# Patient Record
Sex: FEMALE | Race: WHITE | Employment: OTHER | URBAN - METROPOLITAN AREA
[De-identification: names, ages, dates, MRNs, and addresses within clinical notes are randomized per-mention and may not be internally consistent; named-entity substitution may affect disease eponyms.]

---

## 2019-01-25 ENCOUNTER — HOSPITAL ENCOUNTER (OUTPATIENT)
Age: 64
Discharge: HOME | End: 2019-01-27
Payer: COMMERCIAL

## 2019-01-25 ENCOUNTER — HOSPITAL ENCOUNTER (OUTPATIENT)
Dept: GENERAL RADIOLOGY | Age: 64
Discharge: HOME | End: 2019-01-27
Payer: COMMERCIAL

## 2019-01-25 ENCOUNTER — HOSPITAL ENCOUNTER (OUTPATIENT)
Dept: MAMMOGRAPHY | Age: 64
Discharge: HOME | End: 2019-01-27
Payer: COMMERCIAL

## 2019-01-25 ENCOUNTER — HOSPITAL ENCOUNTER (OUTPATIENT)
Dept: ULTRASOUND IMAGING | Age: 64
End: 2019-01-25
Payer: COMMERCIAL

## 2019-01-25 DIAGNOSIS — C50.911 MALIGNANT NEOPLASM OF RIGHT FEMALE BREAST, UNSPECIFIED ESTROGEN RECEPTOR STATUS, UNSPECIFIED SITE OF BREAST (HCC): ICD-10-CM

## 2019-01-25 PROCEDURE — 77065 DX MAMMO INCL CAD UNI: CPT

## 2019-01-25 PROCEDURE — 71046 X-RAY EXAM CHEST 2 VIEWS: CPT

## 2020-02-14 ENCOUNTER — HOSPITAL ENCOUNTER (OUTPATIENT)
Dept: MAMMOGRAPHY | Age: 65
Discharge: HOME | End: 2020-02-16
Payer: COMMERCIAL

## 2020-02-14 PROCEDURE — 77067 SCR MAMMO BI INCL CAD: CPT

## 2021-03-04 ENCOUNTER — HOSPITAL ENCOUNTER (OUTPATIENT)
Dept: MAMMOGRAPHY | Age: 66
Discharge: HOME | End: 2021-03-06
Payer: MEDICARE

## 2021-03-04 DIAGNOSIS — M65.9 SAPHO SYNDROME (HCC): ICD-10-CM

## 2021-03-04 DIAGNOSIS — L40.3 SAPHO SYNDROME (HCC): ICD-10-CM

## 2021-03-04 DIAGNOSIS — M85.80 SAPHO SYNDROME (HCC): ICD-10-CM

## 2021-03-04 DIAGNOSIS — L70.9 SAPHO SYNDROME (HCC): ICD-10-CM

## 2021-03-04 DIAGNOSIS — N95.9 MENOPAUSAL PROBLEM: ICD-10-CM

## 2021-03-04 DIAGNOSIS — M86.9 SAPHO SYNDROME (HCC): ICD-10-CM

## 2021-03-04 DIAGNOSIS — M85.80 OTHER SPECIFIED DISORDERS OF BONE DENSITY AND STRUCTURE, UNSPECIFIED SITE: ICD-10-CM

## 2021-03-04 DIAGNOSIS — N95.9 UNSPECIFIED MENOPAUSAL AND PERIMENOPAUSAL DISORDER: ICD-10-CM

## 2021-03-04 PROCEDURE — 77080 DXA BONE DENSITY AXIAL: CPT

## 2021-04-05 ENCOUNTER — HOSPITAL ENCOUNTER (OUTPATIENT)
Age: 66
Discharge: HOME | End: 2021-04-05
Payer: MEDICARE

## 2021-04-05 ENCOUNTER — HOSPITAL ENCOUNTER (OUTPATIENT)
Dept: MAMMOGRAPHY | Age: 66
Discharge: HOME | End: 2021-04-07
Payer: MEDICARE

## 2021-04-05 DIAGNOSIS — Z12.31 ENCOUNTER FOR SCREENING MAMMOGRAM FOR MALIGNANT NEOPLASM OF BREAST: ICD-10-CM

## 2021-04-05 DIAGNOSIS — Z12.31 OTHER SCREENING MAMMOGRAM: ICD-10-CM

## 2021-04-05 LAB
ALBUMIN SERPL-MCNC: 4.4 G/DL (ref 3.5–5.2)
ALP BLD-CCNC: 91 U/L (ref 35–104)
ALT SERPL-CCNC: 13 U/L (ref 0–32)
ANION GAP SERPL CALCULATED.3IONS-SCNC: 10 MMOL/L (ref 7–16)
AST SERPL-CCNC: 13 U/L (ref 0–31)
BASOPHILS ABSOLUTE: 0.05 E9/L (ref 0–0.2)
BASOPHILS RELATIVE PERCENT: 1 % (ref 0–2)
BILIRUB SERPL-MCNC: 0.3 MG/DL (ref 0–1.2)
BUN BLDV-MCNC: 24 MG/DL (ref 8–23)
CALCIUM SERPL-MCNC: 9.6 MG/DL (ref 8.6–10.2)
CHLORIDE BLD-SCNC: 106 MMOL/L (ref 98–107)
CO2: 25 MMOL/L (ref 22–29)
CREAT SERPL-MCNC: 1.1 MG/DL (ref 0.5–1)
EOSINOPHILS ABSOLUTE: 0.06 E9/L (ref 0.05–0.5)
EOSINOPHILS RELATIVE PERCENT: 1.2 % (ref 0–6)
GFR AFRICAN AMERICAN: 60
GFR NON-AFRICAN AMERICAN: 49 ML/MIN/1.73
GLUCOSE BLD-MCNC: 133 MG/DL (ref 74–99)
HCT VFR BLD CALC: 43.3 % (ref 34–48)
HEMOGLOBIN: 14.1 G/DL (ref 11.5–15.5)
IMMATURE GRANULOCYTES #: 0.01 E9/L
IMMATURE GRANULOCYTES %: 0.2 % (ref 0–5)
LYMPHOCYTES ABSOLUTE: 1.52 E9/L (ref 1.5–4)
LYMPHOCYTES RELATIVE PERCENT: 29.5 % (ref 20–42)
MCH RBC QN AUTO: 28.4 PG (ref 26–35)
MCHC RBC AUTO-ENTMCNC: 32.6 % (ref 32–34.5)
MCV RBC AUTO: 87.1 FL (ref 80–99.9)
MONOCYTES ABSOLUTE: 0.42 E9/L (ref 0.1–0.95)
MONOCYTES RELATIVE PERCENT: 8.1 % (ref 2–12)
NEUTROPHILS ABSOLUTE: 3.1 E9/L (ref 1.8–7.3)
NEUTROPHILS RELATIVE PERCENT: 60 % (ref 43–80)
PDW BLD-RTO: 12.9 FL (ref 11.5–15)
PLATELET # BLD: 228 E9/L (ref 130–450)
PMV BLD AUTO: 10 FL (ref 7–12)
POTASSIUM SERPL-SCNC: 4.5 MMOL/L (ref 3.5–5)
RBC # BLD: 4.97 E12/L (ref 3.5–5.5)
SODIUM BLD-SCNC: 141 MMOL/L (ref 132–146)
TOTAL PROTEIN: 7.3 G/DL (ref 6.4–8.3)
VITAMIN D 25-HYDROXY: 45 NG/ML (ref 30–100)
WBC # BLD: 5.2 E9/L (ref 4.5–11.5)

## 2021-04-05 PROCEDURE — 82306 VITAMIN D 25 HYDROXY: CPT

## 2021-04-05 PROCEDURE — 80183 DRUG SCRN QUANT OXCARBAZEPIN: CPT

## 2021-04-05 PROCEDURE — 36415 COLL VENOUS BLD VENIPUNCTURE: CPT

## 2021-04-05 PROCEDURE — 77067 SCR MAMMO BI INCL CAD: CPT

## 2021-04-05 PROCEDURE — 85025 COMPLETE CBC W/AUTO DIFF WBC: CPT

## 2021-04-05 PROCEDURE — 80053 COMPREHEN METABOLIC PANEL: CPT

## 2021-04-09 LAB — OXCARBAZEPINE: 25 UG/ML (ref 3–35)

## 2021-09-03 ENCOUNTER — HOSPITAL ENCOUNTER (OUTPATIENT)
Dept: CT IMAGING | Age: 66
Discharge: HOME | End: 2021-09-03
Payer: MEDICARE

## 2021-09-03 DIAGNOSIS — G40.209 PARTIAL SYMPTOMATIC EPILEPSY WITH COMPLEX PARTIAL SEIZURES, NOT INTRACTABLE, WITHOUT STATUS EPILEPTICUS (HCC): ICD-10-CM

## 2021-09-03 DIAGNOSIS — D32.9 MENINGIOMA (HCC): ICD-10-CM

## 2021-09-03 PROCEDURE — 70450 CT HEAD/BRAIN W/O DYE: CPT

## 2021-09-17 LAB
ALBUMIN SERPL-MCNC: 3.9 G/DL (ref 3.5–5.2)
ALP BLD-CCNC: 87 U/L (ref 35–104)
ALT SERPL-CCNC: 15 U/L (ref 0–32)
ANION GAP SERPL CALCULATED.3IONS-SCNC: 11 MMOL/L (ref 7–16)
AST SERPL-CCNC: 17 U/L (ref 0–31)
BASOPHILS ABSOLUTE: 0.06 E9/L (ref 0–0.2)
BASOPHILS RELATIVE PERCENT: 0.9 % (ref 0–2)
BILIRUB SERPL-MCNC: 0.4 MG/DL (ref 0–1.2)
BUN BLDV-MCNC: 24 MG/DL (ref 6–23)
CALCIUM SERPL-MCNC: 9.3 MG/DL (ref 8.6–10.2)
CHLORIDE BLD-SCNC: 101 MMOL/L (ref 98–107)
CO2: 26 MMOL/L (ref 22–29)
CREAT SERPL-MCNC: 1.3 MG/DL (ref 0.5–1)
EOSINOPHILS ABSOLUTE: 0.06 E9/L (ref 0.05–0.5)
EOSINOPHILS RELATIVE PERCENT: 0.9 % (ref 0–6)
GFR AFRICAN AMERICAN: 49
GFR NON-AFRICAN AMERICAN: 41 ML/MIN/1.73
GLUCOSE BLD-MCNC: 113 MG/DL (ref 74–99)
HCT VFR BLD CALC: 42.2 % (ref 34–48)
HEMOGLOBIN: 14 G/DL (ref 11.5–15.5)
LACTIC ACID: 1.5 MMOL/L (ref 0.5–2.2)
LYMPHOCYTES ABSOLUTE: 1.74 E9/L (ref 1.5–4)
LYMPHOCYTES RELATIVE PERCENT: 28.1 % (ref 20–42)
MCH RBC QN AUTO: 28.3 PG (ref 26–35)
MCHC RBC AUTO-ENTMCNC: 33.2 % (ref 32–34.5)
MCV RBC AUTO: 85.4 FL (ref 80–99.9)
MONOCYTES ABSOLUTE: 0.81 E9/L (ref 0.1–0.95)
MONOCYTES RELATIVE PERCENT: 13.2 % (ref 2–12)
NEUTROPHILS ABSOLUTE: 3.53 E9/L (ref 1.8–7.3)
NEUTROPHILS RELATIVE PERCENT: 57 % (ref 43–80)
PDW BLD-RTO: 13.8 FL (ref 11.5–15)
PLATELET # BLD: 223 E9/L (ref 130–450)
PMV BLD AUTO: 9.7 FL (ref 7–12)
POLYCHROMASIA: ABNORMAL
POTASSIUM REFLEX MAGNESIUM: 4.5 MMOL/L (ref 3.5–5)
PRO-BNP: 1992 PG/ML (ref 0–125)
RBC # BLD: 4.94 E12/L (ref 3.5–5.5)
SARS-COV-2, NAAT: NOT DETECTED
SODIUM BLD-SCNC: 138 MMOL/L (ref 132–146)
TOTAL PROTEIN: 7.6 G/DL (ref 6.4–8.3)
TROPONIN, HIGH SENSITIVITY: 18 NG/L (ref 0–9)
WBC # BLD: 6.2 E9/L (ref 4.5–11.5)

## 2021-09-17 PROCEDURE — 99285 EMERGENCY DEPT VISIT HI MDM: CPT

## 2021-09-17 PROCEDURE — 87635 SARS-COV-2 COVID-19 AMP PRB: CPT

## 2021-09-17 PROCEDURE — 93005 ELECTROCARDIOGRAM TRACING: CPT | Performed by: NURSE PRACTITIONER

## 2021-09-17 PROCEDURE — 83880 ASSAY OF NATRIURETIC PEPTIDE: CPT

## 2021-09-17 PROCEDURE — 85025 COMPLETE CBC W/AUTO DIFF WBC: CPT

## 2021-09-17 PROCEDURE — 80053 COMPREHEN METABOLIC PANEL: CPT

## 2021-09-17 PROCEDURE — 84484 ASSAY OF TROPONIN QUANT: CPT

## 2021-09-17 PROCEDURE — 83605 ASSAY OF LACTIC ACID: CPT

## 2021-09-17 NOTE — ED TRIAGE NOTES
FIRST PROVIDER CONTACT ASSESSMENT NOTE                                                                                                Department of Emergency Medicine                                                      First Provider Note  21  5:17 PM EDT  NAME: Telma Butcher  : 1955  MRN: [id number]    Chief Complaint: Cough      History of Present Illness:   Telma Butcher is a 74 y.o. female who presents to the ED for productive cough, dyspnea, and congestion since yesterday. Focused Physical Exam:  VS:    ED Triage Vitals   BP Temp Temp src Pulse Resp SpO2 Height Weight   -- -- -- -- -- -- -- --        General: Alert and in no apparent distress. Medical History:  has no past medical history on file. Surgical History:  has no past surgical history on file. Social History:      Family History: family history is not on file.     Allergies: Betadine [povidone iodine]     Initial Plan of Care:  Initiate Treatment-Testing, Proceed toTreatment Area When Bed Available for ED Attending/MLP to Continue Care    -------------------------------------------------END OF FIRST PROVIDER CONTACT ASSESSMENT NOTE--------------------------------------------------------  Electronically signed by MARLYN Modi CNP   DD: 21

## 2021-09-18 ENCOUNTER — APPOINTMENT (OUTPATIENT)
Dept: GENERAL RADIOLOGY | Age: 66
End: 2021-09-18
Payer: MEDICARE

## 2021-09-18 ENCOUNTER — HOSPITAL ENCOUNTER (EMERGENCY)
Age: 66
Discharge: HOME | End: 2021-09-18
Attending: EMERGENCY MEDICINE
Payer: MEDICARE

## 2021-09-18 VITALS
HEIGHT: 66 IN | DIASTOLIC BLOOD PRESSURE: 59 MMHG | RESPIRATION RATE: 20 BRPM | BODY MASS INDEX: 33.75 KG/M2 | OXYGEN SATURATION: 96 % | WEIGHT: 210 LBS | SYSTOLIC BLOOD PRESSURE: 144 MMHG | TEMPERATURE: 97.9 F | HEART RATE: 87 BPM

## 2021-09-18 DIAGNOSIS — R05.9 COUGH: Primary | ICD-10-CM

## 2021-09-18 LAB
EKG ATRIAL RATE: 100 BPM
EKG ATRIAL RATE: 375 BPM
EKG Q-T INTERVAL: 368 MS
EKG Q-T INTERVAL: 376 MS
EKG QRS DURATION: 88 MS
EKG QRS DURATION: 92 MS
EKG QTC CALCULATION (BAZETT): 452 MS
EKG QTC CALCULATION (BAZETT): 482 MS
EKG R AXIS: 21 DEGREES
EKG R AXIS: 40 DEGREES
EKG T AXIS: 57 DEGREES
EKG T AXIS: 99 DEGREES
EKG VENTRICULAR RATE: 103 BPM
EKG VENTRICULAR RATE: 87 BPM
TROPONIN, HIGH SENSITIVITY: 18 NG/L (ref 0–9)

## 2021-09-18 PROCEDURE — 93005 ELECTROCARDIOGRAM TRACING: CPT | Performed by: EMERGENCY MEDICINE

## 2021-09-18 PROCEDURE — 84484 ASSAY OF TROPONIN QUANT: CPT

## 2021-09-18 PROCEDURE — 71046 X-RAY EXAM CHEST 2 VIEWS: CPT

## 2021-09-18 RX ORDER — BENZONATATE 100 MG/1
100 CAPSULE ORAL 2 TIMES DAILY PRN
Qty: 14 CAPSULE | Refills: 0 | Status: SHIPPED | OUTPATIENT
Start: 2021-09-18 | End: 2021-09-25

## 2021-09-18 NOTE — ED NOTES
Bed: 04  Expected date:   Expected time:   Means of arrival:   Comments:  bev Ruano, DEANGELO  09/18/21 6308

## 2021-09-18 NOTE — ED PROVIDER NOTES
HPI:  9/18/21, Time: 3:37 AM EDT         Odilia Lui is a 74 y.o. female with a history of seizures, hyperlipidemia, atrial fibrillation, breast cancer, mandible cancer presenting to the ED for cough, beginning a week ago. The complaint has been persistent, mild in severity, and worsened by nothing. Patient states that for the last week she has had a nonproductive cough. She denies any fever, chills, myalgias, rhinorrhea, sore throat, chest pain, shortness of breath, nausea, vomiting, diarrhea, constipation. She denies any recent travel, recent surgery, calf pain, calf swelling, history of DVT or PE. Does have a history of cancer. Is not undergoing any chemo or radiation per the patient. ROS:   Pertinent positives and negatives are stated within HPI, all other systems reviewed and are negative.  --------------------------------------------- PAST HISTORY ---------------------------------------------  Past Medical History:  has no past medical history on file. Past Surgical History:  has no past surgical history on file. Social History:      Family History: family history is not on file. The patients home medications have been reviewed. Allergies: Betadine [povidone iodine]    ---------------------------------------------------PHYSICAL EXAM--------------------------------------    Constitutional/General: Alert and oriented x3, well appearing, non toxic in NAD  Head: Normocephalic and atraumatic  Eyes: PERRL, EOMI  Mouth: Oropharynx clear, handling secretions, no trismus  Neck: Supple, full ROM, non tender to palpation in the midline, no stridor, no crepitus, no meningeal signs  Pulmonary: Lungs clear to auscultation bilaterally, no wheezes, rales, or rhonchi. Not in respiratory distress  Cardiovascular:  Regular rate. Regular rhythm. No murmurs, gallops, or rubs. 2+ distal pulses  Chest: no chest wall tenderness  Abdomen: Soft. Non tender. Non distended. +BS.   No rebound, guarding, or rigidity. No pulsatile masses appreciated. Musculoskeletal: Moves all extremities x 4. Warm and well perfused, no clubbing, cyanosis, or edema. Capillary refill <3 seconds  Skin: warm and dry. No rashes. Neurologic: GCS 15, CN 2-12 grossly intact, no focal deficits, symmetric strength 5/5 in the upper and lower extremities bilaterally  Psych: Normal Affect    -------------------------------------------------- RESULTS -------------------------------------------------  I have personally reviewed all laboratory and imaging results for this patient. Results are listed below.      LABS:  Results for orders placed or performed during the hospital encounter of 09/18/21   COVID-19, Rapid    Specimen: Nasopharyngeal Swab   Result Value Ref Range    SARS-CoV-2, NAAT Not Detected Not Detected   CBC Auto Differential   Result Value Ref Range    WBC 6.2 4.5 - 11.5 E9/L    RBC 4.94 3.50 - 5.50 E12/L    Hemoglobin 14.0 11.5 - 15.5 g/dL    Hematocrit 42.2 34.0 - 48.0 %    MCV 85.4 80.0 - 99.9 fL    MCH 28.3 26.0 - 35.0 pg    MCHC 33.2 32.0 - 34.5 %    RDW 13.8 11.5 - 15.0 fL    Platelets 551 090 - 860 E9/L    MPV 9.7 7.0 - 12.0 fL    Neutrophils % 57.0 43.0 - 80.0 %    Lymphocytes % 28.1 20.0 - 42.0 %    Monocytes % 13.2 (H) 2.0 - 12.0 %    Eosinophils % 0.9 0.0 - 6.0 %    Basophils % 0.9 0.0 - 2.0 %    Neutrophils Absolute 3.53 1.80 - 7.30 E9/L    Lymphocytes Absolute 1.74 1.50 - 4.00 E9/L    Monocytes Absolute 0.81 0.10 - 0.95 E9/L    Eosinophils Absolute 0.06 0.05 - 0.50 E9/L    Basophils Absolute 0.06 0.00 - 0.20 E9/L    Polychromasia 1+    Comprehensive Metabolic Panel w/ Reflex to MG   Result Value Ref Range    Sodium 138 132 - 146 mmol/L    Potassium reflex Magnesium 4.5 3.5 - 5.0 mmol/L    Chloride 101 98 - 107 mmol/L    CO2 26 22 - 29 mmol/L    Anion Gap 11 7 - 16 mmol/L    Glucose 113 (H) 74 - 99 mg/dL    BUN 24 (H) 6 - 23 mg/dL    CREATININE 1.3 (H) 0.5 - 1.0 mg/dL    GFR Non-African American 41 >=60 mL/min/1.73    GFR MAKING----------------------  Medications - No data to display          Medical Decision Making:    Vital signs are stable. Physical exam is unremarkable. Lungs are clear to auscultation bilaterally. No calf pain or swelling. Labs and imaging were obtained. Patient's creatinine is 1.3. Troponin is 18. White blood cell count is normal.  Lactic acid is normal.  Covid is not detected. Chest x-ray does not show any acute process. BNP is elevated at 1992. Will repeat the patient's troponin. It was 18. Patient denies chest pain. EKG shows atrial fibrillation. Patient says she has a history of atrial fibrillation. I will discharge the patient home. I will prescribe her Tessalon Perles. I instructed her to take the medication prescribed as directed, to follow-up with her primary care physician for reevaluation this week, and to return for worsening symptoms. She is agreeable with plan of care. Re-Evaluations:             Re-evaluation. Patients symptoms are improving        This patient's ED course included: a personal history and physicial examination, re-evaluation prior to disposition, multiple bedside re-evaluations, cardiac monitoring, continuous pulse oximetry and a personal history and physicial eaxmination    This patient has remained hemodynamically stable during their ED course. Counseling: The emergency provider has spoken with the patient and discussed todays results, in addition to providing specific details for the plan of care and counseling regarding the diagnosis and prognosis. Questions are answered at this time and they are agreeable with the plan.       --------------------------------- IMPRESSION AND DISPOSITION ---------------------------------    IMPRESSION  1. Cough        DISPOSITION  Disposition: Discharge to home  Patient condition is stable        NOTE: This report was transcribed using voice recognition software.  Every effort was made to ensure accuracy; however, inadvertent computerized transcription errors may be present          Maximilian Cheng MD  09/18/21 3303

## 2021-11-06 ENCOUNTER — HOSPITAL ENCOUNTER (EMERGENCY)
Age: 66
Discharge: HOME | End: 2021-11-06
Attending: STUDENT IN AN ORGANIZED HEALTH CARE EDUCATION/TRAINING PROGRAM
Payer: MEDICARE

## 2021-11-06 ENCOUNTER — APPOINTMENT (OUTPATIENT)
Dept: CT IMAGING | Age: 66
End: 2021-11-06
Payer: MEDICARE

## 2021-11-06 ENCOUNTER — APPOINTMENT (OUTPATIENT)
Dept: GENERAL RADIOLOGY | Age: 66
End: 2021-11-06
Payer: MEDICARE

## 2021-11-06 VITALS
HEART RATE: 78 BPM | HEIGHT: 66 IN | TEMPERATURE: 97.9 F | RESPIRATION RATE: 16 BRPM | DIASTOLIC BLOOD PRESSURE: 70 MMHG | OXYGEN SATURATION: 97 % | SYSTOLIC BLOOD PRESSURE: 141 MMHG | WEIGHT: 210 LBS | BODY MASS INDEX: 33.75 KG/M2

## 2021-11-06 DIAGNOSIS — S09.90XA CLOSED HEAD INJURY, INITIAL ENCOUNTER: Primary | ICD-10-CM
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
## 2021-11-06 LAB
ANION GAP SERPL CALCULATED.3IONS-SCNC: 11 MMOL/L (ref 7–16)
BASOPHILS ABSOLUTE: 0.04 E9/L (ref 0–0.2)
BASOPHILS RELATIVE PERCENT: 0.4 % (ref 0–2)
BUN BLDV-MCNC: 22 MG/DL (ref 6–23)
CALCIUM SERPL-MCNC: 10.4 MG/DL (ref 8.6–10.2)
CHLORIDE BLD-SCNC: 107 MMOL/L (ref 98–107)
CO2: 26 MMOL/L (ref 22–29)
CREAT SERPL-MCNC: 1.4 MG/DL (ref 0.5–1)
EOSINOPHILS ABSOLUTE: 0.02 E9/L (ref 0.05–0.5)
EOSINOPHILS RELATIVE PERCENT: 0.2 % (ref 0–6)
GFR AFRICAN AMERICAN: 45
GFR NON-AFRICAN AMERICAN: 37 ML/MIN/1.73
GLUCOSE BLD-MCNC: 108 MG/DL (ref 74–99)
HCT VFR BLD CALC: 44.3 % (ref 34–48)
HEMOGLOBIN: 14.2 G/DL (ref 11.5–15.5)
IMMATURE GRANULOCYTES #: 0.04 E9/L
IMMATURE GRANULOCYTES %: 0.4 % (ref 0–5)
LYMPHOCYTES ABSOLUTE: 1.38 E9/L (ref 1.5–4)
LYMPHOCYTES RELATIVE PERCENT: 15.1 % (ref 20–42)
MAGNESIUM: 2.5 MG/DL (ref 1.6–2.6)
MCH RBC QN AUTO: 28.3 PG (ref 26–35)
MCHC RBC AUTO-ENTMCNC: 32.1 % (ref 32–34.5)
MCV RBC AUTO: 88.2 FL (ref 80–99.9)
MONOCYTES ABSOLUTE: 0.52 E9/L (ref 0.1–0.95)
MONOCYTES RELATIVE PERCENT: 5.7 % (ref 2–12)
NEUTROPHILS ABSOLUTE: 7.13 E9/L (ref 1.8–7.3)
NEUTROPHILS RELATIVE PERCENT: 78.2 % (ref 43–80)
PDW BLD-RTO: 13.6 FL (ref 11.5–15)
PLATELET # BLD: 216 E9/L (ref 130–450)
PMV BLD AUTO: 11.3 FL (ref 7–12)
POTASSIUM SERPL-SCNC: 4.1 MMOL/L (ref 3.5–5)
RBC # BLD: 5.02 E12/L (ref 3.5–5.5)
SODIUM BLD-SCNC: 144 MMOL/L (ref 132–146)
WBC # BLD: 9.1 E9/L (ref 4.5–11.5)

## 2021-11-06 PROCEDURE — 72170 X-RAY EXAM OF PELVIS: CPT

## 2021-11-06 PROCEDURE — 83735 ASSAY OF MAGNESIUM: CPT

## 2021-11-06 PROCEDURE — 85025 COMPLETE CBC W/AUTO DIFF WBC: CPT

## 2021-11-06 PROCEDURE — 70450 CT HEAD/BRAIN W/O DYE: CPT

## 2021-11-06 PROCEDURE — 93005 ELECTROCARDIOGRAM TRACING: CPT | Performed by: STUDENT IN AN ORGANIZED HEALTH CARE EDUCATION/TRAINING PROGRAM

## 2021-11-06 PROCEDURE — 80048 BASIC METABOLIC PNL TOTAL CA: CPT

## 2021-11-06 PROCEDURE — 71045 X-RAY EXAM CHEST 1 VIEW: CPT

## 2021-11-06 PROCEDURE — 99284 EMERGENCY DEPT VISIT MOD MDM: CPT

## 2021-11-06 PROCEDURE — 72125 CT NECK SPINE W/O DYE: CPT

## 2021-11-06 NOTE — ED NOTES
[id number] patient brought in via squad from home with c/o dizziness per the family she has had 2 falls in 2 days patient baseline mentation is confusion but per report the family states she just isnt her usual self patient cant remember falling family states there was no LOC she follows commands but is only alert to self and forgetful she tolerated her ekg well safety maintained     Yajaira Garcia RN  11/06/21 8644

## 2021-11-06 NOTE — ED PROVIDER NOTES
Department of Emergency Medicine   ED  Provider Note  Admit Date/RoomTime: 11/6/2021  2:18 PM  ED Room: Kendrick Stoddard          History of Present Illness:  11/6/21, Time: 3:27 PM EDT  Chief Complaint   Patient presents with   Dom Chen     pt has fallen 2 times today and hit back of head, pts family states that pt is more altered since fall, +thinners, -LOC         Jose Antonio Moon is a 74 y.o. female presenting to the ED for mechanical fall. Apparently, the patient is very unsteady on her feet at baseline. She has sustained 2 mechanical falls over the past 3 days. She fell 3 days ago and struck her head on the ground. Subsequently she has been complaining of intermittent she denies any nausea or vomiting, headaches or diplopia since then. Apparently, the patient was ambulating today at home and she fell while using her walker. She usually gets caught up on her feet. This was witnessed by her . However, the patient has baseline dementia and is unable to further describe the events aside from she tripped. At present, she denies any complaints. Nothing makes her symptoms better or worse. I did inquire with the patient's granddaughter Chase Ceja about admission for placement and she declines. Apparently, the patient's  is largely unable to care for her at home. However, they have been resistant to placement for long-term care as an inpatient. Review of Systems:  Review of systems obtained and negative unless stated otherwise above in the HPI.    --------------------------------------------- PAST HISTORY ---------------------------------------------  Past Medical History: Denies history of hypertension or diabetes, does have a history of A. fib on Eliquis with recent cardioversion  Past Surgical History:  has no past surgical history on file. Social History:  reports that she has never smoked. She does not have any smokeless tobacco history on file.  She reports that she does not drink alcohol and does not use drugs. Family History: family history is not on file. . Unless otherwise noted, family history is non contributory    The patients home medications have been reviewed. Allergies: Betadine [povidone iodine]    I have reviewed the past medical history, past surgical history, social history, and family history    ---------------------------------------------------PHYSICAL EXAM--------------------------------------    Constitutional: Appears in no distress  Head: Normocephalic, atraumatic  Eyes: Non-icteric slcera, no conjunctival injection  ENT: Moist mucous membranes,  Neck: Trachea midline, no JVD  Respiratory: Nonlabored respirations. Lungs clear to auscultation bilaterally, no wheezes, rales, or rhonchi. Cardiovascular: Regular rate. Regular rhythm. No murmurs, no gallops, no rubs. Gastrointestinal: Abdomen Soft, Non tender, Non distended. No rebound tenderness, guarding, or rigidity. Extremities: No lower extremity edema  Genitourinary: No CVA tenderness, no suprapubic tenderness  Musculoskeletal: Moves all extremities, no deformity, there is no midline cervical thoracic or lumbar spinal tenderness  Skin: Pink, warm, dry without rash. Neurologic: Alert, symmetric facies, no aphasia    -------------------------------------------------- RESULTS -------------------------------------------------  I have personally reviewed all laboratory and imaging results for this patient. Results are listed below.      LABS: (Lab results interpreted by me)  Results for orders placed or performed during the hospital encounter of 86/66/53   Basic Metabolic Panel   Result Value Ref Range    Sodium 144 132 - 146 mmol/L    Potassium 4.1 3.5 - 5.0 mmol/L    Chloride 107 98 - 107 mmol/L    CO2 26 22 - 29 mmol/L    Anion Gap 11 7 - 16 mmol/L    Glucose 108 (H) 74 - 99 mg/dL    BUN 22 6 - 23 mg/dL    CREATININE 1.4 (H) 0.5 - 1.0 mg/dL    GFR Non-African American 37 >=60 mL/min/1.73    GFR African American 45 Calcium 10.4 (H) 8.6 - 10.2 mg/dL   CBC Auto Differential   Result Value Ref Range    WBC 9.1 4.5 - 11.5 E9/L    RBC 5.02 3.50 - 5.50 E12/L    Hemoglobin 14.2 11.5 - 15.5 g/dL    Hematocrit 44.3 34.0 - 48.0 %    MCV 88.2 80.0 - 99.9 fL    MCH 28.3 26.0 - 35.0 pg    MCHC 32.1 32.0 - 34.5 %    RDW 13.6 11.5 - 15.0 fL    Platelets 489 303 - 065 E9/L    MPV 11.3 7.0 - 12.0 fL    Neutrophils % 78.2 43.0 - 80.0 %    Immature Granulocytes % 0.4 0.0 - 5.0 %    Lymphocytes % 15.1 (L) 20.0 - 42.0 %    Monocytes % 5.7 2.0 - 12.0 %    Eosinophils % 0.2 0.0 - 6.0 %    Basophils % 0.4 0.0 - 2.0 %    Neutrophils Absolute 7.13 1.80 - 7.30 E9/L    Immature Granulocytes # 0.04 E9/L    Lymphocytes Absolute 1.38 (L) 1.50 - 4.00 E9/L    Monocytes Absolute 0.52 0.10 - 0.95 E9/L    Eosinophils Absolute 0.02 (L) 0.05 - 0.50 E9/L    Basophils Absolute 0.04 0.00 - 0.20 E9/L   Magnesium   Result Value Ref Range    Magnesium 2.5 1.6 - 2.6 mg/dL   ,       RADIOLOGY:  Interpreted by Radiologist unless otherwise specified  CT HEAD WO CONTRAST   Final Result   No acute intracranial abnormality. Postsurgical changes and left-sided ventricular shunt catheter. CT CERVICAL SPINE WO CONTRAST   Final Result   No acute cervical spine fracture. Mild degenerative changes of the cervical spine with mild reversal of the   cervical lordosis. XR CHEST PORTABLE   Final Result   No acute process. XR PELVIS (1-2 VIEWS)   Final Result   No acute abnormality of the pelvis.               ------------------------- NURSING NOTES AND VITALS REVIEWED ---------------------------   The nursing notes within the ED encounter and vital signs as below have been reviewed by myself  BP (!) 141/70   Pulse 78   Temp 97.9 °F (36.6 °C)   Resp 16   Ht 5' 6\" (1.676 m)   Wt 210 lb (95.3 kg)   SpO2 97%   BMI 33.89 kg/m²     Oxygen Saturation Interpretation: Normal    The patients available past medical records and past encounters were reviewed. ------------------------------ ED COURSE/MEDICAL DECISION MAKING----------------------  Medications - No data to display       This patient's ED course included:a personal history and physicial examination    This patient has remained hemodynamically stable during their ED course. Consultations:  None    Medical Decision Making:   Patient presents after mechanical fall. She denies complaints at this time. Will obtain CT of the head as well as CT C-spine plain film of the chest and pelvis. Labs: Elevated creatinine 1.4 this is around the patient's normal baseline. CBC is normal.    Imaging is reviewed by myself unremarkable for any acute intracranial process, there is a shunt noted, C-spine is unremarkable aside from degenerative changes. I spoke with the patient's son who states he is comfortable with the patient going home. Patient was ambulated throughout the emergency department. She does so without difficulty and walks by herself to the bathroom. We discussed inpatient versus outpatient management and possible PT OT. Patient be discharged with outpatient follow-up. Counseling: The emergency provider has spoken with the patient and discussed todays results, in addition to providing specific details for the plan of care and counseling regarding the diagnosis and prognosis. Questions are answered at this time and they are agreeable with the plan.       --------------------------------- IMPRESSION AND DISPOSITION ---------------------------------    IMPRESSION  1. Closed head injury, initial encounter    2. Fall, initial encounter        DISPOSITION  Disposition: Discharge to home  Patient condition is stable    IDr. Naa, am the primary provider of record    Naa Mackenzie DO  Emergency Medicine    NOTE: This report was transcribed using voice recognition software.  Every effort was made to ensure accuracy; however, inadvertent computerized transcription errors may be present         Dandy Sanches,   11/06/21 1817

## 2021-11-06 NOTE — ED NOTES
[phone] patient fully dressed with all personal belongings at her side she tolerated the removal of her IV well I reviewed her discharge instructions, fall safety and follow up appointments she and her grandson verbalized understanding patient was wheeled to a private vehicle grandson driving safety maintained     [facility]  11/06/21 [address]

## 2021-11-06 NOTE — PROGRESS NOTES
Pt arrived to Ct department with two earrings and one cross necklace. Jewelry was removed in CT room and placed in sealed envelope.  Pt was given sealed envelope containing jewelry leaving CT room

## 2021-11-06 NOTE — ED NOTES
[phone] patient back from testing states she tolerated it well no signs of distress safety maintained     Geraldo Espinoza RN  11/06/21 3295

## 2021-11-08 ENCOUNTER — APPOINTMENT (OUTPATIENT)
Dept: CT IMAGING | Age: 66
End: 2021-11-08
Payer: MEDICARE

## 2021-11-08 ENCOUNTER — HOSPITAL ENCOUNTER (EMERGENCY)
Age: 66
Discharge: HOME | End: 2021-11-08
Attending: EMERGENCY MEDICINE
Payer: MEDICARE

## 2021-11-08 VITALS
DIASTOLIC BLOOD PRESSURE: 52 MMHG | RESPIRATION RATE: 19 BRPM | HEART RATE: 43 BPM | TEMPERATURE: 97.1 F | OXYGEN SATURATION: 98 % | SYSTOLIC BLOOD PRESSURE: 107 MMHG

## 2021-11-08 DIAGNOSIS — I95.1 ORTHOSTASIS: ICD-10-CM

## 2021-11-08 DIAGNOSIS — R42 DIZZINESS: Primary | ICD-10-CM

## 2021-11-08 LAB
ALBUMIN SERPL-MCNC: 4.3 G/DL (ref 3.5–5.2)
ALP BLD-CCNC: 87 U/L (ref 35–104)
ALT SERPL-CCNC: 13 U/L (ref 0–32)
ANION GAP SERPL CALCULATED.3IONS-SCNC: 11 MMOL/L (ref 7–16)
AST SERPL-CCNC: 16 U/L (ref 0–31)
BASOPHILS ABSOLUTE: 0.05 E9/L (ref 0–0.2)
BASOPHILS RELATIVE PERCENT: 0.7 % (ref 0–2)
BILIRUB SERPL-MCNC: 0.4 MG/DL (ref 0–1.2)
BILIRUBIN URINE: NEGATIVE
BLOOD, URINE: NEGATIVE
BUN BLDV-MCNC: 23 MG/DL (ref 6–23)
CALCIUM SERPL-MCNC: 9.3 MG/DL (ref 8.6–10.2)
CHLORIDE BLD-SCNC: 102 MMOL/L (ref 98–107)
CLARITY: CLEAR
CO2: 27 MMOL/L (ref 22–29)
COLOR: YELLOW
CREAT SERPL-MCNC: 1.3 MG/DL (ref 0.5–1)
EOSINOPHILS ABSOLUTE: 0.03 E9/L (ref 0.05–0.5)
EOSINOPHILS RELATIVE PERCENT: 0.4 % (ref 0–6)
GFR AFRICAN AMERICAN: 49
GFR NON-AFRICAN AMERICAN: 41 ML/MIN/1.73
GLUCOSE BLD-MCNC: 94 MG/DL (ref 74–99)
GLUCOSE URINE: NEGATIVE MG/DL
HCT VFR BLD CALC: 44.3 % (ref 34–48)
HEMOGLOBIN: 14.3 G/DL (ref 11.5–15.5)
IMMATURE GRANULOCYTES #: 0.02 E9/L
IMMATURE GRANULOCYTES %: 0.3 % (ref 0–5)
KETONES, URINE: NEGATIVE MG/DL
LACTIC ACID, SEPSIS: 0.9 MMOL/L (ref 0.5–1.9)
LACTIC ACID, SEPSIS: 1.2 MMOL/L (ref 0.5–1.9)
LEUKOCYTE ESTERASE, URINE: NEGATIVE
LYMPHOCYTES ABSOLUTE: 1.9 E9/L (ref 1.5–4)
LYMPHOCYTES RELATIVE PERCENT: 26.1 % (ref 20–42)
MCH RBC QN AUTO: 28.9 PG (ref 26–35)
MCHC RBC AUTO-ENTMCNC: 32.3 % (ref 32–34.5)
MCV RBC AUTO: 89.5 FL (ref 80–99.9)
MONOCYTES ABSOLUTE: 0.58 E9/L (ref 0.1–0.95)
MONOCYTES RELATIVE PERCENT: 8 % (ref 2–12)
NEUTROPHILS ABSOLUTE: 4.71 E9/L (ref 1.8–7.3)
NEUTROPHILS RELATIVE PERCENT: 64.5 % (ref 43–80)
NITRITE, URINE: NEGATIVE
PDW BLD-RTO: 13.1 FL (ref 11.5–15)
PH UA: 6.5 (ref 5–9)
PLATELET # BLD: 232 E9/L (ref 130–450)
PMV BLD AUTO: 10.3 FL (ref 7–12)
POTASSIUM REFLEX MAGNESIUM: 3.9 MMOL/L (ref 3.5–5)
PRO-BNP: 1599 PG/ML (ref 0–125)
PROTEIN UA: NEGATIVE MG/DL
RBC # BLD: 4.95 E12/L (ref 3.5–5.5)
SODIUM BLD-SCNC: 140 MMOL/L (ref 132–146)
SPECIFIC GRAVITY UA: 1.02 (ref 1–1.03)
TOTAL PROTEIN: 7.4 G/DL (ref 6.4–8.3)
TROPONIN, HIGH SENSITIVITY: 17 NG/L (ref 0–9)
UROBILINOGEN, URINE: 0.2 E.U./DL
WBC # BLD: 7.3 E9/L (ref 4.5–11.5)

## 2021-11-08 PROCEDURE — 85025 COMPLETE CBC W/AUTO DIFF WBC: CPT

## 2021-11-08 PROCEDURE — 83605 ASSAY OF LACTIC ACID: CPT

## 2021-11-08 PROCEDURE — 2580000003 HC RX 258: Performed by: GENERAL PRACTICE

## 2021-11-08 PROCEDURE — 84484 ASSAY OF TROPONIN QUANT: CPT

## 2021-11-08 PROCEDURE — 81003 URINALYSIS AUTO W/O SCOPE: CPT

## 2021-11-08 PROCEDURE — 87088 URINE BACTERIA CULTURE: CPT

## 2021-11-08 PROCEDURE — 70450 CT HEAD/BRAIN W/O DYE: CPT

## 2021-11-08 PROCEDURE — 80053 COMPREHEN METABOLIC PANEL: CPT

## 2021-11-08 PROCEDURE — 83880 ASSAY OF NATRIURETIC PEPTIDE: CPT

## 2021-11-08 PROCEDURE — 93005 ELECTROCARDIOGRAM TRACING: CPT | Performed by: GENERAL PRACTICE

## 2021-11-08 PROCEDURE — 36415 COLL VENOUS BLD VENIPUNCTURE: CPT

## 2021-11-08 PROCEDURE — 99283 EMERGENCY DEPT VISIT LOW MDM: CPT

## 2021-11-08 RX ORDER — 0.9 % SODIUM CHLORIDE 0.9 %
1000 INTRAVENOUS SOLUTION INTRAVENOUS ONCE
Status: COMPLETED | OUTPATIENT
Start: 2021-11-08 | End: 2021-11-08

## 2021-11-08 RX ADMIN — SODIUM CHLORIDE 1000 ML: 9 INJECTION, SOLUTION INTRAVENOUS at 18:28

## 2021-11-08 RX ADMIN — SODIUM CHLORIDE 1000 ML: 9 INJECTION, SOLUTION INTRAVENOUS at 15:09

## 2021-11-08 ASSESSMENT — ENCOUNTER SYMPTOMS
SORE THROAT: 0
DIARRHEA: 0
EYE PAIN: 0
WHEEZING: 0
VOMITING: 0
COUGH: 0
ABDOMINAL PAIN: 0
CHEST TIGHTNESS: 0
CHOKING: 0
SINUS PAIN: 0
SHORTNESS OF BREATH: 0
NAUSEA: 0

## 2021-11-08 NOTE — ED NOTES
Bed: 10  Expected date: 11/8/21  Expected time:   Means of arrival:   Comments:  triage     Fred Jose RN  11/08/21 1400

## 2021-11-08 NOTE — ED TRIAGE NOTES
FIRST PROVIDER CONTACT ASSESSMENT NOTE           Department of Emergency Medicine                 First Provider Note            21  1:49 PM EST    Date of Encounter: No admission date for patient encounter. Patient Name: Geralynn Harada  : 1955  MRN: [id number]    Chief Complaint: Dizziness (hx dementia, freq falls this past week)      History of Present Illness:   Geralynn Harada is a 74 y.o. female who presents to the ED for dizziness. History of afib, cardioverted two week ago at [facility]. Frequent falls at home. Focused Physical Exam:  VS:    ED Triage Vitals [21 1335]   BP Temp Temp Source Pulse Resp SpO2 Height Weight   -- 97.1 °F (36.2 °C) Temporal -- -- -- -- --        Physical Ex: Constitutional: Alert and non-toxic. Medical History:  has no past medical history on file. Surgical History:  has no past surgical history on file. Social History:  reports that she has never smoked. She does not have any smokeless tobacco history on file. She reports that she does not drink alcohol and does not use drugs. Family History: family history is not on file.     Allergies: Betadine [povidone iodine]     Initial Plan of Care: Initiate Treatment-Testing, Proceed toTreatment Area When Bed Available for ED Attending/MLP to Continue Care      ---END OF FIRST PROVIDER CONTACT ASSESSMENT NOTE---  Electronically signed by MARLYN Jennings CNP   DD: 21

## 2021-11-08 NOTE — ED PROVIDER NOTES
ED  Provider Note  Admit Date/RoomTime: 11/8/2021  2:00 PM  ED Room: 10/10     HPI:   Marixa Adams is a 74 y.o. female presenting to the ED for dizzyness, beginning days ago. History comes primarily from Family. Past medical history includes prior aneurysmal rupture with intracranial hemorrhage, dementia, newly diagnosed atrial fibrillation. The complaint has been persistent, moderate in severity, improved by nothing and worsened by nothing. Associated symptoms include falls. Rell Lopez has been having multiple falls over the course the last several days. She was recently assessed for this at [facility] at which point a CT scan was performed which revealed no acute intracranial abnormality. She again fell over the course of this past weekend. The patient lives at home with her  and son, who are her caretakers. They are concerned that her newly diagnosed atrial fibrillation is the cause for her falls. Patient last fell 48 hours ago and has not been seen since that fall. For this reason she was transported to [facility] emergency department for further evaluation and treatment. On arrival, the patient was assessed with history, physical exam, imaging studies, laboratory studies and ekg, vital signs. Vital signs were notable for but the patient was afebrile. Aradycardia of 43 and a borderline blood pressure of 107/52           Review of Systems   Constitutional: Negative for appetite change, chills and fever. HENT: Negative for sinus pain and sore throat. Eyes: Negative for pain and visual disturbance. Respiratory: Negative for cough, choking, chest tightness, shortness of breath and wheezing. Cardiovascular: Negative for chest pain and palpitations. Gastrointestinal: Negative for abdominal pain, diarrhea, nausea and vomiting. Genitourinary: Negative for hematuria. Musculoskeletal: Negative for neck pain and neck stiffness. Skin: Negative for rash. Neurological: Negative for tremors, syncope and weakness. Psychiatric/Behavioral: Negative for confusion. Physical Exam  Vitals and nursing note reviewed. Constitutional:       Appearance: She is well-developed. She is obese. She is not ill-appearing. HENT:      Head: Normocephalic and atraumatic. Eyes:      Pupils: Pupils are equal, round, and reactive to light. Cardiovascular:      Rate and Rhythm: Normal rate and regular rhythm. Pulmonary:      Effort: Pulmonary effort is normal. No respiratory distress. Breath sounds: Normal breath sounds. No wheezing or rales. Abdominal:      General: Bowel sounds are normal.      Palpations: Abdomen is soft. Tenderness: There is no abdominal tenderness. There is no guarding or rebound. Musculoskeletal:      Cervical back: Normal range of motion and neck supple. Skin:     General: Skin is warm and dry. Neurological:      General: No focal deficit present. Mental Status: She is alert. Mental status is at baseline. Cranial Nerves: No cranial nerve deficit. Sensory: No sensory deficit. Coordination: Coordination normal.          Procedures     MDM  Number of Diagnoses or Management Options  Dizziness  Orthostasis  Diagnosis management comments: Emergency Department evaluation was notable for reassuring work-up in the setting of lightheadedness and fall. Laboratory studies revealed no significant leukocytosis, anemia or thrombocytopenia, patient's troponin and proBNP were stable as compared to previous studies, lactic acid level was within normal limits, renal function was good as well as balanced electrolytes. Patient was mildly bradycardic throughout the entirety of her emergency department stay, however she improved from the 40s to the high 50s over the course of her time in the emergency department, and after fluids and food repeat orthostatic assessment revealed no significant drop.   Discussed patient's baseline iodine]    -------------------------------------------------- RESULTS -------------------------------------------------  Labs:  Results for orders placed or performed during the hospital encounter of 11/08/21   CBC Auto Differential   Result Value Ref Range    WBC 7.3 4.5 - 11.5 E9/L    RBC 4.95 3.50 - 5.50 E12/L    Hemoglobin 14.3 11.5 - 15.5 g/dL    Hematocrit 44.3 34.0 - 48.0 %    MCV 89.5 80.0 - 99.9 fL    MCH 28.9 26.0 - 35.0 pg    MCHC 32.3 32.0 - 34.5 %    RDW 13.1 11.5 - 15.0 fL    Platelets 812 905 - 693 E9/L    MPV 10.3 7.0 - 12.0 fL    Neutrophils % 64.5 43.0 - 80.0 %    Immature Granulocytes % 0.3 0.0 - 5.0 %    Lymphocytes % 26.1 20.0 - 42.0 %    Monocytes % 8.0 2.0 - 12.0 %    Eosinophils % 0.4 0.0 - 6.0 %    Basophils % 0.7 0.0 - 2.0 %    Neutrophils Absolute 4.71 1.80 - 7.30 E9/L    Immature Granulocytes # 0.02 E9/L    Lymphocytes Absolute 1.90 1.50 - 4.00 E9/L    Monocytes Absolute 0.58 0.10 - 0.95 E9/L    Eosinophils Absolute 0.03 (L) 0.05 - 0.50 E9/L    Basophils Absolute 0.05 0.00 - 0.20 E9/L   Troponin   Result Value Ref Range    Troponin, High Sensitivity 17 (H) 0 - 9 ng/L   Urinalysis, reflex to microscopic   Result Value Ref Range    Color, UA Yellow Straw/Yellow    Clarity, UA Clear Clear    Glucose, Ur Negative Negative mg/dL    Bilirubin Urine Negative Negative    Ketones, Urine Negative Negative mg/dL    Specific Gravity, UA 1.020 1.005 - 1.030    Blood, Urine Negative Negative    pH, UA 6.5 5.0 - 9.0    Protein, UA Negative Negative mg/dL    Urobilinogen, Urine 0.2 <2.0 E.U./dL    Nitrite, Urine Negative Negative    Leukocyte Esterase, Urine Negative Negative   Comprehensive Metabolic Panel w/ Reflex to MG   Result Value Ref Range    Sodium 140 132 - 146 mmol/L    Potassium reflex Magnesium 3.9 3.5 - 5.0 mmol/L    Chloride 102 98 - 107 mmol/L    CO2 27 22 - 29 mmol/L    Anion Gap 11 7 - 16 mmol/L    Glucose 94 74 - 99 mg/dL    BUN 23 6 - 23 mg/dL    CREATININE 1.3 (H) 0.5 - 1.0 mg/dL GFR Non- 41 >=60 mL/min/1.73    GFR African American 49     Calcium 9.3 8.6 - 10.2 mg/dL    Total Protein 7.4 6.4 - 8.3 g/dL    Albumin 4.3 3.5 - 5.2 g/dL    Total Bilirubin 0.4 0.0 - 1.2 mg/dL    Alkaline Phosphatase 87 35 - 104 U/L    ALT 13 0 - 32 U/L    AST 16 0 - 31 U/L   Brain Natriuretic Peptide   Result Value Ref Range    Pro-BNP 1,599 (H) 0 - 125 pg/mL   Lactate, Sepsis   Result Value Ref Range    Lactic Acid, Sepsis 1.2 0.5 - 1.9 mmol/L   Lactate, Sepsis   Result Value Ref Range    Lactic Acid, Sepsis 0.9 0.5 - 1.9 mmol/L   EKG 12 Lead   Result Value Ref Range    Ventricular Rate 44 BPM    Atrial Rate 44 BPM    P-R Interval 216 ms    QRS Duration 92 ms    Q-T Interval 532 ms    QTc Calculation (Bazett) 454 ms    P Axis 41 degrees    R Axis 14 degrees    T Axis 49 degrees       Radiology:  CT Head WO Contrast   Final Result   1. There is no acute intracranial hemorrhage or acute intracranial abnormality   2. Stable position of the  shunt catheter tip near the foramen Monro there   is no evidence of hydrocephalus. 3. Postsurgical changes seen within the suprasellar region. ------------------------- NURSING NOTES AND VITALS REVIEWED ---------------------------  Date / Time Roomed:  11/8/2021  2:00 PM  ED Bed Assignment:  10/10    The nursing notes within the ED encounter and vital signs as below have been reviewed. BP (!) 107/52   Pulse (!) 43   Temp 97.1 °F (36.2 °C) (Temporal)   Resp 19   SpO2 98%   Oxygen Saturation Interpretation: Normal      ------------------------------------------ PROGRESS NOTES ------------------------------------------  8:34 PM EST  I have spoken with the patient and discussed todays results, in addition to providing specific details for the plan of care and counseling regarding the diagnosis and prognosis. Their questions are answered at this time and they are agreeable with the plan.  I discussed at length with them reasons for immediate return here for re evaluation. They will followup with their primary care physician by calling their office tomorrow. --------------------------------- ADDITIONAL PROVIDER NOTES ---------------------------------  At this time the patient is without objective evidence of an acute process requiring hospitalization or inpatient management. They have remained hemodynamically stable throughout their entire ED visit and are stable for discharge with outpatient follow-up. The plan has been discussed in detail and they are aware of the specific conditions for emergent return, as well as the importance of follow-up. New Prescriptions    No medications on file       Diagnosis:  1. Dizziness    2. Orthostasis        Disposition:  Patient's disposition: Discharge to home  Patient's condition is stable.        Zeina [address] DO  Resident  11/08/21 2034

## 2021-11-10 LAB
EKG ATRIAL RATE: 45 BPM
EKG P AXIS: 41 DEGREES
EKG P-R INTERVAL: 202 MS
EKG Q-T INTERVAL: 490 MS
EKG QRS DURATION: 90 MS
EKG QTC CALCULATION (BAZETT): 423 MS
EKG R AXIS: 14 DEGREES
EKG T AXIS: 61 DEGREES
EKG VENTRICULAR RATE: 45 BPM
URINE CULTURE, ROUTINE: NORMAL

## 2021-11-10 PROCEDURE — 93010 ELECTROCARDIOGRAM REPORT: CPT | Performed by: INTERNAL MEDICINE

## 2021-11-11 LAB
EKG ATRIAL RATE: 44 BPM
EKG P AXIS: 41 DEGREES
EKG P-R INTERVAL: 216 MS
EKG Q-T INTERVAL: 532 MS
EKG QRS DURATION: 92 MS
EKG QTC CALCULATION (BAZETT): 454 MS
EKG R AXIS: 14 DEGREES
EKG T AXIS: 49 DEGREES
EKG VENTRICULAR RATE: 44 BPM

## 2024-01-01 ENCOUNTER — APPOINTMENT (OUTPATIENT)
Dept: GENERAL RADIOLOGY | Age: 69
End: 2024-01-01
Payer: MEDICARE

## 2024-01-01 ENCOUNTER — HOSPITAL ENCOUNTER (EMERGENCY)
Age: 69
Discharge: HOME | End: 2024-01-02
Attending: STUDENT IN AN ORGANIZED HEALTH CARE EDUCATION/TRAINING PROGRAM
Payer: MEDICARE

## 2024-01-01 ENCOUNTER — APPOINTMENT (OUTPATIENT)
Dept: CT IMAGING | Age: 69
End: 2024-01-01
Payer: MEDICARE

## 2024-01-01 VITALS
RESPIRATION RATE: 20 BRPM | OXYGEN SATURATION: 99 % | BODY MASS INDEX: 33.09 KG/M2 | TEMPERATURE: 98.8 F | HEART RATE: 63 BPM | DIASTOLIC BLOOD PRESSURE: 73 MMHG | WEIGHT: 205 LBS | SYSTOLIC BLOOD PRESSURE: 134 MMHG

## 2024-01-01 DIAGNOSIS — S82.839A AVULSION FRACTURE OF DISTAL FIBULA: ICD-10-CM
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
## 2024-01-01 PROCEDURE — 72125 CT NECK SPINE W/O DYE: CPT

## 2024-01-01 PROCEDURE — 73610 X-RAY EXAM OF ANKLE: CPT

## 2024-01-01 PROCEDURE — 73521 X-RAY EXAM HIPS BI 2 VIEWS: CPT

## 2024-01-01 PROCEDURE — 70450 CT HEAD/BRAIN W/O DYE: CPT

## 2024-01-01 PROCEDURE — 73552 X-RAY EXAM OF FEMUR 2/>: CPT

## 2024-01-01 PROCEDURE — 73560 X-RAY EXAM OF KNEE 1 OR 2: CPT

## 2024-01-01 PROCEDURE — 99284 EMERGENCY DEPT VISIT MOD MDM: CPT

## 2024-01-01 PROCEDURE — 71045 X-RAY EXAM CHEST 1 VIEW: CPT

## 2024-01-01 RX ORDER — METOPROLOL TARTRATE 50 MG/1
50 TABLET, FILM COATED ORAL 2 TIMES DAILY
COMMUNITY

## 2024-01-01 RX ORDER — SERTRALINE HYDROCHLORIDE 100 MG/1
100 TABLET, FILM COATED ORAL 2 TIMES DAILY
COMMUNITY

## 2024-01-01 RX ORDER — ANASTROZOLE 1 MG/1
1 TABLET ORAL DAILY
COMMUNITY

## 2024-01-01 RX ORDER — OXCARBAZEPINE 600 MG/1
600 TABLET, FILM COATED ORAL 2 TIMES DAILY
COMMUNITY

## 2024-01-01 RX ORDER — ASPIRIN 81 MG/1
81 TABLET ORAL DAILY
COMMUNITY

## 2024-01-01 RX ORDER — ZONISAMIDE 100 MG/1
100 CAPSULE ORAL 4 TIMES DAILY
COMMUNITY

## 2024-01-01 RX ORDER — DONEPEZIL HYDROCHLORIDE 5 MG/1
5 TABLET, FILM COATED ORAL NIGHTLY
COMMUNITY

## 2024-01-01 RX ORDER — SIMVASTATIN 20 MG
20 TABLET ORAL NIGHTLY
COMMUNITY

## 2024-01-01 ASSESSMENT — LIFESTYLE VARIABLES: HOW OFTEN DO YOU HAVE A DRINK CONTAINING ALCOHOL: NEVER

## 2024-01-02 NOTE — DISCHARGE INSTRUCTIONS
Please follow-up with an orthopedic surgeon for further evaluation of your symptoms.  Please follow-up with your primary care doctor for further evaluation of your symptoms and for ER follow-up.  Please return to the ER for any new or worsening symptoms.

## 2024-01-22 ENCOUNTER — OFFICE VISIT (OUTPATIENT)
Dept: ORTHOPEDIC SURGERY | Age: 69
End: 2024-01-22
Payer: MEDICARE

## 2024-01-22 VITALS — TEMPERATURE: 98 F | WEIGHT: 205 LBS | HEIGHT: 66 IN | BODY MASS INDEX: 32.95 KG/M2

## 2024-01-22 DIAGNOSIS — S93.402A SPRAIN OF LEFT ANKLE, UNSPECIFIED LIGAMENT, INITIAL ENCOUNTER: Primary | ICD-10-CM

## 2024-01-22 PROCEDURE — 99203 OFFICE O/P NEW LOW 30 MIN: CPT | Performed by: ORTHOPAEDIC SURGERY

## 2024-01-22 PROCEDURE — 1123F ACP DISCUSS/DSCN MKR DOCD: CPT | Performed by: ORTHOPAEDIC SURGERY

## 2024-01-22 NOTE — PROGRESS NOTES
Susanna Arriola is a 68 y.o. female, who presents   Chief Complaint   Patient presents with    Ankle Pain     Left ankle fx DOI 1/1/24. Patient slipped on a ramp and injured ankle. Has been in boot since injury. Ankle has improved since the injury.        HPI:: Injury occurred 1/1/2024.  Mrs. garza was on the wheelchair ramp outside her house and slipped.  She hurt her left ankle and leg.  She was taken to [facility] ER by EMS where she was evaluated including x-rays from hips to left ankle.  She was placed in a boot brace.  She is still wearing that.    Allergies; medications; past medical, surgical, family, and social history; and problem list have been reviewed today and updated as indicated in this encounter - see below following Ortho specifics.    Musculoskeletal: The boot brace was removed.  She has good skin condition and good neurovascular function in the left lower extremity.  There is no pain in the hip with good range of motion.  Knee range of motion is good with no instability or pain.  There is mild tenderness to palpation anterior over the dorsum of the foot with no irregularities and no discoloration or swelling.  She has fair range of motion in the ankle with little limited dorsiflexion which may be her normal range.  There is no medial or lateral pain to suggest malleolar problems acutely.    Radiologic Studies: Imaging hips, left femur knee and ankle showed no evidence of acute fracture.    ASSESSMENT:  Susanna was seen today for ankle pain.    Diagnoses and all orders for this visit:    Sprain of left ankle, unspecified ligament, initial encounter     Treatment alternatives were reviewed including medical and physical therapies, injections, and surgical options, expected risks benefits and likely outcome of each were discussed in detail, questions asked and answered and understood.  We discussed the incident as well as physical findings and imaging results.  I see no evidence of acute

## 2024-07-04 ENCOUNTER — APPOINTMENT (OUTPATIENT)
Dept: CT IMAGING | Age: 69
DRG: 308 | End: 2024-07-04
Payer: MEDICARE

## 2024-07-04 ENCOUNTER — HOSPITAL ENCOUNTER (INPATIENT)
Age: 69
LOS: 2 days | Discharge: HOME | DRG: 308 | End: 2024-07-06
Attending: STUDENT IN AN ORGANIZED HEALTH CARE EDUCATION/TRAINING PROGRAM | Admitting: INTERNAL MEDICINE
Payer: MEDICARE

## 2024-07-04 ENCOUNTER — APPOINTMENT (OUTPATIENT)
Dept: GENERAL RADIOLOGY | Age: 69
DRG: 308 | End: 2024-07-04
Payer: MEDICARE

## 2024-07-04 DIAGNOSIS — J96.01 ACUTE HYPOXIC RESPIRATORY FAILURE (HCC): ICD-10-CM

## 2024-07-04 DIAGNOSIS — I48.91 ATRIAL FIBRILLATION WITH RVR (HCC): Primary | ICD-10-CM

## 2024-07-04 DIAGNOSIS — I48.91 ATRIAL FIBRILLATION WITH RAPID VENTRICULAR RESPONSE (HCC): ICD-10-CM

## 2024-07-04 DIAGNOSIS — I48.0 PAROXYSMAL ATRIAL FIBRILLATION (HCC): ICD-10-CM

## 2024-07-04 LAB
ALBUMIN SERPL-MCNC: 4.2 G/DL (ref 3.5–5.2)
ALLEN TEST: POSITIVE
ALP SERPL-CCNC: 95 U/L (ref 35–104)
ALT SERPL-CCNC: 18 U/L (ref 0–32)
ANION GAP SERPL CALCULATED.3IONS-SCNC: 14 MMOL/L (ref 7–16)
AST SERPL-CCNC: 22 U/L (ref 0–31)
B PARAP IS1001 DNA NPH QL NAA+NON-PROBE: NOT DETECTED
B PERT DNA SPEC QL NAA+PROBE: NOT DETECTED
BACTERIA URNS QL MICRO: ABNORMAL
BASOPHILS # BLD: 0.06 K/UL (ref 0–0.2)
BASOPHILS NFR BLD: 1 % (ref 0–2)
BILIRUB SERPL-MCNC: 0.4 MG/DL (ref 0–1.2)
BILIRUB UR QL STRIP: NEGATIVE
BNP SERPL-MCNC: 2674 PG/ML (ref 0–450)
BUN SERPL-MCNC: 22 MG/DL (ref 6–23)
C PNEUM DNA NPH QL NAA+NON-PROBE: NOT DETECTED
CALCIUM SERPL-MCNC: 9.3 MG/DL (ref 8.6–10.2)
CHLORIDE SERPL-SCNC: 111 MMOL/L (ref 98–107)
CLARITY UR: CLEAR
CO2 SERPL-SCNC: 17 MMOL/L (ref 22–29)
COLOR UR: YELLOW
CREAT SERPL-MCNC: 1 MG/DL (ref 0.5–1)
EKG ATRIAL RATE: 278 BPM
EKG ATRIAL RATE: 315 BPM
EKG Q-T INTERVAL: 304 MS
EKG Q-T INTERVAL: 314 MS
EKG QRS DURATION: 72 MS
EKG QRS DURATION: 78 MS
EKG QTC CALCULATION (BAZETT): 445 MS
EKG QTC CALCULATION (BAZETT): 467 MS
EKG R AXIS: 43 DEGREES
EKG R AXIS: 59 DEGREES
EKG T AXIS: 120 DEGREES
EKG T AXIS: 80 DEGREES
EKG VENTRICULAR RATE: 129 BPM
EKG VENTRICULAR RATE: 133 BPM
EOSINOPHIL # BLD: 0.01 K/UL (ref 0.05–0.5)
EOSINOPHILS RELATIVE PERCENT: 0 % (ref 0–6)
ERYTHROCYTE [DISTWIDTH] IN BLOOD BY AUTOMATED COUNT: 13.4 % (ref 11.5–15)
FIO2: 50
FLUAV RNA NPH QL NAA+NON-PROBE: NOT DETECTED
FLUBV RNA NPH QL NAA+NON-PROBE: NOT DETECTED
GFR, ESTIMATED: 60 ML/MIN/1.73M2
GLUCOSE SERPL-MCNC: 153 MG/DL (ref 74–99)
GLUCOSE UR STRIP-MCNC: NEGATIVE MG/DL
HADV DNA NPH QL NAA+NON-PROBE: NOT DETECTED
HCOV 229E RNA NPH QL NAA+NON-PROBE: NOT DETECTED
HCOV HKU1 RNA NPH QL NAA+NON-PROBE: NOT DETECTED
HCOV NL63 RNA NPH QL NAA+NON-PROBE: NOT DETECTED
HCOV OC43 RNA NPH QL NAA+NON-PROBE: NOT DETECTED
HCT VFR BLD AUTO: 41.4 % (ref 34–48)
HGB BLD-MCNC: 13.3 G/DL (ref 11.5–15.5)
HGB UR QL STRIP.AUTO: ABNORMAL
HMPV RNA NPH QL NAA+NON-PROBE: NOT DETECTED
HPIV1 RNA NPH QL NAA+NON-PROBE: NOT DETECTED
HPIV2 RNA NPH QL NAA+NON-PROBE: NOT DETECTED
HPIV3 RNA NPH QL NAA+NON-PROBE: NOT DETECTED
HPIV4 RNA NPH QL NAA+NON-PROBE: NOT DETECTED
IMM GRANULOCYTES # BLD AUTO: 0.05 K/UL (ref 0–0.58)
IMM GRANULOCYTES NFR BLD: 0 % (ref 0–5)
INR PPP: 1.8
KETONES UR STRIP-MCNC: NEGATIVE MG/DL
LEUKOCYTE ESTERASE UR QL STRIP: NEGATIVE
LYMPHOCYTES NFR BLD: 0.86 K/UL (ref 1.5–4)
LYMPHOCYTES RELATIVE PERCENT: 7 % (ref 20–42)
M PNEUMO DNA NPH QL NAA+NON-PROBE: NOT DETECTED
MAGNESIUM SERPL-MCNC: 2 MG/DL (ref 1.6–2.6)
MCH RBC QN AUTO: 29 PG (ref 26–35)
MCHC RBC AUTO-ENTMCNC: 32.1 G/DL (ref 32–34.5)
MCV RBC AUTO: 90.4 FL (ref 80–99.9)
MONOCYTES NFR BLD: 0.63 K/UL (ref 0.1–0.95)
MONOCYTES NFR BLD: 5 % (ref 2–12)
MUCOUS THREADS URNS QL MICRO: PRESENT
NEGATIVE BASE EXCESS, ART: 8.8 MMOL/L
NEUTROPHILS NFR BLD: 87 % (ref 43–80)
NEUTS SEG NFR BLD: 10.79 K/UL (ref 1.8–7.3)
NITRITE UR QL STRIP: NEGATIVE
O2 DELIVERY DEVICE: ABNORMAL
PATIENT TEMP: 37
PEEP: 8
PH UR STRIP: 6 [PH] (ref 5–9)
PLATELET # BLD AUTO: 237 K/UL (ref 130–450)
PMV BLD AUTO: 10.4 FL (ref 7–12)
POC HCO3: 18.7 MMOL/L (ref 22–26)
POC O2 SATURATION: 97.2 % (ref 92–98.5)
POC PCO2 TEMP: 45.1 MM HG
POC PCO2: 45.1 MM HG (ref 35–45)
POC PH TEMP: 7.23
POC PH: 7.23 (ref 7.35–7.45)
POC PO2 TEMP: 109.9 MM HG
POC PO2: 109.9 MM HG (ref 60–80)
POTASSIUM SERPL-SCNC: 4.4 MMOL/L (ref 3.5–5)
PRESSURE SUPPORT: 14
PROT SERPL-MCNC: 7.4 G/DL (ref 6.4–8.3)
PROT UR STRIP-MCNC: ABNORMAL MG/DL
PROTHROMBIN TIME: 19.3 SEC (ref 9.3–12.4)
RBC # BLD AUTO: 4.58 M/UL (ref 3.5–5.5)
RBC #/AREA URNS HPF: ABNORMAL /HPF
RSV RNA NPH QL NAA+NON-PROBE: NOT DETECTED
RV+EV RNA NPH QL NAA+NON-PROBE: NOT DETECTED
SAMPLE SITE: ABNORMAL
SARS-COV-2 RNA NPH QL NAA+NON-PROBE: NOT DETECTED
SODIUM SERPL-SCNC: 142 MMOL/L (ref 132–146)
SP GR UR STRIP: 1.02 (ref 1–1.03)
SPECIMEN DESCRIPTION: NORMAL
TROPONIN I SERPL HS-MCNC: 14 NG/L (ref 0–9)
TROPONIN I SERPL HS-MCNC: 16 NG/L (ref 0–9)
TROPONIN I SERPL HS-MCNC: 20 NG/L (ref 0–9)
TROPONIN I SERPL HS-MCNC: 26 NG/L (ref 0–9)
TROPONIN I SERPL HS-MCNC: 26 NG/L (ref 0–9)
UROBILINOGEN UR STRIP-ACNC: 0.2 EU/DL (ref 0–1)
WBC #/AREA URNS HPF: ABNORMAL /HPF
WBC OTHER # BLD: 12.4 K/UL (ref 4.5–11.5)

## 2024-07-04 PROCEDURE — 71045 X-RAY EXAM CHEST 1 VIEW: CPT

## 2024-07-04 PROCEDURE — 85025 COMPLETE CBC W/AUTO DIFF WBC: CPT

## 2024-07-04 PROCEDURE — 6370000000 HC RX 637 (ALT 250 FOR IP): Performed by: NURSE PRACTITIONER

## 2024-07-04 PROCEDURE — 2500000003 HC RX 250 WO HCPCS: Performed by: STUDENT IN AN ORGANIZED HEALTH CARE EDUCATION/TRAINING PROGRAM

## 2024-07-04 PROCEDURE — 0202U NFCT DS 22 TRGT SARS-COV-2: CPT

## 2024-07-04 PROCEDURE — 93005 ELECTROCARDIOGRAM TRACING: CPT | Performed by: STUDENT IN AN ORGANIZED HEALTH CARE EDUCATION/TRAINING PROGRAM

## 2024-07-04 PROCEDURE — 6360000002 HC RX W HCPCS: Performed by: INTERNAL MEDICINE

## 2024-07-04 PROCEDURE — 94660 CPAP INITIATION&MGMT: CPT

## 2024-07-04 PROCEDURE — 2580000003 HC RX 258: Performed by: STUDENT IN AN ORGANIZED HEALTH CARE EDUCATION/TRAINING PROGRAM

## 2024-07-04 PROCEDURE — 83880 ASSAY OF NATRIURETIC PEPTIDE: CPT

## 2024-07-04 PROCEDURE — 74177 CT ABD & PELVIS W/CONTRAST: CPT

## 2024-07-04 PROCEDURE — 5A09357 ASSISTANCE WITH RESPIRATORY VENTILATION, LESS THAN 24 CONSECUTIVE HOURS, CONTINUOUS POSITIVE AIRWAY PRESSURE: ICD-10-PCS | Performed by: INTERNAL MEDICINE

## 2024-07-04 PROCEDURE — 85610 PROTHROMBIN TIME: CPT

## 2024-07-04 PROCEDURE — 84484 ASSAY OF TROPONIN QUANT: CPT

## 2024-07-04 PROCEDURE — 96365 THER/PROPH/DIAG IV INF INIT: CPT

## 2024-07-04 PROCEDURE — 81001 URINALYSIS AUTO W/SCOPE: CPT

## 2024-07-04 PROCEDURE — 6370000000 HC RX 637 (ALT 250 FOR IP): Performed by: INTERNAL MEDICINE

## 2024-07-04 PROCEDURE — 6360000002 HC RX W HCPCS: Performed by: NURSE PRACTITIONER

## 2024-07-04 PROCEDURE — 96374 THER/PROPH/DIAG INJ IV PUSH: CPT

## 2024-07-04 PROCEDURE — 96361 HYDRATE IV INFUSION ADD-ON: CPT

## 2024-07-04 PROCEDURE — 93010 ELECTROCARDIOGRAM REPORT: CPT | Performed by: INTERNAL MEDICINE

## 2024-07-04 PROCEDURE — 83735 ASSAY OF MAGNESIUM: CPT

## 2024-07-04 PROCEDURE — 99285 EMERGENCY DEPT VISIT HI MDM: CPT

## 2024-07-04 PROCEDURE — 80053 COMPREHEN METABOLIC PANEL: CPT

## 2024-07-04 PROCEDURE — 36415 COLL VENOUS BLD VENIPUNCTURE: CPT

## 2024-07-04 PROCEDURE — 93005 ELECTROCARDIOGRAM TRACING: CPT | Performed by: NURSE PRACTITIONER

## 2024-07-04 PROCEDURE — 99223 1ST HOSP IP/OBS HIGH 75: CPT | Performed by: INTERNAL MEDICINE

## 2024-07-04 PROCEDURE — 2060000000 HC ICU INTERMEDIATE R&B

## 2024-07-04 PROCEDURE — 6360000004 HC RX CONTRAST MEDICATION: Performed by: RADIOLOGY

## 2024-07-04 PROCEDURE — 82803 BLOOD GASES ANY COMBINATION: CPT

## 2024-07-04 PROCEDURE — 94640 AIRWAY INHALATION TREATMENT: CPT

## 2024-07-04 PROCEDURE — 2580000003 HC RX 258: Performed by: NURSE PRACTITIONER

## 2024-07-04 RX ORDER — ONDANSETRON 4 MG/1
4 TABLET, ORALLY DISINTEGRATING ORAL EVERY 8 HOURS PRN
Status: DISCONTINUED | OUTPATIENT
Start: 2024-07-04 | End: 2024-07-06 | Stop reason: HOSPADM

## 2024-07-04 RX ORDER — SODIUM CHLORIDE 9 MG/ML
INJECTION, SOLUTION INTRAVENOUS PRN
Status: DISCONTINUED | OUTPATIENT
Start: 2024-07-04 | End: 2024-07-06 | Stop reason: HOSPADM

## 2024-07-04 RX ORDER — ASPIRIN 81 MG/1
81 TABLET ORAL DAILY
Status: DISCONTINUED | OUTPATIENT
Start: 2024-07-04 | End: 2024-07-06 | Stop reason: HOSPADM

## 2024-07-04 RX ORDER — OXCARBAZEPINE 300 MG/1
600 TABLET, FILM COATED ORAL 2 TIMES DAILY
Status: DISCONTINUED | OUTPATIENT
Start: 2024-07-04 | End: 2024-07-06 | Stop reason: HOSPADM

## 2024-07-04 RX ORDER — ANASTROZOLE 1 MG/1
1 TABLET ORAL DAILY
Status: DISCONTINUED | OUTPATIENT
Start: 2024-07-04 | End: 2024-07-06 | Stop reason: HOSPADM

## 2024-07-04 RX ORDER — MAGNESIUM SULFATE IN WATER 40 MG/ML
2000 INJECTION, SOLUTION INTRAVENOUS PRN
Status: DISCONTINUED | OUTPATIENT
Start: 2024-07-04 | End: 2024-07-06 | Stop reason: HOSPADM

## 2024-07-04 RX ORDER — DILTIAZEM HYDROCHLORIDE 5 MG/ML
10 INJECTION INTRAVENOUS ONCE
Status: COMPLETED | OUTPATIENT
Start: 2024-07-04 | End: 2024-07-04

## 2024-07-04 RX ORDER — POLYETHYLENE GLYCOL 3350 17 G/17G
17 POWDER, FOR SOLUTION ORAL DAILY PRN
Status: DISCONTINUED | OUTPATIENT
Start: 2024-07-04 | End: 2024-07-06 | Stop reason: HOSPADM

## 2024-07-04 RX ORDER — POTASSIUM CHLORIDE 20 MEQ/1
40 TABLET, EXTENDED RELEASE ORAL PRN
Status: DISCONTINUED | OUTPATIENT
Start: 2024-07-04 | End: 2024-07-06 | Stop reason: HOSPADM

## 2024-07-04 RX ORDER — DONEPEZIL HYDROCHLORIDE 5 MG/1
5 TABLET, FILM COATED ORAL NIGHTLY
Status: DISCONTINUED | OUTPATIENT
Start: 2024-07-04 | End: 2024-07-06 | Stop reason: HOSPADM

## 2024-07-04 RX ORDER — SODIUM CHLORIDE 0.9 % (FLUSH) 0.9 %
5-40 SYRINGE (ML) INJECTION EVERY 12 HOURS SCHEDULED
Status: DISCONTINUED | OUTPATIENT
Start: 2024-07-04 | End: 2024-07-06 | Stop reason: HOSPADM

## 2024-07-04 RX ORDER — METOPROLOL TARTRATE 50 MG/1
50 TABLET, FILM COATED ORAL 2 TIMES DAILY
Status: DISCONTINUED | OUTPATIENT
Start: 2024-07-04 | End: 2024-07-04

## 2024-07-04 RX ORDER — LEVALBUTEROL 1.25 MG/.5ML
1.25 SOLUTION, CONCENTRATE RESPIRATORY (INHALATION) EVERY 4 HOURS PRN
Status: DISCONTINUED | OUTPATIENT
Start: 2024-07-04 | End: 2024-07-06 | Stop reason: HOSPADM

## 2024-07-04 RX ORDER — LORAZEPAM 2 MG/ML
0.5 INJECTION INTRAMUSCULAR ONCE
Status: COMPLETED | OUTPATIENT
Start: 2024-07-04 | End: 2024-07-04

## 2024-07-04 RX ORDER — 0.9 % SODIUM CHLORIDE 0.9 %
1000 INTRAVENOUS SOLUTION INTRAVENOUS ONCE
Status: DISCONTINUED | OUTPATIENT
Start: 2024-07-04 | End: 2024-07-04

## 2024-07-04 RX ORDER — ATORVASTATIN CALCIUM 10 MG/1
10 TABLET, FILM COATED ORAL DAILY
Status: DISCONTINUED | OUTPATIENT
Start: 2024-07-04 | End: 2024-07-06 | Stop reason: HOSPADM

## 2024-07-04 RX ORDER — SODIUM CHLORIDE FOR INHALATION 0.9 %
3 VIAL, NEBULIZER (ML) INHALATION EVERY 4 HOURS PRN
Status: DISCONTINUED | OUTPATIENT
Start: 2024-07-04 | End: 2024-07-06 | Stop reason: HOSPADM

## 2024-07-04 RX ORDER — FUROSEMIDE 10 MG/ML
40 INJECTION INTRAMUSCULAR; INTRAVENOUS 2 TIMES DAILY
Status: COMPLETED | OUTPATIENT
Start: 2024-07-04 | End: 2024-07-05

## 2024-07-04 RX ORDER — POTASSIUM CHLORIDE 7.45 MG/ML
10 INJECTION INTRAVENOUS PRN
Status: DISCONTINUED | OUTPATIENT
Start: 2024-07-04 | End: 2024-07-06 | Stop reason: HOSPADM

## 2024-07-04 RX ORDER — SODIUM CHLORIDE 0.9 % (FLUSH) 0.9 %
5-40 SYRINGE (ML) INJECTION PRN
Status: DISCONTINUED | OUTPATIENT
Start: 2024-07-04 | End: 2024-07-06 | Stop reason: HOSPADM

## 2024-07-04 RX ORDER — FUROSEMIDE 10 MG/ML
20 INJECTION INTRAMUSCULAR; INTRAVENOUS 2 TIMES DAILY
Status: DISCONTINUED | OUTPATIENT
Start: 2024-07-04 | End: 2024-07-04

## 2024-07-04 RX ORDER — ACETAMINOPHEN 650 MG/1
650 SUPPOSITORY RECTAL EVERY 6 HOURS PRN
Status: DISCONTINUED | OUTPATIENT
Start: 2024-07-04 | End: 2024-07-06 | Stop reason: HOSPADM

## 2024-07-04 RX ORDER — ONDANSETRON 2 MG/ML
4 INJECTION INTRAMUSCULAR; INTRAVENOUS EVERY 6 HOURS PRN
Status: DISCONTINUED | OUTPATIENT
Start: 2024-07-04 | End: 2024-07-06 | Stop reason: HOSPADM

## 2024-07-04 RX ORDER — ACETAMINOPHEN 325 MG/1
650 TABLET ORAL EVERY 6 HOURS PRN
Status: DISCONTINUED | OUTPATIENT
Start: 2024-07-04 | End: 2024-07-06 | Stop reason: HOSPADM

## 2024-07-04 RX ORDER — ALBUTEROL SULFATE 2.5 MG/3ML
2.5 SOLUTION RESPIRATORY (INHALATION) EVERY 4 HOURS PRN
Status: DISCONTINUED | OUTPATIENT
Start: 2024-07-04 | End: 2024-07-04

## 2024-07-04 RX ADMIN — IOPAMIDOL 80 ML: 755 INJECTION, SOLUTION INTRAVENOUS at 05:15

## 2024-07-04 RX ADMIN — LORAZEPAM 0.5 MG: 2 INJECTION INTRAMUSCULAR; INTRAVENOUS at 06:06

## 2024-07-04 RX ADMIN — APIXABAN 5 MG: 5 TABLET, FILM COATED ORAL at 09:51

## 2024-07-04 RX ADMIN — APIXABAN 5 MG: 5 TABLET, FILM COATED ORAL at 20:47

## 2024-07-04 RX ADMIN — ASPIRIN 81 MG: 81 TABLET, COATED ORAL at 09:51

## 2024-07-04 RX ADMIN — DILTIAZEM HYDROCHLORIDE 10 MG: 5 INJECTION, SOLUTION INTRAVENOUS at 05:45

## 2024-07-04 RX ADMIN — FUROSEMIDE 40 MG: 10 INJECTION, SOLUTION INTRAMUSCULAR; INTRAVENOUS at 18:53

## 2024-07-04 RX ADMIN — ANASTROZOLE 1 MG: 1 TABLET ORAL at 16:41

## 2024-07-04 RX ADMIN — Medication 10 ML: at 20:48

## 2024-07-04 RX ADMIN — DONEPEZIL HYDROCHLORIDE 5 MG: 5 TABLET, FILM COATED ORAL at 20:46

## 2024-07-04 RX ADMIN — Medication 10 ML: at 09:58

## 2024-07-04 RX ADMIN — METOPROLOL TARTRATE 75 MG: 50 TABLET, FILM COATED ORAL at 20:47

## 2024-07-04 RX ADMIN — SODIUM CHLORIDE 1000 ML: 9 INJECTION, SOLUTION INTRAVENOUS at 04:21

## 2024-07-04 RX ADMIN — METOPROLOL TARTRATE 50 MG: 50 TABLET, FILM COATED ORAL at 09:51

## 2024-07-04 RX ADMIN — ATORVASTATIN CALCIUM 10 MG: 10 TABLET, FILM COATED ORAL at 09:51

## 2024-07-04 RX ADMIN — FUROSEMIDE 20 MG: 10 INJECTION, SOLUTION INTRAMUSCULAR; INTRAVENOUS at 09:52

## 2024-07-04 RX ADMIN — DILTIAZEM HYDROCHLORIDE 12.5 MG/HR: 5 INJECTION INTRAVENOUS at 20:21

## 2024-07-04 RX ADMIN — DILTIAZEM HYDROCHLORIDE 5 MG/HR: 5 INJECTION INTRAVENOUS at 05:49

## 2024-07-04 RX ADMIN — LEVALBUTEROL HYDROCHLORIDE 1.25 MG: 1.25 SOLUTION, CONCENTRATE RESPIRATORY (INHALATION) at 06:00

## 2024-07-04 ASSESSMENT — LIFESTYLE VARIABLES
HOW OFTEN DO YOU HAVE A DRINK CONTAINING ALCOHOL: NEVER
HOW MANY STANDARD DRINKS CONTAINING ALCOHOL DO YOU HAVE ON A TYPICAL DAY: PATIENT DOES NOT DRINK

## 2024-07-04 ASSESSMENT — PAIN - FUNCTIONAL ASSESSMENT: PAIN_FUNCTIONAL_ASSESSMENT: NONE - DENIES PAIN

## 2024-07-04 NOTE — H&P
Department of Internal Medicine  History and Physical Examination     Primary Care Physician: Lay Hunter MD   Admitting Physician:  Shen Solo DO  Admission date and time: 7/4/2024  4:07 AM    Room:  08/08  Admitting diagnosis: Atrial fibrillation with rapid ventricular response (HCC) [I48.91]    Patient Name: Susanna Arriola  MRN: [id number]    Date of Service: 7/4/2024     Chief Complaint: Shortness of breath    HISTORY OF PRESENT ILLNESS:    Susanna Arriola is a 69-year-old female patient presents to [facility] for evaluation of shortness of breath.  This was of an abrupt onset.  States she was feeling at her baseline yesterday.  History collection is limited from the patient due to dementia but her family present at bedside augment this somewhat.  She was evaluated in the ER where she was found to be in atrial fibrillation with rapid ventricular response with heart rates in the 140s and 150s.  She was also hypertensive, tachypneic and borderline hypoxic but without fever.  Cardizem bolus and infusion were instituted.  Troponin x 2 were assessed and not elevated at 14 and 16 with flat trajectory.  CBC showed mild leukocytosis but was otherwise unremarkable.  Magnesium and potassium levels were adequate.  proBNP was elevated at 2674 and bilateral pleural effusions were identified on CT abdomen pelvis imaging.  Also apparent CT imaging was multiple hepatic cysts and an abnormal calcified region only falciform ligament that will eventually require further workup.  Following ambulation back from the restroom and our initial examination this morning the patient developed some significant respiratory difficulties that required the initiation of NIPPV.  Case was discussed with ER physician with plans for admission, further care and management under the service of Dr. Solo.      PAST MEDICAL Hx:  Past Medical History:   Diagnosis Date    Atrial fibrillation (HCC)     Dementia (HCC)     History of cardioversion  07/04/2024 04:21 AM     Recent Labs     07/04/24  0530   TROPHS 16*       ASSESSMENT:  Atrial fibrillation with rapid ventricular response, on chronic Eliquis  Acutely decompensated CHF, type indeterminate  Essential hypertension   Dementia with intermittent behavioral disturbance  Seizure disorder  Depression and anxiety    PLAN:  Susanna Arriola is a 69-year-old female who presented with symptomatic atrial fibrillation with rapid ventricular response and also component of decompensated CHF.  Unfortunately, records at this facility are limited as she typically goes to [facility].  Echo will be updated to determine cardiomyopathy status and LVEF/valvular situation.  Cardizem bolus and infusion have been instituted.  Resume oral beta-blocker therapy and anticoagulation.  Cardiovascular team will provide consultation as well.  Gentle Lasix diuresis and the bolus ordered in the emergency department of fluid will be discontinued for now.  Due to significant respiratory difficulty following ambulation he will be placed on BiPAP to assist in management of her respiratory status.  Low-dose Ativan has been provided otherwise she became rather agitated with this degree of dyspnea.  PT, OT and social work will follow for discharge planning purposes. Underlying co-morbidites will be addressed during hospitalization as well. Labs and vital signs will be monitored closely and addressed accordingly. See additional orders for details.     Due to high hospital inpatient census and bed limitations, along with staff shortages, we have had a natalia discussion with the patient/family regarding the likelihood of prolonged ER boarding status and potential delays/disruptions in care related to this.  They understand and agree to maintain hospitalization at this facility while accepting these risks.  We have made every attempt to coordinate care with the ER nursing staff as well to avoid any disruptions in care.      Jack

## 2024-07-04 NOTE — ED PROVIDER NOTES
[facility] EMERGENCY DEPARTMENT  EMERGENCY DEPARTMENT ENCOUNTER    Pt Name: Susanna Arriola  MRN: [id number]  Birthdate 1955  Date of evaluation: 7/4/2024  Provider: Arnav Garrison MD  PCP: Lay Hunter MD  Note Started: 4:05 AM EDT 7/4/24    HPI     Patient is a 69 y.o. female presents with a chief complaint of   Chief Complaint   Patient presents with    Shortness of Breath     Pt comes to the ED with c/o shortness of breath.   .    Patient presents for shortness of breath.  Patient developed shortness of breath just prior to arrival was worried about elevated blood pressure.  Patient has a history of atrial fibrillation and is on metoprolol as well as warfarin for this.  Patient has not missed a dose.  Patient denies any changes in urinary or bowel habits.    Nursing Notes were all reviewed and agreed with or any disagreements were addressed in the HPI.    History From: Patient    Review of Systems   Pertinent positives and negatives as per HPI.     Physical Exam  Vitals and nursing note reviewed.   Constitutional:       Appearance: She is well-developed.   HENT:      Head: Normocephalic and atraumatic.   Eyes:      Conjunctiva/sclera: Conjunctivae normal.   Cardiovascular:      Rate and Rhythm: Tachycardia present. Rhythm irregular.      Heart sounds: Normal heart sounds. No murmur heard.  Pulmonary:      Effort: Pulmonary effort is normal. No respiratory distress.      Breath sounds: Normal breath sounds. No wheezing or rales.   Abdominal:      General: Bowel sounds are normal.      Palpations: Abdomen is soft.      Tenderness: There is no abdominal tenderness. There is no guarding or rebound.   Musculoskeletal:      Cervical back: Normal range of motion and neck supple.   Skin:     General: Skin is warm and dry.   Neurological:      General: No focal deficit present.      Mental Status: She is alert.      Cranial Nerves: No cranial nerve deficit.       Virus PCR Not Detected Not Detected    Bordetella parapertussis by PCR Not Detected Not Detected    B Pertussis by PCR Not Detected Not Detected    Chlamydia pneumoniae By PCR Not Detected Not Detected    Mycoplasma pneumo by PCR Not Detected Not Detected   CBC with Auto Differential   Result Value Ref Range    WBC 12.4 (H) 4.5 - 11.5 k/uL    RBC 4.58 3.50 - 5.50 m/uL    Hemoglobin 13.3 11.5 - 15.5 g/dL    Hematocrit 41.4 34.0 - 48.0 %    MCV 90.4 80.0 - 99.9 fL    MCH 29.0 26.0 - 35.0 pg    MCHC 32.1 32.0 - 34.5 g/dL    RDW 13.4 11.5 - 15.0 %    Platelets 237 130 - 450 k/uL    MPV 10.4 7.0 - 12.0 fL    Neutrophils % 87 (H) 43.0 - 80.0 %    Lymphocytes % 7 (L) 20.0 - 42.0 %    Monocytes % 5 2.0 - 12.0 %    Eosinophils % 0 0 - 6 %    Basophils % 1 0.0 - 2.0 %    Immature Granulocytes % 0 0.0 - 5.0 %    Neutrophils Absolute 10.79 (H) 1.80 - 7.30 k/uL    Lymphocytes Absolute 0.86 (L) 1.50 - 4.00 k/uL    Monocytes Absolute 0.63 0.10 - 0.95 k/uL    Eosinophils Absolute 0.01 (L) 0.05 - 0.50 k/uL    Basophils Absolute 0.06 0.00 - 0.20 k/uL    Immature Granulocytes Absolute 0.05 0.00 - 0.58 k/uL   CMP   Result Value Ref Range    Sodium 142 132 - 146 mmol/L    Potassium 4.4 3.5 - 5.0 mmol/L    Chloride 111 (H) 98 - 107 mmol/L    CO2 17 (L) 22 - 29 mmol/L    Anion Gap 14 7 - 16 mmol/L    Glucose 153 (H) 74 - 99 mg/dL    BUN 22 6 - 23 mg/dL    Creatinine 1.0 0.50 - 1.00 mg/dL    Est, Glom Filt Rate 60 (L) >60 mL/min/1.73m2    Calcium 9.3 8.6 - 10.2 mg/dL    Total Protein 7.4 6.4 - 8.3 g/dL    Albumin 4.2 3.5 - 5.2 g/dL    Total Bilirubin 0.4 0.0 - 1.2 mg/dL    Alkaline Phosphatase 95 35 - 104 U/L    ALT 18 0 - 32 U/L    AST 22 0 - 31 U/L   Troponin   Result Value Ref Range    Troponin, High Sensitivity 14 (H) 0 - 9 ng/L   Magnesium   Result Value Ref Range    Magnesium 2.0 1.6 - 2.6 mg/dL   Brain Natriuretic Peptide   Result Value Ref Range    Pro-BNP 2,674 (H) 0 - 450 pg/mL   Protime-INR   Result Value Ref Range    Protime

## 2024-07-04 NOTE — CONSULTS
INPATIENT CARDIOLOGY CONSULT    Name: Susanna Arriola    Age: 69 y.o.    Date of Admission: 7/4/2024  4:07 AM    Date of Service: 7/4/2024    Reason for Consultation:   Chief Complaint   Patient presents with    Shortness of Breath     Pt comes to the ED with c/o shortness of breath.          Referring Physician: Shen Solo DO    History of Present Illness: 69-year-old female who presented for shortness of breath and found to be in A-fib with RVR and cardiology consulted.          Past Medical History:  Past Medical History:   Diagnosis Date    Atrial fibrillation (HCC)     Dementia (HCC)     History of cardioversion 10/2021       Past Surgical History:  History reviewed. No pertinent surgical history.    Family History:  History reviewed. No pertinent family history.    Social History:  Social History     Socioeconomic History    Marital status:      Spouse name: Not on file    Number of children: Not on file    Years of education: Not on file    Highest education level: Not on file   Occupational History    Not on file   Tobacco Use    Smoking status: Never    Smokeless tobacco: Not on file   Substance and Sexual Activity    Alcohol use: Never    Drug use: Never    Sexual activity: Not on file   Other Topics Concern    Not on file   Social History Narrative    Not on file     Social Determinants of Health     Financial Resource Strain: Not on file   Food Insecurity: Not on file   Transportation Needs: Not on file   Physical Activity: Not on file   Stress: Not on file   Social Connections: Not on file   Intimate Partner Violence: Not on file   Housing Stability: Not on file       Allergies:  Allergies   Allergen Reactions    Betadine [Povidone Iodine] Rash       Home Medications:  Prior to Admission medications    Medication Sig Start Date End Date Taking? Authorizing Provider   apixaban (ELIQUIS) 5 MG TABS tablet Take by mouth 2 times daily    Provider, MD Kelvin   sertraline (ZOLOFT) 100 MG

## 2024-07-04 NOTE — PROGRESS NOTES
4 Eyes Skin Assessment     NAME:  Susanna Arriola  YOB: 1955  MEDICAL RECORD NUMBER:  [id number]    The patient is being assessed for  Admission    I agree that at least one RN has performed a thorough Head to Toe Skin Assessment on the patient. ALL assessment sites listed below have been assessed.      Areas assessed by both nurses:    Head, Face, Ears, Shoulders, Back, Chest, Arms, Elbows, Hands, Sacrum. Buttock, Coccyx, Ischium, Legs. Feet and Heels, and Under Medical Devices         Does the Patient have a Wound? No noted wound(s)       Deshaun Prevention initiated by RN: No  Wound Care Orders initiated by RN: No    Pressure Injury (Stage 3,4, Unstageable, DTI, NWPT, and Complex wounds) if present, place Wound referral order by RN under : No    New Ostomies, if present place, Ostomy referral order under : No     Nurse 1 eSignature: Electronically signed by Pancho Mujica RN on 7/4/24 at 2:59 PM EDT    **SHARE this note so that the co-signing nurse can place an eSignature**    Nurse 2 eSignature: Electronically signed by Vania Verma RN on 7/4/24 at 3:00 PM EDT

## 2024-07-05 ENCOUNTER — ANESTHESIA EVENT (OUTPATIENT)
Age: 69
End: 2024-07-05
Payer: MEDICARE

## 2024-07-05 ENCOUNTER — HOSPITAL ENCOUNTER (INPATIENT)
Age: 69
DRG: 308 | End: 2024-07-05
Payer: MEDICARE

## 2024-07-05 ENCOUNTER — ANESTHESIA (OUTPATIENT)
Age: 69
End: 2024-07-05
Payer: MEDICARE

## 2024-07-05 VITALS
HEART RATE: 62 BPM | OXYGEN SATURATION: 92 % | BODY MASS INDEX: 34.16 KG/M2 | WEIGHT: 205 LBS | DIASTOLIC BLOOD PRESSURE: 55 MMHG | HEIGHT: 65 IN | SYSTOLIC BLOOD PRESSURE: 94 MMHG | TEMPERATURE: 98.8 F

## 2024-07-05 LAB
ANION GAP SERPL CALCULATED.3IONS-SCNC: 10 MMOL/L (ref 7–16)
BASOPHILS # BLD: 0.05 K/UL (ref 0–0.2)
BASOPHILS NFR BLD: 1 % (ref 0–2)
BUN SERPL-MCNC: 20 MG/DL (ref 6–23)
CALCIUM SERPL-MCNC: 8.6 MG/DL (ref 8.6–10.2)
CHLORIDE SERPL-SCNC: 108 MMOL/L (ref 98–107)
CHOLEST SERPL-MCNC: 156 MG/DL
CO2 SERPL-SCNC: 23 MMOL/L (ref 22–29)
CREAT SERPL-MCNC: 1.1 MG/DL (ref 0.5–1)
ECHO BSA: 2.06 M2
EKG ATRIAL RATE: 277 BPM
EKG ATRIAL RATE: 46 BPM
EKG P AXIS: 71 DEGREES
EKG P-R INTERVAL: 218 MS
EKG Q-T INTERVAL: 310 MS
EKG Q-T INTERVAL: 554 MS
EKG QRS DURATION: 74 MS
EKG QRS DURATION: 96 MS
EKG QTC CALCULATION (BAZETT): 481 MS
EKG QTC CALCULATION (BAZETT): 484 MS
EKG R AXIS: 43 DEGREES
EKG R AXIS: 50 DEGREES
EKG T AXIS: 130 DEGREES
EKG T AXIS: 59 DEGREES
EKG VENTRICULAR RATE: 145 BPM
EKG VENTRICULAR RATE: 46 BPM
EOSINOPHIL # BLD: 0.02 K/UL (ref 0.05–0.5)
EOSINOPHILS RELATIVE PERCENT: 0 % (ref 0–6)
ERYTHROCYTE [DISTWIDTH] IN BLOOD BY AUTOMATED COUNT: 13.6 % (ref 11.5–15)
GFR, ESTIMATED: 56 ML/MIN/1.73M2
GLUCOSE SERPL-MCNC: 129 MG/DL (ref 74–99)
HBA1C MFR BLD: 4.9 % (ref 4–5.6)
HCT VFR BLD AUTO: 37.2 % (ref 34–48)
HDLC SERPL-MCNC: 58 MG/DL
HGB BLD-MCNC: 11.9 G/DL (ref 11.5–15.5)
IMM GRANULOCYTES # BLD AUTO: 0.03 K/UL (ref 0–0.58)
IMM GRANULOCYTES NFR BLD: 0 % (ref 0–5)
LDLC SERPL CALC-MCNC: 80 MG/DL
LYMPHOCYTES NFR BLD: 1.08 K/UL (ref 1.5–4)
LYMPHOCYTES RELATIVE PERCENT: 13 % (ref 20–42)
MAGNESIUM SERPL-MCNC: 2.1 MG/DL (ref 1.6–2.6)
MCH RBC QN AUTO: 28.2 PG (ref 26–35)
MCHC RBC AUTO-ENTMCNC: 32 G/DL (ref 32–34.5)
MCV RBC AUTO: 88.2 FL (ref 80–99.9)
MONOCYTES NFR BLD: 0.69 K/UL (ref 0.1–0.95)
MONOCYTES NFR BLD: 8 % (ref 2–12)
NEUTROPHILS NFR BLD: 77 % (ref 43–80)
NEUTS SEG NFR BLD: 6.37 K/UL (ref 1.8–7.3)
PHOSPHATE SERPL-MCNC: 2.5 MG/DL (ref 2.5–4.5)
PLATELET # BLD AUTO: 200 K/UL (ref 130–450)
PMV BLD AUTO: 10.4 FL (ref 7–12)
POTASSIUM SERPL-SCNC: 3.4 MMOL/L (ref 3.5–5)
RBC # BLD AUTO: 4.22 M/UL (ref 3.5–5.5)
SODIUM SERPL-SCNC: 141 MMOL/L (ref 132–146)
T4 FREE SERPL-MCNC: 0.7 NG/DL (ref 0.9–1.7)
TRIGL SERPL-MCNC: 92 MG/DL
TROPONIN I SERPL HS-MCNC: 19 NG/L (ref 0–9)
TROPONIN I SERPL HS-MCNC: 19 NG/L (ref 0–9)
TROPONIN I SERPL HS-MCNC: 21 NG/L (ref 0–9)
TROPONIN I SERPL HS-MCNC: 21 NG/L (ref 0–9)
TROPONIN I SERPL HS-MCNC: 22 NG/L (ref 0–9)
TROPONIN I SERPL HS-MCNC: 22 NG/L (ref 0–9)
TSH SERPL DL<=0.05 MIU/L-ACNC: 1.11 UIU/ML (ref 0.27–4.2)
VLDLC SERPL CALC-MCNC: 18 MG/DL
WBC OTHER # BLD: 8.2 K/UL (ref 4.5–11.5)

## 2024-07-05 PROCEDURE — 97530 THERAPEUTIC ACTIVITIES: CPT | Performed by: PHYSICAL THERAPIST

## 2024-07-05 PROCEDURE — 93312 ECHO TRANSESOPHAGEAL: CPT | Performed by: INTERNAL MEDICINE

## 2024-07-05 PROCEDURE — 92960 CARDIOVERSION ELECTRIC EXT: CPT | Performed by: INTERNAL MEDICINE

## 2024-07-05 PROCEDURE — 6370000000 HC RX 637 (ALT 250 FOR IP): Performed by: INTERNAL MEDICINE

## 2024-07-05 PROCEDURE — 93005 ELECTROCARDIOGRAM TRACING: CPT | Performed by: INTERNAL MEDICINE

## 2024-07-05 PROCEDURE — 83036 HEMOGLOBIN GLYCOSYLATED A1C: CPT

## 2024-07-05 PROCEDURE — 6360000002 HC RX W HCPCS: Performed by: NURSE ANESTHETIST, CERTIFIED REGISTERED

## 2024-07-05 PROCEDURE — 7100000001 HC PACU RECOVERY - ADDTL 15 MIN

## 2024-07-05 PROCEDURE — 84100 ASSAY OF PHOSPHORUS: CPT

## 2024-07-05 PROCEDURE — 93010 ELECTROCARDIOGRAM REPORT: CPT | Performed by: INTERNAL MEDICINE

## 2024-07-05 PROCEDURE — 97161 PT EVAL LOW COMPLEX 20 MIN: CPT | Performed by: PHYSICAL THERAPIST

## 2024-07-05 PROCEDURE — 80061 LIPID PANEL: CPT

## 2024-07-05 PROCEDURE — 6360000002 HC RX W HCPCS: Performed by: INTERNAL MEDICINE

## 2024-07-05 PROCEDURE — 7100000000 HC PACU RECOVERY - FIRST 15 MIN

## 2024-07-05 PROCEDURE — 36415 COLL VENOUS BLD VENIPUNCTURE: CPT

## 2024-07-05 PROCEDURE — 6370000000 HC RX 637 (ALT 250 FOR IP): Performed by: NURSE PRACTITIONER

## 2024-07-05 PROCEDURE — 93320 DOPPLER ECHO COMPLETE: CPT | Performed by: INTERNAL MEDICINE

## 2024-07-05 PROCEDURE — 94660 CPAP INITIATION&MGMT: CPT

## 2024-07-05 PROCEDURE — 93325 DOPPLER ECHO COLOR FLOW MAPG: CPT | Performed by: INTERNAL MEDICINE

## 2024-07-05 PROCEDURE — B24BZZ4 ULTRASONOGRAPHY OF HEART WITH AORTA, TRANSESOPHAGEAL: ICD-10-PCS | Performed by: INTERNAL MEDICINE

## 2024-07-05 PROCEDURE — 84443 ASSAY THYROID STIM HORMONE: CPT

## 2024-07-05 PROCEDURE — 5A2204Z RESTORATION OF CARDIAC RHYTHM, SINGLE: ICD-10-PCS | Performed by: INTERNAL MEDICINE

## 2024-07-05 PROCEDURE — 97110 THERAPEUTIC EXERCISES: CPT | Performed by: PHYSICAL THERAPIST

## 2024-07-05 PROCEDURE — 2580000003 HC RX 258: Performed by: NURSE ANESTHETIST, CERTIFIED REGISTERED

## 2024-07-05 PROCEDURE — 85025 COMPLETE CBC W/AUTO DIFF WBC: CPT

## 2024-07-05 PROCEDURE — 3700000000 HC ANESTHESIA ATTENDED CARE

## 2024-07-05 PROCEDURE — 2580000003 HC RX 258: Performed by: NURSE PRACTITIONER

## 2024-07-05 PROCEDURE — 3700000001 HC ADD 15 MINUTES (ANESTHESIA)

## 2024-07-05 PROCEDURE — 3E033XZ INTRODUCTION OF VASOPRESSOR INTO PERIPHERAL VEIN, PERCUTANEOUS APPROACH: ICD-10-PCS | Performed by: INTERNAL MEDICINE

## 2024-07-05 PROCEDURE — 2060000000 HC ICU INTERMEDIATE R&B

## 2024-07-05 PROCEDURE — 83735 ASSAY OF MAGNESIUM: CPT

## 2024-07-05 PROCEDURE — 80048 BASIC METABOLIC PNL TOTAL CA: CPT

## 2024-07-05 PROCEDURE — 84439 ASSAY OF FREE THYROXINE: CPT

## 2024-07-05 PROCEDURE — 99233 SBSQ HOSP IP/OBS HIGH 50: CPT | Performed by: INTERNAL MEDICINE

## 2024-07-05 PROCEDURE — 93312 ECHO TRANSESOPHAGEAL: CPT

## 2024-07-05 PROCEDURE — 84484 ASSAY OF TROPONIN QUANT: CPT

## 2024-07-05 PROCEDURE — 2700000000 HC OXYGEN THERAPY PER DAY

## 2024-07-05 RX ORDER — SODIUM CHLORIDE, SODIUM LACTATE, POTASSIUM CHLORIDE, CALCIUM CHLORIDE 600; 310; 30; 20 MG/100ML; MG/100ML; MG/100ML; MG/100ML
INJECTION, SOLUTION INTRAVENOUS CONTINUOUS PRN
Status: DISCONTINUED | OUTPATIENT
Start: 2024-07-05 | End: 2024-07-05 | Stop reason: SDUPTHER

## 2024-07-05 RX ORDER — EPHEDRINE SULFATE/0.9% NACL/PF 25 MG/5 ML
SYRINGE (ML) INTRAVENOUS
Status: DISPENSED
Start: 2024-07-05 | End: 2024-07-06

## 2024-07-05 RX ORDER — EPHEDRINE SULFATE 50 MG/ML
INJECTION INTRAVENOUS PRN
Status: DISCONTINUED | OUTPATIENT
Start: 2024-07-05 | End: 2024-07-05 | Stop reason: SDUPTHER

## 2024-07-05 RX ORDER — PROPOFOL 10 MG/ML
INJECTION, EMULSION INTRAVENOUS PRN
Status: DISCONTINUED | OUTPATIENT
Start: 2024-07-05 | End: 2024-07-05 | Stop reason: SDUPTHER

## 2024-07-05 RX ORDER — PHENYLEPHRINE HYDROCHLORIDE 10 MG/ML
INJECTION INTRAVENOUS
Status: COMPLETED
Start: 2024-07-05 | End: 2024-07-05

## 2024-07-05 RX ADMIN — PROPOFOL 150 MG: 10 INJECTION, EMULSION INTRAVENOUS at 13:08

## 2024-07-05 RX ADMIN — EPHEDRINE SULFATE 10 MG: 50 INJECTION INTRAVENOUS at 13:42

## 2024-07-05 RX ADMIN — ATORVASTATIN CALCIUM 10 MG: 10 TABLET, FILM COATED ORAL at 07:58

## 2024-07-05 RX ADMIN — METOPROLOL TARTRATE 75 MG: 50 TABLET, FILM COATED ORAL at 07:57

## 2024-07-05 RX ADMIN — PHENYLEPHRINE HYDROCHLORIDE 100 MCG: 10 INJECTION INTRAVENOUS at 13:38

## 2024-07-05 RX ADMIN — ACETAMINOPHEN 650 MG: 325 TABLET ORAL at 07:58

## 2024-07-05 RX ADMIN — PHENYLEPHRINE HYDROCHLORIDE 100 MCG: 10 INJECTION INTRAVENOUS at 13:14

## 2024-07-05 RX ADMIN — METOPROLOL TARTRATE 75 MG: 50 TABLET, FILM COATED ORAL at 20:41

## 2024-07-05 RX ADMIN — Medication 10 ML: at 07:59

## 2024-07-05 RX ADMIN — OXCARBAZEPINE 600 MG: 300 TABLET, FILM COATED ORAL at 20:41

## 2024-07-05 RX ADMIN — PHENYLEPHRINE HYDROCHLORIDE 100 MCG: 10 INJECTION INTRAVENOUS at 13:29

## 2024-07-05 RX ADMIN — OXCARBAZEPINE 600 MG: 300 TABLET, FILM COATED ORAL at 09:31

## 2024-07-05 RX ADMIN — DONEPEZIL HYDROCHLORIDE 5 MG: 5 TABLET, FILM COATED ORAL at 20:42

## 2024-07-05 RX ADMIN — PHENYLEPHRINE HYDROCHLORIDE 100 MCG: 10 INJECTION INTRAVENOUS at 13:18

## 2024-07-05 RX ADMIN — APIXABAN 5 MG: 5 TABLET, FILM COATED ORAL at 07:57

## 2024-07-05 RX ADMIN — ASPIRIN 81 MG: 81 TABLET, COATED ORAL at 07:57

## 2024-07-05 RX ADMIN — FUROSEMIDE 40 MG: 10 INJECTION, SOLUTION INTRAMUSCULAR; INTRAVENOUS at 18:26

## 2024-07-05 RX ADMIN — ANASTROZOLE 1 MG: 1 TABLET ORAL at 07:57

## 2024-07-05 RX ADMIN — Medication 10 ML: at 20:45

## 2024-07-05 RX ADMIN — APIXABAN 5 MG: 5 TABLET, FILM COATED ORAL at 20:41

## 2024-07-05 RX ADMIN — PHENYLEPHRINE HYDROCHLORIDE 100 MCG: 10 INJECTION INTRAVENOUS at 13:27

## 2024-07-05 RX ADMIN — FUROSEMIDE 40 MG: 10 INJECTION, SOLUTION INTRAMUSCULAR; INTRAVENOUS at 07:58

## 2024-07-05 RX ADMIN — SODIUM CHLORIDE, POTASSIUM CHLORIDE, SODIUM LACTATE AND CALCIUM CHLORIDE: 600; 310; 30; 20 INJECTION, SOLUTION INTRAVENOUS at 12:58

## 2024-07-05 RX ADMIN — EPHEDRINE SULFATE 5 MG: 50 INJECTION INTRAVENOUS at 13:52

## 2024-07-05 RX ADMIN — EPHEDRINE SULFATE 10 MG: 50 INJECTION INTRAVENOUS at 13:46

## 2024-07-05 RX ADMIN — PROPOFOL 50 MG: 10 INJECTION, EMULSION INTRAVENOUS at 13:27

## 2024-07-05 NOTE — PROGRESS NOTES
Patient brought to PACU for scheduled ARIANNA/Cardioversion with Dr. Villalobos. Patient placed on oxygen and monitors; consents verified.   1230: Pre procedure vitals- /76, 100% on 4L Oxy Mask, RR 20, HR 84  1308: Time out performed  1328: Patient shocked at 200Js  EKG obtained, pt in afib, pt given more sedation by anesthesia  1341: Patient shocked at 300Js  Patient converted to Sinus Jose A with 1st degree AV block.  Patients son updated.

## 2024-07-05 NOTE — PLAN OF CARE
1947  Problem: Safety - Adult  Goal: Free from fall injury  7/4/2024 2130 by Nellie Hood, RN  Outcome: Progressing  7/4/2024 1637 by Pancho Mujica, RN  Outcome: Progressing     Problem: Discharge Planning  Goal: Discharge to home or other facility with appropriate resources  7/4/2024 2130 by Nellie Hood, RN  Outcome: Progressing  Flowsheets (Taken 7/4/2024 1949)  Discharge to home or other facility with appropriate resources: Identify barriers to discharge with patient and caregiver  7/4/2024 1637 by Pancho Mujica, RN  Outcome: Progressing

## 2024-07-05 NOTE — PROGRESS NOTES
Physical Therapy  Physical Therapy Initial Evaluation/Plan of Care    Room #:  0536/0536-01  Patient Name: Susanna Arriola  YOB: 1955  MRN: [id number]    Date of Service: 7/5/2024     Tentative placement recommendation: Home with Home Health Physical Therapy with 24/7 assist/supervision  Equipment recommendation: Patient has needed equipment       Evaluating Physical Therapist: Puneet Shirley, PT, DPT #[id number]      Specific Provider Orders/Date/Referring Provider :     07/04/24 0545    PT eval and treat  Start:  07/04/24 0545,   End:  07/04/24 0545,   ONE TIME,   Standing Count:  1 Occurrences,   R       Jack Moscoso, APRN - CNP Acknowledge New    Admitting Diagnosis:   Atrial fibrillation with rapid ventricular response (HCC) [I48.91]  Atrial fibrillation with RVR (HCC) [I48.91]  Acute hypoxic respiratory failure (HCC) [J96.01]      Surgery: none  Visit Diagnoses         Codes    Atrial fibrillation with RVR (HCC)    -  Primary I48.91    Acute hypoxic respiratory failure (HCC)     J96.01    Paroxysmal atrial fibrillation (HCC)     I48.0            Patient Active Problem List   Diagnosis    Atrial fibrillation with rapid ventricular response (HCC)        ASSESSMENT of Current Deficits Patient exhibits decreased strength, balance, and endurance impairing functional mobility, transfers, gait , gait distance, and tolerance to activity. Pt pleasantly confused during session but agreeable to PT. Pt ambulated in hallway with no LOB, dizziness, or SOB performing ambulation and stairs with Josias.  Pt agreeable to seated exercises following function with VC for technique.      PHYSICAL THERAPY  PLAN OF CARE       Physical therapy plan of care is established based on physician order,  patient diagnosis and clinical assessment    Current Treatment Recommendations:    -Bed Mobility: Lower and upper extremity exercises, and trunk control activities  -Sitting Balance: Incorporate reaching activities to activate trunk  tolerance. Pt performed bed mobility, transfers, ambulation in hallway, stair training, seated exercises.      Therapeutic Exercises:  ankle pumps, heel raises, long arc quad, and seated marching  x 15 reps.       At end of session, patient in chair with alarm call light and phone within reach,  all lines and tubes intact, nursing notified.      Patient would benefit from continued skilled Physical Therapy to improve functional independence and quality of life.         Patient's/ family goals   home    Time in    1625  Time out  1708    Total Treatment Time  23 minutes    Evaluation time includes thorough review of current medical information, gathering information on past medical history/social history and prior level of function, completion of standardized testing/informal observation of tasks, assessment of data, and development of Plan of care and goals.     CPT codes:  Low Complexity PT evaluation (99443)  Therapeutic activities (53689)   15 minutes  1 unit(s)  Therapeutic exercises (18144)   8 minutes  1 unit(s)    Puneet Shirley, PT

## 2024-07-05 NOTE — PROGRESS NOTES
assessed.  Integumentary:  No rashes  Skin normal color and texture.  Genitalia/Breast:  Deferred    Medication:  Scheduled Meds:   anastrozole  1 mg Oral Daily    apixaban  5 mg Oral BID    aspirin  81 mg Oral Daily    donepezil  5 mg Oral Nightly    OXcarbazepine  600 mg Oral BID    sertraline  100 mg Oral BID    atorvastatin  10 mg Oral Daily    sodium chloride flush  5-40 mL IntraVENous 2 times per day    furosemide  40 mg IntraVENous BID    metoprolol tartrate  75 mg Oral BID     Continuous Infusions:   dilTIAZem 10 mg/hr (07/05/24 0926)    sodium chloride         Objective Data:  CBC with Differential:    Lab Results   Component Value Date/Time    WBC 8.2 07/05/2024 07:08 AM    RBC 4.22 07/05/2024 07:08 AM    HGB 11.9 07/05/2024 07:08 AM    HCT 37.2 07/05/2024 07:08 AM     07/05/2024 07:08 AM    MCV 88.2 07/05/2024 07:08 AM    MCH 28.2 07/05/2024 07:08 AM    MCHC 32.0 07/05/2024 07:08 AM    RDW 13.6 07/05/2024 07:08 AM    LYMPHOPCT 13 07/05/2024 07:08 AM    MONOPCT 8 07/05/2024 07:08 AM    EOSPCT 0 07/05/2024 07:08 AM    BASOPCT 1 07/05/2024 07:08 AM    MONOSABS 0.69 07/05/2024 07:08 AM    LYMPHSABS 1.08 07/05/2024 07:08 AM    EOSABS 0.02 07/05/2024 07:08 AM    BASOSABS 0.05 07/05/2024 07:08 AM     BMP:    Lab Results   Component Value Date/Time     07/05/2024 07:08 AM    K 3.4 07/05/2024 07:08 AM    K 3.9 11/08/2021 03:03 PM     07/05/2024 07:08 AM    CO2 23 07/05/2024 07:08 AM    BUN 20 07/05/2024 07:08 AM    CREATININE 1.1 07/05/2024 07:08 AM    CALCIUM 8.6 07/05/2024 07:08 AM    GFRAA 49 11/08/2021 03:03 PM    LABGLOM 56 07/05/2024 07:08 AM    GLUCOSE 129 07/05/2024 07:08 AM       Wound Documentation:   See documentation    Assessment:  Atrial fibrillation with rapid ventricular response, on chronic Eliquis  Acutely decompensated CHF, type indeterminate  Essential hypertension   Dementia with intermittent behavioral disturbance  Seizure disorder  Depression and anxiety    Plan:   Susanna

## 2024-07-05 NOTE — ANESTHESIA POSTPROCEDURE EVALUATION
Department of Anesthesiology  Postprocedure Note    Patient: Susanna Arriola  MRN: [id number]  YOB: 1955  Date of evaluation: 7/5/2024    Procedure Summary       Date: 07/05/24 Room / Location: [facility] PACU; [facility] Cardiology    Anesthesia Start: 1258 Anesthesia Stop: 1406    Procedure: ARIANNA W/ POSSIBLE CARDIOVERSION (PRN CONTRAST/BUBBLE/3D) Diagnosis: Paroxysmal atrial fibrillation (HCC)    Scheduled Providers: Roverto Cabrera MD; Deirdre Nolasco APRN - CRNA Responsible Provider: Roverto Cabrera MD    Anesthesia Type: MAC ASA Status: 3            Anesthesia Type: No value filed.    Aliyah Phase I: Aliyah Score: 8    Aliyah Phase II:      Anesthesia Post Evaluation    Patient location during evaluation: PACU  Patient participation: complete - patient participated  Level of consciousness: awake  Airway patency: patent  Nausea & Vomiting: no nausea and no vomiting  Cardiovascular status: hemodynamically stable  Respiratory status: acceptable  Hydration status: euvolemic  Pain management: adequate    No notable events documented.

## 2024-07-05 NOTE — PROCEDURES
PROCEDURE NOTE  Date: 7/5/2024   Name: Susanna Arriola  YOB: 1955    Procedures            : Arian Villalobos MD     Procedure Date: 7/5/24      PROCEDURE(S): Synchronized direct-current cardioversion.     INDICATION(S): Atrial fibrillation.       CLINICAL SUMMARY:  Susanna Arriola is a 69 y.o. female with history of a fib/flutter who presents for ARIANNA/DCC. The risks and benefits of synchronized direct current cardioversion and moderate sedation were discussed with the patient today to include but not limited skin burn, stroke/CVA, allergic reaction,breathing problems that require a breathing tube, infection, embolus, arrhythmias, MI, external pacing, temporary and or permanent pacemaker use of ACLS, death.  It was discussed that there is a <1% risk of significant complication associated with the synchronized direct current cardioversion and moderate sedation.The patient verbalizes the  Understanding of these and other unnamed risks and wishes to proceed.  The patient is on anticoagulation with Eliquis and status of anticoagulant agent was confirmed.        DESCRIPTION OF PROCEDURE(S):  After taking written informed consent, the patient was brought to the procedure room and the pads were placed appropriately.  NPO status was confirmed with the patient. A proper time-out was performed.  The patient was sedated by the anesthesia service. Patient then underwent ARIANNA which revealed no evidence of left atrial appendage or left atrial clot and subsequently synchronized biphasic cardioversion initially with 200 J was performed which was unsuccessful but the repeat 300 J biphasic synchronized cardioversion successfully converted patient into normal rhythm. The patient tolerated the procedure very well without any complication.       PLAN:   Successful synchronized biphasic cardioversion with 300 J  from A fib/Flutter to normal sinus rhythm.    The patient tolerated the procedure very well without any

## 2024-07-05 NOTE — CARE COORDINATION
Case Management Assessment  Initial Evaluation    Date/Time of Evaluation: 7/5/2024 3:56 PM  Assessment Completed by: PANCHO Kelly    If patient is discharged prior to next notation, then this note serves as note for discharge by case management.    Patient Name: Susanna Arriola                   YOB: 1955  Diagnosis: Atrial fibrillation with rapid ventricular response (HCC) [I48.91]  Atrial fibrillation with RVR (HCC) [I48.91]  Acute hypoxic respiratory failure (HCC) [J96.01]                   Date / Time: 7/4/2024  4:07 AM    Patient Admission Status: Inpatient   Readmission Risk (Low < 19, Mod (19-27), High > 27): Readmission Risk Score: 10.8    Current PCP: Lay Hunter MD  PCP verified by CM? Yes    Chart Reviewed: Yes      History Provided by: Patient, Spouse  Patient Orientation: Alert and Oriented    Patient Cognition: Short Term Memory Deficit    Hospitalization in the last 30 days (Readmission):  No    If yes, Readmission Assessment in  Navigator will be completed.    Advance Directives:      Code Status: Full Code   Patient's Primary Decision Maker is: Legal Next of Kin      Discharge Planning:    Patient lives with:   Type of Home:    Primary Care Giver: Family  Patient Support Systems include: Spouse/Significant Other, Children   Current Financial resources: Medicare  Current community resources: None  Current services prior to admission:              Current DME:              Type of Home Care services:       ADLS  Prior functional level: Independent in ADLs/IADLs  Current functional level: Independent in ADLs/IADLs    PT AM-PAC:   /24  OT AM-PAC:   /24    Family can provide assistance at DC: Yes  Would you like Case Management to discuss the discharge plan with any other family members/significant others, and if so, who? Yes ()  Plans to Return to Present Housing: Yes    Potential Assistance needed at discharge:              Potential DME:    Patient

## 2024-07-05 NOTE — ANESTHESIA PRE PROCEDURE
Jack Moscoso APRN - CNP        polyethylene glycol (GLYCOLAX) packet 17 g  17 g Oral Daily PRN Jack Moscoso APRN - CNP        levalbuterol (XOPENEX) nebulizer solution 1.25 mg  1.25 mg Nebulization Q4H PRN Jack Moscoso APRN - CNP   1.25 mg at 07/04/24 0600    sodium chloride nebulizer 0.9 % solution 3 mL  3 mL Nebulization Q4H PRN Jack Moscoso APRN - CNP        furosemide (LASIX) injection 40 mg  40 mg IntraVENous BID Arian Villalobos MD   40 mg at 07/05/24 0758    metoprolol tartrate (LOPRESSOR) tablet 75 mg  75 mg Oral BID Arian Villalobos MD   75 mg at 07/05/24 0757       Allergies:    Allergies   Allergen Reactions    Betadine [Povidone Iodine] Rash       Problem List:    Patient Active Problem List   Diagnosis Code    Atrial fibrillation with rapid ventricular response (HCC) I48.91       Past Medical History:        Diagnosis Date    Atrial fibrillation (HCC)     Dementia (HCC)     History of cardioversion 10/2021       Past Surgical History:  No past surgical history on file.    Social History:    Social History     Tobacco Use    Smoking status: Never    Smokeless tobacco: Not on file   Substance Use Topics    Alcohol use: Never                                Counseling given: Not Answered      Vital Signs (Current): There were no vitals filed for this visit.                                           BP Readings from Last 3 Encounters:   07/05/24 112/68   01/01/24 134/73   11/08/21 (!) 107/52       NPO Status:                                                                                 BMI:   Wt Readings from Last 3 Encounters:   07/04/24 93 kg (205 lb)   01/22/24 93 kg (205 lb)   01/01/24 93 kg (205 lb)     There is no height or weight on file to calculate BMI.    CBC:   Lab Results   Component Value Date/Time    WBC 8.2 07/05/2024 07:08 AM    RBC 4.22 07/05/2024 07:08 AM    HGB 11.9 07/05/2024 07:08 AM    HCT 37.2 07/05/2024 07:08 AM    MCV 88.2 07/05/2024 07:08 AM    RDW 13.6

## 2024-07-05 NOTE — PROGRESS NOTES
07/04/24  2339 07/05/24  0145 07/05/24  0708 07/05/24  0956 07/05/24  1622   TROPHS 21* 21* 22* 19* 19*     Lab Results   Component Value Date    INR 1.8 07/04/2024    PROTIME 19.3 (H) 07/04/2024     Lab Results   Component Value Date    TSH 1.11 07/05/2024     Lab Results   Component Value Date    LABA1C 4.9 07/05/2024     No results found for: \"EAG\"  Lab Results   Component Value Date    CHOL 156 07/05/2024     Lab Results   Component Value Date    TRIG 92 07/05/2024     Lab Results   Component Value Date    HDL 58 07/05/2024     No components found for: \"LDLCALC\", \"LDLCHOLESTEROL\"  Lab Results   Component Value Date    VLDL 18 07/05/2024     No results found for: \"CHOLHDLRATIO\"  Recent Labs     07/04/24  0421   PROBNP 2,674*       Cardiac Tests:  EKG reviewed (EKG date: EKG July 4, 2024 at 6:17 AM shows a flutter with RVR and variable ventricular response rate of 133 bpm):      Echocardiogram reviewed:     Stress test reviewed:      Cardiac catheterization reviewed:     CXR reviewed:     The 10-year ASCVD risk score (Pretty DK, et al., 2019) is: 6.3%    Values used to calculate the score:      Age: 69 years      Sex: Female      Is Non- : No      Diabetic: No      Tobacco smoker: No      Systolic Blood Pressure: 101 mmHg      Is BP treated: No      HDL Cholesterol: 58 mg/dL      Total Cholesterol: 156 mg/dL      ARIANNA July 5, 2024    Left Ventricle: The EF by visual approximation is 50 - 55%.    Right Ventricle: Right ventricle size is normal. Normal systolic function.    Aortic Valve: Mild regurgitation.    Mitral Valve: Mild regurgitation.    Tricuspid Valve: Mild regurgitation.    Pulmonic Valve: Trace regurgitation.    Left Atrium: Left atrium is dilated. No left atrial appendage thrombus noted.    Interatrial Septum: Agitated saline study was negative with and without provocation.    Image quality is adequate.         ASSESSMENT / PLAN:    1.  Acute decompensated heart failure with  unknown EF    2. Atrial fibrillation with rapid ventricular response (HCC)  - Echo (TTE) complete (PRN contrast/bubble/strain/3D); Standing  - Echo (TTE) complete (PRN contrast/bubble/strain/3D)    3. Acute hypoxic respiratory failure (HCC)    4.  Hypertension    5.  Dementia    6.  Seizure disorder    7.  Depression/anxiety     Plan:  Underwent successful ARIANNA guided cardioversion today  Continue Eliquis for anticoagulation  Continue current dose of beta-blocker for rate control  Discontinue on diltiazem drip  Continue IV Lasix and switch to p.o. Lasix tomorrow if patient has achieved euvolemic  Monitor electrolytes and keep potassium at 4 magnesium at 2  Daily weight, strict ins and out monitoring, and low-salt diet  Eventual ischemic evaluation once stable and euvolemic  Above plan discussed with charge nurse      Greater than 50 minutes was spent counseling the patient, reviewing the rationale for the above recommendations and reviewing the patient's current medication list, problem list and results of all previously ordered testing.      Thank you for allowing me to participate in your patient's care. Please feel free to contact me if you have any questions or concerns.    Arian Villalobos MD  [facility] Cardiology

## 2024-07-06 VITALS
SYSTOLIC BLOOD PRESSURE: 104 MMHG | WEIGHT: 205 LBS | HEART RATE: 76 BPM | BODY MASS INDEX: 34.16 KG/M2 | DIASTOLIC BLOOD PRESSURE: 67 MMHG | OXYGEN SATURATION: 100 % | RESPIRATION RATE: 18 BRPM | TEMPERATURE: 97.5 F | HEIGHT: 65 IN

## 2024-07-06 LAB
TROPONIN I SERPL HS-MCNC: 24 NG/L (ref 0–9)
TROPONIN I SERPL HS-MCNC: 24 NG/L (ref 0–9)
TROPONIN I SERPL HS-MCNC: 25 NG/L (ref 0–9)

## 2024-07-06 PROCEDURE — 2580000003 HC RX 258: Performed by: NURSE PRACTITIONER

## 2024-07-06 PROCEDURE — 6370000000 HC RX 637 (ALT 250 FOR IP): Performed by: INTERNAL MEDICINE

## 2024-07-06 PROCEDURE — 84484 ASSAY OF TROPONIN QUANT: CPT

## 2024-07-06 PROCEDURE — 36415 COLL VENOUS BLD VENIPUNCTURE: CPT

## 2024-07-06 PROCEDURE — 99233 SBSQ HOSP IP/OBS HIGH 50: CPT | Performed by: INTERNAL MEDICINE

## 2024-07-06 PROCEDURE — 6370000000 HC RX 637 (ALT 250 FOR IP): Performed by: NURSE PRACTITIONER

## 2024-07-06 PROCEDURE — 94660 CPAP INITIATION&MGMT: CPT

## 2024-07-06 RX ORDER — SERTRALINE HYDROCHLORIDE 100 MG/1
100 TABLET, FILM COATED ORAL DAILY
Qty: 30 TABLET | Refills: 0
Start: 2024-07-06

## 2024-07-06 RX ORDER — METOPROLOL TARTRATE 75 MG/1
75 TABLET, FILM COATED ORAL 2 TIMES DAILY
Qty: 60 TABLET | Refills: 0 | Status: SHIPPED | OUTPATIENT
Start: 2024-07-06

## 2024-07-06 RX ADMIN — METOPROLOL TARTRATE 75 MG: 50 TABLET, FILM COATED ORAL at 07:53

## 2024-07-06 RX ADMIN — APIXABAN 5 MG: 5 TABLET, FILM COATED ORAL at 07:54

## 2024-07-06 RX ADMIN — ANASTROZOLE 1 MG: 1 TABLET ORAL at 07:54

## 2024-07-06 RX ADMIN — SERTRALINE 100 MG: 50 TABLET, FILM COATED ORAL at 07:53

## 2024-07-06 RX ADMIN — Medication 10 ML: at 07:59

## 2024-07-06 RX ADMIN — ASPIRIN 81 MG: 81 TABLET, COATED ORAL at 07:53

## 2024-07-06 RX ADMIN — ATORVASTATIN CALCIUM 10 MG: 10 TABLET, FILM COATED ORAL at 07:53

## 2024-07-06 RX ADMIN — OXCARBAZEPINE 600 MG: 300 TABLET, FILM COATED ORAL at 07:53

## 2024-07-06 NOTE — PLAN OF CARE
Problem: Discharge Planning  Goal: Discharge to home or other facility with appropriate resources  7/5/2024 2010 by Nellie Hood, RN  Outcome: Progressing  Flowsheets (Taken 7/5/2024 1938)  Discharge to home or other facility with appropriate resources: Identify barriers to discharge with patient and caregiver  7/5/2024 1821 by Angela lAlison, RN  Outcome: Progressing     Problem: Safety - Adult  Goal: Free from fall injury  7/5/2024 2010 by Nellie Hood, RN  Outcome: Progressing  7/5/2024 1821 by Angela Allison, RN  Outcome: Progressing

## 2024-07-06 NOTE — PROGRESS NOTES
[facility] Cardiology Inpatient Progress Note    Patient is a 69 y.o. female of Lay Hunter MD seen in hospital follow up.     Chief complaint: Afib    HPI: No CP or SOB.     Patient Active Problem List   Diagnosis    Atrial fibrillation with rapid ventricular response (HCC)       Allergies   Allergen Reactions    Betadine [Povidone Iodine] Rash       Current Facility-Administered Medications   Medication Dose Route Frequency Provider Last Rate Last Admin    sertraline (ZOLOFT) tablet 100 mg  100 mg Oral Daily Jack Moscoso APRN - CNP   100 mg at 07/06/24 0753    anastrozole (ARIMIDEX) tablet 1 mg  1 mg Oral Daily Jack Moscoso APRN - CNP   1 mg at 07/06/24 0754    apixaban (ELIQUIS) tablet 5 mg  5 mg Oral BID Jack Moscoso APRN - CNP   5 mg at 07/06/24 0754    aspirin EC tablet 81 mg  81 mg Oral Daily Jack Moscoso APRN - CNP   81 mg at 07/06/24 0753    donepezil (ARICEPT) tablet 5 mg  5 mg Oral Nightly Jack Moscoso APRN - CNP   5 mg at 07/05/24 2042    OXcarbazepine (TRILEPTAL) tablet 600 mg  600 mg Oral BID Jack Moscoso APRN - CNP   600 mg at 07/06/24 0753    atorvastatin (LIPITOR) tablet 10 mg  10 mg Oral Daily Jack Moscoso APRN - CNP   10 mg at 07/06/24 0753    sodium phosphate 14.88 mmol in sodium chloride 0.9 % 250 mL IVPB  0.16 mmol/kg IntraVENous PRN Jack Moscoso APRN - CNP        Or    sodium phosphate 29.76 mmol in sodium chloride 0.9 % 250 mL IVPB  0.32 mmol/kg IntraVENous PRN Jack Moscoso APRN - CNP        sodium chloride flush 0.9 % injection 5-40 mL  5-40 mL IntraVENous 2 times per day Jack Moscoso APRN - CNP   10 mL at 07/06/24 0759    sodium chloride flush 0.9 % injection 5-40 mL  5-40 mL IntraVENous PRN Jack Moscoso APRN - CNP        0.9 % sodium chloride infusion   IntraVENous PRN Jack Moscoso APRN - CNP        potassium chloride (KLOR-CON M) extended release tablet 40 mEq  40 mEq Oral PRN Jack Msocoso, MARLYN - CNP        Or    potassium bicarb-citric

## 2024-07-06 NOTE — DISCHARGE SUMMARY
adequate.  proBNP was elevated at 2674 and bilateral pleural effusions were identified on CT abdomen pelvis imaging.  Also apparent CT imaging was multiple hepatic cysts and an abnormal calcified region only falciform ligament that will eventually require further workup.  Following ambulation back from the restroom and our initial examination this morning the patient developed some significant respiratory difficulties that required the initiation of NIPPV.  Case was discussed with ER physician with plans for admission, further care and management under the service of Dr. Solo.     LABS::  Lab Results   Component Value Date    WBC 8.2 07/05/2024    HGB 11.9 07/05/2024    HCT 37.2 07/05/2024     07/05/2024     07/05/2024    K 3.4 (L) 07/05/2024     (H) 07/05/2024    CREATININE 1.1 (H) 07/05/2024    BUN 20 07/05/2024    CO2 23 07/05/2024    GLUCOSE 129 (H) 07/05/2024    ALT 18 07/04/2024    AST 22 07/04/2024    INR 1.8 07/04/2024     Lab Results   Component Value Date    INR 1.8 07/04/2024    PROTIME 19.3 (H) 07/04/2024      Lab Results   Component Value Date    TSH 1.11 07/05/2024     Lab Results   Component Value Date    TRIG 92 07/05/2024     Lab Results   Component Value Date    HDL 58 07/05/2024     No components found for: \"LDLCALC\"  Lab Results   Component Value Date    LABA1C 4.9 07/05/2024       IMAGING:  ARIANNA with possible cardioversion (with contrast PRN)    Addendum Date: 7/5/2024      Left Ventricle: The EF by visual approximation is 50 - 55%.   Right Ventricle: Right ventricle size is normal. Normal systolic function.   Aortic Valve: Mild regurgitation.   Mitral Valve: Mild regurgitation.   Tricuspid Valve: Mild regurgitation.   Pulmonic Valve: Trace regurgitation.   Left Atrium: Left atrium is dilated. No left atrial appendage thrombus noted.   Interatrial Septum: Agitated saline study was negative with and without provocation.   Image quality is adequate.     Result Date: 7/5/2024     well as the importance of follow-up.  Their questions are answered at this time and they are agreeable with the plan for discharge to home    DISCHARGE MEDICATIONS:   Current Discharge Medication List             Details   sertraline (ZOLOFT) 100 MG tablet Take 1 tablet by mouth daily  Qty: 30 tablet, Refills: 0      metoprolol tartrate 75 MG TABS Take 75 mg by mouth 2 times daily  Qty: 60 tablet, Refills: 0                Details   apixaban (ELIQUIS) 5 MG TABS tablet Take by mouth 2 times daily      anastrozole (ARIMIDEX) 1 MG tablet Take 1 tablet by mouth daily      simvastatin (ZOCOR) 20 MG tablet Take 1 tablet by mouth nightly      zonisamide (ZONEGRAN) 100 MG capsule Take 1 capsule by mouth 4 times daily      donepezil (ARICEPT) 5 MG tablet Take 1 tablet by mouth nightly      OXcarbazepine (TRILEPTAL) 600 MG tablet Take 1 tablet by mouth 2 times daily      aspirin 81 MG EC tablet Take 1 tablet by mouth daily             FOLLOW UP/INSTRUCTIONS:  This patient is instructed to follow-up with her primary care physician.  Patient is instructed to follow-up with the consults listed above as directed by them.  she is instructed to resume home medications and take new medications as indicated in the list above.  If the patient has a recurrence of symptoms, she is instructed to go to the ED.    Preparing for this patient's discharge, including paperwork, orders, instructions, and meeting with patient did require > 40 minutes.    MARLYN Alfonso CNP     7/6/2024  9:35 AM

## 2024-07-06 NOTE — PROGRESS NOTES
OT SESSION ATTEMPT     Date:2024  Patient Name: Susanna Arriola  MRN: [id number]  : 1955  Room: [address] [place] [address]     Attempted OT session this date:    [] unavailable due to other medical staff currently with pt   [] unavailable per nursing staff recommendation    [] Unavailable per nursing staff secondary to lab / radiology results    [] pt declined due to ____.  Benefits of participation in therapy reviewed with pt.    [] off unit   [x] Other: Pt being DC today    Will reattempt OT evaluation and/or treatment at a later time.    Divya Bravo OTR/L; [id number]

## 2024-10-25 ENCOUNTER — HOSPITAL ENCOUNTER (EMERGENCY)
Age: 69
Discharge: HOME | End: 2024-10-26
Attending: EMERGENCY MEDICINE
Payer: MEDICARE

## 2024-10-25 ENCOUNTER — APPOINTMENT (OUTPATIENT)
Dept: CT IMAGING | Age: 69
End: 2024-10-25
Payer: MEDICARE

## 2024-10-25 ENCOUNTER — APPOINTMENT (OUTPATIENT)
Dept: GENERAL RADIOLOGY | Age: 69
End: 2024-10-25
Payer: MEDICARE

## 2024-10-25 VITALS
OXYGEN SATURATION: 93 % | BODY MASS INDEX: 27.12 KG/M2 | HEART RATE: 78 BPM | RESPIRATION RATE: 16 BRPM | SYSTOLIC BLOOD PRESSURE: 139 MMHG | DIASTOLIC BLOOD PRESSURE: 63 MMHG | WEIGHT: 163 LBS | TEMPERATURE: 97.1 F

## 2024-10-25 DIAGNOSIS — N30.00 ACUTE CYSTITIS WITHOUT HEMATURIA: ICD-10-CM
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
## 2024-10-25 LAB
ALBUMIN SERPL-MCNC: 4.5 G/DL (ref 3.5–5.2)
ALP SERPL-CCNC: 96 U/L (ref 35–104)
ALT SERPL-CCNC: 16 U/L (ref 0–32)
ANION GAP SERPL CALCULATED.3IONS-SCNC: 12 MMOL/L (ref 7–16)
AST SERPL-CCNC: 25 U/L (ref 0–31)
BACTERIA URNS QL MICRO: ABNORMAL
BASOPHILS # BLD: 0.06 K/UL (ref 0–0.2)
BASOPHILS NFR BLD: 1 % (ref 0–2)
BILIRUB SERPL-MCNC: 0.4 MG/DL (ref 0–1.2)
BILIRUB UR QL STRIP: NEGATIVE
BUN SERPL-MCNC: 25 MG/DL (ref 6–23)
CALCIUM SERPL-MCNC: 9.9 MG/DL (ref 8.6–10.2)
CHLORIDE SERPL-SCNC: 101 MMOL/L (ref 98–107)
CLARITY UR: ABNORMAL
CO2 SERPL-SCNC: 29 MMOL/L (ref 22–29)
COLOR UR: YELLOW
CREAT SERPL-MCNC: 1.4 MG/DL (ref 0.5–1)
EKG ATRIAL RATE: 48 BPM
EKG P AXIS: 75 DEGREES
EKG P-R INTERVAL: 192 MS
EKG Q-T INTERVAL: 530 MS
EKG QRS DURATION: 92 MS
EKG QTC CALCULATION (BAZETT): 473 MS
EKG R AXIS: 82 DEGREES
EKG T AXIS: 68 DEGREES
EKG VENTRICULAR RATE: 48 BPM
EOSINOPHIL # BLD: 0.02 K/UL (ref 0.05–0.5)
EOSINOPHILS RELATIVE PERCENT: 0 % (ref 0–6)
EPI CELLS #/AREA URNS HPF: ABNORMAL /HPF
ERYTHROCYTE [DISTWIDTH] IN BLOOD BY AUTOMATED COUNT: 13.5 % (ref 11.5–15)
GFR, ESTIMATED: 41 ML/MIN/1.73M2
GLUCOSE SERPL-MCNC: 139 MG/DL (ref 74–99)
GLUCOSE UR STRIP-MCNC: NEGATIVE MG/DL
HCT VFR BLD AUTO: 42.2 % (ref 34–48)
HGB BLD-MCNC: 13.6 G/DL (ref 11.5–15.5)
HGB UR QL STRIP.AUTO: NEGATIVE
IMM GRANULOCYTES # BLD AUTO: <0.03 K/UL (ref 0–0.58)
IMM GRANULOCYTES NFR BLD: 0 % (ref 0–5)
INR PPP: 3.4
KETONES UR STRIP-MCNC: ABNORMAL MG/DL
LEUKOCYTE ESTERASE UR QL STRIP: ABNORMAL
LYMPHOCYTES NFR BLD: 1.17 K/UL (ref 1.5–4)
LYMPHOCYTES RELATIVE PERCENT: 15 % (ref 20–42)
MCH RBC QN AUTO: 28.2 PG (ref 26–35)
MCHC RBC AUTO-ENTMCNC: 32.2 G/DL (ref 32–34.5)
MCV RBC AUTO: 87.6 FL (ref 80–99.9)
MONOCYTES NFR BLD: 0.45 K/UL (ref 0.1–0.95)
MONOCYTES NFR BLD: 6 % (ref 2–12)
NEUTROPHILS NFR BLD: 78 % (ref 43–80)
NEUTS SEG NFR BLD: 6.13 K/UL (ref 1.8–7.3)
NITRITE UR QL STRIP: NEGATIVE
PH UR STRIP: 6 [PH] (ref 5–9)
PLATELET # BLD AUTO: 306 K/UL (ref 130–450)
PMV BLD AUTO: 9.7 FL (ref 7–12)
POTASSIUM SERPL-SCNC: 4.4 MMOL/L (ref 3.5–5)
PROT SERPL-MCNC: 8.7 G/DL (ref 6.4–8.3)
PROT UR STRIP-MCNC: ABNORMAL MG/DL
PROTHROMBIN TIME: 37 SEC (ref 9.3–12.4)
RBC # BLD AUTO: 4.82 M/UL (ref 3.5–5.5)
RBC #/AREA URNS HPF: ABNORMAL /HPF
SODIUM SERPL-SCNC: 142 MMOL/L (ref 132–146)
SP GR UR STRIP: 1.02 (ref 1–1.03)
TROPONIN I SERPL HS-MCNC: 14 NG/L (ref 0–9)
TROPONIN I SERPL HS-MCNC: 17 NG/L (ref 0–9)
UROBILINOGEN UR STRIP-ACNC: 0.2 EU/DL (ref 0–1)
WBC #/AREA URNS HPF: ABNORMAL /HPF
WBC OTHER # BLD: 7.9 K/UL (ref 4.5–11.5)

## 2024-10-25 PROCEDURE — 81001 URINALYSIS AUTO W/SCOPE: CPT

## 2024-10-25 PROCEDURE — 6360000002 HC RX W HCPCS

## 2024-10-25 PROCEDURE — 85025 COMPLETE CBC W/AUTO DIFF WBC: CPT

## 2024-10-25 PROCEDURE — 93010 ELECTROCARDIOGRAM REPORT: CPT | Performed by: INTERNAL MEDICINE

## 2024-10-25 PROCEDURE — 80053 COMPREHEN METABOLIC PANEL: CPT

## 2024-10-25 PROCEDURE — 84484 ASSAY OF TROPONIN QUANT: CPT

## 2024-10-25 PROCEDURE — 85610 PROTHROMBIN TIME: CPT

## 2024-10-25 PROCEDURE — 93005 ELECTROCARDIOGRAM TRACING: CPT

## 2024-10-25 PROCEDURE — 72125 CT NECK SPINE W/O DYE: CPT

## 2024-10-25 PROCEDURE — 2580000003 HC RX 258

## 2024-10-25 PROCEDURE — 71045 X-RAY EXAM CHEST 1 VIEW: CPT

## 2024-10-25 PROCEDURE — 99285 EMERGENCY DEPT VISIT HI MDM: CPT

## 2024-10-25 PROCEDURE — 96374 THER/PROPH/DIAG INJ IV PUSH: CPT

## 2024-10-25 PROCEDURE — 70450 CT HEAD/BRAIN W/O DYE: CPT

## 2024-10-25 RX ORDER — ONDANSETRON 2 MG/ML
4 INJECTION INTRAMUSCULAR; INTRAVENOUS ONCE
Status: DISCONTINUED | OUTPATIENT
Start: 2024-10-25 | End: 2024-10-26 | Stop reason: HOSPADM

## 2024-10-25 RX ORDER — AMOXICILLIN AND CLAVULANATE POTASSIUM 500; 125 MG/1; MG/1
1 TABLET, FILM COATED ORAL 2 TIMES DAILY
Qty: 20 TABLET | Refills: 0 | Status: SHIPPED | OUTPATIENT
Start: 2024-10-25 | End: 2024-11-04

## 2024-10-25 RX ADMIN — WATER 2000 MG: 1 INJECTION INTRAMUSCULAR; INTRAVENOUS; SUBCUTANEOUS at 22:13

## 2024-10-26 NOTE — DISCHARGE INSTR - COC
Continuity of Care Form    Patient Name: Susanna Arriola   :  1955  MRN:  [id number]    Admit date:  10/25/2024  Discharge date:  ***    Code Status Order: Prior   Advance Directives:   Advance Care Flowsheet Documentation             Admitting Physician:  No admitting provider for patient encounter.  PCP: Lay Hunter MD    Discharging Nurse: ***  Discharging Hospital Unit/Room#: HALL02/H2  Discharging Unit Phone Number: ***    Emergency Contact:   Extended Emergency Contact Information  Primary Emergency Contact: ZeinabErasmo KINJAL  Address: [address] [place] [address] [place]  Home Phone: [phone]  Mobile Phone: [phone]  Relation: Spouse   needed? No  Secondary Emergency Contact: ANNA ARRIOLA  Home Phone: [phone]  Relation: Child  Preferred language: English   needed? No    Past Surgical History:  History reviewed. No pertinent surgical history.    Immunization History:   Immunization History   Administered Date(s) Administered    COVID-19, MODERNA, (age 12y+), IM, 50mcg/0.5mL 10/09/2024    COVID-19, PFIZER PURPLE top, DILUTE for use, (age 12 y+), 30mcg/0.3mL 2021, 2021, 10/12/2021       Active Problems:  Patient Active Problem List   Diagnosis Code    Atrial fibrillation with RVR ([facility]) I48.91       Isolation/Infection:   Isolation            No Isolation          Patient Infection Status       None to display                     Nurse Assessment:  Last Vital Signs: /63   Pulse 78   Temp 97.1 °F (36.2 °C) (Temporal)   Resp 16   Wt 73.9 kg (163 lb)   SpO2 93%   BMI 27.12 kg/m²     Last documented pain score (0-10 scale):    Last Weight:   Wt Readings from Last 1 Encounters:   10/25/24 73.9 kg (163 lb)     Mental Status:  {IP PT MENTAL STATUS:}    IV Access:  { JANA IV ACCESS:[id number]}    Nursing Mobility/ADLs:  Walking   {[facility] DME ADLs:[id number]}  Transfer  {[facility] DME

## 2025-01-01 ENCOUNTER — APPOINTMENT (OUTPATIENT)
Dept: ULTRASOUND IMAGING | Age: 70
DRG: 871 | End: 2025-01-01
Payer: MEDICARE

## 2025-01-01 ENCOUNTER — APPOINTMENT (OUTPATIENT)
Dept: CT IMAGING | Age: 70
DRG: 871 | End: 2025-01-01
Payer: MEDICARE

## 2025-01-01 ENCOUNTER — HOSPITAL ENCOUNTER (INPATIENT)
Age: 70
LOS: 5 days | Discharge: HOSPICE | DRG: 871 | End: 2025-08-10
Attending: EMERGENCY MEDICINE | Admitting: HOSPITALIST
Payer: MEDICARE

## 2025-01-01 ENCOUNTER — APPOINTMENT (OUTPATIENT)
Age: 70
DRG: 871 | End: 2025-01-01
Attending: INTERNAL MEDICINE
Payer: MEDICARE

## 2025-01-01 ENCOUNTER — APPOINTMENT (OUTPATIENT)
Dept: GENERAL RADIOLOGY | Age: 70
DRG: 871 | End: 2025-01-01
Payer: MEDICARE

## 2025-01-01 ENCOUNTER — APPOINTMENT (OUTPATIENT)
Age: 70
DRG: 871 | End: 2025-01-01
Attending: HOSPITALIST
Payer: MEDICARE

## 2025-01-01 VITALS
HEIGHT: 65 IN | HEART RATE: 104 BPM | TEMPERATURE: 99.2 F | OXYGEN SATURATION: 97 % | BODY MASS INDEX: 30.49 KG/M2 | SYSTOLIC BLOOD PRESSURE: 70 MMHG | WEIGHT: 182.98 LBS | DIASTOLIC BLOOD PRESSURE: 50 MMHG | RESPIRATION RATE: 12 BRPM

## 2025-01-01 DIAGNOSIS — R41.82 ALTERED MENTAL STATUS, UNSPECIFIED ALTERED MENTAL STATUS TYPE: Primary | ICD-10-CM

## 2025-01-01 DIAGNOSIS — R74.01 TRANSAMINITIS: ICD-10-CM

## 2025-01-01 DIAGNOSIS — R18.8 OTHER ASCITES: ICD-10-CM

## 2025-01-01 DIAGNOSIS — J90 BILATERAL PLEURAL EFFUSION: ICD-10-CM

## 2025-01-01 DIAGNOSIS — I42.9 CARDIOMYOPATHY, UNSPECIFIED TYPE (HCC): ICD-10-CM

## 2025-01-01 DIAGNOSIS — I48.91 ATRIAL FIBRILLATION WITH RVR (HCC): ICD-10-CM

## 2025-01-01 DIAGNOSIS — R79.1 ELEVATED INR: ICD-10-CM

## 2025-01-01 DIAGNOSIS — R79.89 ELEVATED BRAIN NATRIURETIC PEPTIDE (BNP) LEVEL: ICD-10-CM

## 2025-01-01 DIAGNOSIS — J18.9 PNEUMONIA DUE TO INFECTIOUS ORGANISM, UNSPECIFIED LATERALITY, UNSPECIFIED PART OF LUNG: ICD-10-CM

## 2025-01-01 LAB
ACB COMPLEX DNA BLD POS QL NAA+NON-PROBE: NOT DETECTED
ALBUMIN SERPL-MCNC: 2 G/DL (ref 3.5–5.2)
ALBUMIN SERPL-MCNC: 2 G/DL (ref 3.5–5.2)
ALBUMIN SERPL-MCNC: 2.1 G/DL (ref 3.5–5.2)
ALBUMIN SERPL-MCNC: 2.6 G/DL (ref 3.5–5.2)
ALBUMIN SERPL-MCNC: 2.7 G/DL (ref 3.5–5.2)
ALBUMIN SERPL-MCNC: 2.9 G/DL (ref 3.5–5.2)
ALBUMIN SERPL-MCNC: 2.9 G/DL (ref 3.5–5.2)
ALBUMIN SERPL-MCNC: 3.2 G/DL (ref 3.5–5.2)
ALP SERPL-CCNC: 112 U/L (ref 35–104)
ALP SERPL-CCNC: 123 U/L (ref 35–104)
ALP SERPL-CCNC: 129 U/L (ref 35–104)
ALP SERPL-CCNC: 138 U/L (ref 35–104)
ALP SERPL-CCNC: 148 U/L (ref 35–104)
ALP SERPL-CCNC: 158 U/L (ref 35–104)
ALP SERPL-CCNC: 160 U/L (ref 35–104)
ALP SERPL-CCNC: 191 U/L (ref 35–104)
ALT SERPL-CCNC: 109 U/L (ref 0–35)
ALT SERPL-CCNC: 129 U/L (ref 0–35)
ALT SERPL-CCNC: 139 U/L (ref 0–35)
ALT SERPL-CCNC: 161 U/L (ref 0–35)
ALT SERPL-CCNC: 59 U/L (ref 0–35)
ALT SERPL-CCNC: 63 U/L (ref 0–35)
ALT SERPL-CCNC: 71 U/L (ref 0–35)
ALT SERPL-CCNC: 91 U/L (ref 0–35)
AMMONIA PLAS-SCNC: 12 UMOL/L (ref 11–51)
AMMONIA PLAS-SCNC: 13 UMOL/L (ref 11–51)
AMMONIA PLAS-SCNC: 13 UMOL/L (ref 11–51)
ANION GAP SERPL CALCULATED.3IONS-SCNC: 13 MMOL/L (ref 7–16)
ANION GAP SERPL CALCULATED.3IONS-SCNC: 14 MMOL/L (ref 7–16)
ANION GAP SERPL CALCULATED.3IONS-SCNC: 14 MMOL/L (ref 7–16)
ANION GAP SERPL CALCULATED.3IONS-SCNC: 15 MMOL/L (ref 7–16)
ANION GAP SERPL CALCULATED.3IONS-SCNC: 16 MMOL/L (ref 7–16)
ANION GAP SERPL CALCULATED.3IONS-SCNC: 17 MMOL/L (ref 7–16)
APPEARANCE FLD: NORMAL
AST SERPL-CCNC: 112 U/L (ref 0–35)
AST SERPL-CCNC: 129 U/L (ref 0–35)
AST SERPL-CCNC: 153 U/L (ref 0–35)
AST SERPL-CCNC: 172 U/L (ref 0–35)
AST SERPL-CCNC: 224 U/L (ref 0–35)
AST SERPL-CCNC: 271 U/L (ref 0–35)
AST SERPL-CCNC: 328 U/L (ref 0–35)
AST SERPL-CCNC: 97 U/L (ref 0–35)
B FRAGILIS DNA BLD POS QL NAA+NON-PROBE: NOT DETECTED
B.E.: 2.5 MMOL/L (ref -3–3)
B.E.: 2.8 MMOL/L (ref -3–3)
B.E.: 3.5 MMOL/L (ref -3–3)
BASOPHILS # BLD: 0 K/UL (ref 0–0.2)
BASOPHILS # BLD: 0.01 K/UL (ref 0–0.2)
BASOPHILS # BLD: 0.04 K/UL (ref 0–0.2)
BASOPHILS NFR BLD: 0 % (ref 0–2)
BASOPHILS NFR BLD: 1 % (ref 0–2)
BILIRUB SERPL-MCNC: 0.7 MG/DL (ref 0–1.2)
BILIRUB SERPL-MCNC: 0.9 MG/DL (ref 0–1.2)
BILIRUB SERPL-MCNC: 1 MG/DL (ref 0–1.2)
BILIRUB SERPL-MCNC: 1.4 MG/DL (ref 0–1.2)
BILIRUB UR QL STRIP: NEGATIVE
BIOFIRE TEST COMMENT: ABNORMAL
BNP SERPL-MCNC: ABNORMAL PG/ML (ref 0–125)
BNP SERPL-MCNC: ABNORMAL PG/ML (ref 0–125)
BODY FLD TYPE: NORMAL
BUN SERPL-MCNC: 19 MG/DL (ref 8–23)
BUN SERPL-MCNC: 19 MG/DL (ref 8–23)
BUN SERPL-MCNC: 20 MG/DL (ref 8–23)
BUN SERPL-MCNC: 20 MG/DL (ref 8–23)
BUN SERPL-MCNC: 24 MG/DL (ref 8–23)
BUN SERPL-MCNC: 24 MG/DL (ref 8–23)
BUN SERPL-MCNC: 27 MG/DL (ref 8–23)
BUN SERPL-MCNC: 31 MG/DL (ref 8–23)
BUN SERPL-MCNC: 32 MG/DL (ref 8–23)
BUN SERPL-MCNC: 39 MG/DL (ref 8–23)
C ALBICANS DNA BLD POS QL NAA+NON-PROBE: NOT DETECTED
C AURIS DNA BLD POS QL NAA+NON-PROBE: NOT DETECTED
C GATTII+NEOFOR DNA BLD POS QL NAA+N-PRB: NOT DETECTED
C GLABRATA DNA BLD POS QL NAA+NON-PROBE: NOT DETECTED
C KRUSEI DNA BLD POS QL NAA+NON-PROBE: NOT DETECTED
C PARAP DNA BLD POS QL NAA+NON-PROBE: NOT DETECTED
C TROPICLS DNA BLD POS QL NAA+NON-PROBE: NOT DETECTED
CALCIUM SERPL-MCNC: 7.4 MG/DL (ref 8.8–10.2)
CALCIUM SERPL-MCNC: 7.5 MG/DL (ref 8.8–10.2)
CALCIUM SERPL-MCNC: 7.5 MG/DL (ref 8.8–10.2)
CALCIUM SERPL-MCNC: 7.6 MG/DL (ref 8.8–10.2)
CALCIUM SERPL-MCNC: 8 MG/DL (ref 8.8–10.2)
CALCIUM SERPL-MCNC: 8.2 MG/DL (ref 8.8–10.2)
CALCIUM SERPL-MCNC: 8.2 MG/DL (ref 8.8–10.2)
CALCIUM SERPL-MCNC: 8.3 MG/DL (ref 8.8–10.2)
CALCIUM SERPL-MCNC: 8.3 MG/DL (ref 8.8–10.2)
CALCIUM SERPL-MCNC: 8.7 MG/DL (ref 8.8–10.2)
CASTS #/AREA URNS LPF: ABNORMAL /LPF
CHLORIDE SERPL-SCNC: 102 MMOL/L (ref 98–107)
CHLORIDE SERPL-SCNC: 104 MMOL/L (ref 98–107)
CHLORIDE SERPL-SCNC: 104 MMOL/L (ref 98–107)
CHLORIDE SERPL-SCNC: 105 MMOL/L (ref 98–107)
CHLORIDE SERPL-SCNC: 107 MMOL/L (ref 98–107)
CHLORIDE SERPL-SCNC: 109 MMOL/L (ref 98–107)
CHLORIDE SERPL-SCNC: 110 MMOL/L (ref 98–107)
CHLORIDE SERPL-SCNC: 99 MMOL/L (ref 98–107)
CHLORIDE UR-SCNC: 45 MMOL/L
CLARITY UR: CLEAR
CLOT CHECK: NORMAL
CO2 SERPL-SCNC: 21 MMOL/L (ref 22–29)
CO2 SERPL-SCNC: 22 MMOL/L (ref 22–29)
CO2 SERPL-SCNC: 23 MMOL/L (ref 22–29)
CO2 SERPL-SCNC: 24 MMOL/L (ref 22–29)
CO2 SERPL-SCNC: 24 MMOL/L (ref 22–29)
CO2 SERPL-SCNC: 25 MMOL/L (ref 22–29)
CO2 SERPL-SCNC: 25 MMOL/L (ref 22–29)
CO2 SERPL-SCNC: 26 MMOL/L (ref 22–29)
COHB: 0.4 % (ref 0–1.5)
COHB: 0.6 % (ref 0–1.5)
COHB: 0.9 % (ref 0–1.5)
COLOR FLD: YELLOW
COLOR UR: YELLOW
CORTIS SERPL-MCNC: 54.5 UG/DL (ref 2.7–18.4)
CREAT SERPL-MCNC: 1.4 MG/DL (ref 0.5–1)
CREAT SERPL-MCNC: 1.6 MG/DL (ref 0.5–1)
CREAT SERPL-MCNC: 1.6 MG/DL (ref 0.5–1)
CREAT SERPL-MCNC: 1.7 MG/DL (ref 0.5–1)
CREAT SERPL-MCNC: 1.9 MG/DL (ref 0.5–1)
CREAT SERPL-MCNC: 1.9 MG/DL (ref 0.5–1)
CREAT SERPL-MCNC: 2.3 MG/DL (ref 0.5–1)
CREAT UR-MCNC: 58.7 MG/DL (ref 29–226)
CRITICAL: ABNORMAL
CRP SERPL HS-MCNC: 28.5 MG/L (ref 0–5)
CRYSTALS URNS MICRO: ABNORMAL /HPF
DATE ANALYZED: ABNORMAL
DATE OF COLLECTION: ABNORMAL
E CLOAC COMP DNA BLD POS NAA+NON-PROBE: NOT DETECTED
E COLI DNA BLD POS QL NAA+NON-PROBE: NOT DETECTED
E FAECALIS DNA BLD POS QL NAA+NON-PROBE: DETECTED
E FAECIUM DNA BLD POS QL NAA+NON-PROBE: NOT DETECTED
ECHO AO ASC DIAM: 3.1 CM
ECHO AO ASCENDING AORTA INDEX: 1.61 CM/M2
ECHO AO ROOT DIAM: 2.8 CM
ECHO AO ROOT INDEX: 1.46 CM/M2
ECHO AO SINUS VALSALVA DIAM: 2.9 CM
ECHO AO SINUS VALSALVA INDEX: 1.51 CM/M2
ECHO AR MAX VEL PISA: 3.9 M/S
ECHO AV AREA PEAK VELOCITY: 1.6 CM2
ECHO AV AREA VTI: 1.2 CM2
ECHO AV AREA/BSA PEAK VELOCITY: 0.8 CM2/M2
ECHO AV AREA/BSA VTI: 0.6 CM2/M2
ECHO AV CUSP MM: 1.4 CM
ECHO AV MEAN GRADIENT: 5 MMHG
ECHO AV MEAN VELOCITY: 1 M/S
ECHO AV PEAK GRADIENT: 8 MMHG
ECHO AV PEAK VELOCITY: 1.4 M/S
ECHO AV REGURGITANT PHT: 671.3 MS
ECHO AV VELOCITY RATIO: 0.5
ECHO AV VTI: 23.9 CM
ECHO BSA: 2.01 M2
ECHO EST RA PRESSURE: 3 MMHG
ECHO LA DIAMETER INDEX: 2.4 CM/M2
ECHO LA DIAMETER: 4.6 CM
ECHO LA TO AORTIC ROOT RATIO: 1.64
ECHO LA VOL A-L A2C: 125 ML (ref 22–52)
ECHO LA VOL A-L A4C: 136 ML (ref 22–52)
ECHO LA VOL MOD A2C: 121 ML (ref 22–52)
ECHO LA VOL MOD A4C: 127 ML (ref 22–52)
ECHO LA VOLUME AREA LENGTH: 136 ML
ECHO LA VOLUME INDEX A-L A2C: 65 ML/M2 (ref 16–34)
ECHO LA VOLUME INDEX A-L A4C: 71 ML/M2 (ref 16–34)
ECHO LA VOLUME INDEX AREA LENGTH: 71 ML/M2 (ref 16–34)
ECHO LA VOLUME INDEX MOD A2C: 63 ML/M2 (ref 16–34)
ECHO LA VOLUME INDEX MOD A4C: 66 ML/M2 (ref 16–34)
ECHO LV EDV A2C: 46 ML
ECHO LV EDV A4C: 48 ML
ECHO LV EDV BP: 49 ML (ref 56–104)
ECHO LV EDV INDEX A4C: 25 ML/M2
ECHO LV EDV INDEX BP: 26 ML/M2
ECHO LV EDV NDEX A2C: 24 ML/M2
ECHO LV EF PHYSICIAN: 47 %
ECHO LV EJECTION FRACTION A2C: 47 %
ECHO LV EJECTION FRACTION A4C: 50 %
ECHO LV EJECTION FRACTION BIPLANE: 47 % (ref 55–100)
ECHO LV ESV A2C: 24 ML
ECHO LV ESV A4C: 24 ML
ECHO LV ESV BP: 26 ML (ref 19–49)
ECHO LV ESV INDEX A2C: 13 ML/M2
ECHO LV ESV INDEX A4C: 13 ML/M2
ECHO LV ESV INDEX BP: 14 ML/M2
ECHO LV FRACTIONAL SHORTENING: 25 % (ref 28–44)
ECHO LV INTERNAL DIMENSION DIASTOLE INDEX: 2.5 CM/M2
ECHO LV INTERNAL DIMENSION DIASTOLIC: 4.8 CM (ref 3.9–5.3)
ECHO LV INTERNAL DIMENSION SYSTOLIC INDEX: 1.88 CM/M2
ECHO LV INTERNAL DIMENSION SYSTOLIC: 3.6 CM
ECHO LV IVSD: 1 CM (ref 0.6–0.9)
ECHO LV IVSS: 1.1 CM
ECHO LV MASS 2D: 158.8 G (ref 67–162)
ECHO LV MASS INDEX 2D: 82.7 G/M2 (ref 43–95)
ECHO LV POSTERIOR WALL DIASTOLIC: 0.9 CM (ref 0.6–0.9)
ECHO LV POSTERIOR WALL SYSTOLIC: 1.2 CM
ECHO LV RELATIVE WALL THICKNESS RATIO: 0.38
ECHO LVOT AREA: 3.1 CM2
ECHO LVOT AV VTI INDEX: 0.38
ECHO LVOT DIAM: 2 CM
ECHO LVOT MEAN GRADIENT: 1 MMHG
ECHO LVOT PEAK GRADIENT: 2 MMHG
ECHO LVOT PEAK VELOCITY: 0.7 M/S
ECHO LVOT STROKE VOLUME INDEX: 14.7 ML/M2
ECHO LVOT SV: 28.3 ML
ECHO LVOT VTI: 9 CM
ECHO MV AREA PHT: 1.7 CM2
ECHO MV AREA VTI: 0.8 CM2
ECHO MV E DECELERATION TIME (DT): 399 MS
ECHO MV EROA PISA: 0.1 CM2
ECHO MV LVOT VTI INDEX: 3.92
ECHO MV MAX VELOCITY: 1.6 M/S
ECHO MV MEAN GRADIENT: 5 MMHG
ECHO MV MEAN VELOCITY: 1.1 M/S
ECHO MV PEAK GRADIENT: 10 MMHG
ECHO MV PRESSURE HALF TIME (PHT): 128.6 MS
ECHO MV REGURGITANT ALIASING (NYQUIST) VELOCITY: 34 CM/S
ECHO MV REGURGITANT RADIUS PISA: 0.54 CM
ECHO MV REGURGITANT VELOCITY PISA: 4.5 M/S
ECHO MV REGURGITANT VOLUME PISA: 16.96 ML
ECHO MV REGURGITANT VTIA: 122.6 CM
ECHO MV VTI: 35.3 CM
ECHO PV MAX VELOCITY: 0.9 M/S
ECHO PV MEAN GRADIENT: 1 MMHG
ECHO PV MEAN VELOCITY: 0.5 M/S
ECHO PV PEAK GRADIENT: 3 MMHG
ECHO PV VTI: 10.4 CM
ECHO RIGHT VENTRICULAR SYSTOLIC PRESSURE (RVSP): 50 MMHG
ECHO RV INTERNAL DIMENSION: 2.5 CM
ECHO RV TAPSE: 1 CM (ref 1.7–?)
ECHO TV REGURGITANT MAX VELOCITY: 3.41 M/S
ECHO TV REGURGITANT PEAK GRADIENT: 47 MMHG
EKG ATRIAL RATE: 111 BPM
EKG ATRIAL RATE: 140 BPM
EKG ATRIAL RATE: 67 BPM
EKG Q-T INTERVAL: 370 MS
EKG Q-T INTERVAL: 392 MS
EKG Q-T INTERVAL: 454 MS
EKG QRS DURATION: 104 MS
EKG QRS DURATION: 112 MS
EKG QRS DURATION: 120 MS
EKG QTC CALCULATION (BAZETT): 564 MS
EKG QTC CALCULATION (BAZETT): 572 MS
EKG QTC CALCULATION (BAZETT): 625 MS
EKG R AXIS: 111 DEGREES
EKG R AXIS: 22 DEGREES
EKG R AXIS: 34 DEGREES
EKG T AXIS: 198 DEGREES
EKG T AXIS: 205 DEGREES
EKG T AXIS: 212 DEGREES
EKG VENTRICULAR RATE: 114 BPM
EKG VENTRICULAR RATE: 128 BPM
EKG VENTRICULAR RATE: 140 BPM
ENTEROBACTERALES DNA BLD POS NAA+N-PRB: NOT DETECTED
EOSINOPHIL # BLD: 0 K/UL (ref 0.05–0.5)
EOSINOPHIL # BLD: 0 K/UL (ref 0.05–0.5)
EOSINOPHIL # BLD: 0.09 K/UL (ref 0.05–0.5)
EOSINOPHIL # BLD: 0.11 K/UL (ref 0.05–0.5)
EOSINOPHIL # BLD: 0.17 K/UL (ref 0.05–0.5)
EOSINOPHIL # BLD: 0.37 K/UL (ref 0.05–0.5)
EOSINOPHILS RELATIVE PERCENT: 0 % (ref 0–6)
EOSINOPHILS RELATIVE PERCENT: 0 % (ref 0–6)
EOSINOPHILS RELATIVE PERCENT: 1 % (ref 0–6)
EOSINOPHILS RELATIVE PERCENT: 1 % (ref 0–6)
EOSINOPHILS RELATIVE PERCENT: 2 % (ref 0–6)
EOSINOPHILS RELATIVE PERCENT: 4 % (ref 0–6)
EPI CELLS #/AREA URNS HPF: ABNORMAL /HPF
ERYTHROCYTE [DISTWIDTH] IN BLOOD BY AUTOMATED COUNT: 19.8 % (ref 11.5–15)
ERYTHROCYTE [DISTWIDTH] IN BLOOD BY AUTOMATED COUNT: 20.8 % (ref 11.5–15)
ERYTHROCYTE [DISTWIDTH] IN BLOOD BY AUTOMATED COUNT: 20.9 % (ref 11.5–15)
ERYTHROCYTE [DISTWIDTH] IN BLOOD BY AUTOMATED COUNT: 22.9 % (ref 11.5–15)
ERYTHROCYTE [DISTWIDTH] IN BLOOD BY AUTOMATED COUNT: 23 % (ref 11.5–15)
ERYTHROCYTE [DISTWIDTH] IN BLOOD BY AUTOMATED COUNT: 23.2 % (ref 11.5–15)
ERYTHROCYTE [SEDIMENTATION RATE] IN BLOOD BY WESTERGREN METHOD: 6 MM/HR (ref 0–20)
FERRITIN SERPL-MCNC: 132 NG/ML
GFR, ESTIMATED: 22 ML/MIN/1.73M2
GFR, ESTIMATED: 27 ML/MIN/1.73M2
GFR, ESTIMATED: 29 ML/MIN/1.73M2
GFR, ESTIMATED: 32 ML/MIN/1.73M2
GFR, ESTIMATED: 35 ML/MIN/1.73M2
GFR, ESTIMATED: 35 ML/MIN/1.73M2
GFR, ESTIMATED: 39 ML/MIN/1.73M2
GFR, ESTIMATED: 39 ML/MIN/1.73M2
GFR, ESTIMATED: 41 ML/MIN/1.73M2
GFR, ESTIMATED: 41 ML/MIN/1.73M2
GLUCOSE BLD-MCNC: 188 MG/DL (ref 74–99)
GLUCOSE BLD-MCNC: 260 MG/DL (ref 74–99)
GLUCOSE FLD-MCNC: 219 MG/DL
GLUCOSE SERPL-MCNC: 101 MG/DL (ref 74–99)
GLUCOSE SERPL-MCNC: 125 MG/DL (ref 74–99)
GLUCOSE SERPL-MCNC: 134 MG/DL (ref 74–99)
GLUCOSE SERPL-MCNC: 136 MG/DL (ref 74–99)
GLUCOSE SERPL-MCNC: 160 MG/DL (ref 74–99)
GLUCOSE SERPL-MCNC: 161 MG/DL (ref 74–99)
GLUCOSE SERPL-MCNC: 209 MG/DL (ref 74–99)
GLUCOSE SERPL-MCNC: 210 MG/DL (ref 74–99)
GLUCOSE SERPL-MCNC: 92 MG/DL (ref 74–99)
GLUCOSE SERPL-MCNC: 96 MG/DL (ref 74–99)
GLUCOSE UR STRIP-MCNC: NEGATIVE MG/DL
GP B STREP DNA BLD POS QL NAA+NON-PROBE: NOT DETECTED
HAEM INFLU DNA BLD POS QL NAA+NON-PROBE: NOT DETECTED
HCO3: 26.9 MMOL/L (ref 22–26)
HCO3: 27.5 MMOL/L (ref 22–26)
HCO3: 28.8 MMOL/L (ref 22–26)
HCT VFR BLD AUTO: 19.2 % (ref 34–48)
HCT VFR BLD AUTO: 19.7 % (ref 34–48)
HCT VFR BLD AUTO: 20.8 % (ref 34–48)
HCT VFR BLD AUTO: 23.8 % (ref 34–48)
HCT VFR BLD AUTO: 23.9 % (ref 34–48)
HCT VFR BLD AUTO: 23.9 % (ref 34–48)
HCT VFR BLD AUTO: 26.3 % (ref 34–48)
HCT VFR BLD AUTO: 31.5 % (ref 34–48)
HGB BLD-MCNC: 10.1 G/DL (ref 11.5–15.5)
HGB BLD-MCNC: 6 G/DL (ref 11.5–15.5)
HGB BLD-MCNC: 6.2 G/DL (ref 11.5–15.5)
HGB BLD-MCNC: 6.9 G/DL (ref 11.5–15.5)
HGB BLD-MCNC: 7.3 G/DL (ref 11.5–15.5)
HGB BLD-MCNC: 7.5 G/DL (ref 11.5–15.5)
HGB BLD-MCNC: 7.9 G/DL (ref 11.5–15.5)
HGB BLD-MCNC: 8.4 G/DL (ref 11.5–15.5)
HGB UR QL STRIP.AUTO: ABNORMAL
HHB: 1 % (ref 0–5)
HHB: 12.8 % (ref 0–5)
HHB: 5.2 % (ref 0–5)
IMM GRANULOCYTES # BLD AUTO: 0.03 K/UL (ref 0–0.58)
IMM GRANULOCYTES # BLD AUTO: <0.03 K/UL (ref 0–0.58)
IMM GRANULOCYTES NFR BLD: 0 % (ref 0–5)
IMM GRANULOCYTES NFR BLD: 1 % (ref 0–5)
INR PPP: 2.3
INR PPP: 3.2
IRON SATN MFR SERPL: 11 % (ref 15–50)
IRON SERPL-MCNC: 18 UG/DL (ref 37–145)
K OXYTOCA DNA BLD POS QL NAA+NON-PROBE: NOT DETECTED
KETONES UR STRIP-MCNC: NEGATIVE MG/DL
KLEBSIELLA SP DNA BLD POS QL NAA+NON-PRB: NOT DETECTED
KLEBSIELLA SP DNA BLD POS QL NAA+NON-PRB: NOT DETECTED
L MONOCYTOG DNA BLD POS QL NAA+NON-PROBE: NOT DETECTED
LAB: ABNORMAL
LACTATE BLDV-SCNC: 1.2 MMOL/L (ref 0.5–2.2)
LACTATE BLDV-SCNC: 1.8 MMOL/L (ref 0.5–2.2)
LACTATE BLDV-SCNC: 2.3 MMOL/L (ref 0.5–2.2)
LACTATE BLDV-SCNC: 2.4 MMOL/L (ref 0.5–2.2)
LACTATE BLDV-SCNC: 2.5 MMOL/L (ref 0.5–2.2)
LACTATE BLDV-SCNC: 2.7 MMOL/L (ref 0.5–2.2)
LACTATE BLDV-SCNC: 2.8 MMOL/L (ref 0.5–1.9)
LACTATE BLDV-SCNC: 2.8 MMOL/L (ref 0.5–2.2)
LACTATE BLDV-SCNC: 2.9 MMOL/L (ref 0.5–2.2)
LACTATE BLDV-SCNC: 3.3 MMOL/L (ref 0.5–2.2)
LACTATE BLDV-SCNC: 3.5 MMOL/L (ref 0.5–2.2)
LACTATE BLDV-SCNC: 3.5 MMOL/L (ref 0.5–2.2)
LDH FLD L TO P-CCNC: 106 U/L
LDH SERPL-CCNC: 430 U/L (ref 135–214)
LEUKOCYTE ESTERASE UR QL STRIP: ABNORMAL
LYMPHOCYTES NFR BLD: 0 K/UL (ref 1.5–4)
LYMPHOCYTES NFR BLD: 0.11 K/UL (ref 1.5–4)
LYMPHOCYTES NFR BLD: 0.42 K/UL (ref 1.5–4)
LYMPHOCYTES NFR BLD: 0.52 K/UL (ref 1.5–4)
LYMPHOCYTES NFR BLD: 0.57 K/UL (ref 1.5–4)
LYMPHOCYTES NFR BLD: 0.7 K/UL (ref 1.5–4)
LYMPHOCYTES RELATIVE PERCENT: 0 % (ref 20–42)
LYMPHOCYTES RELATIVE PERCENT: 1 % (ref 20–42)
LYMPHOCYTES RELATIVE PERCENT: 11 % (ref 20–42)
LYMPHOCYTES RELATIVE PERCENT: 4 % (ref 20–42)
LYMPHOCYTES RELATIVE PERCENT: 6 % (ref 20–42)
LYMPHOCYTES RELATIVE PERCENT: 8 % (ref 20–42)
Lab: 1437
Lab: 525
Lab: 955
MAGNESIUM SERPL-MCNC: 1.8 MG/DL (ref 1.6–2.4)
MAGNESIUM SERPL-MCNC: 2.3 MG/DL (ref 1.6–2.4)
MAGNESIUM SERPL-MCNC: 2.3 MG/DL (ref 1.6–2.4)
MAGNESIUM SERPL-MCNC: 2.4 MG/DL (ref 1.6–2.4)
MAGNESIUM SERPL-MCNC: 2.4 MG/DL (ref 1.6–2.4)
MCH RBC QN AUTO: 26.2 PG (ref 26–35)
MCH RBC QN AUTO: 26.4 PG (ref 26–35)
MCH RBC QN AUTO: 26.4 PG (ref 26–35)
MCH RBC QN AUTO: 26.8 PG (ref 26–35)
MCH RBC QN AUTO: 27.3 PG (ref 26–35)
MCH RBC QN AUTO: 27.3 PG (ref 26–35)
MCHC RBC AUTO-ENTMCNC: 30.5 G/DL (ref 32–34.5)
MCHC RBC AUTO-ENTMCNC: 31.5 G/DL (ref 32–34.5)
MCHC RBC AUTO-ENTMCNC: 31.9 G/DL (ref 32–34.5)
MCHC RBC AUTO-ENTMCNC: 32.1 G/DL (ref 32–34.5)
MCHC RBC AUTO-ENTMCNC: 32.3 G/DL (ref 32–34.5)
MCHC RBC AUTO-ENTMCNC: 33.1 G/DL (ref 32–34.5)
MCV RBC AUTO: 81.7 FL (ref 80–99.9)
MCV RBC AUTO: 81.8 FL (ref 80–99.9)
MCV RBC AUTO: 82.7 FL (ref 80–99.9)
MCV RBC AUTO: 85 FL (ref 80–99.9)
MCV RBC AUTO: 85.4 FL (ref 80–99.9)
MCV RBC AUTO: 86.6 FL (ref 80–99.9)
METHB: 0.3 % (ref 0–1.5)
MICROORGANISM SPEC CULT: ABNORMAL
MICROORGANISM SPEC CULT: NO GROWTH
MICROORGANISM SPEC CULT: NORMAL
MICROORGANISM/AGENT SPEC: ABNORMAL
MICROORGANISM/AGENT SPEC: ABNORMAL
MICROORGANISM/AGENT SPEC: NORMAL
MODE: ABNORMAL
MONOCYTES NFR BLD: 0.07 K/UL (ref 0.1–0.95)
MONOCYTES NFR BLD: 0.3 K/UL (ref 0.1–0.95)
MONOCYTES NFR BLD: 0.43 K/UL (ref 0.1–0.95)
MONOCYTES NFR BLD: 0.48 K/UL (ref 0.1–0.95)
MONOCYTES NFR BLD: 0.53 K/UL (ref 0.1–0.95)
MONOCYTES NFR BLD: 0.75 K/UL (ref 0.1–0.95)
MONOCYTES NFR BLD: 1 % (ref 2–12)
MONOCYTES NFR BLD: 4 % (ref 2–12)
MONOCYTES NFR BLD: 7 % (ref 2–12)
MONOCYTES NFR BLD: 8 % (ref 2–12)
MONOCYTES NFR FLD: 61 %
N MEN DNA BLD POS QL NAA+NON-PROBE: NOT DETECTED
NEUTROPHILS NFR BLD: 81 % (ref 43–80)
NEUTROPHILS NFR BLD: 85 % (ref 43–80)
NEUTROPHILS NFR BLD: 86 % (ref 43–80)
NEUTROPHILS NFR BLD: 89 % (ref 43–80)
NEUTROPHILS NFR BLD: 94 % (ref 43–80)
NEUTROPHILS NFR BLD: 97 % (ref 43–80)
NEUTROPHILS NFR FLD: 40 %
NEUTS SEG NFR BLD: 11.55 K/UL (ref 1.8–7.3)
NEUTS SEG NFR BLD: 13.22 K/UL (ref 1.8–7.3)
NEUTS SEG NFR BLD: 5 K/UL (ref 1.8–7.3)
NEUTS SEG NFR BLD: 5.82 K/UL (ref 1.8–7.3)
NEUTS SEG NFR BLD: 7.63 K/UL (ref 1.8–7.3)
NEUTS SEG NFR BLD: 8.26 K/UL (ref 1.8–7.3)
NITRITE UR QL STRIP: NEGATIVE
NON-GYN CYTOLOGY REPORT: NORMAL
NUCLEATED RED BLOOD CELLS: 2 PER 100 WBC
NUCLEATED RED BLOOD CELLS: 2 PER 100 WBC
NUCLEATED RED BLOOD CELLS: 3 PER 100 WBC
NUCLEATED RED BLOOD CELLS: 4 PER 100 WBC
O2 CONTENT: 13.2 ML/DL
O2 CONTENT: 13.7 ML/DL
O2 CONTENT: 9.9 ML/DL
O2 SATURATION: 87.1 % (ref 92–98.5)
O2 SATURATION: 94.8 % (ref 92–98.5)
O2 SATURATION: 99 % (ref 92–98.5)
O2HB: 86.3 % (ref 94–97)
O2HB: 94.1 % (ref 94–97)
O2HB: 97.8 % (ref 94–97)
OPERATOR ID: 420
OPERATOR ID: 5132
OPERATOR ID: ABNORMAL
OSMOLALITY UR: 328 MOSM/KG (ref 300–900)
P AERUGINOSA DNA BLD POS NAA+NON-PROBE: NOT DETECTED
PATIENT TEMP: 37 C
PCO2: 40.8 MMHG (ref 35–45)
PCO2: 42.6 MMHG (ref 35–45)
PCO2: 46.9 MMHG (ref 35–45)
PH BLOOD GAS: 7.41 (ref 7.35–7.45)
PH BLOOD GAS: 7.43 (ref 7.35–7.45)
PH BLOOD GAS: 7.44 (ref 7.35–7.45)
PH UR STRIP: 6 [PH] (ref 5–8)
PHOSPHATE SERPL-MCNC: 2.8 MG/DL (ref 2.5–4.5)
PHOSPHATE SERPL-MCNC: 2.8 MG/DL (ref 2.5–4.5)
PHOSPHATE SERPL-MCNC: 3.1 MG/DL (ref 2.5–4.5)
PHOSPHATE SERPL-MCNC: 3.2 MG/DL (ref 2.5–4.5)
PLATELET CONFIRMATION: NORMAL
PLATELET, FLUORESCENCE: 103 K/UL (ref 130–450)
PLATELET, FLUORESCENCE: 123 K/UL (ref 130–450)
PLATELET, FLUORESCENCE: 50 K/UL (ref 130–450)
PLATELET, FLUORESCENCE: 61 K/UL (ref 130–450)
PLATELET, FLUORESCENCE: 69 K/UL (ref 130–450)
PLATELET, FLUORESCENCE: 88 K/UL (ref 130–450)
PMV BLD AUTO: 10.7 FL (ref 7–12)
PMV BLD AUTO: 10.9 FL (ref 7–12)
PMV BLD AUTO: 11.2 FL (ref 7–12)
PMV BLD AUTO: 9.9 FL (ref 7–12)
PMV BLD AUTO: ABNORMAL FL (ref 7–12)
PMV BLD AUTO: ABNORMAL FL (ref 7–12)
PO2: 154.7 MMHG (ref 75–100)
PO2: 59.6 MMHG (ref 75–100)
PO2: 80.6 MMHG (ref 75–100)
POTASSIUM SERPL-SCNC: 2.9 MMOL/L (ref 3.5–5.1)
POTASSIUM SERPL-SCNC: 3.1 MMOL/L (ref 3.5–5.1)
POTASSIUM SERPL-SCNC: 3.2 MMOL/L (ref 3.5–5.1)
POTASSIUM SERPL-SCNC: 3.2 MMOL/L (ref 3.5–5.1)
POTASSIUM SERPL-SCNC: 3.4 MMOL/L (ref 3.5–5.1)
POTASSIUM SERPL-SCNC: 3.7 MMOL/L (ref 3.5–5.1)
POTASSIUM SERPL-SCNC: 3.8 MMOL/L (ref 3.5–5.1)
POTASSIUM SERPL-SCNC: 4 MMOL/L (ref 3.5–5.1)
PROCALCITONIN SERPL-MCNC: 0.09 NG/ML (ref 0–0.08)
PROT FLD-MCNC: 0.9 G/DL
PROT SERPL-MCNC: 4.5 G/DL (ref 6.4–8.3)
PROT SERPL-MCNC: 4.5 G/DL (ref 6.4–8.3)
PROT SERPL-MCNC: 4.6 G/DL (ref 6.4–8.3)
PROT SERPL-MCNC: 5.3 G/DL (ref 6.4–8.3)
PROT SERPL-MCNC: 5.5 G/DL (ref 6.4–8.3)
PROT SERPL-MCNC: 5.7 G/DL (ref 6.4–8.3)
PROT SERPL-MCNC: 5.8 G/DL (ref 6.4–8.3)
PROT SERPL-MCNC: 6.5 G/DL (ref 6.4–8.3)
PROT UR STRIP-MCNC: NEGATIVE MG/DL
PROTEUS SP DNA BLD POS QL NAA+NON-PROBE: NOT DETECTED
PROTHROMBIN TIME: 24.4 SEC (ref 9.3–12.4)
PROTHROMBIN TIME: 34.6 SEC (ref 9.3–12.4)
RBC # BLD AUTO: 2.35 M/UL (ref 3.5–5.5)
RBC # BLD AUTO: 2.76 M/UL (ref 3.5–5.5)
RBC # BLD AUTO: 2.8 M/UL (ref 3.5–5.5)
RBC # BLD AUTO: 2.89 M/UL (ref 3.5–5.5)
RBC # BLD AUTO: 3.08 M/UL (ref 3.5–5.5)
RBC # BLD AUTO: 3.85 M/UL (ref 3.5–5.5)
RBC # BLD: ABNORMAL 10*6/UL
RBC # FLD: 9000 CELLS/UL
RBC #/AREA URNS HPF: ABNORMAL /HPF
REPORT STATUS: NORMAL
S AUREUS DNA BLD POS QL NAA+NON-PROBE: NOT DETECTED
S AUREUS+CONS DNA BLD POS NAA+NON-PROBE: NOT DETECTED
S EPIDERMIDIS DNA BLD POS QL NAA+NON-PRB: NOT DETECTED
S LUGDUNENSIS DNA BLD POS QL NAA+NON-PRB: NOT DETECTED
S MALTOPHILIA DNA BLD POS QL NAA+NON-PRB: NOT DETECTED
S MARCESCENS DNA BLD POS NAA+NON-PROBE: NOT DETECTED
S PNEUM DNA BLD POS QL NAA+NON-PROBE: NOT DETECTED
S PYO DNA BLD POS QL NAA+NON-PROBE: NOT DETECTED
SALMONELLA DNA BLD POS QL NAA+NON-PROBE: NOT DETECTED
SERVICE CMNT-IMP: ABNORMAL
SERVICE CMNT-IMP: NORMAL
SODIUM SERPL-SCNC: 139 MMOL/L (ref 136–145)
SODIUM SERPL-SCNC: 139 MMOL/L (ref 136–145)
SODIUM SERPL-SCNC: 140 MMOL/L (ref 136–145)
SODIUM SERPL-SCNC: 141 MMOL/L (ref 136–145)
SODIUM SERPL-SCNC: 143 MMOL/L (ref 136–145)
SODIUM SERPL-SCNC: 143 MMOL/L (ref 136–145)
SODIUM SERPL-SCNC: 144 MMOL/L (ref 136–145)
SODIUM SERPL-SCNC: 144 MMOL/L (ref 136–145)
SODIUM SERPL-SCNC: 146 MMOL/L (ref 136–145)
SODIUM SERPL-SCNC: 147 MMOL/L (ref 136–145)
SODIUM UR-SCNC: 64 MMOL/L
SOURCE, BLOOD GAS: ABNORMAL
SP GR UR STRIP: 1.01 (ref 1–1.03)
SPECIMEN DESCRIPTION: ABNORMAL
SPECIMEN DESCRIPTION: NORMAL
SPECIMEN TYPE: NORMAL
STREPTOCOCCUS DNA BLD POS NAA+NON-PROBE: NOT DETECTED
THB: 11.3 G/DL (ref 11.5–15.5)
THB: 6.9 G/DL (ref 11.5–15.5)
THB: 9.9 G/DL (ref 11.5–15.5)
TIBC SERPL-MCNC: 167 UG/DL (ref 250–450)
TIME ANALYZED: 1448
TIME ANALYZED: 528
TIME ANALYZED: 959
TOTAL PROTEIN, URINE: 382 MG/DL (ref 0–12)
TROPONIN I SERPL HS-MCNC: 136 NG/L (ref 0–14)
TROPONIN I SERPL HS-MCNC: 144 NG/L (ref 0–14)
URINE TOTAL PROTEIN CREATININE RATIO: 6.51 (ref 0–0.2)
UROBILINOGEN UR STRIP-ACNC: 0.2 EU/DL (ref 0–1)
VANA+VANB ISLT/SPM QL: NOT DETECTED
WBC # FLD: 76 CELLS/UL
WBC #/AREA URNS HPF: ABNORMAL /HPF
WBC OTHER # BLD: 12.3 K/UL (ref 4.5–11.5)
WBC OTHER # BLD: 13.7 K/UL (ref 4.5–11.5)
WBC OTHER # BLD: 6.2 K/UL (ref 4.5–11.5)
WBC OTHER # BLD: 6.8 K/UL (ref 4.5–11.5)
WBC OTHER # BLD: 8.6 K/UL (ref 4.5–11.5)
WBC OTHER # BLD: 9.6 K/UL (ref 4.5–11.5)
YEAST URNS QL MICRO: PRESENT

## 2025-01-01 PROCEDURE — 82570 ASSAY OF URINE CREATININE: CPT

## 2025-01-01 PROCEDURE — 84100 ASSAY OF PHOSPHORUS: CPT

## 2025-01-01 PROCEDURE — 6360000002 HC RX W HCPCS: Performed by: SPECIALIST

## 2025-01-01 PROCEDURE — 85018 HEMOGLOBIN: CPT

## 2025-01-01 PROCEDURE — 32555 ASPIRATE PLEURA W/ IMAGING: CPT

## 2025-01-01 PROCEDURE — 83615 LACTATE (LD) (LDH) ENZYME: CPT

## 2025-01-01 PROCEDURE — 83735 ASSAY OF MAGNESIUM: CPT

## 2025-01-01 PROCEDURE — G0378 HOSPITAL OBSERVATION PER HR: HCPCS

## 2025-01-01 PROCEDURE — 99233 SBSQ HOSP IP/OBS HIGH 50: CPT | Performed by: STUDENT IN AN ORGANIZED HEALTH CARE EDUCATION/TRAINING PROGRAM

## 2025-01-01 PROCEDURE — 2700000000 HC OXYGEN THERAPY PER DAY

## 2025-01-01 PROCEDURE — 76700 US EXAM ABDOM COMPLETE: CPT

## 2025-01-01 PROCEDURE — 96361 HYDRATE IV INFUSION ADD-ON: CPT

## 2025-01-01 PROCEDURE — 6370000000 HC RX 637 (ALT 250 FOR IP): Performed by: INTERNAL MEDICINE

## 2025-01-01 PROCEDURE — 0W9B3ZZ DRAINAGE OF LEFT PLEURAL CAVITY, PERCUTANEOUS APPROACH: ICD-10-PCS | Performed by: INTERNAL MEDICINE

## 2025-01-01 PROCEDURE — 6370000000 HC RX 637 (ALT 250 FOR IP): Performed by: NURSE PRACTITIONER

## 2025-01-01 PROCEDURE — 96367 TX/PROPH/DG ADDL SEQ IV INF: CPT

## 2025-01-01 PROCEDURE — 51798 US URINE CAPACITY MEASURE: CPT

## 2025-01-01 PROCEDURE — 2580000003 HC RX 258: Performed by: EMERGENCY MEDICINE

## 2025-01-01 PROCEDURE — 6370000000 HC RX 637 (ALT 250 FOR IP): Performed by: HOSPITALIST

## 2025-01-01 PROCEDURE — 84484 ASSAY OF TROPONIN QUANT: CPT

## 2025-01-01 PROCEDURE — 2060000000 HC ICU INTERMEDIATE R&B

## 2025-01-01 PROCEDURE — 71045 X-RAY EXAM CHEST 1 VIEW: CPT

## 2025-01-01 PROCEDURE — 96376 TX/PRO/DX INJ SAME DRUG ADON: CPT

## 2025-01-01 PROCEDURE — 87150 DNA/RNA AMPLIFIED PROBE: CPT

## 2025-01-01 PROCEDURE — 70450 CT HEAD/BRAIN W/O DYE: CPT

## 2025-01-01 PROCEDURE — 2580000003 HC RX 258: Performed by: SPECIALIST

## 2025-01-01 PROCEDURE — 2500000003 HC RX 250 WO HCPCS: Performed by: HOSPITALIST

## 2025-01-01 PROCEDURE — 93010 ELECTROCARDIOGRAM REPORT: CPT | Performed by: INTERNAL MEDICINE

## 2025-01-01 PROCEDURE — 85610 PROTHROMBIN TIME: CPT

## 2025-01-01 PROCEDURE — 2000000000 HC ICU R&B

## 2025-01-01 PROCEDURE — 87015 SPECIMEN INFECT AGNT CONCNTJ: CPT

## 2025-01-01 PROCEDURE — 6360000002 HC RX W HCPCS: Performed by: NURSE PRACTITIONER

## 2025-01-01 PROCEDURE — 87116 MYCOBACTERIA CULTURE: CPT

## 2025-01-01 PROCEDURE — 80053 COMPREHEN METABOLIC PANEL: CPT

## 2025-01-01 PROCEDURE — 36410 VNPNXR 3YR/> PHY/QHP DX/THER: CPT

## 2025-01-01 PROCEDURE — 84300 ASSAY OF URINE SODIUM: CPT

## 2025-01-01 PROCEDURE — 93005 ELECTROCARDIOGRAM TRACING: CPT | Performed by: EMERGENCY MEDICINE

## 2025-01-01 PROCEDURE — 2500000003 HC RX 250 WO HCPCS: Performed by: INTERNAL MEDICINE

## 2025-01-01 PROCEDURE — 86140 C-REACTIVE PROTEIN: CPT

## 2025-01-01 PROCEDURE — 99223 1ST HOSP IP/OBS HIGH 75: CPT | Performed by: HOSPITALIST

## 2025-01-01 PROCEDURE — 93005 ELECTROCARDIOGRAM TRACING: CPT | Performed by: HOSPITALIST

## 2025-01-01 PROCEDURE — 80048 BASIC METABOLIC PNL TOTAL CA: CPT

## 2025-01-01 PROCEDURE — 83605 ASSAY OF LACTIC ACID: CPT

## 2025-01-01 PROCEDURE — 82533 TOTAL CORTISOL: CPT

## 2025-01-01 PROCEDURE — 85025 COMPLETE CBC W/AUTO DIFF WBC: CPT

## 2025-01-01 PROCEDURE — 86922 COMPATIBILITY TEST ANTIGLOB: CPT

## 2025-01-01 PROCEDURE — P9016 RBC LEUKOCYTES REDUCED: HCPCS

## 2025-01-01 PROCEDURE — 2500000003 HC RX 250 WO HCPCS

## 2025-01-01 PROCEDURE — 6360000002 HC RX W HCPCS

## 2025-01-01 PROCEDURE — 2580000003 HC RX 258: Performed by: INTERNAL MEDICINE

## 2025-01-01 PROCEDURE — 86880 COOMBS TEST DIRECT: CPT

## 2025-01-01 PROCEDURE — 83986 ASSAY PH BODY FLUID NOS: CPT

## 2025-01-01 PROCEDURE — 82962 GLUCOSE BLOOD TEST: CPT

## 2025-01-01 PROCEDURE — 96366 THER/PROPH/DIAG IV INF ADDON: CPT

## 2025-01-01 PROCEDURE — 82805 BLOOD GASES W/O2 SATURATION: CPT

## 2025-01-01 PROCEDURE — 99233 SBSQ HOSP IP/OBS HIGH 50: CPT | Performed by: INTERNAL MEDICINE

## 2025-01-01 PROCEDURE — 96375 TX/PRO/DX INJ NEW DRUG ADDON: CPT

## 2025-01-01 PROCEDURE — 87106 FUNGI IDENTIFICATION YEAST: CPT

## 2025-01-01 PROCEDURE — 6370000000 HC RX 637 (ALT 250 FOR IP): Performed by: STUDENT IN AN ORGANIZED HEALTH CARE EDUCATION/TRAINING PROGRAM

## 2025-01-01 PROCEDURE — 86901 BLOOD TYPING SEROLOGIC RH(D): CPT

## 2025-01-01 PROCEDURE — 86860 RBC ANTIBODY ELUTION: CPT

## 2025-01-01 PROCEDURE — 85652 RBC SED RATE AUTOMATED: CPT

## 2025-01-01 PROCEDURE — 6360000002 HC RX W HCPCS: Performed by: INTERNAL MEDICINE

## 2025-01-01 PROCEDURE — 87086 URINE CULTURE/COLONY COUNT: CPT

## 2025-01-01 PROCEDURE — 99285 EMERGENCY DEPT VISIT HI MDM: CPT

## 2025-01-01 PROCEDURE — 86902 BLOOD TYPE ANTIGEN DONOR EA: CPT

## 2025-01-01 PROCEDURE — 87040 BLOOD CULTURE FOR BACTERIA: CPT

## 2025-01-01 PROCEDURE — 81001 URINALYSIS AUTO W/SCOPE: CPT

## 2025-01-01 PROCEDURE — C1751 CATH, INF, PER/CENT/MIDLINE: HCPCS

## 2025-01-01 PROCEDURE — 83880 ASSAY OF NATRIURETIC PEPTIDE: CPT

## 2025-01-01 PROCEDURE — 84157 ASSAY OF PROTEIN OTHER: CPT

## 2025-01-01 PROCEDURE — 82945 GLUCOSE OTHER FLUID: CPT

## 2025-01-01 PROCEDURE — 2580000003 HC RX 258: Performed by: NURSE PRACTITIONER

## 2025-01-01 PROCEDURE — 6360000002 HC RX W HCPCS: Performed by: HOSPITALIST

## 2025-01-01 PROCEDURE — 30233N1 TRANSFUSION OF NONAUTOLOGOUS RED BLOOD CELLS INTO PERIPHERAL VEIN, PERCUTANEOUS APPROACH: ICD-10-PCS | Performed by: STUDENT IN AN ORGANIZED HEALTH CARE EDUCATION/TRAINING PROGRAM

## 2025-01-01 PROCEDURE — 99232 SBSQ HOSP IP/OBS MODERATE 35: CPT | Performed by: NURSE PRACTITIONER

## 2025-01-01 PROCEDURE — 51701 INSERT BLADDER CATHETER: CPT

## 2025-01-01 PROCEDURE — 86870 RBC ANTIBODY IDENTIFICATION: CPT

## 2025-01-01 PROCEDURE — 74177 CT ABD & PELVIS W/CONTRAST: CPT

## 2025-01-01 PROCEDURE — 2580000003 HC RX 258: Performed by: STUDENT IN AN ORGANIZED HEALTH CARE EDUCATION/TRAINING PROGRAM

## 2025-01-01 PROCEDURE — 87206 SMEAR FLUORESCENT/ACID STAI: CPT

## 2025-01-01 PROCEDURE — 36415 COLL VENOUS BLD VENIPUNCTURE: CPT

## 2025-01-01 PROCEDURE — 82140 ASSAY OF AMMONIA: CPT

## 2025-01-01 PROCEDURE — 85014 HEMATOCRIT: CPT

## 2025-01-01 PROCEDURE — C8929 TTE W OR WO FOL WCON,DOPPLER: HCPCS

## 2025-01-01 PROCEDURE — 99223 1ST HOSP IP/OBS HIGH 75: CPT | Performed by: INTERNAL MEDICINE

## 2025-01-01 PROCEDURE — 71275 CT ANGIOGRAPHY CHEST: CPT

## 2025-01-01 PROCEDURE — 76937 US GUIDE VASCULAR ACCESS: CPT

## 2025-01-01 PROCEDURE — 71250 CT THORAX DX C-: CPT

## 2025-01-01 PROCEDURE — 05HB33Z INSERTION OF INFUSION DEVICE INTO RIGHT BASILIC VEIN, PERCUTANEOUS APPROACH: ICD-10-PCS | Performed by: STUDENT IN AN ORGANIZED HEALTH CARE EDUCATION/TRAINING PROGRAM

## 2025-01-01 PROCEDURE — 83550 IRON BINDING TEST: CPT

## 2025-01-01 PROCEDURE — 88112 CYTOPATH CELL ENHANCE TECH: CPT

## 2025-01-01 PROCEDURE — 83935 ASSAY OF URINE OSMOLALITY: CPT

## 2025-01-01 PROCEDURE — 2580000003 HC RX 258

## 2025-01-01 PROCEDURE — 6360000002 HC RX W HCPCS: Performed by: STUDENT IN AN ORGANIZED HEALTH CARE EDUCATION/TRAINING PROGRAM

## 2025-01-01 PROCEDURE — 6370000000 HC RX 637 (ALT 250 FOR IP): Performed by: EMERGENCY MEDICINE

## 2025-01-01 PROCEDURE — 86850 RBC ANTIBODY SCREEN: CPT

## 2025-01-01 PROCEDURE — 83540 ASSAY OF IRON: CPT

## 2025-01-01 PROCEDURE — 99291 CRITICAL CARE FIRST HOUR: CPT | Performed by: STUDENT IN AN ORGANIZED HEALTH CARE EDUCATION/TRAINING PROGRAM

## 2025-01-01 PROCEDURE — P9047 ALBUMIN (HUMAN), 25%, 50ML: HCPCS | Performed by: INTERNAL MEDICINE

## 2025-01-01 PROCEDURE — 6360000004 HC RX CONTRAST MEDICATION: Performed by: RADIOLOGY

## 2025-01-01 PROCEDURE — 87220 TISSUE EXAM FOR FUNGI: CPT

## 2025-01-01 PROCEDURE — 87070 CULTURE OTHR SPECIMN AEROBIC: CPT

## 2025-01-01 PROCEDURE — 99232 SBSQ HOSP IP/OBS MODERATE 35: CPT | Performed by: INTERNAL MEDICINE

## 2025-01-01 PROCEDURE — 99497 ADVNCD CARE PLAN 30 MIN: CPT | Performed by: NURSE PRACTITIONER

## 2025-01-01 PROCEDURE — 89051 BODY FLUID CELL COUNT: CPT

## 2025-01-01 PROCEDURE — 6360000002 HC RX W HCPCS: Performed by: EMERGENCY MEDICINE

## 2025-01-01 PROCEDURE — 99221 1ST HOSP IP/OBS SF/LOW 40: CPT | Performed by: NURSE PRACTITIONER

## 2025-01-01 PROCEDURE — 6360000004 HC RX CONTRAST MEDICATION: Performed by: HOSPITALIST

## 2025-01-01 PROCEDURE — 36430 TRANSFUSION BLD/BLD COMPNT: CPT

## 2025-01-01 PROCEDURE — 86900 BLOOD TYPING SEROLOGIC ABO: CPT

## 2025-01-01 PROCEDURE — 96365 THER/PROPH/DIAG IV INF INIT: CPT

## 2025-01-01 PROCEDURE — 86920 COMPATIBILITY TEST SPIN: CPT

## 2025-01-01 PROCEDURE — 96368 THER/DIAG CONCURRENT INF: CPT

## 2025-01-01 PROCEDURE — APPSS60 APP SPLIT SHARED TIME 46-60 MINUTES

## 2025-01-01 PROCEDURE — 82436 ASSAY OF URINE CHLORIDE: CPT

## 2025-01-01 PROCEDURE — 84156 ASSAY OF PROTEIN URINE: CPT

## 2025-01-01 PROCEDURE — 93306 TTE W/DOPPLER COMPLETE: CPT | Performed by: INTERNAL MEDICINE

## 2025-01-01 PROCEDURE — 93005 ELECTROCARDIOGRAM TRACING: CPT

## 2025-01-01 PROCEDURE — 87081 CULTURE SCREEN ONLY: CPT

## 2025-01-01 PROCEDURE — 74018 RADEX ABDOMEN 1 VIEW: CPT

## 2025-01-01 PROCEDURE — 87102 FUNGUS ISOLATION CULTURE: CPT

## 2025-01-01 PROCEDURE — 87205 SMEAR GRAM STAIN: CPT

## 2025-01-01 PROCEDURE — 87077 CULTURE AEROBIC IDENTIFY: CPT

## 2025-01-01 PROCEDURE — 84145 PROCALCITONIN (PCT): CPT

## 2025-01-01 PROCEDURE — 82728 ASSAY OF FERRITIN: CPT

## 2025-01-01 PROCEDURE — 88305 TISSUE EXAM BY PATHOLOGIST: CPT

## 2025-01-01 RX ORDER — QUETIAPINE FUMARATE 25 MG/1
25 TABLET, FILM COATED ORAL 2 TIMES DAILY
Status: DISCONTINUED | OUTPATIENT
Start: 2025-01-01 | End: 2025-01-01

## 2025-01-01 RX ORDER — SODIUM CHLORIDE 0.9 % (FLUSH) 0.9 %
5-40 SYRINGE (ML) INJECTION PRN
Status: DISCONTINUED | OUTPATIENT
Start: 2025-01-01 | End: 2025-01-01 | Stop reason: SDUPTHER

## 2025-01-01 RX ORDER — MIDAZOLAM HYDROCHLORIDE 2 MG/2ML
0.5 INJECTION, SOLUTION INTRAMUSCULAR; INTRAVENOUS EVERY 4 HOURS PRN
Status: DISCONTINUED | OUTPATIENT
Start: 2025-01-01 | End: 2025-01-01 | Stop reason: HOSPADM

## 2025-01-01 RX ORDER — POTASSIUM CHLORIDE 1500 MG/1
40 TABLET, EXTENDED RELEASE ORAL PRN
Status: DISCONTINUED | OUTPATIENT
Start: 2025-01-01 | End: 2025-01-01 | Stop reason: HOSPADM

## 2025-01-01 RX ORDER — LORAZEPAM 2 MG/ML
0.5 INJECTION INTRAMUSCULAR ONCE
Status: DISCONTINUED | OUTPATIENT
Start: 2025-01-01 | End: 2025-01-01

## 2025-01-01 RX ORDER — SODIUM CHLORIDE, SODIUM LACTATE, POTASSIUM CHLORIDE, CALCIUM CHLORIDE 600; 310; 30; 20 MG/100ML; MG/100ML; MG/100ML; MG/100ML
INJECTION, SOLUTION INTRAVENOUS CONTINUOUS
Status: DISCONTINUED | OUTPATIENT
Start: 2025-01-01 | End: 2025-01-01

## 2025-01-01 RX ORDER — DIAZEPAM 10 MG/2ML
5 INJECTION, SOLUTION INTRAMUSCULAR; INTRAVENOUS ONCE
Status: COMPLETED | OUTPATIENT
Start: 2025-01-01 | End: 2025-01-01

## 2025-01-01 RX ORDER — BISACODYL 10 MG
10 SUPPOSITORY, RECTAL RECTAL DAILY PRN
Status: DISCONTINUED | OUTPATIENT
Start: 2025-01-01 | End: 2025-01-01 | Stop reason: HOSPADM

## 2025-01-01 RX ORDER — POTASSIUM CHLORIDE 7.45 MG/ML
10 INJECTION INTRAVENOUS
Status: COMPLETED | OUTPATIENT
Start: 2025-01-01 | End: 2025-01-01

## 2025-01-01 RX ORDER — SENNOSIDES 8.6 MG/1
1 TABLET ORAL NIGHTLY
Status: DISCONTINUED | OUTPATIENT
Start: 2025-01-01 | End: 2025-01-01 | Stop reason: HOSPADM

## 2025-01-01 RX ORDER — GABAPENTIN 100 MG/1
200 CAPSULE ORAL 3 TIMES DAILY
Status: DISCONTINUED | OUTPATIENT
Start: 2025-01-01 | End: 2025-01-01

## 2025-01-01 RX ORDER — SODIUM CHLORIDE 0.9 % (FLUSH) 0.9 %
5-40 SYRINGE (ML) INJECTION PRN
Status: DISCONTINUED | OUTPATIENT
Start: 2025-01-01 | End: 2025-01-01 | Stop reason: HOSPADM

## 2025-01-01 RX ORDER — POTASSIUM CHLORIDE 1500 MG/1
40 TABLET, EXTENDED RELEASE ORAL ONCE
Status: DISCONTINUED | OUTPATIENT
Start: 2025-01-01 | End: 2025-01-01 | Stop reason: HOSPADM

## 2025-01-01 RX ORDER — PROCHLORPERAZINE MALEATE 10 MG
5 TABLET ORAL EVERY 8 HOURS PRN
Status: DISCONTINUED | OUTPATIENT
Start: 2025-01-01 | End: 2025-01-01 | Stop reason: HOSPADM

## 2025-01-01 RX ORDER — POLYETHYLENE GLYCOL 3350 17 G/17G
17 POWDER, FOR SOLUTION ORAL DAILY
Status: DISCONTINUED | OUTPATIENT
Start: 2025-01-01 | End: 2025-01-01 | Stop reason: HOSPADM

## 2025-01-01 RX ORDER — MIDAZOLAM HYDROCHLORIDE 2 MG/2ML
1 INJECTION, SOLUTION INTRAMUSCULAR; INTRAVENOUS ONCE
Status: COMPLETED | OUTPATIENT
Start: 2025-01-01 | End: 2025-01-01

## 2025-01-01 RX ORDER — ALBUMIN (HUMAN) 12.5 G/50ML
25 SOLUTION INTRAVENOUS ONCE
Status: COMPLETED | OUTPATIENT
Start: 2025-01-01 | End: 2025-01-01

## 2025-01-01 RX ORDER — ONDANSETRON 2 MG/ML
4 INJECTION INTRAMUSCULAR; INTRAVENOUS EVERY 6 HOURS PRN
Status: DISCONTINUED | OUTPATIENT
Start: 2025-01-01 | End: 2025-01-01

## 2025-01-01 RX ORDER — IOPAMIDOL 755 MG/ML
75 INJECTION, SOLUTION INTRAVASCULAR
Status: COMPLETED | OUTPATIENT
Start: 2025-01-01 | End: 2025-01-01

## 2025-01-01 RX ORDER — LORAZEPAM 0.5 MG/1
0.5 TABLET ORAL ONCE
Status: COMPLETED | OUTPATIENT
Start: 2025-01-01 | End: 2025-01-01

## 2025-01-01 RX ORDER — 0.9 % SODIUM CHLORIDE 0.9 %
1000 INTRAVENOUS SOLUTION INTRAVENOUS ONCE
Status: DISCONTINUED | OUTPATIENT
Start: 2025-01-01 | End: 2025-01-01

## 2025-01-01 RX ORDER — 0.9 % SODIUM CHLORIDE 0.9 %
500 INTRAVENOUS SOLUTION INTRAVENOUS ONCE
Status: COMPLETED | OUTPATIENT
Start: 2025-01-01 | End: 2025-01-01

## 2025-01-01 RX ORDER — SODIUM CHLORIDE 9 MG/ML
INJECTION, SOLUTION INTRAVENOUS PRN
Status: DISCONTINUED | OUTPATIENT
Start: 2025-01-01 | End: 2025-01-01 | Stop reason: SDUPTHER

## 2025-01-01 RX ORDER — MAGNESIUM SULFATE IN WATER 40 MG/ML
2000 INJECTION, SOLUTION INTRAVENOUS PRN
Status: DISCONTINUED | OUTPATIENT
Start: 2025-01-01 | End: 2025-01-01 | Stop reason: HOSPADM

## 2025-01-01 RX ORDER — POTASSIUM CHLORIDE 7.45 MG/ML
10 INJECTION INTRAVENOUS
Status: DISPENSED | OUTPATIENT
Start: 2025-01-01 | End: 2025-01-01

## 2025-01-01 RX ORDER — MAGNESIUM HYDROXIDE/ALUMINUM HYDROXICE/SIMETHICONE 120; 1200; 1200 MG/30ML; MG/30ML; MG/30ML
30 SUSPENSION ORAL EVERY 6 HOURS PRN
Status: DISCONTINUED | OUTPATIENT
Start: 2025-01-01 | End: 2025-01-01 | Stop reason: HOSPADM

## 2025-01-01 RX ORDER — ONDANSETRON 4 MG/1
4 TABLET, ORALLY DISINTEGRATING ORAL EVERY 8 HOURS PRN
Status: DISCONTINUED | OUTPATIENT
Start: 2025-01-01 | End: 2025-01-01

## 2025-01-01 RX ORDER — MAGNESIUM SULFATE IN WATER 40 MG/ML
2000 INJECTION, SOLUTION INTRAVENOUS ONCE
Status: COMPLETED | OUTPATIENT
Start: 2025-01-01 | End: 2025-01-01

## 2025-01-01 RX ORDER — MIDODRINE HYDROCHLORIDE 5 MG/1
15 TABLET ORAL EVERY 8 HOURS
Status: DISCONTINUED | OUTPATIENT
Start: 2025-01-01 | End: 2025-01-01 | Stop reason: HOSPADM

## 2025-01-01 RX ORDER — ACETAMINOPHEN 650 MG/1
650 SUPPOSITORY RECTAL EVERY 6 HOURS PRN
Status: DISCONTINUED | OUTPATIENT
Start: 2025-01-01 | End: 2025-01-01 | Stop reason: HOSPADM

## 2025-01-01 RX ORDER — LIDOCAINE HYDROCHLORIDE 10 MG/ML
50 INJECTION, SOLUTION EPIDURAL; INFILTRATION; INTRACAUDAL; PERINEURAL ONCE
Status: COMPLETED | OUTPATIENT
Start: 2025-01-01 | End: 2025-01-01

## 2025-01-01 RX ORDER — IPRATROPIUM BROMIDE AND ALBUTEROL SULFATE 2.5; .5 MG/3ML; MG/3ML
1 SOLUTION RESPIRATORY (INHALATION) EVERY 4 HOURS PRN
Status: DISCONTINUED | OUTPATIENT
Start: 2025-01-01 | End: 2025-01-01 | Stop reason: HOSPADM

## 2025-01-01 RX ORDER — DONEPEZIL HYDROCHLORIDE 5 MG/1
5 TABLET, FILM COATED ORAL NIGHTLY
Status: DISCONTINUED | OUTPATIENT
Start: 2025-01-01 | End: 2025-01-01 | Stop reason: HOSPADM

## 2025-01-01 RX ORDER — MORPHINE SULFATE 2 MG/ML
2 INJECTION, SOLUTION INTRAMUSCULAR; INTRAVENOUS
Refills: 0 | Status: DISCONTINUED | OUTPATIENT
Start: 2025-01-01 | End: 2025-01-01 | Stop reason: HOSPADM

## 2025-01-01 RX ORDER — PANTOPRAZOLE SODIUM 40 MG/1
40 TABLET, DELAYED RELEASE ORAL
Status: DISCONTINUED | OUTPATIENT
Start: 2025-01-01 | End: 2025-01-01 | Stop reason: HOSPADM

## 2025-01-01 RX ORDER — ANASTROZOLE 1 MG/1
1 TABLET ORAL DAILY
Status: DISCONTINUED | OUTPATIENT
Start: 2025-01-01 | End: 2025-01-01 | Stop reason: HOSPADM

## 2025-01-01 RX ORDER — SODIUM CHLORIDE 0.9 % (FLUSH) 0.9 %
5-40 SYRINGE (ML) INJECTION EVERY 12 HOURS SCHEDULED
Status: DISCONTINUED | OUTPATIENT
Start: 2025-01-01 | End: 2025-01-01 | Stop reason: SDUPTHER

## 2025-01-01 RX ORDER — SENNOSIDES 8.8 MG/5ML
5 LIQUID ORAL 2 TIMES DAILY PRN
Status: DISCONTINUED | OUTPATIENT
Start: 2025-01-01 | End: 2025-01-01

## 2025-01-01 RX ORDER — ACETAMINOPHEN 325 MG/1
650 TABLET ORAL EVERY 6 HOURS PRN
Status: DISCONTINUED | OUTPATIENT
Start: 2025-01-01 | End: 2025-01-01 | Stop reason: HOSPADM

## 2025-01-01 RX ORDER — SODIUM CHLORIDE 9 MG/ML
INJECTION, SOLUTION INTRAVENOUS PRN
Status: DISCONTINUED | OUTPATIENT
Start: 2025-01-01 | End: 2025-01-01 | Stop reason: HOSPADM

## 2025-01-01 RX ORDER — DIGOXIN 0.25 MG/ML
250 INJECTION INTRAMUSCULAR; INTRAVENOUS ONCE
Status: COMPLETED | OUTPATIENT
Start: 2025-01-01 | End: 2025-01-01

## 2025-01-01 RX ORDER — METOPROLOL TARTRATE 25 MG/1
25 TABLET, FILM COATED ORAL 2 TIMES DAILY
Status: DISCONTINUED | OUTPATIENT
Start: 2025-01-01 | End: 2025-01-01

## 2025-01-01 RX ORDER — MIDAZOLAM HYDROCHLORIDE 2 MG/2ML
2 INJECTION, SOLUTION INTRAMUSCULAR; INTRAVENOUS ONCE
Status: COMPLETED | OUTPATIENT
Start: 2025-01-01 | End: 2025-01-01

## 2025-01-01 RX ORDER — METOPROLOL TARTRATE 25 MG/1
75 TABLET, FILM COATED ORAL 2 TIMES DAILY
Status: DISCONTINUED | OUTPATIENT
Start: 2025-01-01 | End: 2025-01-01

## 2025-01-01 RX ORDER — MIDODRINE HYDROCHLORIDE 5 MG/1
10 TABLET ORAL ONCE
Status: COMPLETED | OUTPATIENT
Start: 2025-01-01 | End: 2025-01-01

## 2025-01-01 RX ORDER — SODIUM CHLORIDE 0.9 % (FLUSH) 0.9 %
5-40 SYRINGE (ML) INJECTION EVERY 12 HOURS SCHEDULED
Status: DISCONTINUED | OUTPATIENT
Start: 2025-01-01 | End: 2025-01-01 | Stop reason: HOSPADM

## 2025-01-01 RX ORDER — MIDODRINE HYDROCHLORIDE 5 MG/1
15 TABLET ORAL
Status: DISCONTINUED | OUTPATIENT
Start: 2025-01-01 | End: 2025-01-01

## 2025-01-01 RX ORDER — FUROSEMIDE 10 MG/ML
40 INJECTION INTRAMUSCULAR; INTRAVENOUS ONCE
Status: COMPLETED | OUTPATIENT
Start: 2025-01-01 | End: 2025-01-01

## 2025-01-01 RX ORDER — OXCARBAZEPINE 150 MG/1
600 TABLET, FILM COATED ORAL 2 TIMES DAILY
Status: DISCONTINUED | OUTPATIENT
Start: 2025-01-01 | End: 2025-01-01 | Stop reason: HOSPADM

## 2025-01-01 RX ORDER — ENOXAPARIN SODIUM 100 MG/ML
1 INJECTION SUBCUTANEOUS 2 TIMES DAILY
Status: DISCONTINUED | OUTPATIENT
Start: 2025-01-01 | End: 2025-01-01

## 2025-01-01 RX ORDER — HYDROXYZINE HYDROCHLORIDE 10 MG/1
25 TABLET, FILM COATED ORAL ONCE
Status: COMPLETED | OUTPATIENT
Start: 2025-01-01 | End: 2025-01-01

## 2025-01-01 RX ORDER — SODIUM CHLORIDE 9 MG/ML
INJECTION, SOLUTION INTRAVENOUS PRN
Status: DISCONTINUED | OUTPATIENT
Start: 2025-01-01 | End: 2025-01-01

## 2025-01-01 RX ORDER — SODIUM CHLORIDE, SODIUM LACTATE, POTASSIUM CHLORIDE, AND CALCIUM CHLORIDE .6; .31; .03; .02 G/100ML; G/100ML; G/100ML; G/100ML
500 INJECTION, SOLUTION INTRAVENOUS ONCE
Status: DISCONTINUED | OUTPATIENT
Start: 2025-01-01 | End: 2025-01-01

## 2025-01-01 RX ORDER — PROCHLORPERAZINE EDISYLATE 5 MG/ML
10 INJECTION INTRAMUSCULAR; INTRAVENOUS EVERY 6 HOURS PRN
Status: DISCONTINUED | OUTPATIENT
Start: 2025-01-01 | End: 2025-01-01 | Stop reason: HOSPADM

## 2025-01-01 RX ORDER — GLYCOPYRROLATE 0.2 MG/ML
0.4 INJECTION INTRAMUSCULAR; INTRAVENOUS EVERY 4 HOURS PRN
Status: DISCONTINUED | OUTPATIENT
Start: 2025-01-01 | End: 2025-01-01 | Stop reason: HOSPADM

## 2025-01-01 RX ORDER — METOPROLOL TARTRATE 25 MG/1
25 TABLET, FILM COATED ORAL 3 TIMES DAILY
Status: DISCONTINUED | OUTPATIENT
Start: 2025-01-01 | End: 2025-01-01 | Stop reason: HOSPADM

## 2025-01-01 RX ORDER — POTASSIUM CHLORIDE 7.45 MG/ML
10 INJECTION INTRAVENOUS PRN
Status: DISCONTINUED | OUTPATIENT
Start: 2025-01-01 | End: 2025-01-01 | Stop reason: HOSPADM

## 2025-01-01 RX ORDER — QUETIAPINE FUMARATE 25 MG/1
12.5 TABLET, FILM COATED ORAL 2 TIMES DAILY
Status: DISCONTINUED | OUTPATIENT
Start: 2025-01-01 | End: 2025-01-01

## 2025-01-01 RX ORDER — BUMETANIDE 0.25 MG/ML
1 INJECTION, SOLUTION INTRAMUSCULAR; INTRAVENOUS 2 TIMES DAILY
Status: DISCONTINUED | OUTPATIENT
Start: 2025-01-01 | End: 2025-01-01

## 2025-01-01 RX ORDER — MORPHINE SULFATE 4 MG/ML
4 INJECTION, SOLUTION INTRAMUSCULAR; INTRAVENOUS
Refills: 0 | Status: DISCONTINUED | OUTPATIENT
Start: 2025-01-01 | End: 2025-01-01 | Stop reason: HOSPADM

## 2025-01-01 RX ORDER — BUMETANIDE 0.25 MG/ML
1 INJECTION, SOLUTION INTRAMUSCULAR; INTRAVENOUS DAILY
Status: DISCONTINUED | OUTPATIENT
Start: 2025-01-01 | End: 2025-01-01 | Stop reason: HOSPADM

## 2025-01-01 RX ORDER — MIDODRINE HYDROCHLORIDE 5 MG/1
5 TABLET ORAL
Status: DISCONTINUED | OUTPATIENT
Start: 2025-01-01 | End: 2025-01-01

## 2025-01-01 RX ORDER — MIDODRINE HYDROCHLORIDE 5 MG/1
10 TABLET ORAL
Status: DISCONTINUED | OUTPATIENT
Start: 2025-01-01 | End: 2025-01-01

## 2025-01-01 RX ORDER — ZONISAMIDE 100 MG/1
200 CAPSULE ORAL 2 TIMES DAILY
Status: DISCONTINUED | OUTPATIENT
Start: 2025-01-01 | End: 2025-01-01

## 2025-01-01 RX ADMIN — SODIUM CHLORIDE 500 ML: 9 INJECTION, SOLUTION INTRAVENOUS at 05:04

## 2025-01-01 RX ADMIN — SENNOSIDES 8.6 MG: 8.6 TABLET, COATED ORAL at 22:37

## 2025-01-01 RX ADMIN — AMPICILLIN SODIUM 2000 MG: 2 INJECTION, POWDER, FOR SOLUTION INTRAMUSCULAR; INTRAVENOUS at 18:39

## 2025-01-01 RX ADMIN — APIXABAN 5 MG: 5 TABLET, FILM COATED ORAL at 11:37

## 2025-01-01 RX ADMIN — METOPROLOL TARTRATE 25 MG: 25 TABLET, FILM COATED ORAL at 14:54

## 2025-01-01 RX ADMIN — SODIUM CHLORIDE, PRESERVATIVE FREE 10 ML: 5 INJECTION INTRAVENOUS at 23:56

## 2025-01-01 RX ADMIN — DIGOXIN 250 MCG: 250 INJECTION, SOLUTION INTRAMUSCULAR; INTRAVENOUS; PARENTERAL at 18:27

## 2025-01-01 RX ADMIN — ANASTROZOLE 1 MG: 1 TABLET, FILM COATED ORAL at 11:08

## 2025-01-01 RX ADMIN — ZONISAMIDE 200 MG: 100 CAPSULE ORAL at 20:28

## 2025-01-01 RX ADMIN — LIDOCAINE HYDROCHLORIDE 25 MG: 10 SOLUTION INTRAVENOUS at 14:55

## 2025-01-01 RX ADMIN — MORPHINE SULFATE 2 MG: 2 INJECTION, SOLUTION INTRAMUSCULAR; INTRAVENOUS at 14:31

## 2025-01-01 RX ADMIN — MIDAZOLAM HYDROCHLORIDE 2 MG: 1 INJECTION, SOLUTION INTRAMUSCULAR; INTRAVENOUS at 14:30

## 2025-01-01 RX ADMIN — BUMETANIDE 1 MG: 0.25 INJECTION INTRAMUSCULAR; INTRAVENOUS at 17:52

## 2025-01-01 RX ADMIN — ZONISAMIDE 200 MG: 100 CAPSULE ORAL at 21:36

## 2025-01-01 RX ADMIN — AMPICILLIN SODIUM 2000 MG: 2 INJECTION, POWDER, FOR SOLUTION INTRAMUSCULAR; INTRAVENOUS at 12:08

## 2025-01-01 RX ADMIN — MIDODRINE HYDROCHLORIDE 7.5 MG: 2.5 TABLET ORAL at 10:01

## 2025-01-01 RX ADMIN — Medication 0.5 MG/MIN: at 06:48

## 2025-01-01 RX ADMIN — MIDODRINE HYDROCHLORIDE 5 MG: 5 TABLET ORAL at 09:45

## 2025-01-01 RX ADMIN — AMPICILLIN SODIUM 2000 MG: 2 INJECTION, POWDER, FOR SOLUTION INTRAMUSCULAR; INTRAVENOUS at 12:51

## 2025-01-01 RX ADMIN — CAMPHOR, MENTHOL: .5; .5 LOTION TOPICAL at 21:20

## 2025-01-01 RX ADMIN — AMPICILLIN SODIUM 2000 MG: 2 INJECTION, POWDER, FOR SOLUTION INTRAMUSCULAR; INTRAVENOUS at 11:27

## 2025-01-01 RX ADMIN — ZONISAMIDE 200 MG: 100 CAPSULE ORAL at 08:37

## 2025-01-01 RX ADMIN — MIDODRINE HYDROCHLORIDE 15 MG: 5 TABLET ORAL at 14:05

## 2025-01-01 RX ADMIN — AMIODARONE HYDROCHLORIDE 150 MG: 50 INJECTION, SOLUTION INTRAVENOUS at 16:56

## 2025-01-01 RX ADMIN — SODIUM CHLORIDE, PRESERVATIVE FREE 10 ML: 5 INJECTION INTRAVENOUS at 21:34

## 2025-01-01 RX ADMIN — AMPICILLIN SODIUM 2000 MG: 2 INJECTION, POWDER, FOR SOLUTION INTRAMUSCULAR; INTRAVENOUS at 14:54

## 2025-01-01 RX ADMIN — DONEPEZIL HYDROCHLORIDE 5 MG: 5 TABLET, FILM COATED ORAL at 20:00

## 2025-01-01 RX ADMIN — MIDODRINE HYDROCHLORIDE 15 MG: 5 TABLET ORAL at 16:30

## 2025-01-01 RX ADMIN — SODIUM CHLORIDE, PRESERVATIVE FREE 10 ML: 5 INJECTION INTRAVENOUS at 11:15

## 2025-01-01 RX ADMIN — MIDODRINE HYDROCHLORIDE 5 MG: 5 TABLET ORAL at 11:43

## 2025-01-01 RX ADMIN — CAMPHOR, MENTHOL: .5; .5 LOTION TOPICAL at 20:28

## 2025-01-01 RX ADMIN — MIDODRINE HYDROCHLORIDE 15 MG: 5 TABLET ORAL at 14:51

## 2025-01-01 RX ADMIN — POLYETHYLENE GLYCOL 3350 17 G: 17 POWDER, FOR SOLUTION ORAL at 10:01

## 2025-01-01 RX ADMIN — QUETIAPINE FUMARATE 12.5 MG: 25 TABLET ORAL at 11:36

## 2025-01-01 RX ADMIN — ZONISAMIDE 200 MG: 100 CAPSULE ORAL at 11:14

## 2025-01-01 RX ADMIN — MIDODRINE HYDROCHLORIDE 7.5 MG: 2.5 TABLET ORAL at 14:54

## 2025-01-01 RX ADMIN — MIDODRINE HYDROCHLORIDE 10 MG: 5 TABLET ORAL at 13:33

## 2025-01-01 RX ADMIN — VANCOMYCIN HYDROCHLORIDE 2000 MG: 10 INJECTION, POWDER, LYOPHILIZED, FOR SOLUTION INTRAVENOUS at 23:55

## 2025-01-01 RX ADMIN — METOPROLOL TARTRATE 25 MG: 25 TABLET, FILM COATED ORAL at 10:01

## 2025-01-01 RX ADMIN — ENOXAPARIN SODIUM 80 MG: 100 INJECTION SUBCUTANEOUS at 21:34

## 2025-01-01 RX ADMIN — DONEPEZIL HYDROCHLORIDE 5 MG: 5 TABLET, FILM COATED ORAL at 21:33

## 2025-01-01 RX ADMIN — SODIUM CHLORIDE, PRESERVATIVE FREE 10 ML: 5 INJECTION INTRAVENOUS at 09:47

## 2025-01-01 RX ADMIN — ZONISAMIDE 200 MG: 100 CAPSULE ORAL at 09:47

## 2025-01-01 RX ADMIN — MIDODRINE HYDROCHLORIDE 7.5 MG: 2.5 TABLET ORAL at 18:00

## 2025-01-01 RX ADMIN — ANASTROZOLE 1 MG: 1 TABLET, FILM COATED ORAL at 10:15

## 2025-01-01 RX ADMIN — IOPAMIDOL 75 ML: 755 INJECTION, SOLUTION INTRAVENOUS at 16:03

## 2025-01-01 RX ADMIN — SODIUM CHLORIDE, PRESERVATIVE FREE 10 ML: 5 INJECTION INTRAVENOUS at 22:37

## 2025-01-01 RX ADMIN — ZONISAMIDE 200 MG: 100 CAPSULE ORAL at 20:00

## 2025-01-01 RX ADMIN — AMPICILLIN SODIUM 2000 MG: 2 INJECTION, POWDER, FOR SOLUTION INTRAMUSCULAR; INTRAVENOUS at 01:20

## 2025-01-01 RX ADMIN — DIAZEPAM 5 MG: 5 INJECTION, SOLUTION INTRAMUSCULAR; INTRAVENOUS at 01:46

## 2025-01-01 RX ADMIN — POTASSIUM CHLORIDE 10 MEQ: 7.46 INJECTION, SOLUTION INTRAVENOUS at 17:48

## 2025-01-01 RX ADMIN — SODIUM CHLORIDE, PRESERVATIVE FREE 10 ML: 5 INJECTION INTRAVENOUS at 20:22

## 2025-01-01 RX ADMIN — MIDODRINE HYDROCHLORIDE 7.5 MG: 2.5 TABLET ORAL at 18:22

## 2025-01-01 RX ADMIN — AMPICILLIN SODIUM 2000 MG: 2 INJECTION, POWDER, FOR SOLUTION INTRAMUSCULAR; INTRAVENOUS at 22:43

## 2025-01-01 RX ADMIN — DIAZEPAM 5 MG: 5 INJECTION, SOLUTION INTRAMUSCULAR; INTRAVENOUS at 21:04

## 2025-01-01 RX ADMIN — CAMPHOR, MENTHOL: .5; .5 LOTION TOPICAL at 09:22

## 2025-01-01 RX ADMIN — ZONISAMIDE 200 MG: 100 CAPSULE ORAL at 00:00

## 2025-01-01 RX ADMIN — CAMPHOR, MENTHOL: .5; .5 LOTION TOPICAL at 11:08

## 2025-01-01 RX ADMIN — Medication 6 MG: at 21:25

## 2025-01-01 RX ADMIN — OXCARBAZEPINE 600 MG: 150 TABLET, FILM COATED ORAL at 10:15

## 2025-01-01 RX ADMIN — MIDODRINE HYDROCHLORIDE 15 MG: 5 TABLET ORAL at 04:59

## 2025-01-01 RX ADMIN — FUROSEMIDE 40 MG: 10 INJECTION, SOLUTION INTRAMUSCULAR; INTRAVENOUS at 20:47

## 2025-01-01 RX ADMIN — CAMPHOR, MENTHOL: .5; .5 LOTION TOPICAL at 10:00

## 2025-01-01 RX ADMIN — GABAPENTIN 200 MG: 100 CAPSULE ORAL at 14:54

## 2025-01-01 RX ADMIN — METOPROLOL TARTRATE 25 MG: 25 TABLET, FILM COATED ORAL at 21:33

## 2025-01-01 RX ADMIN — AMPICILLIN SODIUM 2000 MG: 2 INJECTION, POWDER, FOR SOLUTION INTRAMUSCULAR; INTRAVENOUS at 05:30

## 2025-01-01 RX ADMIN — PANTOPRAZOLE SODIUM 40 MG: 40 TABLET, DELAYED RELEASE ORAL at 06:02

## 2025-01-01 RX ADMIN — MAGNESIUM SULFATE HEPTAHYDRATE 2000 MG: 40 INJECTION, SOLUTION INTRAVENOUS at 20:53

## 2025-01-01 RX ADMIN — GABAPENTIN 200 MG: 100 CAPSULE ORAL at 22:33

## 2025-01-01 RX ADMIN — AMPICILLIN SODIUM 2000 MG: 2 INJECTION, POWDER, FOR SOLUTION INTRAMUSCULAR; INTRAVENOUS at 22:41

## 2025-01-01 RX ADMIN — OXCARBAZEPINE 600 MG: 150 TABLET, FILM COATED ORAL at 20:26

## 2025-01-01 RX ADMIN — DONEPEZIL HYDROCHLORIDE 5 MG: 5 TABLET, FILM COATED ORAL at 21:25

## 2025-01-01 RX ADMIN — AMPICILLIN SODIUM 2000 MG: 2 INJECTION, POWDER, FOR SOLUTION INTRAMUSCULAR; INTRAVENOUS at 12:32

## 2025-01-01 RX ADMIN — CAMPHOR, MENTHOL: .5; .5 LOTION TOPICAL at 22:13

## 2025-01-01 RX ADMIN — AMPICILLIN SODIUM 2000 MG: 2 INJECTION, POWDER, FOR SOLUTION INTRAMUSCULAR; INTRAVENOUS at 11:45

## 2025-01-01 RX ADMIN — POTASSIUM CHLORIDE 10 MEQ: 7.46 INJECTION, SOLUTION INTRAVENOUS at 16:38

## 2025-01-01 RX ADMIN — APIXABAN 5 MG: 5 TABLET, FILM COATED ORAL at 22:32

## 2025-01-01 RX ADMIN — OXCARBAZEPINE 600 MG: 150 TABLET, FILM COATED ORAL at 11:07

## 2025-01-01 RX ADMIN — SENNOSIDES 8.6 MG: 8.6 TABLET, COATED ORAL at 21:35

## 2025-01-01 RX ADMIN — ALBUMIN (HUMAN) 25 G: 0.25 INJECTION, SOLUTION INTRAVENOUS at 11:05

## 2025-01-01 RX ADMIN — CEFEPIME 2000 MG: 2 INJECTION, POWDER, FOR SOLUTION INTRAVENOUS at 20:55

## 2025-01-01 RX ADMIN — MAGNESIUM SULFATE HEPTAHYDRATE 2000 MG: 40 INJECTION, SOLUTION INTRAVENOUS at 16:16

## 2025-01-01 RX ADMIN — AMPICILLIN SODIUM 2000 MG: 2 INJECTION, POWDER, FOR SOLUTION INTRAMUSCULAR; INTRAVENOUS at 16:28

## 2025-01-01 RX ADMIN — PANTOPRAZOLE SODIUM 40 MG: 40 TABLET, DELAYED RELEASE ORAL at 05:58

## 2025-01-01 RX ADMIN — AMPICILLIN SODIUM 2000 MG: 2 INJECTION, POWDER, FOR SOLUTION INTRAMUSCULAR; INTRAVENOUS at 06:00

## 2025-01-01 RX ADMIN — CALCIUM GLUCONATE 3000 MG: 98 INJECTION INTRAVENOUS at 23:48

## 2025-01-01 RX ADMIN — DONEPEZIL HYDROCHLORIDE 5 MG: 5 TABLET, FILM COATED ORAL at 22:33

## 2025-01-01 RX ADMIN — MIDAZOLAM HYDROCHLORIDE 1 MG: 1 INJECTION, SOLUTION INTRAMUSCULAR; INTRAVENOUS at 22:34

## 2025-01-01 RX ADMIN — ACETAMINOPHEN 650 MG: 325 TABLET ORAL at 10:01

## 2025-01-01 RX ADMIN — SODIUM CHLORIDE, PRESERVATIVE FREE 10 ML: 5 INJECTION INTRAVENOUS at 21:21

## 2025-01-01 RX ADMIN — Medication 0.5 MG/MIN: at 15:16

## 2025-01-01 RX ADMIN — AMPICILLIN SODIUM 2000 MG: 2 INJECTION, POWDER, FOR SOLUTION INTRAMUSCULAR; INTRAVENOUS at 06:01

## 2025-01-01 RX ADMIN — DIAZEPAM 5 MG: 5 INJECTION, SOLUTION INTRAMUSCULAR; INTRAVENOUS at 20:11

## 2025-01-01 RX ADMIN — Medication 0.5 MG/MIN: at 22:31

## 2025-01-01 RX ADMIN — OXCARBAZEPINE 600 MG: 150 TABLET, FILM COATED ORAL at 22:35

## 2025-01-01 RX ADMIN — AMPICILLIN SODIUM 2000 MG: 2 INJECTION, POWDER, FOR SOLUTION INTRAMUSCULAR; INTRAVENOUS at 00:00

## 2025-01-01 RX ADMIN — SENNOSIDES 8.6 MG: 8.6 TABLET, COATED ORAL at 22:12

## 2025-01-01 RX ADMIN — SODIUM CHLORIDE, PRESERVATIVE FREE 10 ML: 5 INJECTION INTRAVENOUS at 22:12

## 2025-01-01 RX ADMIN — SULFUR HEXAFLUORIDE 2 ML: 60.7; .19; .19 INJECTION, POWDER, LYOPHILIZED, FOR SUSPENSION INTRAVENOUS; INTRAVESICAL at 13:07

## 2025-01-01 RX ADMIN — ANASTROZOLE 1 MG: 1 TABLET, FILM COATED ORAL at 09:45

## 2025-01-01 RX ADMIN — METOPROLOL TARTRATE 25 MG: 25 TABLET, FILM COATED ORAL at 15:30

## 2025-01-01 RX ADMIN — MORPHINE SULFATE 2 MG: 2 INJECTION, SOLUTION INTRAMUSCULAR; INTRAVENOUS at 17:30

## 2025-01-01 RX ADMIN — AMIODARONE HYDROCHLORIDE 1 MG/MIN: 50 INJECTION, SOLUTION INTRAVENOUS at 17:12

## 2025-01-01 RX ADMIN — AMPICILLIN SODIUM 2000 MG: 2 INJECTION, POWDER, FOR SOLUTION INTRAMUSCULAR; INTRAVENOUS at 18:28

## 2025-01-01 RX ADMIN — SODIUM CHLORIDE, PRESERVATIVE FREE 10 ML: 5 INJECTION INTRAVENOUS at 10:46

## 2025-01-01 RX ADMIN — OXCARBAZEPINE 600 MG: 150 TABLET, FILM COATED ORAL at 21:26

## 2025-01-01 RX ADMIN — AMPICILLIN SODIUM 2000 MG: 2 INJECTION, POWDER, FOR SOLUTION INTRAMUSCULAR; INTRAVENOUS at 22:38

## 2025-01-01 RX ADMIN — AMPICILLIN SODIUM 2000 MG: 2 INJECTION, POWDER, FOR SOLUTION INTRAMUSCULAR; INTRAVENOUS at 23:27

## 2025-01-01 RX ADMIN — IOPAMIDOL 75 ML: 755 INJECTION, SOLUTION INTRAVENOUS at 15:35

## 2025-01-01 RX ADMIN — MIDODRINE HYDROCHLORIDE 15 MG: 5 TABLET ORAL at 06:11

## 2025-01-01 RX ADMIN — QUETIAPINE FUMARATE 12.5 MG: 25 TABLET ORAL at 20:26

## 2025-01-01 RX ADMIN — AMPICILLIN SODIUM 2000 MG: 2 INJECTION, POWDER, FOR SOLUTION INTRAMUSCULAR; INTRAVENOUS at 16:53

## 2025-01-01 RX ADMIN — POLYETHYLENE GLYCOL 3350 17 G: 17 POWDER, FOR SOLUTION ORAL at 09:46

## 2025-01-01 RX ADMIN — Medication 6 MG: at 22:32

## 2025-01-01 RX ADMIN — OXCARBAZEPINE 600 MG: 150 TABLET, FILM COATED ORAL at 09:22

## 2025-01-01 RX ADMIN — LORAZEPAM 0.5 MG: 0.5 TABLET ORAL at 04:42

## 2025-01-01 RX ADMIN — CAMPHOR, MENTHOL: .5; .5 LOTION TOPICAL at 11:49

## 2025-01-01 RX ADMIN — APIXABAN 5 MG: 5 TABLET, FILM COATED ORAL at 20:01

## 2025-01-01 RX ADMIN — AMPICILLIN SODIUM 2000 MG: 2 INJECTION, POWDER, FOR SOLUTION INTRAMUSCULAR; INTRAVENOUS at 18:13

## 2025-01-01 RX ADMIN — MIDODRINE HYDROCHLORIDE 15 MG: 5 TABLET ORAL at 21:25

## 2025-01-01 RX ADMIN — AMPICILLIN SODIUM 2000 MG: 2 INJECTION, POWDER, FOR SOLUTION INTRAMUSCULAR; INTRAVENOUS at 03:24

## 2025-01-01 RX ADMIN — ANASTROZOLE 1 MG: 1 TABLET, FILM COATED ORAL at 11:37

## 2025-01-01 RX ADMIN — SODIUM CHLORIDE, PRESERVATIVE FREE 10 ML: 5 INJECTION INTRAVENOUS at 12:01

## 2025-01-01 RX ADMIN — BUMETANIDE 1 MG: 0.25 INJECTION INTRAMUSCULAR; INTRAVENOUS at 09:46

## 2025-01-01 RX ADMIN — SODIUM CHLORIDE, SODIUM LACTATE, POTASSIUM CHLORIDE, AND CALCIUM CHLORIDE: .6; .31; .03; .02 INJECTION, SOLUTION INTRAVENOUS at 12:29

## 2025-01-01 RX ADMIN — CAMPHOR, MENTHOL: .5; .5 LOTION TOPICAL at 15:30

## 2025-01-01 RX ADMIN — POTASSIUM CHLORIDE 10 MEQ: 7.46 INJECTION, SOLUTION INTRAVENOUS at 04:30

## 2025-01-01 RX ADMIN — ZONISAMIDE 200 MG: 100 CAPSULE ORAL at 21:27

## 2025-01-01 RX ADMIN — POLYETHYLENE GLYCOL 3350 17 G: 17 POWDER, FOR SOLUTION ORAL at 11:34

## 2025-01-01 RX ADMIN — MIDODRINE HYDROCHLORIDE 10 MG: 5 TABLET ORAL at 05:23

## 2025-01-01 RX ADMIN — METOPROLOL TARTRATE 25 MG: 25 TABLET, FILM COATED ORAL at 09:45

## 2025-01-01 RX ADMIN — ZONISAMIDE 200 MG: 100 CAPSULE ORAL at 11:37

## 2025-01-01 RX ADMIN — Medication 0.5 MG/MIN: at 22:28

## 2025-01-01 RX ADMIN — POTASSIUM CHLORIDE 10 MEQ: 7.46 INJECTION, SOLUTION INTRAVENOUS at 03:28

## 2025-01-01 RX ADMIN — QUETIAPINE FUMARATE 25 MG: 25 TABLET ORAL at 22:36

## 2025-01-01 RX ADMIN — OXCARBAZEPINE 600 MG: 150 TABLET, FILM COATED ORAL at 21:32

## 2025-01-01 RX ADMIN — LORAZEPAM 0.5 MG: 0.5 TABLET ORAL at 19:00

## 2025-01-01 RX ADMIN — PANTOPRAZOLE SODIUM 40 MG: 40 TABLET, DELAYED RELEASE ORAL at 05:01

## 2025-01-01 RX ADMIN — POTASSIUM CHLORIDE 10 MEQ: 7.46 INJECTION, SOLUTION INTRAVENOUS at 22:50

## 2025-01-01 RX ADMIN — ZONISAMIDE 200 MG: 100 CAPSULE ORAL at 10:48

## 2025-01-01 RX ADMIN — SENNOSIDES 8.6 MG: 8.6 TABLET, COATED ORAL at 20:28

## 2025-01-01 RX ADMIN — POTASSIUM BICARBONATE 40 MEQ: 782 TABLET, EFFERVESCENT ORAL at 08:37

## 2025-01-01 RX ADMIN — METOPROLOL TARTRATE 25 MG: 25 TABLET, FILM COATED ORAL at 21:25

## 2025-01-01 RX ADMIN — POLYETHYLENE GLYCOL 3350 17 G: 17 POWDER, FOR SOLUTION ORAL at 09:22

## 2025-01-01 RX ADMIN — DONEPEZIL HYDROCHLORIDE 5 MG: 5 TABLET, FILM COATED ORAL at 22:12

## 2025-01-01 RX ADMIN — SODIUM CHLORIDE 500 ML: 0.9 INJECTION, SOLUTION INTRAVENOUS at 05:02

## 2025-01-01 RX ADMIN — SODIUM CHLORIDE, PRESERVATIVE FREE 10 ML: 5 INJECTION INTRAVENOUS at 09:23

## 2025-01-01 RX ADMIN — MIDODRINE HYDROCHLORIDE 5 MG: 5 TABLET ORAL at 16:56

## 2025-01-01 RX ADMIN — SODIUM CHLORIDE, PRESERVATIVE FREE 10 ML: 5 INJECTION INTRAVENOUS at 12:09

## 2025-01-01 RX ADMIN — ENOXAPARIN SODIUM 80 MG: 100 INJECTION SUBCUTANEOUS at 09:46

## 2025-01-01 RX ADMIN — QUETIAPINE FUMARATE 12.5 MG: 25 TABLET ORAL at 10:02

## 2025-01-01 RX ADMIN — OXCARBAZEPINE 600 MG: 150 TABLET, FILM COATED ORAL at 22:26

## 2025-01-01 RX ADMIN — ZONISAMIDE 200 MG: 100 CAPSULE ORAL at 09:32

## 2025-01-01 RX ADMIN — CAMPHOR, MENTHOL: .5; .5 LOTION TOPICAL at 22:32

## 2025-01-01 RX ADMIN — SODIUM CHLORIDE, PRESERVATIVE FREE 10 ML: 5 INJECTION INTRAVENOUS at 20:01

## 2025-01-01 RX ADMIN — ENOXAPARIN SODIUM 80 MG: 100 INJECTION SUBCUTANEOUS at 12:06

## 2025-01-01 RX ADMIN — MIDODRINE HYDROCHLORIDE 7.5 MG: 2.5 TABLET ORAL at 11:36

## 2025-01-01 RX ADMIN — PROCHLORPERAZINE EDISYLATE 10 MG: 5 INJECTION INTRAMUSCULAR; INTRAVENOUS at 20:11

## 2025-01-01 RX ADMIN — POTASSIUM BICARBONATE 40 MEQ: 782 TABLET, EFFERVESCENT ORAL at 03:28

## 2025-01-01 RX ADMIN — OXCARBAZEPINE 600 MG: 150 TABLET, FILM COATED ORAL at 11:34

## 2025-01-01 RX ADMIN — AMPICILLIN SODIUM 2000 MG: 2 INJECTION, POWDER, FOR SOLUTION INTRAMUSCULAR; INTRAVENOUS at 05:03

## 2025-01-01 RX ADMIN — BUMETANIDE 1 MG: 0.25 INJECTION INTRAMUSCULAR; INTRAVENOUS at 10:02

## 2025-01-01 RX ADMIN — HYDROXYZINE HYDROCHLORIDE 25 MG: 10 TABLET ORAL at 14:54

## 2025-01-01 RX ADMIN — DONEPEZIL HYDROCHLORIDE 5 MG: 5 TABLET, FILM COATED ORAL at 20:27

## 2025-01-01 RX ADMIN — ANASTROZOLE 1 MG: 1 TABLET, FILM COATED ORAL at 09:22

## 2025-01-01 RX ADMIN — AMPICILLIN SODIUM 2000 MG: 2 INJECTION, POWDER, FOR SOLUTION INTRAMUSCULAR; INTRAVENOUS at 06:17

## 2025-01-01 RX ADMIN — ZONISAMIDE 200 MG: 100 CAPSULE ORAL at 22:27

## 2025-01-01 ASSESSMENT — PAIN - FUNCTIONAL ASSESSMENT
PAIN_FUNCTIONAL_ASSESSMENT: ADULT NONVERBAL PAIN SCALE (NPVS)

## 2025-01-01 ASSESSMENT — PAIN SCALES - PAIN ASSESSMENT IN ADVANCED DEMENTIA (PAINAD)
BODYLANGUAGE: RELAXED
BREATHING: NORMAL
CONSOLABILITY: NO NEED TO CONSOLE
TOTALSCORE: 0
FACIALEXPRESSION: SMILING OR INEXPRESSIVE

## 2025-01-01 ASSESSMENT — PAIN SCALES - GENERAL
PAINLEVEL_OUTOF10: 1
PAINLEVEL_OUTOF10: 0
PAINLEVEL_OUTOF10: 2
PAINLEVEL_OUTOF10: 0

## 2025-03-13 ENCOUNTER — HOSPITAL ENCOUNTER (INPATIENT)
Age: 70
LOS: 9 days | Discharge: HOME HEALTH | DRG: 329 | End: 2025-03-22
Attending: STUDENT IN AN ORGANIZED HEALTH CARE EDUCATION/TRAINING PROGRAM | Admitting: SURGERY
Payer: MEDICARE

## 2025-03-13 ENCOUNTER — APPOINTMENT (OUTPATIENT)
Dept: CT IMAGING | Age: 70
DRG: 329 | End: 2025-03-13
Payer: MEDICARE

## 2025-03-13 DIAGNOSIS — K57.92 ACUTE DIVERTICULITIS: Primary | ICD-10-CM

## 2025-03-13 DIAGNOSIS — N17.9 AKI (ACUTE KIDNEY INJURY): ICD-10-CM

## 2025-03-13 DIAGNOSIS — R19.8 PERFORATED VISCUS: ICD-10-CM

## 2025-03-13 DIAGNOSIS — K57.92 ACUTE DIVERTICULITIS: ICD-10-CM

## 2025-03-13 DIAGNOSIS — R52 PAIN: ICD-10-CM

## 2025-03-13 DIAGNOSIS — K57.20 DIVERTICULITIS OF LARGE INTESTINE WITH PERFORATION AND ABSCESS, UNSPECIFIED BLEEDING STATUS: ICD-10-CM

## 2025-03-13 LAB
ALBUMIN SERPL-MCNC: 3.6 G/DL (ref 3.5–5.2)
ALP SERPL-CCNC: 93 U/L (ref 35–104)
ALT SERPL-CCNC: 20 U/L (ref 0–32)
ANION GAP SERPL CALCULATED.3IONS-SCNC: 14 MMOL/L (ref 7–16)
AST SERPL-CCNC: 38 U/L (ref 0–31)
BACTERIA URNS QL MICRO: ABNORMAL
BASOPHILS # BLD: 0.04 K/UL (ref 0–0.2)
BASOPHILS NFR BLD: 0 % (ref 0–2)
BILIRUB SERPL-MCNC: 0.6 MG/DL (ref 0–1.2)
BILIRUB UR QL STRIP: NEGATIVE
BUN SERPL-MCNC: 38 MG/DL (ref 6–23)
CALCIUM SERPL-MCNC: 9.3 MG/DL (ref 8.6–10.2)
CASTS #/AREA URNS LPF: ABNORMAL /LPF
CHLORIDE SERPL-SCNC: 103 MMOL/L (ref 98–107)
CLARITY UR: CLEAR
CO2 SERPL-SCNC: 23 MMOL/L (ref 22–29)
COLOR UR: YELLOW
CREAT SERPL-MCNC: 1.8 MG/DL (ref 0.5–1)
EOSINOPHIL # BLD: 0 K/UL (ref 0.05–0.5)
EOSINOPHILS RELATIVE PERCENT: 0 % (ref 0–6)
EPI CELLS #/AREA URNS HPF: ABNORMAL /HPF
ERYTHROCYTE [DISTWIDTH] IN BLOOD BY AUTOMATED COUNT: 14.1 % (ref 11.5–15)
FLUAV RNA RESP QL NAA+PROBE: NOT DETECTED
FLUBV RNA RESP QL NAA+PROBE: NOT DETECTED
GFR, ESTIMATED: 30 ML/MIN/1.73M2
GLUCOSE SERPL-MCNC: 132 MG/DL (ref 74–99)
GLUCOSE UR STRIP-MCNC: NEGATIVE MG/DL
HCT VFR BLD AUTO: 40.2 % (ref 34–48)
HGB BLD-MCNC: 13.4 G/DL (ref 11.5–15.5)
HGB UR QL STRIP.AUTO: ABNORMAL
IMM GRANULOCYTES # BLD AUTO: 0.2 K/UL (ref 0–0.58)
IMM GRANULOCYTES NFR BLD: 1 % (ref 0–5)
INR PPP: 3.7
KETONES UR STRIP-MCNC: NEGATIVE MG/DL
LEUKOCYTE ESTERASE UR QL STRIP: NEGATIVE
LYMPHOCYTES NFR BLD: 1.01 K/UL (ref 1.5–4)
LYMPHOCYTES RELATIVE PERCENT: 5 % (ref 20–42)
MAGNESIUM SERPL-MCNC: 2.1 MG/DL (ref 1.6–2.6)
MCH RBC QN AUTO: 28.5 PG (ref 26–35)
MCHC RBC AUTO-ENTMCNC: 33.3 G/DL (ref 32–34.5)
MCV RBC AUTO: 85.4 FL (ref 80–99.9)
MONOCYTES NFR BLD: 0.73 K/UL (ref 0.1–0.95)
MONOCYTES NFR BLD: 4 % (ref 2–12)
NEUTROPHILS NFR BLD: 90 % (ref 43–80)
NEUTS SEG NFR BLD: 17.36 K/UL (ref 1.8–7.3)
NITRITE UR QL STRIP: NEGATIVE
PH UR STRIP: 5.5 [PH] (ref 5–8)
PLATELET # BLD AUTO: 240 K/UL (ref 130–450)
PMV BLD AUTO: 10.6 FL (ref 7–12)
POTASSIUM SERPL-SCNC: 3.8 MMOL/L (ref 3.5–5)
PROT SERPL-MCNC: 7.8 G/DL (ref 6.4–8.3)
PROT UR STRIP-MCNC: ABNORMAL MG/DL
PROTHROMBIN TIME: 40.9 SEC (ref 9.3–12.4)
RBC # BLD AUTO: 4.71 M/UL (ref 3.5–5.5)
RBC #/AREA URNS HPF: ABNORMAL /HPF
SARS-COV-2 RNA RESP QL NAA+PROBE: NOT DETECTED
SODIUM SERPL-SCNC: 140 MMOL/L (ref 132–146)
SOURCE: NORMAL
SP GR UR STRIP: 1.02 (ref 1–1.03)
SPECIMEN DESCRIPTION: NORMAL
T4 FREE SERPL-MCNC: 0.9 NG/DL (ref 0.9–1.7)
TROPONIN I SERPL HS-MCNC: 25 NG/L (ref 0–9)
TROPONIN I SERPL HS-MCNC: NORMAL NG/L (ref 0–14)
TSH SERPL DL<=0.05 MIU/L-ACNC: 1.1 UIU/ML (ref 0.27–4.2)
UROBILINOGEN UR STRIP-ACNC: 0.2 EU/DL (ref 0–1)
WBC #/AREA URNS HPF: ABNORMAL /HPF
WBC OTHER # BLD: 19.3 K/UL (ref 4.5–11.5)

## 2025-03-13 PROCEDURE — 74177 CT ABD & PELVIS W/CONTRAST: CPT

## 2025-03-13 PROCEDURE — 96374 THER/PROPH/DIAG INJ IV PUSH: CPT

## 2025-03-13 PROCEDURE — 85610 PROTHROMBIN TIME: CPT

## 2025-03-13 PROCEDURE — 85025 COMPLETE CBC W/AUTO DIFF WBC: CPT

## 2025-03-13 PROCEDURE — 87636 SARSCOV2 & INF A&B AMP PRB: CPT

## 2025-03-13 PROCEDURE — 84484 ASSAY OF TROPONIN QUANT: CPT

## 2025-03-13 PROCEDURE — 96375 TX/PRO/DX INJ NEW DRUG ADDON: CPT

## 2025-03-13 PROCEDURE — 80053 COMPREHEN METABOLIC PANEL: CPT

## 2025-03-13 PROCEDURE — 84439 ASSAY OF FREE THYROXINE: CPT

## 2025-03-13 PROCEDURE — 2580000003 HC RX 258: Performed by: STUDENT IN AN ORGANIZED HEALTH CARE EDUCATION/TRAINING PROGRAM

## 2025-03-13 PROCEDURE — 6360000002 HC RX W HCPCS: Performed by: STUDENT IN AN ORGANIZED HEALTH CARE EDUCATION/TRAINING PROGRAM

## 2025-03-13 PROCEDURE — 70450 CT HEAD/BRAIN W/O DYE: CPT

## 2025-03-13 PROCEDURE — 93005 ELECTROCARDIOGRAM TRACING: CPT | Performed by: STUDENT IN AN ORGANIZED HEALTH CARE EDUCATION/TRAINING PROGRAM

## 2025-03-13 PROCEDURE — 2580000003 HC RX 258

## 2025-03-13 PROCEDURE — 81001 URINALYSIS AUTO W/SCOPE: CPT

## 2025-03-13 PROCEDURE — 84443 ASSAY THYROID STIM HORMONE: CPT

## 2025-03-13 PROCEDURE — 2500000003 HC RX 250 WO HCPCS: Performed by: STUDENT IN AN ORGANIZED HEALTH CARE EDUCATION/TRAINING PROGRAM

## 2025-03-13 PROCEDURE — 87086 URINE CULTURE/COLONY COUNT: CPT

## 2025-03-13 PROCEDURE — 6360000004 HC RX CONTRAST MEDICATION: Performed by: RADIOLOGY

## 2025-03-13 PROCEDURE — 99285 EMERGENCY DEPT VISIT HI MDM: CPT

## 2025-03-13 PROCEDURE — 83735 ASSAY OF MAGNESIUM: CPT

## 2025-03-13 PROCEDURE — 1200000000 HC SEMI PRIVATE

## 2025-03-13 PROCEDURE — 87077 CULTURE AEROBIC IDENTIFY: CPT

## 2025-03-13 RX ORDER — ONDANSETRON 2 MG/ML
4 INJECTION INTRAMUSCULAR; INTRAVENOUS EVERY 6 HOURS PRN
Status: DISCONTINUED | OUTPATIENT
Start: 2025-03-13 | End: 2025-03-22 | Stop reason: HOSPADM

## 2025-03-13 RX ORDER — POTASSIUM CHLORIDE 1500 MG/1
40 TABLET, EXTENDED RELEASE ORAL PRN
Status: DISCONTINUED | OUTPATIENT
Start: 2025-03-13 | End: 2025-03-22 | Stop reason: HOSPADM

## 2025-03-13 RX ORDER — METRONIDAZOLE 500 MG/100ML
500 INJECTION, SOLUTION INTRAVENOUS EVERY 8 HOURS
Status: DISCONTINUED | OUTPATIENT
Start: 2025-03-13 | End: 2025-03-18

## 2025-03-13 RX ORDER — FENTANYL CITRATE 50 UG/ML
25 INJECTION, SOLUTION INTRAMUSCULAR; INTRAVENOUS ONCE
Refills: 0 | Status: COMPLETED | OUTPATIENT
Start: 2025-03-13 | End: 2025-03-13

## 2025-03-13 RX ORDER — POTASSIUM CHLORIDE 7.45 MG/ML
10 INJECTION INTRAVENOUS PRN
Status: DISCONTINUED | OUTPATIENT
Start: 2025-03-13 | End: 2025-03-22 | Stop reason: HOSPADM

## 2025-03-13 RX ORDER — SODIUM CHLORIDE, SODIUM LACTATE, POTASSIUM CHLORIDE, CALCIUM CHLORIDE 600; 310; 30; 20 MG/100ML; MG/100ML; MG/100ML; MG/100ML
INJECTION, SOLUTION INTRAVENOUS ONCE
Status: DISCONTINUED | OUTPATIENT
Start: 2025-03-13 | End: 2025-03-13 | Stop reason: SDUPTHER

## 2025-03-13 RX ORDER — SODIUM CHLORIDE 0.9 % (FLUSH) 0.9 %
5-40 SYRINGE (ML) INJECTION EVERY 12 HOURS SCHEDULED
Status: DISCONTINUED | OUTPATIENT
Start: 2025-03-13 | End: 2025-03-22 | Stop reason: HOSPADM

## 2025-03-13 RX ORDER — ONDANSETRON 4 MG/1
4 TABLET, ORALLY DISINTEGRATING ORAL EVERY 8 HOURS PRN
Status: DISCONTINUED | OUTPATIENT
Start: 2025-03-13 | End: 2025-03-22 | Stop reason: HOSPADM

## 2025-03-13 RX ORDER — ENOXAPARIN SODIUM 100 MG/ML
40 INJECTION SUBCUTANEOUS DAILY
Status: DISCONTINUED | OUTPATIENT
Start: 2025-03-14 | End: 2025-03-16

## 2025-03-13 RX ORDER — SODIUM CHLORIDE, SODIUM LACTATE, POTASSIUM CHLORIDE, CALCIUM CHLORIDE 600; 310; 30; 20 MG/100ML; MG/100ML; MG/100ML; MG/100ML
INJECTION, SOLUTION INTRAVENOUS
Status: COMPLETED
Start: 2025-03-13 | End: 2025-03-13

## 2025-03-13 RX ORDER — MORPHINE SULFATE 4 MG/ML
4 INJECTION, SOLUTION INTRAMUSCULAR; INTRAVENOUS
Status: DISCONTINUED | OUTPATIENT
Start: 2025-03-13 | End: 2025-03-22 | Stop reason: HOSPADM

## 2025-03-13 RX ORDER — MORPHINE SULFATE 2 MG/ML
2 INJECTION, SOLUTION INTRAMUSCULAR; INTRAVENOUS
Status: DISCONTINUED | OUTPATIENT
Start: 2025-03-13 | End: 2025-03-22 | Stop reason: HOSPADM

## 2025-03-13 RX ORDER — MAGNESIUM SULFATE IN WATER 40 MG/ML
2000 INJECTION, SOLUTION INTRAVENOUS PRN
Status: DISCONTINUED | OUTPATIENT
Start: 2025-03-13 | End: 2025-03-22 | Stop reason: HOSPADM

## 2025-03-13 RX ORDER — SODIUM CHLORIDE 0.9 % (FLUSH) 0.9 %
SYRINGE (ML) INJECTION
Status: DISPENSED
Start: 2025-03-13 | End: 2025-03-14

## 2025-03-13 RX ORDER — SODIUM CHLORIDE, SODIUM LACTATE, POTASSIUM CHLORIDE, CALCIUM CHLORIDE 600; 310; 30; 20 MG/100ML; MG/100ML; MG/100ML; MG/100ML
INJECTION, SOLUTION INTRAVENOUS CONTINUOUS
Status: DISCONTINUED | OUTPATIENT
Start: 2025-03-13 | End: 2025-03-17

## 2025-03-13 RX ORDER — SODIUM CHLORIDE 9 MG/ML
INJECTION, SOLUTION INTRAVENOUS PRN
Status: DISCONTINUED | OUTPATIENT
Start: 2025-03-13 | End: 2025-03-22 | Stop reason: HOSPADM

## 2025-03-13 RX ORDER — SODIUM CHLORIDE 0.9 % (FLUSH) 0.9 %
5-40 SYRINGE (ML) INJECTION PRN
Status: DISCONTINUED | OUTPATIENT
Start: 2025-03-13 | End: 2025-03-22 | Stop reason: HOSPADM

## 2025-03-13 RX ORDER — IOPAMIDOL 755 MG/ML
70 INJECTION, SOLUTION INTRAVASCULAR
Status: COMPLETED | OUTPATIENT
Start: 2025-03-13 | End: 2025-03-13

## 2025-03-13 RX ADMIN — SODIUM CHLORIDE, SODIUM LACTATE, POTASSIUM CHLORIDE, AND CALCIUM CHLORIDE: .6; .31; .03; .02 INJECTION, SOLUTION INTRAVENOUS at 21:50

## 2025-03-13 RX ADMIN — FENTANYL CITRATE 25 MCG: 50 INJECTION, SOLUTION INTRAMUSCULAR; INTRAVENOUS at 20:28

## 2025-03-13 RX ADMIN — PIPERACILLIN AND TAZOBACTAM 4500 MG: 4; .5 INJECTION, POWDER, LYOPHILIZED, FOR SOLUTION INTRAVENOUS; PARENTERAL at 21:48

## 2025-03-13 RX ADMIN — IOPAMIDOL 70 ML: 755 INJECTION, SOLUTION INTRAVENOUS at 18:09

## 2025-03-13 RX ADMIN — FAMOTIDINE 20 MG: 10 INJECTION, SOLUTION INTRAVENOUS at 20:29

## 2025-03-13 ASSESSMENT — PAIN - FUNCTIONAL ASSESSMENT: PAIN_FUNCTIONAL_ASSESSMENT: 0-10

## 2025-03-13 NOTE — ED PROVIDER NOTES
for chills and fever.   Respiratory:  Negative for cough and shortness of breath.    Cardiovascular:  Negative for chest pain and palpitations.   Gastrointestinal:  Positive for abdominal pain. Negative for blood in stool, constipation, diarrhea, nausea and vomiting.   Genitourinary:  Negative for dysuria and flank pain.       Pertinent positives and negatives are stated within HPI or above, all other systems reviewed and are negative.    SURGICAL HISTORY   No past surgical history on file.    CURRENTMEDICATIONS       Previous Medications    ANASTROZOLE (ARIMIDEX) 1 MG TABLET    Take 1 tablet by mouth daily    APIXABAN (ELIQUIS) 5 MG TABS TABLET    Take by mouth 2 times daily    ASPIRIN 81 MG EC TABLET    Take 1 tablet by mouth daily    DONEPEZIL (ARICEPT) 5 MG TABLET    Take 1 tablet by mouth nightly    METOPROLOL TARTRATE 75 MG TABS    Take 75 mg by mouth 2 times daily    OXCARBAZEPINE (TRILEPTAL) 600 MG TABLET    Take 1 tablet by mouth 2 times daily    SERTRALINE (ZOLOFT) 100 MG TABLET    Take 1 tablet by mouth daily    SIMVASTATIN (ZOCOR) 20 MG TABLET    Take 1 tablet by mouth nightly    ZONISAMIDE (ZONEGRAN) 100 MG CAPSULE    Take 1 capsule by mouth 4 times daily       ALLERGIES     Betadine [povidone iodine]    FAMILYHISTORY     No family history on file.     SOCIAL HISTORY       Social History     Tobacco Use    Smoking status: Never   Substance Use Topics    Alcohol use: Never    Drug use: Never       SCREENINGS        Maxine Coma Scale  Eye Opening: Spontaneous  Best Verbal Response: Oriented  Best Motor Response: Obeys commands  Maxine Coma Scale Score: 15                CIWA Assessment  BP: (!) 116/48  Pulse: 79           PHYSICAL EXAM  1 or more Elements     Physical Exam  Vitals and nursing note reviewed.   Constitutional:       General: She is not in acute distress.     Appearance: She is not toxic-appearing.   HENT:      Head: Normocephalic and atraumatic.      Right Ear: External ear normal.       17.36 (*)     Lymphocytes Absolute 1.01 (*)     Eosinophils Absolute 0.00 (*)     All other components within normal limits   COMPREHENSIVE METABOLIC PANEL - Abnormal; Notable for the following components:    Glucose 132 (*)     BUN 38 (*)     Creatinine 1.8 (*)     Est, Glom Filt Rate 30 (*)     AST 38 (*)     All other components within normal limits   PROTIME-INR - Abnormal; Notable for the following components:    Protime 40.9 (*)     All other components within normal limits   URINALYSIS WITH MICROSCOPIC - Abnormal; Notable for the following components:    Urine Hgb SMALL (*)     Protein, UA TRACE (*)     WBC, UA 6 TO 9 (*)     Casts UA 0 TO 2 HYALINE (*)     Bacteria, UA TRACE (*)     All other components within normal limits   TROPONIN - Abnormal; Notable for the following components:    Troponin, High Sensitivity 25 (*)     All other components within normal limits   COVID-19 & INFLUENZA COMBO   CULTURE, URINE   TROPONIN   TSH   T4, FREE   MAGNESIUM   CBC WITH AUTO DIFFERENTIAL   COMPREHENSIVE METABOLIC PANEL W/ REFLEX TO MG FOR LOW K   PROTIME-INR   TROPONIN       As interpreted by me, the above displayed indicated labs are abnormal. All other labs obtained during this visit were within normal range or not returned as of this dictation.    RADIOLOGY:   Unless a wet read is documented in MDM or ED course, non-plain film images such as CT, Ultrasound and MRI are read by the radiologist. Plain radiographic images are visualized and preliminarily interpreted by the ED Provider with the findings documented in the ED course or MDM.    Interpretation per the Radiologist below, if available at the time of this note:    CT ABDOMEN PELVIS W IV CONTRAST Additional Contrast? None   Final Result   1. Acute sigmoid diverticulitis with perforation and 3.4 x 1.6 cm abscess.   2. Nonobstructing right renal calculi.   3. Numerous hepatic cysts.         CT HEAD WO CONTRAST   Final Result        No results found.    No results

## 2025-03-13 NOTE — ED NOTES
Radiology Procedure Waiver   Name: Susanna Arriola  : 1955  MRN: [id number]    Date:  3/13/25    Time: 6:03 PM EDT    Benefits of immediately proceeding with radiology exam(s) without pre-testing outweigh the risks or are not indicated as specified below and therefore the following is/are being waived:    [x] Benefits of immediate radiology exam(s) outweigh any risk.                                               OR    Pre-exam testing is not indicated for the following reason(s):  [] Pregnancy test   [] Patients LMP on-time and regular.   [] Patient had Tubal Ligation or has other Contraception Device.   [] Patient  is Menopausal or Premenarcheal.    [] Patient had Full or Partial Hysterectomy.    [] Protocol for CT contrast allegry   [] Patient has tolerated well previously   [] Patient does not have a true allergy    [] MRI Questionnaire     [] BUN/Creatinine   [] Patient age w/no hx of renal dysfunction.   [] Patient on Dialysis.   [] Recent Normal Labs.  Electronically signed by Dodie Petty DO on 3/13/25 at 6:03 PM EDT               Dodie Petty DO  [phone]

## 2025-03-14 ENCOUNTER — ANESTHESIA EVENT (OUTPATIENT)
Dept: OPERATING ROOM | Age: 70
End: 2025-03-14
Payer: MEDICARE

## 2025-03-14 LAB
ALBUMIN SERPL-MCNC: 3.4 G/DL (ref 3.5–5.2)
ALP SERPL-CCNC: 91 U/L (ref 35–104)
ALT SERPL-CCNC: 17 U/L (ref 0–32)
ANION GAP SERPL CALCULATED.3IONS-SCNC: 13 MMOL/L (ref 7–16)
AST SERPL-CCNC: 22 U/L (ref 0–31)
BASOPHILS # BLD: 0.02 K/UL (ref 0–0.2)
BASOPHILS NFR BLD: 0 % (ref 0–2)
BILIRUB SERPL-MCNC: 0.5 MG/DL (ref 0–1.2)
BUN SERPL-MCNC: 35 MG/DL (ref 6–23)
CALCIUM SERPL-MCNC: 9.1 MG/DL (ref 8.6–10.2)
CHLORIDE SERPL-SCNC: 102 MMOL/L (ref 98–107)
CO2 SERPL-SCNC: 23 MMOL/L (ref 22–29)
CREAT SERPL-MCNC: 1.7 MG/DL (ref 0.5–1)
EKG ATRIAL RATE: 83 BPM
EKG P AXIS: 60 DEGREES
EKG P-R INTERVAL: 204 MS
EKG Q-T INTERVAL: 322 MS
EKG QRS DURATION: 100 MS
EKG QTC CALCULATION (BAZETT): 378 MS
EKG R AXIS: 23 DEGREES
EKG T AXIS: 54 DEGREES
EKG VENTRICULAR RATE: 83 BPM
EOSINOPHIL # BLD: 0.01 K/UL (ref 0.05–0.5)
EOSINOPHILS RELATIVE PERCENT: 0 % (ref 0–6)
ERYTHROCYTE [DISTWIDTH] IN BLOOD BY AUTOMATED COUNT: 14.3 % (ref 11.5–15)
GFR, ESTIMATED: 33 ML/MIN/1.73M2
GLUCOSE SERPL-MCNC: 128 MG/DL (ref 74–99)
HCT VFR BLD AUTO: 38.5 % (ref 34–48)
HGB BLD-MCNC: 12.3 G/DL (ref 11.5–15.5)
IMM GRANULOCYTES # BLD AUTO: 0.16 K/UL (ref 0–0.58)
IMM GRANULOCYTES NFR BLD: 1 % (ref 0–5)
INR PPP: 2.5
INR PPP: 3.7
LYMPHOCYTES NFR BLD: 1.02 K/UL (ref 1.5–4)
LYMPHOCYTES RELATIVE PERCENT: 6 % (ref 20–42)
MAGNESIUM SERPL-MCNC: 2.2 MG/DL (ref 1.6–2.6)
MCH RBC QN AUTO: 28.2 PG (ref 26–35)
MCHC RBC AUTO-ENTMCNC: 31.9 G/DL (ref 32–34.5)
MCV RBC AUTO: 88.3 FL (ref 80–99.9)
MONOCYTES NFR BLD: 0.68 K/UL (ref 0.1–0.95)
MONOCYTES NFR BLD: 4 % (ref 2–12)
NEUTROPHILS NFR BLD: 89 % (ref 43–80)
NEUTS SEG NFR BLD: 15.05 K/UL (ref 1.8–7.3)
PARTIAL THROMBOPLASTIN TIME: 39.2 SEC (ref 24.5–35.1)
PLATELET # BLD AUTO: 212 K/UL (ref 130–450)
PMV BLD AUTO: 10 FL (ref 7–12)
POTASSIUM SERPL-SCNC: 3.3 MMOL/L (ref 3.5–5)
PROT SERPL-MCNC: 7.3 G/DL (ref 6.4–8.3)
PROTHROMBIN TIME: 27.2 SEC (ref 9.3–12.4)
PROTHROMBIN TIME: 41 SEC (ref 9.3–12.4)
RBC # BLD AUTO: 4.36 M/UL (ref 3.5–5.5)
SODIUM SERPL-SCNC: 138 MMOL/L (ref 132–146)
TROPONIN I SERPL HS-MCNC: 24 NG/L (ref 0–9)
WBC OTHER # BLD: 16.9 K/UL (ref 4.5–11.5)

## 2025-03-14 PROCEDURE — 86922 COMPATIBILITY TEST ANTIGLOB: CPT

## 2025-03-14 PROCEDURE — 36415 COLL VENOUS BLD VENIPUNCTURE: CPT

## 2025-03-14 PROCEDURE — 84484 ASSAY OF TROPONIN QUANT: CPT

## 2025-03-14 PROCEDURE — 86870 RBC ANTIBODY IDENTIFICATION: CPT

## 2025-03-14 PROCEDURE — 93010 ELECTROCARDIOGRAM REPORT: CPT | Performed by: INTERNAL MEDICINE

## 2025-03-14 PROCEDURE — 36430 TRANSFUSION BLD/BLD COMPNT: CPT

## 2025-03-14 PROCEDURE — 30233K1 TRANSFUSION OF NONAUTOLOGOUS FROZEN PLASMA INTO PERIPHERAL VEIN, PERCUTANEOUS APPROACH: ICD-10-PCS | Performed by: SURGERY

## 2025-03-14 PROCEDURE — 86900 BLOOD TYPING SEROLOGIC ABO: CPT

## 2025-03-14 PROCEDURE — 83735 ASSAY OF MAGNESIUM: CPT

## 2025-03-14 PROCEDURE — 80053 COMPREHEN METABOLIC PANEL: CPT

## 2025-03-14 PROCEDURE — 85610 PROTHROMBIN TIME: CPT

## 2025-03-14 PROCEDURE — 1200000000 HC SEMI PRIVATE

## 2025-03-14 PROCEDURE — 86850 RBC ANTIBODY SCREEN: CPT

## 2025-03-14 PROCEDURE — 6370000000 HC RX 637 (ALT 250 FOR IP)

## 2025-03-14 PROCEDURE — 2500000003 HC RX 250 WO HCPCS: Performed by: SURGERY

## 2025-03-14 PROCEDURE — 86920 COMPATIBILITY TEST SPIN: CPT

## 2025-03-14 PROCEDURE — 86905 BLOOD TYPING RBC ANTIGENS: CPT

## 2025-03-14 PROCEDURE — 85025 COMPLETE CBC W/AUTO DIFF WBC: CPT

## 2025-03-14 PROCEDURE — 86927 PLASMA FRESH FROZEN: CPT

## 2025-03-14 PROCEDURE — 6370000000 HC RX 637 (ALT 250 FOR IP): Performed by: SURGERY

## 2025-03-14 PROCEDURE — 30233L1 TRANSFUSION OF NONAUTOLOGOUS FRESH PLASMA INTO PERIPHERAL VEIN, PERCUTANEOUS APPROACH: ICD-10-PCS | Performed by: SURGERY

## 2025-03-14 PROCEDURE — 99024 POSTOP FOLLOW-UP VISIT: CPT | Performed by: SURGERY

## 2025-03-14 PROCEDURE — 30233N1 TRANSFUSION OF NONAUTOLOGOUS RED BLOOD CELLS INTO PERIPHERAL VEIN, PERCUTANEOUS APPROACH: ICD-10-PCS | Performed by: SURGERY

## 2025-03-14 PROCEDURE — 86880 COOMBS TEST DIRECT: CPT

## 2025-03-14 PROCEDURE — 86901 BLOOD TYPING SEROLOGIC RH(D): CPT

## 2025-03-14 PROCEDURE — 85730 THROMBOPLASTIN TIME PARTIAL: CPT

## 2025-03-14 PROCEDURE — 6360000002 HC RX W HCPCS: Performed by: SURGERY

## 2025-03-14 PROCEDURE — P9017 PLASMA 1 DONOR FRZ W/IN 8 HR: HCPCS

## 2025-03-14 PROCEDURE — 2580000003 HC RX 258: Performed by: SURGERY

## 2025-03-14 RX ORDER — OXCARBAZEPINE 150 MG/1
600 TABLET, FILM COATED ORAL 2 TIMES DAILY
Status: DISCONTINUED | OUTPATIENT
Start: 2025-03-14 | End: 2025-03-22 | Stop reason: HOSPADM

## 2025-03-14 RX ORDER — METOPROLOL TARTRATE 50 MG
75 TABLET ORAL 2 TIMES DAILY
Status: DISCONTINUED | OUTPATIENT
Start: 2025-03-14 | End: 2025-03-22 | Stop reason: HOSPADM

## 2025-03-14 RX ORDER — DONEPEZIL HYDROCHLORIDE 5 MG/1
5 TABLET, FILM COATED ORAL NIGHTLY
Status: DISCONTINUED | OUTPATIENT
Start: 2025-03-14 | End: 2025-03-22 | Stop reason: HOSPADM

## 2025-03-14 RX ORDER — ZONISAMIDE 100 MG/1
100 CAPSULE ORAL 4 TIMES DAILY
Status: DISCONTINUED | OUTPATIENT
Start: 2025-03-14 | End: 2025-03-22 | Stop reason: HOSPADM

## 2025-03-14 RX ORDER — SODIUM CHLORIDE 9 MG/ML
INJECTION, SOLUTION INTRAVENOUS PRN
Status: DISCONTINUED | OUTPATIENT
Start: 2025-03-14 | End: 2025-03-20 | Stop reason: ALTCHOICE

## 2025-03-14 RX ORDER — PHYTONADIONE 5 MG/1
5 TABLET ORAL ONCE
Status: COMPLETED | OUTPATIENT
Start: 2025-03-14 | End: 2025-03-14

## 2025-03-14 RX ORDER — ATORVASTATIN CALCIUM 10 MG/1
10 TABLET, FILM COATED ORAL DAILY
Status: DISCONTINUED | OUTPATIENT
Start: 2025-03-14 | End: 2025-03-22 | Stop reason: HOSPADM

## 2025-03-14 RX ADMIN — WATER 1000 MG: 1 INJECTION INTRAMUSCULAR; INTRAVENOUS; SUBCUTANEOUS at 09:02

## 2025-03-14 RX ADMIN — SODIUM CHLORIDE, PRESERVATIVE FREE 10 ML: 5 INJECTION INTRAVENOUS at 20:34

## 2025-03-14 RX ADMIN — DONEPEZIL HYDROCHLORIDE 5 MG: 5 TABLET ORAL at 20:26

## 2025-03-14 RX ADMIN — ZONISAMIDE 100 MG: 100 CAPSULE ORAL at 16:53

## 2025-03-14 RX ADMIN — METOPROLOL TARTRATE 75 MG: 50 TABLET, FILM COATED ORAL at 09:02

## 2025-03-14 RX ADMIN — SODIUM CHLORIDE, SODIUM LACTATE, POTASSIUM CHLORIDE, AND CALCIUM CHLORIDE: .6; .31; .03; .02 INJECTION, SOLUTION INTRAVENOUS at 22:34

## 2025-03-14 RX ADMIN — ZONISAMIDE 100 MG: 100 CAPSULE ORAL at 09:47

## 2025-03-14 RX ADMIN — OXCARBAZEPINE 600 MG: 150 TABLET, FILM COATED ORAL at 20:26

## 2025-03-14 RX ADMIN — OXCARBAZEPINE 600 MG: 150 TABLET, FILM COATED ORAL at 09:02

## 2025-03-14 RX ADMIN — MORPHINE SULFATE 4 MG: 4 INJECTION, SOLUTION INTRAMUSCULAR; INTRAVENOUS at 22:36

## 2025-03-14 RX ADMIN — MORPHINE SULFATE 4 MG: 4 INJECTION, SOLUTION INTRAMUSCULAR; INTRAVENOUS at 11:24

## 2025-03-14 RX ADMIN — ZONISAMIDE 100 MG: 100 CAPSULE ORAL at 20:26

## 2025-03-14 RX ADMIN — PHYTONADIONE 5 MG: 5 TABLET ORAL at 09:05

## 2025-03-14 RX ADMIN — METRONIDAZOLE 500 MG: 500 INJECTION, SOLUTION INTRAVENOUS at 16:54

## 2025-03-14 RX ADMIN — METRONIDAZOLE 500 MG: 500 INJECTION, SOLUTION INTRAVENOUS at 00:53

## 2025-03-14 RX ADMIN — METRONIDAZOLE 500 MG: 500 INJECTION, SOLUTION INTRAVENOUS at 09:08

## 2025-03-14 RX ADMIN — POTASSIUM CHLORIDE 40 MEQ: 1500 TABLET, EXTENDED RELEASE ORAL at 10:16

## 2025-03-14 RX ADMIN — METOPROLOL TARTRATE 75 MG: 50 TABLET, FILM COATED ORAL at 20:28

## 2025-03-14 RX ADMIN — ZONISAMIDE 100 MG: 100 CAPSULE ORAL at 14:55

## 2025-03-14 ASSESSMENT — PAIN SCALES - PAIN ASSESSMENT IN ADVANCED DEMENTIA (PAINAD)
BODYLANGUAGE: RELAXED
CONSOLABILITY: NO NEED TO CONSOLE
BREATHING: NORMAL
BODYLANGUAGE: RELAXED
TOTALSCORE: 0
FACIALEXPRESSION: SMILING OR INEXPRESSIVE
NEGVOCALIZATION: OCCASIONAL MOAN/GROAN, LOW SPEECH, NEGATIVE/DISAPPROVING QUALITY

## 2025-03-14 ASSESSMENT — PAIN SCALES - GENERAL
PAINLEVEL_OUTOF10: 2
PAINLEVEL_OUTOF10: 0
PAINLEVEL_OUTOF10: 7
PAINLEVEL_OUTOF10: 2

## 2025-03-14 ASSESSMENT — PAIN DESCRIPTION - LOCATION: LOCATION: ABDOMEN

## 2025-03-14 ASSESSMENT — PAIN DESCRIPTION - DESCRIPTORS: DESCRIPTORS: ACHING;NAGGING;DISCOMFORT

## 2025-03-14 NOTE — PROGRESS NOTES
Spiritual Health History and Assessment/Progress Note  [facility]    Initial Encounter, Spiritual/Emotional Needs, Rituals, Rites and Sacraments,  Encounter (Fr. Briseno),  ,  ,      Name: Susanna Arriola MRN: [id number]    Age: 69 y.o.     Sex: female   Language: English   Faith: Congregation   Acute diverticulitis     Date: 3/14/2025                           Spiritual Assessment began in [facility] SURG        Referral/Consult From: Rounding   Encounter Overview/Reason: Initial Encounter, Spiritual/Emotional Needs, Rituals, Rites and Sacraments,  Encounter (Fr. Briseno)  Service Provided For: Patient    Tala, Belief, Meaning:   Patient identifies as spiritual  Family/Friends No family/friends present      Importance and Influence:  Patient unable to assess at this time  Family/Friends No family/friends present    Community:  Patient Other: unknown  Family/Friends No family/friends present    Assessment and Plan of Care:     Patient Interventions include: Facilitated expression of thoughts and feelings and Provided sacramental/Rastafari ritual  Family/Friends Interventions include: No family/friends present    Patient Plan of Care: No spiritual needs identified for follow-up and Spiritual Care available upon further referral  Family/Friends Plan of Care: No family/friends present    Electronically signed by Chaplain Lynne on 3/14/2025 at 1:46 PM

## 2025-03-14 NOTE — ANESTHESIA PRE PROCEDURE
OXcarbazepine (TRILEPTAL) tablet 600 mg  600 mg Oral BID Rishi Ruiz APRN - CNP   600 mg at 03/15/25 2004    zonisamide (ZONEGRAN) capsule 100 mg  100 mg Oral 4x Daily Rishi Ruiz APRN - CNP   100 mg at 03/15/25 2004    atorvastatin (LIPITOR) tablet 10 mg  10 mg Oral Daily Rishi Ruiz APRN - CNP   10 mg at 03/15/25 0954    donepezil (ARICEPT) tablet 5 mg  5 mg Oral Nightly Rishi Ruiz APRN - CNP   5 mg at 03/15/25 2004    0.9 % sodium chloride infusion   IntraVENous PRN Romeo Drake DO        sodium chloride flush 0.9 % injection 5-40 mL  5-40 mL IntraVENous 2 times per day Levi Bunn MD   10 mL at 03/15/25 2005    sodium chloride flush 0.9 % injection 5-40 mL  5-40 mL IntraVENous PRN Levi Bunn MD        0.9 % sodium chloride infusion   IntraVENous PRN Levi Bunn MD        potassium chloride (KLOR-CON M) extended release tablet 40 mEq  40 mEq Oral PRN Levi Bunn MD   40 mEq at 03/14/25 1016    Or    potassium bicarb-citric acid (EFFER-K) effervescent tablet 40 mEq  40 mEq Oral PRN Levi Bunn MD        Or    potassium chloride 10 mEq/100 mL IVPB (Peripheral Line)  10 mEq IntraVENous PRN Levi Bunn MD        magnesium sulfate 2000 mg in 50 mL IVPB premix  2,000 mg IntraVENous PRN Lvei Bunn MD        ondansetron (ZOFRAN-ODT) disintegrating tablet 4 mg  4 mg Oral Q8H PRN Levi Bunn MD        Or    ondansetron (ZOFRAN) injection 4 mg  4 mg IntraVENous Q6H PRN Levi Bunn MD        lactated ringers infusion   IntraVENous Continuous Levi Bunn MD 75 mL/hr at 03/16/25 0624 Rate Verify at 03/16/25 0624    metroNIDAZOLE (FLAGYL) 500 mg in 0.9% NaCl 100 mL IVPB premix  500 mg IntraVENous Q8H Levi Bunn  mL/hr at 03/16/25 1746 500 mg at 03/16/25 1746    cefTRIAXone (ROCEPHIN) 1,000 mg in sterile water 10 mL IV syringe  1,000 mg IntraVENous Daily Levi Bunn MD   1,000 mg at 03/16/25 0948    morphine (PF) injection 2 mg  2 mg

## 2025-03-14 NOTE — H&P
0.9% NaCl 100 mL IVPB premix  500 mg IntraVENous Q8H Levi Bunn MD   Stopped at 03/14/25 0239    cefTRIAXone (ROCEPHIN) 1,000 mg in sterile water 10 mL IV syringe  1,000 mg IntraVENous Daily Levi Bunn MD        morphine (PF) injection 2 mg  2 mg IntraVENous Q2H PRN Levi Bunn MD        Or    morphine injection 4 mg  4 mg IntraVENous Q2H PRN Levi Bunn MD           Allergies   Allergen Reactions    Betadine [Povidone Iodine] Rash       The patient has a family history that is negative for severe cardiovascular or respiratory issues, negative for reaction to anesthesia.    Social History     Socioeconomic History    Marital status:      Spouse name: Not on file    Number of children: Not on file    Years of education: Not on file    Highest education level: Not on file   Occupational History    Not on file   Tobacco Use    Smoking status: Never    Smokeless tobacco: Not on file   Substance and Sexual Activity    Alcohol use: Never    Drug use: Never    Sexual activity: Not on file   Other Topics Concern    Not on file   Social History Narrative    Not on file     Social Drivers of Health     Financial Resource Strain: Not on file   Food Insecurity: No Food Insecurity (3/14/2025)    Hunger Vital Sign     Worried About Running Out of Food in the Last Year: Never true     Ran Out of Food in the Last Year: Never true   Transportation Needs: No Transportation Needs (3/14/2025)    PRAPARE - Transportation     Lack of Transportation (Medical): No     Lack of Transportation (Non-Medical): No   Physical Activity: Not on file   Stress: Not on file   Social Connections: Not on file   Intimate Partner Violence: Not on file   Housing Stability: Low Risk  (3/14/2025)    Housing Stability Vital Sign     Unable to Pay for Housing in the Last Year: No     Number of Times Moved in the Last Year: 0     Homeless in the Last Year: No           Review of Systems  Review of Systems -  General ROS:  general anesthetic including MI, CVA, sudden death or reactions to anesthetic medications. The patient understands the risks and alternatives and the possibility of converting to an open procedure. All questions were answered to the patient's satisfaction and they freely signed the consent.      Levi Bunn MD  7:47 AM  3/14/2025

## 2025-03-15 LAB
25(OH)D3 SERPL-MCNC: 40.8 NG/ML (ref 30–100)
ALBUMIN SERPL-MCNC: 3 G/DL (ref 3.5–5.2)
ALP SERPL-CCNC: 85 U/L (ref 35–104)
ALT SERPL-CCNC: 16 U/L (ref 0–32)
ANION GAP SERPL CALCULATED.3IONS-SCNC: 10 MMOL/L (ref 7–16)
ARM BAND NUMBER: NORMAL
AST SERPL-CCNC: 22 U/L (ref 0–31)
BASOPHILS # BLD: 0.03 K/UL (ref 0–0.2)
BASOPHILS NFR BLD: 0 % (ref 0–2)
BILIRUB SERPL-MCNC: 0.9 MG/DL (ref 0–1.2)
BLOOD BANK BLOOD PRODUCT EXPIRATION DATE: NORMAL
BLOOD BANK BLOOD PRODUCT EXPIRATION DATE: NORMAL
BLOOD BANK DISPENSE STATUS: NORMAL
BLOOD BANK DISPENSE STATUS: NORMAL
BLOOD BANK ISBT PRODUCT BLOOD TYPE: 1700
BLOOD BANK ISBT PRODUCT BLOOD TYPE: 7300
BLOOD BANK PRODUCT CODE: NORMAL
BLOOD BANK PRODUCT CODE: NORMAL
BLOOD BANK UNIT TYPE AND RH: NORMAL
BLOOD BANK UNIT TYPE AND RH: NORMAL
BPU ID: NORMAL
BPU ID: NORMAL
BUN SERPL-MCNC: 30 MG/DL (ref 6–23)
CALCIUM SERPL-MCNC: 8.7 MG/DL (ref 8.6–10.2)
CHLORIDE SERPL-SCNC: 105 MMOL/L (ref 98–107)
CHOLEST SERPL-MCNC: 134 MG/DL
CO2 SERPL-SCNC: 24 MMOL/L (ref 22–29)
COMPONENT: NORMAL
COMPONENT: NORMAL
CREAT SERPL-MCNC: 1.4 MG/DL (ref 0.5–1)
EOSINOPHIL # BLD: 0.07 K/UL (ref 0.05–0.5)
EOSINOPHILS RELATIVE PERCENT: 1 % (ref 0–6)
ERYTHROCYTE [DISTWIDTH] IN BLOOD BY AUTOMATED COUNT: 13.9 % (ref 11.5–15)
FOLATE SERPL-MCNC: >20 NG/ML (ref 4.8–24.2)
GFR, ESTIMATED: 41 ML/MIN/1.73M2
GLUCOSE SERPL-MCNC: 94 MG/DL (ref 74–99)
HCT VFR BLD AUTO: 35.9 % (ref 34–48)
HDLC SERPL-MCNC: 52 MG/DL
HGB BLD-MCNC: 11.3 G/DL (ref 11.5–15.5)
IMM GRANULOCYTES # BLD AUTO: 0.06 K/UL (ref 0–0.58)
IMM GRANULOCYTES NFR BLD: 1 % (ref 0–5)
INR PPP: 2.4
IRON SATN MFR SERPL: 5 % (ref 15–50)
IRON SERPL-MCNC: 11 UG/DL (ref 37–145)
LDLC SERPL CALC-MCNC: 63 MG/DL
LYMPHOCYTES NFR BLD: 0.96 K/UL (ref 1.5–4)
LYMPHOCYTES RELATIVE PERCENT: 9 % (ref 20–42)
MCH RBC QN AUTO: 27.9 PG (ref 26–35)
MCHC RBC AUTO-ENTMCNC: 31.5 G/DL (ref 32–34.5)
MCV RBC AUTO: 88.6 FL (ref 80–99.9)
MONOCYTES NFR BLD: 0.71 K/UL (ref 0.1–0.95)
MONOCYTES NFR BLD: 6 % (ref 2–12)
NEUTROPHILS NFR BLD: 84 % (ref 43–80)
NEUTS SEG NFR BLD: 9.3 K/UL (ref 1.8–7.3)
PLATELET # BLD AUTO: 191 K/UL (ref 130–450)
PMV BLD AUTO: 10.4 FL (ref 7–12)
POTASSIUM SERPL-SCNC: 3.6 MMOL/L (ref 3.5–5)
PROT SERPL-MCNC: 6.4 G/DL (ref 6.4–8.3)
PROTHROMBIN TIME: 26.5 SEC (ref 9.3–12.4)
RBC # BLD AUTO: 4.05 M/UL (ref 3.5–5.5)
SODIUM SERPL-SCNC: 139 MMOL/L (ref 132–146)
T4 FREE SERPL-MCNC: 0.9 NG/DL (ref 0.9–1.7)
TIBC SERPL-MCNC: 209 UG/DL (ref 250–450)
TRANSFUSION STATUS: NORMAL
TRANSFUSION STATUS: NORMAL
TRIGL SERPL-MCNC: 94 MG/DL
TROPONIN I SERPL HS-MCNC: 22 NG/L (ref 0–9)
UNIT DIVISION: 0
UNIT DIVISION: 0
UNIT ISSUE DATE/TIME: NORMAL
UNIT ISSUE DATE/TIME: NORMAL
VIT B12 SERPL-MCNC: 667 PG/ML (ref 211–946)
VLDLC SERPL CALC-MCNC: 19 MG/DL
WBC OTHER # BLD: 11.1 K/UL (ref 4.5–11.5)

## 2025-03-15 PROCEDURE — 80053 COMPREHEN METABOLIC PANEL: CPT

## 2025-03-15 PROCEDURE — 2500000003 HC RX 250 WO HCPCS: Performed by: SURGERY

## 2025-03-15 PROCEDURE — 6360000002 HC RX W HCPCS: Performed by: SURGERY

## 2025-03-15 PROCEDURE — 84484 ASSAY OF TROPONIN QUANT: CPT

## 2025-03-15 PROCEDURE — 85610 PROTHROMBIN TIME: CPT

## 2025-03-15 PROCEDURE — 83550 IRON BINDING TEST: CPT

## 2025-03-15 PROCEDURE — 83540 ASSAY OF IRON: CPT

## 2025-03-15 PROCEDURE — 80061 LIPID PANEL: CPT

## 2025-03-15 PROCEDURE — 1200000000 HC SEMI PRIVATE

## 2025-03-15 PROCEDURE — 82607 VITAMIN B-12: CPT

## 2025-03-15 PROCEDURE — 82306 VITAMIN D 25 HYDROXY: CPT

## 2025-03-15 PROCEDURE — 84439 ASSAY OF FREE THYROXINE: CPT

## 2025-03-15 PROCEDURE — 36415 COLL VENOUS BLD VENIPUNCTURE: CPT

## 2025-03-15 PROCEDURE — 6370000000 HC RX 637 (ALT 250 FOR IP): Performed by: SURGERY

## 2025-03-15 PROCEDURE — 99232 SBSQ HOSP IP/OBS MODERATE 35: CPT | Performed by: SURGERY

## 2025-03-15 PROCEDURE — 93005 ELECTROCARDIOGRAM TRACING: CPT

## 2025-03-15 PROCEDURE — 6370000000 HC RX 637 (ALT 250 FOR IP)

## 2025-03-15 PROCEDURE — 85025 COMPLETE CBC W/AUTO DIFF WBC: CPT

## 2025-03-15 PROCEDURE — 82746 ASSAY OF FOLIC ACID SERUM: CPT

## 2025-03-15 RX ORDER — BISACODYL 5 MG/1
10 TABLET, DELAYED RELEASE ORAL EVERY 4 HOURS
Status: COMPLETED | OUTPATIENT
Start: 2025-03-15 | End: 2025-03-15

## 2025-03-15 RX ADMIN — ENOXAPARIN SODIUM 40 MG: 100 INJECTION SUBCUTANEOUS at 09:54

## 2025-03-15 RX ADMIN — OXCARBAZEPINE 600 MG: 150 TABLET, FILM COATED ORAL at 20:04

## 2025-03-15 RX ADMIN — ZONISAMIDE 100 MG: 100 CAPSULE ORAL at 18:29

## 2025-03-15 RX ADMIN — METRONIDAZOLE 500 MG: 500 INJECTION, SOLUTION INTRAVENOUS at 18:29

## 2025-03-15 RX ADMIN — ZONISAMIDE 100 MG: 100 CAPSULE ORAL at 09:54

## 2025-03-15 RX ADMIN — BISACODYL 10 MG: 5 TABLET, COATED ORAL at 18:29

## 2025-03-15 RX ADMIN — OXCARBAZEPINE 600 MG: 150 TABLET, FILM COATED ORAL at 09:54

## 2025-03-15 RX ADMIN — METRONIDAZOLE 500 MG: 500 INJECTION, SOLUTION INTRAVENOUS at 01:41

## 2025-03-15 RX ADMIN — ZONISAMIDE 100 MG: 100 CAPSULE ORAL at 20:04

## 2025-03-15 RX ADMIN — MORPHINE SULFATE 2 MG: 2 INJECTION, SOLUTION INTRAMUSCULAR; INTRAVENOUS at 04:53

## 2025-03-15 RX ADMIN — SERTRALINE 100 MG: 50 TABLET, FILM COATED ORAL at 09:54

## 2025-03-15 RX ADMIN — BISACODYL 10 MG: 5 TABLET, COATED ORAL at 13:48

## 2025-03-15 RX ADMIN — DONEPEZIL HYDROCHLORIDE 5 MG: 5 TABLET ORAL at 20:04

## 2025-03-15 RX ADMIN — ATORVASTATIN CALCIUM 10 MG: 10 TABLET, FILM COATED ORAL at 09:54

## 2025-03-15 RX ADMIN — METOPROLOL TARTRATE 75 MG: 50 TABLET, FILM COATED ORAL at 09:54

## 2025-03-15 RX ADMIN — ZONISAMIDE 100 MG: 100 CAPSULE ORAL at 12:45

## 2025-03-15 RX ADMIN — WATER 1000 MG: 1 INJECTION INTRAMUSCULAR; INTRAVENOUS; SUBCUTANEOUS at 09:54

## 2025-03-15 RX ADMIN — METRONIDAZOLE 500 MG: 500 INJECTION, SOLUTION INTRAVENOUS at 09:55

## 2025-03-15 RX ADMIN — SODIUM CHLORIDE, PRESERVATIVE FREE 10 ML: 5 INJECTION INTRAVENOUS at 20:05

## 2025-03-15 ASSESSMENT — PAIN DESCRIPTION - DESCRIPTORS: DESCRIPTORS: ACHING;NAGGING;DISCOMFORT

## 2025-03-15 ASSESSMENT — PAIN SCALES - GENERAL
PAINLEVEL_OUTOF10: 3
PAINLEVEL_OUTOF10: 5

## 2025-03-15 ASSESSMENT — PAIN DESCRIPTION - LOCATION: LOCATION: ABDOMEN

## 2025-03-15 NOTE — PROGRESS NOTES
sertraline  100 mg Oral Daily    OXcarbazepine  600 mg Oral BID    zonisamide  100 mg Oral 4x Daily    atorvastatin  10 mg Oral Daily    donepezil  5 mg Oral Nightly    sodium chloride flush  5-40 mL IntraVENous 2 times per day    enoxaparin  40 mg SubCUTAneous Daily    metroNIDAZOLE  500 mg IntraVENous Q8H    cefTRIAXone (ROCEPHIN) IV  1,000 mg IntraVENous Daily     Continuous Infusions:   sodium chloride      sodium chloride      lactated ringers 75 mL/hr at 03/14/25 2234       Objective Data:  Recent Labs     03/13/25  1646 03/14/25  0356 03/15/25  0354   WBC 19.3* 16.9* 11.1   RBC 4.71 4.36 4.05   HGB 13.4 12.3 11.3*   HCT 40.2 38.5 35.9   MCV 85.4 88.3 88.6   MCH 28.5 28.2 27.9   MCHC 33.3 31.9* 31.5*   RDW 14.1 14.3 13.9    212 191   MPV 10.6 10.0 10.4     Lab Results   Component Value Date/Time     03/15/2025 03:54 AM    K 3.6 03/15/2025 03:54 AM    K 3.9 11/08/2021 03:03 PM     03/15/2025 03:54 AM    CO2 24 03/15/2025 03:54 AM    ANIONGAP 10 03/15/2025 03:54 AM    GLUCOSE 94 03/15/2025 03:54 AM    BUN 30 03/15/2025 03:54 AM    CREATININE 1.4 03/15/2025 03:54 AM    GFRAA 49 11/08/2021 03:03 PM    LABGLOM 41 03/15/2025 03:54 AM    CALCIUM 8.7 03/15/2025 03:54 AM    BILITOT 0.9 03/15/2025 03:54 AM    ALT 16 03/15/2025 03:54 AM    AST 22 03/15/2025 03:54 AM    ALKPHOS 85 03/15/2025 03:54 AM    ALBUMIN 3.0 03/15/2025 03:54 AM     Recent Labs     03/14/25  0356 03/13/25  1646   MG 2.2 2.1      Lab Results   Component Value Date/Time    PHOS 2.5 07/05/2024 07:08 AM     No results for input(s): \"TROPONINI\" in the last 72 hours.    Assessment:  1. Acute abdominal pain, probably secondary to acute diverticulitis with associated perforation, abscess formation.  2. History of paroxysmal atrial fibrillation, currently in sinus rhythm, currently on Eliquis therapy.  3. History of normal-pressure hydrocephalus with status post ventricular shunt.  4. History of breast cancer, on Arimidex.  5. Dementia,  on Aricept.  6. Essential hypertension.  7. Hyperlipidemia, on Zocor.  8. History of depression, on Zoloft.    Plan:   Plan for surgical intervention on March 16, 2025 with ostomy placement  Continue IV fluids  Antibiotic therapy with Rocephin and Flagyl  DVT prophylaxis Lovenox  Check coags in the morning  Monitor labs and vitals      Continue current therapy.  See orders for further plan of care.      More than 50% of my time was spent at the bedside counseling/coordinating care with the patient and/or family with face to face contact.  This time was spent reviewing notes and laboratory data as well as instructing and counseling the patient. Time I spent with the family or surrogate(s) is included only if the patient was incapable of providing the necessary information or participating in medical decisions. I also discussed the differential diagnosis and all of the proposed management plans with the patient and individuals accompanying the patient. I am readily available for any further decision-making and intervention.     The patient was seen, examined and then discussed with Dr. Drake.      MARLYN Haro CNP  3/15/2025  5:55 PM        I saw and evaluated the patient. I agree with the findings and the plan of care as documented in Rishi CLINE-CNP note.    Romeo Drake DO, BENITA  6:00 PM  3/15/2025

## 2025-03-15 NOTE — PROGRESS NOTES
Surgery Progress Note            Chief complaint:   Chief Complaint   Patient presents with    Abdominal Pain     Abdominal pain started a few days ago from home, patient currently on antibiotics for uti      Patient Active Problem List   Diagnosis    Atrial fibrillation with RVR (HCC)    Acute diverticulitis       S: no acute changes    O:   Vitals:    03/15/25 0943   BP: (!) 118/53   Pulse: 64   Resp:    Temp:    SpO2:        Intake/Output Summary (Last 24 hours) at 3/15/2025 1328  Last data filed at 3/15/2025 0943  Gross per 24 hour   Intake 1740 ml   Output 300 ml   Net 1440 ml           Labs:  Lab Results   Component Value Date/Time    WBC 11.1 03/15/2025 03:54 AM    WBC 16.9 03/14/2025 03:56 AM    WBC 19.3 03/13/2025 04:46 PM    HGB 11.3 03/15/2025 03:54 AM    HGB 12.3 03/14/2025 03:56 AM    HGB 13.4 03/13/2025 04:46 PM    HCT 35.9 03/15/2025 03:54 AM    HCT 38.5 03/14/2025 03:56 AM    HCT 40.2 03/13/2025 04:46 PM     Lab Results   Component Value Date    CREATININE 1.4 (H) 03/15/2025    BUN 30 (H) 03/15/2025     03/15/2025    K 3.6 03/15/2025     03/15/2025    CO2 24 03/15/2025     No results found for: \"LIPASE\", \"AMYLASE\"      Physical exam:   BP (!) 118/53   Pulse 64   Temp 98.8 °F (37.1 °C) (Oral)   Resp 17   Ht 1.651 m (5' 5\")   Wt 87.1 kg (192 lb)   SpO2 90%   BMI 31.95 kg/m²   General appearance: NAD  Head: NCAT  Neck: supple, no masses  Lungs: equal chest rise bilateral  Heart: S1S2 present  Abdomen: soft, severely tender in llq with mass palpable, nondistended  Skin; no lesions  Gu: no cva tenderness  Extremities: extremities normal, atraumatic, no cyanosis or edema    A:  acute diverticulitis with abscess     P: will plan for surgery tomorrow with ostomy  Discussed the risk, benefits and alternatives of surgery including wound infections, bleeding, scar and hernia formation and the risks of general anesthetic including MI, CVA, sudden death or reactions to anesthetic medications.

## 2025-03-15 NOTE — H&P
[place] [id number]                           HISTORY & PHYSICAL      PATIENT NAME: FEDERICO HARDEN              : 1955  MED REC NO: [id number]                        ROOM: [id number]  ACCOUNT NO: [id number]                       ADMIT DATE: 2025  PROVIDER: Romeo Drake DO      CHIEF COMPLAINT AND HISTORY OF CHIEF COMPLAINT:  Pleasant 69-year-old white female who admitted to [facility].  The patient presented to the hospital here through the emergency room with chief complaint of abdominal pain.  The patient was evaluated in the emergency room.  Radiographic findings at this time did show evidence of cholecystic disease.  The patient was evaluated in the emergency room with abnormal CT scan findings showing evidence of acute sigmoid diverticulitis with a perforation and 3.4 x 1.6 cm abscess.  The patient was admitted to hospital to the general surgical floor under the service of Dr. Bunn.  Consultation was obtained with Dr. Drake and Dr. Solo.  The patient was seen at this time.  Exam revealed a 69-year-old white female who does follow up with Dr. Toma Salinas.  The patient is a poor historian.  The patient is not quite sure why she came into the hospital here.  She is resting comfortably.  Her review of systems otherwise is unremarkable.  From a cardiac standpoint of view, she has no complaints of chest pain.  An EKG shows sinus rhythm, occasional PAC.  Troponin level was not elevated.  The patient does have a history of paroxysmal atrial fibrillation, for which she has been on anticoagulant therapy.  Respiratory wise, she did not appear to be short of breath.  She is not wearing any oxygen.  Gastrointestinal wise, she does complain of occasional diarrhea.  She has been having some loose stools.  Genitourinary wise, she does relate to having occasional incontinence.  Neurologically, she does suffer from  edema.  NEUROLOGIC:  Grossly intact.  BREASTS:  Not performed.  RECTOGENITAL:  Not performed.    IMPRESSION:  At this time, include:  1. Acute abdominal pain, probably secondary to acute diverticulitis with associated perforation, abscess formation.  2. History of paroxysmal atrial fibrillation, currently in sinus rhythm, currently on Eliquis therapy.  3. History of normal-pressure hydrocephalus with status post ventricular shunt.  4. History of breast cancer, on Arimidex.  5. Dementia, on Aricept.  6. Essential hypertension.  7. Hyperlipidemia, on Zocor.  8. History of depression, on Zoloft.    PLAN AND RECOMMENDATIONS:  At this time, currently the patient appears stable.  The patient was admitted to the hospital to the general surgical floor.  We have been asked to participate in her care.  The patient will receive fresh-frozen plasma at this time due to elevated INR at this time for possible surgical intervention.  The patient has been placed on antibiotic therapy.  We will proceed with further workup as clinically indicated.  We will follow along with Surgery and make further recommendations.  As always, during any surgical intervention, I would recommend close cardiac monitoring and good oxygenation throughout the surgical procedure.  Appropriate lab work will be followed accordingly.  The patient will be observed for any signs of sepsis or seizure disorder.          KENTON TOTH DO      D:  03/14/2025 17:06:39     T:  03/14/2025 21:16:28     LOUISA/KEVIN  Job #:  [id number]     Doc#:  [id number]

## 2025-03-15 NOTE — PLAN OF CARE
Problem: Discharge Planning  Goal: Discharge to home or other facility with appropriate resources  3/15/2025 1345 by Misty Maldonado RN  Outcome: Progressing  3/15/2025 0002 by Lolly Rivas RN  Outcome: Progressing     Problem: Pain  Goal: Verbalizes/displays adequate comfort level or baseline comfort level  3/15/2025 1345 by Misty Maldonado RN  Outcome: Progressing  3/15/2025 0002 by Lolly Rivas RN  Outcome: Progressing     Problem: ABCDS Injury Assessment  Goal: Absence of physical injury  3/15/2025 1345 by Misty Maldonado RN  Outcome: Progressing  3/15/2025 0002 by Lolly Rivas RN  Outcome: Progressing     Problem: Safety - Adult  Goal: Free from fall injury  3/15/2025 1345 by Misty Maldonado RN  Outcome: Progressing  3/15/2025 0002 by Lolly Rivas RN  Outcome: Progressing

## 2025-03-16 ENCOUNTER — ANESTHESIA (OUTPATIENT)
Dept: OPERATING ROOM | Age: 70
End: 2025-03-16
Payer: MEDICARE

## 2025-03-16 LAB
ALBUMIN SERPL-MCNC: 2.7 G/DL (ref 3.5–5.2)
ALP SERPL-CCNC: 114 U/L (ref 35–104)
ALT SERPL-CCNC: 16 U/L (ref 0–32)
ANION GAP SERPL CALCULATED.3IONS-SCNC: 14 MMOL/L (ref 7–16)
AST SERPL-CCNC: 25 U/L (ref 0–31)
BASOPHILS # BLD: 0.04 K/UL (ref 0–0.2)
BASOPHILS NFR BLD: 0 % (ref 0–2)
BILIRUB SERPL-MCNC: 0.4 MG/DL (ref 0–1.2)
BUN SERPL-MCNC: 26 MG/DL (ref 6–23)
CALCIUM SERPL-MCNC: 8.5 MG/DL (ref 8.6–10.2)
CHLORIDE SERPL-SCNC: 105 MMOL/L (ref 98–107)
CO2 SERPL-SCNC: 21 MMOL/L (ref 22–29)
CREAT SERPL-MCNC: 1.3 MG/DL (ref 0.5–1)
EKG ATRIAL RATE: 61 BPM
EKG P AXIS: 77 DEGREES
EKG P-R INTERVAL: 204 MS
EKG Q-T INTERVAL: 482 MS
EKG QRS DURATION: 110 MS
EKG QTC CALCULATION (BAZETT): 485 MS
EKG R AXIS: 41 DEGREES
EKG T AXIS: 43 DEGREES
EKG VENTRICULAR RATE: 61 BPM
EOSINOPHIL # BLD: 0.07 K/UL (ref 0.05–0.5)
EOSINOPHILS RELATIVE PERCENT: 1 % (ref 0–6)
ERYTHROCYTE [DISTWIDTH] IN BLOOD BY AUTOMATED COUNT: 13.9 % (ref 11.5–15)
GFR, ESTIMATED: 44 ML/MIN/1.73M2
GLUCOSE SERPL-MCNC: 79 MG/DL (ref 74–99)
HCT VFR BLD AUTO: 30.9 % (ref 34–48)
HGB BLD-MCNC: 9.9 G/DL (ref 11.5–15.5)
IMM GRANULOCYTES # BLD AUTO: 0.07 K/UL (ref 0–0.58)
IMM GRANULOCYTES NFR BLD: 1 % (ref 0–5)
INR PPP: 1.9
INR PPP: 2.1
INR PPP: 2.7
LYMPHOCYTES NFR BLD: 0.99 K/UL (ref 1.5–4)
LYMPHOCYTES RELATIVE PERCENT: 9 % (ref 20–42)
MAGNESIUM SERPL-MCNC: 2.1 MG/DL (ref 1.6–2.6)
MCH RBC QN AUTO: 27.8 PG (ref 26–35)
MCHC RBC AUTO-ENTMCNC: 32 G/DL (ref 32–34.5)
MCV RBC AUTO: 86.8 FL (ref 80–99.9)
MICROORGANISM SPEC CULT: ABNORMAL
MONOCYTES NFR BLD: 0.69 K/UL (ref 0.1–0.95)
MONOCYTES NFR BLD: 6 % (ref 2–12)
NEUTROPHILS NFR BLD: 83 % (ref 43–80)
NEUTS SEG NFR BLD: 8.89 K/UL (ref 1.8–7.3)
PARTIAL THROMBOPLASTIN TIME: 34.2 SEC (ref 24.5–35.1)
PARTIAL THROMBOPLASTIN TIME: 35.9 SEC (ref 24.5–35.1)
PLATELET # BLD AUTO: 211 K/UL (ref 130–450)
PMV BLD AUTO: 10.2 FL (ref 7–12)
POTASSIUM SERPL-SCNC: 3.3 MMOL/L (ref 3.5–5)
PROT SERPL-MCNC: 6 G/DL (ref 6.4–8.3)
PROTHROMBIN TIME: 20.3 SEC (ref 9.3–12.4)
PROTHROMBIN TIME: 23.3 SEC (ref 9.3–12.4)
PROTHROMBIN TIME: 29.4 SEC (ref 9.3–12.4)
RBC # BLD AUTO: 3.56 M/UL (ref 3.5–5.5)
SODIUM SERPL-SCNC: 140 MMOL/L (ref 132–146)
SPECIMEN DESCRIPTION: ABNORMAL
WBC OTHER # BLD: 10.8 K/UL (ref 4.5–11.5)

## 2025-03-16 PROCEDURE — 85730 THROMBOPLASTIN TIME PARTIAL: CPT

## 2025-03-16 PROCEDURE — 6360000002 HC RX W HCPCS: Performed by: SURGERY

## 2025-03-16 PROCEDURE — 36430 TRANSFUSION BLD/BLD COMPNT: CPT

## 2025-03-16 PROCEDURE — 87102 FUNGUS ISOLATION CULTURE: CPT

## 2025-03-16 PROCEDURE — 6370000000 HC RX 637 (ALT 250 FOR IP): Performed by: SURGERY

## 2025-03-16 PROCEDURE — 83735 ASSAY OF MAGNESIUM: CPT

## 2025-03-16 PROCEDURE — 7100000000 HC PACU RECOVERY - FIRST 15 MIN: Performed by: SURGERY

## 2025-03-16 PROCEDURE — 87077 CULTURE AEROBIC IDENTIFY: CPT

## 2025-03-16 PROCEDURE — 2500000003 HC RX 250 WO HCPCS: Performed by: SURGERY

## 2025-03-16 PROCEDURE — 87015 SPECIMEN INFECT AGNT CONCNTJ: CPT

## 2025-03-16 PROCEDURE — 85025 COMPLETE CBC W/AUTO DIFF WBC: CPT

## 2025-03-16 PROCEDURE — 6360000002 HC RX W HCPCS

## 2025-03-16 PROCEDURE — 6360000002 HC RX W HCPCS: Performed by: NURSE ANESTHETIST, CERTIFIED REGISTERED

## 2025-03-16 PROCEDURE — 87070 CULTURE OTHR SPECIMN AEROBIC: CPT

## 2025-03-16 PROCEDURE — 2720000010 HC SURG SUPPLY STERILE: Performed by: SURGERY

## 2025-03-16 PROCEDURE — 87205 SMEAR GRAM STAIN: CPT

## 2025-03-16 PROCEDURE — 7100000001 HC PACU RECOVERY - ADDTL 15 MIN: Performed by: SURGERY

## 2025-03-16 PROCEDURE — C1765 ADHESION BARRIER: HCPCS | Performed by: SURGERY

## 2025-03-16 PROCEDURE — P9017 PLASMA 1 DONOR FRZ W/IN 8 HR: HCPCS

## 2025-03-16 PROCEDURE — 3700000000 HC ANESTHESIA ATTENDED CARE: Performed by: SURGERY

## 2025-03-16 PROCEDURE — 3600000014 HC SURGERY LEVEL 4 ADDTL 15MIN: Performed by: SURGERY

## 2025-03-16 PROCEDURE — 6360000002 HC RX W HCPCS: Performed by: ANESTHESIOLOGY

## 2025-03-16 PROCEDURE — 2580000003 HC RX 258: Performed by: NURSE ANESTHETIST, CERTIFIED REGISTERED

## 2025-03-16 PROCEDURE — 2500000003 HC RX 250 WO HCPCS: Performed by: NURSE ANESTHETIST, CERTIFIED REGISTERED

## 2025-03-16 PROCEDURE — 2580000003 HC RX 258

## 2025-03-16 PROCEDURE — 88307 TISSUE EXAM BY PATHOLOGIST: CPT

## 2025-03-16 PROCEDURE — 87206 SMEAR FLUORESCENT/ACID STAI: CPT

## 2025-03-16 PROCEDURE — 80053 COMPREHEN METABOLIC PANEL: CPT

## 2025-03-16 PROCEDURE — 87116 MYCOBACTERIA CULTURE: CPT

## 2025-03-16 PROCEDURE — 0W9G00Z DRAINAGE OF PERITONEAL CAVITY WITH DRAINAGE DEVICE, OPEN APPROACH: ICD-10-PCS | Performed by: SURGERY

## 2025-03-16 PROCEDURE — 1200000000 HC SEMI PRIVATE

## 2025-03-16 PROCEDURE — 85610 PROTHROMBIN TIME: CPT

## 2025-03-16 PROCEDURE — 0D1M0Z4 BYPASS DESCENDING COLON TO CUTANEOUS, OPEN APPROACH: ICD-10-PCS | Performed by: SURGERY

## 2025-03-16 PROCEDURE — 86927 PLASMA FRESH FROZEN: CPT

## 2025-03-16 PROCEDURE — 3600000004 HC SURGERY LEVEL 4 BASE: Performed by: SURGERY

## 2025-03-16 PROCEDURE — 2709999900 HC NON-CHARGEABLE SUPPLY: Performed by: SURGERY

## 2025-03-16 PROCEDURE — 36415 COLL VENOUS BLD VENIPUNCTURE: CPT

## 2025-03-16 PROCEDURE — 3700000001 HC ADD 15 MINUTES (ANESTHESIA): Performed by: SURGERY

## 2025-03-16 PROCEDURE — 0DTN0ZZ RESECTION OF SIGMOID COLON, OPEN APPROACH: ICD-10-PCS | Performed by: SURGERY

## 2025-03-16 DEVICE — SEPRAFILM ADHESION BARRIER (MEMBRANE) IS A STERILE, BIORESORBABLE, TRANSLUCENT ADHESION BARRIER COMPOSED OF TWO ANIONIC POLYSACCHARIDES, SODIUM HYALURONATE (HA) AND CARBOXYMETHYLCELLULOSE (CMC).
Type: IMPLANTABLE DEVICE | Site: ABDOMEN | Status: FUNCTIONAL
Brand: SEPRAFILM

## 2025-03-16 RX ORDER — LIDOCAINE HYDROCHLORIDE 20 MG/ML
INJECTION, SOLUTION INTRAVENOUS
Status: DISCONTINUED | OUTPATIENT
Start: 2025-03-16 | End: 2025-03-16 | Stop reason: SDUPTHER

## 2025-03-16 RX ORDER — SODIUM CHLORIDE 9 MG/ML
INJECTION, SOLUTION INTRAVENOUS PRN
Status: DISCONTINUED | OUTPATIENT
Start: 2025-03-16 | End: 2025-03-20 | Stop reason: ALTCHOICE

## 2025-03-16 RX ORDER — SODIUM CHLORIDE 9 MG/ML
INJECTION, SOLUTION INTRAVENOUS
Status: DISCONTINUED | OUTPATIENT
Start: 2025-03-16 | End: 2025-03-16 | Stop reason: SDUPTHER

## 2025-03-16 RX ORDER — PHENYLEPHRINE HCL IN 0.9% NACL 1 MG/10 ML
SYRINGE (ML) INTRAVENOUS
Status: DISCONTINUED | OUTPATIENT
Start: 2025-03-16 | End: 2025-03-16 | Stop reason: SDUPTHER

## 2025-03-16 RX ORDER — ONDANSETRON 2 MG/ML
INJECTION INTRAMUSCULAR; INTRAVENOUS
Status: DISCONTINUED | OUTPATIENT
Start: 2025-03-16 | End: 2025-03-16 | Stop reason: SDUPTHER

## 2025-03-16 RX ORDER — ONDANSETRON 2 MG/ML
4 INJECTION INTRAMUSCULAR; INTRAVENOUS
Status: DISCONTINUED | OUTPATIENT
Start: 2025-03-16 | End: 2025-03-16 | Stop reason: HOSPADM

## 2025-03-16 RX ORDER — PROPOFOL 10 MG/ML
INJECTION, EMULSION INTRAVENOUS
Status: DISCONTINUED | OUTPATIENT
Start: 2025-03-16 | End: 2025-03-16 | Stop reason: SDUPTHER

## 2025-03-16 RX ORDER — SODIUM CHLORIDE 9 MG/ML
INJECTION, SOLUTION INTRAVENOUS CONTINUOUS
Status: DISCONTINUED | OUTPATIENT
Start: 2025-03-16 | End: 2025-03-16 | Stop reason: HOSPADM

## 2025-03-16 RX ORDER — TRAMADOL HYDROCHLORIDE 50 MG/1
50 TABLET ORAL EVERY 6 HOURS PRN
Status: DISCONTINUED | OUTPATIENT
Start: 2025-03-16 | End: 2025-03-22 | Stop reason: HOSPADM

## 2025-03-16 RX ORDER — ROCURONIUM BROMIDE 10 MG/ML
INJECTION, SOLUTION INTRAVENOUS
Status: DISCONTINUED | OUTPATIENT
Start: 2025-03-16 | End: 2025-03-16 | Stop reason: SDUPTHER

## 2025-03-16 RX ORDER — PROCHLORPERAZINE EDISYLATE 5 MG/ML
5 INJECTION INTRAMUSCULAR; INTRAVENOUS
Status: DISCONTINUED | OUTPATIENT
Start: 2025-03-16 | End: 2025-03-16 | Stop reason: HOSPADM

## 2025-03-16 RX ORDER — NALOXONE HYDROCHLORIDE 0.4 MG/ML
INJECTION, SOLUTION INTRAMUSCULAR; INTRAVENOUS; SUBCUTANEOUS PRN
Status: DISCONTINUED | OUTPATIENT
Start: 2025-03-16 | End: 2025-03-16 | Stop reason: HOSPADM

## 2025-03-16 RX ORDER — HYDRALAZINE HYDROCHLORIDE 20 MG/ML
10 INJECTION INTRAMUSCULAR; INTRAVENOUS
Status: DISCONTINUED | OUTPATIENT
Start: 2025-03-16 | End: 2025-03-16 | Stop reason: HOSPADM

## 2025-03-16 RX ORDER — LABETALOL HYDROCHLORIDE 5 MG/ML
10 INJECTION, SOLUTION INTRAVENOUS
Status: DISCONTINUED | OUTPATIENT
Start: 2025-03-16 | End: 2025-03-16 | Stop reason: HOSPADM

## 2025-03-16 RX ORDER — MAGNESIUM HYDROXIDE 1200 MG/15ML
LIQUID ORAL CONTINUOUS PRN
Status: COMPLETED | OUTPATIENT
Start: 2025-03-16 | End: 2025-03-16

## 2025-03-16 RX ORDER — KETOROLAC TROMETHAMINE 15 MG/ML
15 INJECTION, SOLUTION INTRAMUSCULAR; INTRAVENOUS EVERY 6 HOURS
Status: DISCONTINUED | OUTPATIENT
Start: 2025-03-17 | End: 2025-03-17

## 2025-03-16 RX ORDER — NEOSTIGMINE METHYLSULFATE 1 MG/ML
INJECTION, SOLUTION INTRAVENOUS
Status: DISCONTINUED | OUTPATIENT
Start: 2025-03-16 | End: 2025-03-16 | Stop reason: SDUPTHER

## 2025-03-16 RX ORDER — MIDAZOLAM HYDROCHLORIDE 1 MG/ML
2 INJECTION, SOLUTION INTRAMUSCULAR; INTRAVENOUS
Status: DISCONTINUED | OUTPATIENT
Start: 2025-03-16 | End: 2025-03-16 | Stop reason: HOSPADM

## 2025-03-16 RX ORDER — TRAMADOL HYDROCHLORIDE 50 MG/1
25 TABLET ORAL EVERY 6 HOURS PRN
Status: DISCONTINUED | OUTPATIENT
Start: 2025-03-16 | End: 2025-03-22 | Stop reason: HOSPADM

## 2025-03-16 RX ORDER — FENTANYL CITRATE 50 UG/ML
INJECTION, SOLUTION INTRAMUSCULAR; INTRAVENOUS
Status: DISCONTINUED | OUTPATIENT
Start: 2025-03-16 | End: 2025-03-16 | Stop reason: SDUPTHER

## 2025-03-16 RX ORDER — FENTANYL CITRATE 50 UG/ML
25 INJECTION, SOLUTION INTRAMUSCULAR; INTRAVENOUS EVERY 5 MIN PRN
Status: COMPLETED | OUTPATIENT
Start: 2025-03-16 | End: 2025-03-16

## 2025-03-16 RX ORDER — KETOROLAC TROMETHAMINE 30 MG/ML
INJECTION, SOLUTION INTRAMUSCULAR; INTRAVENOUS
Status: DISCONTINUED | OUTPATIENT
Start: 2025-03-16 | End: 2025-03-16 | Stop reason: SDUPTHER

## 2025-03-16 RX ORDER — GLYCOPYRROLATE 0.2 MG/ML
INJECTION INTRAMUSCULAR; INTRAVENOUS
Status: DISCONTINUED | OUTPATIENT
Start: 2025-03-16 | End: 2025-03-16 | Stop reason: SDUPTHER

## 2025-03-16 RX ORDER — DEXAMETHASONE SODIUM PHOSPHATE 10 MG/ML
INJECTION, SOLUTION INTRAMUSCULAR; INTRAVENOUS
Status: DISCONTINUED | OUTPATIENT
Start: 2025-03-16 | End: 2025-03-16 | Stop reason: SDUPTHER

## 2025-03-16 RX ORDER — LABETALOL HYDROCHLORIDE 5 MG/ML
INJECTION, SOLUTION INTRAVENOUS
Status: DISCONTINUED | OUTPATIENT
Start: 2025-03-16 | End: 2025-03-16 | Stop reason: SDUPTHER

## 2025-03-16 RX ADMIN — TRAMADOL HYDROCHLORIDE 50 MG: 50 TABLET, COATED ORAL at 20:09

## 2025-03-16 RX ADMIN — DEXAMETHASONE SODIUM PHOSPHATE 10 MG: 10 INJECTION INTRAMUSCULAR; INTRAVENOUS at 17:40

## 2025-03-16 RX ADMIN — ROCURONIUM BROMIDE 40 MG: 10 INJECTION, SOLUTION INTRAVENOUS at 17:15

## 2025-03-16 RX ADMIN — METRONIDAZOLE 500 MG: 500 INJECTION, SOLUTION INTRAVENOUS at 00:22

## 2025-03-16 RX ADMIN — FENTANYL CITRATE 50 MCG: 50 INJECTION, SOLUTION INTRAMUSCULAR; INTRAVENOUS at 17:22

## 2025-03-16 RX ADMIN — FENTANYL CITRATE 25 MCG: 50 INJECTION, SOLUTION INTRAMUSCULAR; INTRAVENOUS at 18:49

## 2025-03-16 RX ADMIN — SODIUM CHLORIDE, PRESERVATIVE FREE 10 ML: 5 INJECTION INTRAVENOUS at 20:01

## 2025-03-16 RX ADMIN — Medication 100 MCG: at 18:08

## 2025-03-16 RX ADMIN — LIDOCAINE HYDROCHLORIDE 60 MG: 20 INJECTION, SOLUTION INTRAVENOUS at 17:15

## 2025-03-16 RX ADMIN — LABETALOL HYDROCHLORIDE 10 MG: 5 INJECTION, SOLUTION INTRAVENOUS at 17:50

## 2025-03-16 RX ADMIN — WATER 1000 MG: 1 INJECTION INTRAMUSCULAR; INTRAVENOUS; SUBCUTANEOUS at 09:48

## 2025-03-16 RX ADMIN — PROPOFOL 120 MG: 10 INJECTION, EMULSION INTRAVENOUS at 17:15

## 2025-03-16 RX ADMIN — FENTANYL CITRATE 50 MCG: 50 INJECTION, SOLUTION INTRAMUSCULAR; INTRAVENOUS at 17:11

## 2025-03-16 RX ADMIN — GLYCOPYRROLATE 0.6 MG: 0.2 INJECTION, SOLUTION INTRAMUSCULAR; INTRAVENOUS at 18:21

## 2025-03-16 RX ADMIN — Medication 100 MCG: at 18:01

## 2025-03-16 RX ADMIN — FENTANYL CITRATE 50 MCG: 50 INJECTION, SOLUTION INTRAMUSCULAR; INTRAVENOUS at 17:39

## 2025-03-16 RX ADMIN — ZONISAMIDE 100 MG: 100 CAPSULE ORAL at 20:09

## 2025-03-16 RX ADMIN — PHYTONADIONE 1 MG: 1 INJECTION, EMULSION INTRAMUSCULAR; INTRAVENOUS; SUBCUTANEOUS at 10:59

## 2025-03-16 RX ADMIN — ROCURONIUM BROMIDE 10 MG: 10 INJECTION, SOLUTION INTRAVENOUS at 17:53

## 2025-03-16 RX ADMIN — SODIUM CHLORIDE: 9 INJECTION, SOLUTION INTRAVENOUS at 17:11

## 2025-03-16 RX ADMIN — OXCARBAZEPINE 600 MG: 150 TABLET, FILM COATED ORAL at 20:08

## 2025-03-16 RX ADMIN — METRONIDAZOLE 500 MG: 500 INJECTION, SOLUTION INTRAVENOUS at 09:48

## 2025-03-16 RX ADMIN — DONEPEZIL HYDROCHLORIDE 5 MG: 5 TABLET ORAL at 20:09

## 2025-03-16 RX ADMIN — Medication 100 MCG: at 18:04

## 2025-03-16 RX ADMIN — Medication 3 MG: at 18:21

## 2025-03-16 RX ADMIN — KETOROLAC TROMETHAMINE 30 MG: 30 INJECTION, SOLUTION INTRAMUSCULAR; INTRAVENOUS at 18:27

## 2025-03-16 RX ADMIN — METRONIDAZOLE 500 MG: 500 INJECTION, SOLUTION INTRAVENOUS at 17:46

## 2025-03-16 RX ADMIN — ONDANSETRON 4 MG: 2 INJECTION INTRAMUSCULAR; INTRAVENOUS at 18:06

## 2025-03-16 RX ADMIN — FENTANYL CITRATE 25 MCG: 50 INJECTION, SOLUTION INTRAMUSCULAR; INTRAVENOUS at 19:16

## 2025-03-16 ASSESSMENT — PAIN SCALES - GENERAL
PAINLEVEL_OUTOF10: 10
PAINLEVEL_OUTOF10: 3

## 2025-03-16 ASSESSMENT — PAIN DESCRIPTION - ORIENTATION: ORIENTATION: LOWER;MID;UPPER;RIGHT;LEFT

## 2025-03-16 ASSESSMENT — PAIN - FUNCTIONAL ASSESSMENT
PAIN_FUNCTIONAL_ASSESSMENT: ADULT NONVERBAL PAIN SCALE (NPVS)
PAIN_FUNCTIONAL_ASSESSMENT: ADULT NONVERBAL PAIN SCALE (NPVS)

## 2025-03-16 ASSESSMENT — PAIN DESCRIPTION - DESCRIPTORS: DESCRIPTORS: ACHING;TENDER;SORE

## 2025-03-16 ASSESSMENT — PAIN DESCRIPTION - LOCATION: LOCATION: ABDOMEN

## 2025-03-16 NOTE — PROGRESS NOTES
[age] son brought to bedside for reassurance . Granddaughter here calling pt.  . Pt did speak to  and recognized all family members. States paim is better. Taking ice chips and juvencio well

## 2025-03-16 NOTE — CONSENT
Informed Consent for Blood Component Transfusion Note    I have discussed with the patient the rationale for blood component transfusion; its benefits in treating or preventing fatigue, organ damage, or death; and its risk which includes mild transfusion reactions, rare risk of blood borne infection, or more serious but rare reactions. I have discussed the alternatives to transfusion, including the risk and consequences of not receiving transfusion. The patient had an opportunity to ask questions and had agreed to proceed with transfusion of blood components.    Electronically signed by MARLYN Haro CNP on 3/16/25 at 9:53 AM EDT

## 2025-03-16 NOTE — PROGRESS NOTES
Internal Medicine Consult Note    [facility]    Romeo Drake D.O., AJOLisaI.                    Shen Solo D.O., ALICIAI.                             ABDULLAHI MonroeO.     Ximena Bernardo, MSN, APRN, NP-C  Jack Moscoso, MSN, APRN-CNP  Rishi Ruiz, MSN, APRN-CNP  Fernanda Tay, MSN APRN-CNP  Conchis Bradford, MSN. APRN-NP-C       Primary Care Physician: Lay Hunter MD   Admitting Physician:  Levi Bunn MD  Admission date and time: 3/13/2025  3:45 PM    Room:  [address] [place] [address]  Admitting diagnosis: Perforated viscus [R19.8]  SHALINI (acute kidney injury) [N17.9]  Acute diverticulitis [K57.92]  Diverticulitis of large intestine with perforation and abscess, unspecified bleeding status [K57.20]    Patient Name: Susanna Arriola  MRN: [id number]    Date of Service: 3/16/2025     Subjective:  Susanna is a 69 y.o. female who was seen and examined today,3/16/2025, at the bedside.  Patient resting comfortably in bed.  She states her abdominal pain is better with medication.  Plan for surgery  with ostomy placement today.  INR this morning 2.4 we will give 2 units FFP and vitamin K.    No family present during my examination.    Review of System:   Constitutional:   Denies fever or chills, weight loss or gain, positive fatigue and malaise  HEENT:   Denies ear pain, sore throat, sinus or eye problems.  Cardiovascular:   Denies any chest pain, irregular heartbeats, or palpitations.   Respiratory:   Denies shortness of breath, coughing, sputum production, hemoptysis, or wheezing.  Gastrointestinal:   Denies nausea, vomiting, diarrhea, or constipation.  Reports ongoing abdominal discomfort  Genitourinary:    Denies any urgency, frequency, hematuria. Voiding  without difficulty.  Extremities:   Denies lower extremity swelling, edema or cyanosis.   Neurology:    Denies any headache or focal neurological deficits, Denies generalized weakness or memory difficulty.   Psch:   Denies being

## 2025-03-16 NOTE — OP NOTE
Operative Note      Patient: Susanna Arriola  YOB: 1955  MRN: [id number]    Date of Procedure: 3/16/2025    Pre-Op Diagnosis Codes:      * Pain [R52] perforated sigmoid diverticulitis with diffuse peritonitis, intraabdominal abscess    Post-Op Diagnosis: Same       Procedure(s):  OPEN SIGMOID COLON RESECTION; COLOSTOMY drainage of intraabdominal abscess    Surgeon(s):  Levi Bunn MD    Assistant:   First Assistant: Crystal Quinn    Anesthesia: Choice    Estimated Blood Loss (mL): 400    Complications: None    Specimens:   ID Type Source Tests Collected by Time Destination   1 : Abdominal fluid Body Fluid Ascitic Fluid CULTURE, FUNGUS, GRAM STAIN, CULTURE, BODY FLUID (WITH GRAM STAIN), CULTURE WITH SMEAR, ACID FAST BACILLIUS Levi Bunn MD 3/16/2025 1730    A : Sigmoid colon Tissue Tissue SURGICAL PATHOLOGY Levi Bunn MD 3/16/2025 1751        Implants:  Implant Name Type Inv. Item Serial No.  Lot No. LRB No. Used Action   PACK PROC HALF SHT [id number] SOD HYALURONATE - [id number]  PACK PROC HALF SHT [id number] SOD HYALURONATE  [facility]- [id number] N/A 1 Implanted         Drains:   Urinary Catheter 03/16/25 (Active)       Findings:  Infection Present At Time Of Surgery (PATOS) (choose all levels that have infection present):  - Organ Space infection (below fascia) present as evidenced by abscess, pus, purulent fluid, fluid consistent with infection, phlegmon, and feculent peritonitis      Detailed Description of Procedure:   [id number]    Electronically signed by Levi Bunn MD on 3/16/2025 at 6:23 PM

## 2025-03-16 NOTE — PLAN OF CARE
Problem: Discharge Planning  Goal: Discharge to home or other facility with appropriate resources  3/15/2025 2011 by Alex Ambrocio, RN  Outcome: Progressing     Problem: Pain  Goal: Verbalizes/displays adequate comfort level or baseline comfort level  3/15/2025 2011 by Alex Ambrocio, RN  Outcome: Progressing     Problem: ABCDS Injury Assessment  Goal: Absence of physical injury  3/15/2025 2011 by Alex Ambrocio, RN  Outcome: Progressing     Problem: Safety - Adult  Goal: Free from fall injury  3/15/2025 2011 by Alex Ambrocio, RN  Outcome: Progressing

## 2025-03-17 LAB
ALBUMIN SERPL-MCNC: 2.6 G/DL (ref 3.5–5.2)
ALP SERPL-CCNC: 85 U/L (ref 35–104)
ALT SERPL-CCNC: 14 U/L (ref 0–32)
ANION GAP SERPL CALCULATED.3IONS-SCNC: 18 MMOL/L (ref 7–16)
ARM BAND NUMBER: NORMAL
ARM BAND NUMBER: NORMAL
AST SERPL-CCNC: 22 U/L (ref 0–31)
BASOPHILS # BLD: 0.03 K/UL (ref 0–0.2)
BASOPHILS NFR BLD: 0 % (ref 0–2)
BILIRUB SERPL-MCNC: 0.3 MG/DL (ref 0–1.2)
BLOOD BANK BLOOD PRODUCT EXPIRATION DATE: NORMAL
BLOOD BANK DISPENSE STATUS: NORMAL
BLOOD BANK ISBT PRODUCT BLOOD TYPE: 7300
BLOOD BANK ISBT PRODUCT BLOOD TYPE: 7300
BLOOD BANK ISBT PRODUCT BLOOD TYPE: 8400
BLOOD BANK PRODUCT CODE: NORMAL
BLOOD BANK UNIT TYPE AND RH: NORMAL
BPU ID: NORMAL
BUN SERPL-MCNC: 25 MG/DL (ref 6–23)
CALCIUM SERPL-MCNC: 8.1 MG/DL (ref 8.6–10.2)
CHLORIDE SERPL-SCNC: 104 MMOL/L (ref 98–107)
CO2 SERPL-SCNC: 17 MMOL/L (ref 22–29)
COMPONENT: NORMAL
CREAT SERPL-MCNC: 1.2 MG/DL (ref 0.5–1)
EOSINOPHIL # BLD: 0 K/UL (ref 0.05–0.5)
EOSINOPHILS RELATIVE PERCENT: 0 % (ref 0–6)
ERYTHROCYTE [DISTWIDTH] IN BLOOD BY AUTOMATED COUNT: 14.2 % (ref 11.5–15)
GFR, ESTIMATED: 48 ML/MIN/1.73M2
GLUCOSE SERPL-MCNC: 108 MG/DL (ref 74–99)
HCT VFR BLD AUTO: 27.6 % (ref 34–48)
HGB BLD-MCNC: 9 G/DL (ref 11.5–15.5)
IMM GRANULOCYTES # BLD AUTO: 0.18 K/UL (ref 0–0.58)
IMM GRANULOCYTES NFR BLD: 1 % (ref 0–5)
LYMPHOCYTES NFR BLD: 0.67 K/UL (ref 1.5–4)
LYMPHOCYTES RELATIVE PERCENT: 5 % (ref 20–42)
MCH RBC QN AUTO: 28.3 PG (ref 26–35)
MCHC RBC AUTO-ENTMCNC: 32.6 G/DL (ref 32–34.5)
MCV RBC AUTO: 86.8 FL (ref 80–99.9)
MONOCYTES NFR BLD: 0.95 K/UL (ref 0.1–0.95)
MONOCYTES NFR BLD: 6 % (ref 2–12)
NEUTROPHILS NFR BLD: 88 % (ref 43–80)
NEUTS SEG NFR BLD: 13.11 K/UL (ref 1.8–7.3)
PLATELET # BLD AUTO: 238 K/UL (ref 130–450)
PMV BLD AUTO: 9.6 FL (ref 7–12)
POTASSIUM SERPL-SCNC: 3.6 MMOL/L (ref 3.5–5)
PROT SERPL-MCNC: 5.4 G/DL (ref 6.4–8.3)
RBC # BLD AUTO: 3.18 M/UL (ref 3.5–5.5)
SODIUM SERPL-SCNC: 139 MMOL/L (ref 132–146)
TRANSFUSION STATUS: NORMAL
UNIT DIVISION: 0
UNIT ISSUE DATE/TIME: NORMAL
WBC OTHER # BLD: 14.9 K/UL (ref 4.5–11.5)

## 2025-03-17 PROCEDURE — 1200000000 HC SEMI PRIVATE

## 2025-03-17 PROCEDURE — 6370000000 HC RX 637 (ALT 250 FOR IP): Performed by: SURGERY

## 2025-03-17 PROCEDURE — 80053 COMPREHEN METABOLIC PANEL: CPT

## 2025-03-17 PROCEDURE — 85025 COMPLETE CBC W/AUTO DIFF WBC: CPT

## 2025-03-17 PROCEDURE — 6360000002 HC RX W HCPCS: Performed by: INTERNAL MEDICINE

## 2025-03-17 PROCEDURE — 2500000003 HC RX 250 WO HCPCS: Performed by: SURGERY

## 2025-03-17 PROCEDURE — 6360000002 HC RX W HCPCS: Performed by: SURGERY

## 2025-03-17 PROCEDURE — 36415 COLL VENOUS BLD VENIPUNCTURE: CPT

## 2025-03-17 RX ORDER — SODIUM CHLORIDE AND POTASSIUM CHLORIDE 300; 900 MG/100ML; MG/100ML
INJECTION, SOLUTION INTRAVENOUS CONTINUOUS
Status: DISCONTINUED | OUTPATIENT
Start: 2025-03-17 | End: 2025-03-19

## 2025-03-17 RX ORDER — ENOXAPARIN SODIUM 100 MG/ML
40 INJECTION SUBCUTANEOUS DAILY
Status: DISCONTINUED | OUTPATIENT
Start: 2025-03-17 | End: 2025-03-22 | Stop reason: HOSPADM

## 2025-03-17 RX ADMIN — WATER 1000 MG: 1 INJECTION INTRAMUSCULAR; INTRAVENOUS; SUBCUTANEOUS at 08:35

## 2025-03-17 RX ADMIN — KETOROLAC TROMETHAMINE 15 MG: 15 INJECTION, SOLUTION INTRAMUSCULAR; INTRAVENOUS at 05:34

## 2025-03-17 RX ADMIN — SODIUM CHLORIDE, PRESERVATIVE FREE 10 ML: 5 INJECTION INTRAVENOUS at 20:33

## 2025-03-17 RX ADMIN — KETOROLAC TROMETHAMINE 15 MG: 15 INJECTION, SOLUTION INTRAMUSCULAR; INTRAVENOUS at 00:25

## 2025-03-17 RX ADMIN — METRONIDAZOLE 500 MG: 500 INJECTION, SOLUTION INTRAVENOUS at 18:20

## 2025-03-17 RX ADMIN — POTASSIUM CHLORIDE AND SODIUM CHLORIDE: 900; 300 INJECTION, SOLUTION INTRAVENOUS at 11:44

## 2025-03-17 RX ADMIN — SERTRALINE 100 MG: 50 TABLET, FILM COATED ORAL at 08:35

## 2025-03-17 RX ADMIN — METRONIDAZOLE 500 MG: 500 INJECTION, SOLUTION INTRAVENOUS at 00:27

## 2025-03-17 RX ADMIN — ZONISAMIDE 100 MG: 100 CAPSULE ORAL at 20:32

## 2025-03-17 RX ADMIN — ENOXAPARIN SODIUM 40 MG: 100 INJECTION SUBCUTANEOUS at 11:51

## 2025-03-17 RX ADMIN — ZONISAMIDE 100 MG: 100 CAPSULE ORAL at 11:51

## 2025-03-17 RX ADMIN — ZONISAMIDE 100 MG: 100 CAPSULE ORAL at 08:35

## 2025-03-17 RX ADMIN — ZONISAMIDE 100 MG: 100 CAPSULE ORAL at 18:20

## 2025-03-17 RX ADMIN — DONEPEZIL HYDROCHLORIDE 5 MG: 5 TABLET ORAL at 20:31

## 2025-03-17 RX ADMIN — TRAMADOL HYDROCHLORIDE 50 MG: 50 TABLET, COATED ORAL at 11:51

## 2025-03-17 RX ADMIN — OXCARBAZEPINE 600 MG: 150 TABLET, FILM COATED ORAL at 08:35

## 2025-03-17 RX ADMIN — METRONIDAZOLE 500 MG: 500 INJECTION, SOLUTION INTRAVENOUS at 08:40

## 2025-03-17 RX ADMIN — ATORVASTATIN CALCIUM 10 MG: 10 TABLET, FILM COATED ORAL at 08:35

## 2025-03-17 RX ADMIN — OXCARBAZEPINE 600 MG: 150 TABLET, FILM COATED ORAL at 20:31

## 2025-03-17 RX ADMIN — TRAMADOL HYDROCHLORIDE 50 MG: 50 TABLET, COATED ORAL at 18:22

## 2025-03-17 ASSESSMENT — PAIN DESCRIPTION - LOCATION
LOCATION: ABDOMEN

## 2025-03-17 ASSESSMENT — PAIN DESCRIPTION - DESCRIPTORS
DESCRIPTORS: ACHING;SORE
DESCRIPTORS: ACHING;TENDER;SORE
DESCRIPTORS: ACHING;NAGGING;TENDER

## 2025-03-17 ASSESSMENT — PAIN SCALES - PAIN ASSESSMENT IN ADVANCED DEMENTIA (PAINAD)
BODYLANGUAGE: RELAXED
NEGVOCALIZATION: OCCASIONAL MOAN/GROAN, LOW SPEECH, NEGATIVE/DISAPPROVING QUALITY
CONSOLABILITY: NO NEED TO CONSOLE
TOTALSCORE: 1
BREATHING: NORMAL
FACIALEXPRESSION: SMILING OR INEXPRESSIVE

## 2025-03-17 ASSESSMENT — PAIN SCALES - GENERAL
PAINLEVEL_OUTOF10: 5
PAINLEVEL_OUTOF10: 5
PAINLEVEL_OUTOF10: 6

## 2025-03-17 NOTE — PROGRESS NOTES
Surgery Progress Note            Chief complaint:   Chief Complaint   Patient presents with    Abdominal Pain     Abdominal pain started a few days ago from home, patient currently on antibiotics for uti      Patient Active Problem List   Diagnosis    Atrial fibrillation with RVR (HCC)    Perforated diverticulitis       S: doing well, still confused     O:   Vitals:    03/17/25 0501   BP: (!) 96/46   Pulse: 61   Resp: 17   Temp: 98.1 °F (36.7 °C)   SpO2: 98%       Intake/Output Summary (Last 24 hours) at 3/17/2025 1049  Last data filed at 3/17/2025 0844  Gross per 24 hour   Intake 2570.07 ml   Output 715 ml   Net 1855.07 ml           Labs:  Lab Results   Component Value Date/Time    WBC 14.9 03/17/2025 04:52 AM    WBC 10.8 03/16/2025 04:24 AM    WBC 11.1 03/15/2025 03:54 AM    HGB 9.0 03/17/2025 04:52 AM    HGB 9.9 03/16/2025 04:24 AM    HGB 11.3 03/15/2025 03:54 AM    HCT 27.6 03/17/2025 04:52 AM    HCT 30.9 03/16/2025 04:24 AM    HCT 35.9 03/15/2025 03:54 AM     Lab Results   Component Value Date    CREATININE 1.2 (H) 03/17/2025    BUN 25 (H) 03/17/2025     03/17/2025    K 3.6 03/17/2025     03/17/2025    CO2 17 (L) 03/17/2025     No results found for: \"LIPASE\", \"AMYLASE\"      Physical exam:   BP (!) 96/46   Pulse 61   Temp 98.1 °F (36.7 °C) (Oral)   Resp 17   Ht 1.651 m (5' 5\")   Wt 87.1 kg (192 lb)   SpO2 98%   BMI 31.95 kg/m²   General appearance: NAD  Head: NCAT  Neck: supple, no masses  Lungs: equal chest rise bilateral  Heart: S1S2 present  Abdomen: soft, moderately tender, nondistended,  Incisions bandaged and ostomy pink and patent, drain with serous output  Skin; no lesions  Gu: no cva tenderness  Extremities: extremities normal, atraumatic, no cyanosis or edema    A:  POD #1 Exploratory laparotomy with sigmoid colon resection, open intraabdominal abscess drainage, mobilization and placement of omental flap, mobilization of splenic flexure, and end descending colostomy placement and

## 2025-03-17 NOTE — PLAN OF CARE
Problem: Discharge Planning  Goal: Discharge to home or other facility with appropriate resources  3/16/2025 2242 by Alex Ambrocio, RN  Outcome: Progressing     Problem: Pain  Goal: Verbalizes/displays adequate comfort level or baseline comfort level  3/16/2025 2242 by Alex Ambrocio, RN  Outcome: Progressing     Problem: ABCDS Injury Assessment  Goal: Absence of physical injury  3/16/2025 2242 by Alex Ambrocio, RN  Outcome: Progressing     Problem: Safety - Adult  Goal: Free from fall injury  3/16/2025 2242 by Alex Ambrocio, RN  Outcome: Progressing

## 2025-03-17 NOTE — PROGRESS NOTES
ET Nurse  Admit Date: 3/13/2025  3:45 PM    Reason for consult:  New colostomy    Significant history:    Past Medical History:   Diagnosis Date    Atrial fibrillation (HCC)     Dementia (HCC)     History of cardioversion 10/2021       Stoma assessment:  stoma good red color no stool    Plan:  pouch is intact  Will begin lessons tomorrow    Anny Leary RN 3/17/2025 11:29 AM

## 2025-03-17 NOTE — OP NOTE
[address] [address]                            OPERATIVE REPORT      PATIENT NAME: FEDERICO HARDEN              : 1955  MED REC NO: [id number]                        ROOM: [facility]  ACCOUNT NO: [id number]                       ADMIT DATE: 2025  PROVIDER: Levi Bunn MD      DATE OF PROCEDURE:  2025    SURGEON:  Levi Bunn MD    PREOPERATIVE DIAGNOSES:  Perforated sigmoid diverticulitis, intraabdominal abscess, diffuse peritonitis, and severe sepsis.    POSTOPERATIVE DIAGNOSES:  Perforated sigmoid diverticulitis, intraabdominal abscess, diffuse peritonitis, and severe sepsis.    PROCEDURES:  Exploratory laparotomy with sigmoid colon resection, open intraabdominal abscess drainage, mobilization and placement of omental flap, mobilization of splenic flexure, and end descending colostomy placement and drain placement.    ASSISTANT:  None.    ANESTHESIA:  General.    COMPLICATIONS:  None.    FLUIDS:  Crystalloid.    BLOOD LOSS:  400 mL.    DISPOSITION:  She will be admitted to the floor for routine postoperative care.    INDICATION:  This is a 69-year-old female with the aforementioned diagnosis.  I explained the risks, benefits, potential outcomes, and alternative treatment of the aforementioned procedure and she agreed to proceed understanding those risks and potential outcomes.    DESCRIPTION OF PROCEDURE:  The patient was brought back to the operative suite, placed under general anesthesia, bilateral PCDs were placed.  She was already on antibiotics, was then prepped and draped in normal sterile condition.  Once this was done, a midline incision was made from the infraumbilical area down to the suprapubic area.  Using electrocautery, I was able to dissect down and enter into the peritoneum through the midline.  Once this was done, there was a large amount of purulence that was appreciated.  We were able

## 2025-03-17 NOTE — CARE COORDINATION
Case Management Assessment  Initial Evaluation    Date/Time of Evaluation: 3/17/2025 12:01 PM  Assessment Completed by: Yee Joshi RN    If patient is discharged prior to next notation, then this note serves as note for discharge by case management.    Patient Name: Susanna Arriola                   YOB: 1955  Diagnosis: Perforated viscus [R19.8]  SHALINI (acute kidney injury) [N17.9]  Acute diverticulitis [K57.92]  Diverticulitis of large intestine with perforation and abscess, unspecified bleeding status [K57.20]                   Date / Time: 3/13/2025  3:45 PM    Patient Admission Status: Inpatient   Readmission Risk (Low < 19, Mod (19-27), High > 27): Readmission Risk Score: 17.1    Current PCP: Lay Hunter MD  PCP verified by CM? Yes    Chart Reviewed: Yes      History Provided by: Spouse  Patient Orientation: Person    Patient Cognition: Dementia / Early Alzheimer's    Hospitalization in the last 30 days (Readmission):  No    If yes, Readmission Assessment in  Navigator will be completed.    Advance Directives:      Code Status: Full Code   Patient's Primary Decision Maker is: Legal Next of Kin    Primary Decision Maker: Erasmo Arriola - Spouse - [phone]    Secondary Decision Maker: ANNA ARRIOLA - Child - [phone]    Secondary Decision Maker: Marla Richter - Child - [phone]    Discharge Planning:    Patient lives with: Spouse/Significant Other, Children Type of Home: House  Primary Care Giver: Self  Patient Support Systems include: Children, Spouse/Significant Other   Current Financial resources:    Current community resources:    Current services prior to admission: Other (Comment) (pt unsure)            Current DME:              Type of Home Care services:  None (pt confused, unsure)    ADLS  Prior functional level: Assistance with the following:, Mobility  Current functional level: Assistance with the following:, Toileting, Mobility    PT AM-PAC:   /24  OT

## 2025-03-18 LAB
ABO/RH: NORMAL
ALBUMIN SERPL-MCNC: 2.4 G/DL (ref 3.5–5.2)
ALP SERPL-CCNC: 75 U/L (ref 35–104)
ALT SERPL-CCNC: 13 U/L (ref 0–32)
ANION GAP SERPL CALCULATED.3IONS-SCNC: 11 MMOL/L (ref 7–16)
ANTIBODY IDENTIFICATION: NORMAL
ANTIBODY SCREEN: POSITIVE
ANTIGEN TYPE, PATIENT: NORMAL
ARM BAND NUMBER: NORMAL
AST SERPL-CCNC: 19 U/L (ref 0–31)
BASOPHILS # BLD: 0 K/UL (ref 0–0.2)
BASOPHILS NFR BLD: 0 % (ref 0–2)
BILIRUB SERPL-MCNC: 0.3 MG/DL (ref 0–1.2)
BLOOD BANK COMMENT: NORMAL
BLOOD BANK COMMENT: NORMAL
BLOOD BANK DISPENSE STATUS: NORMAL
BLOOD BANK SAMPLE EXPIRATION: NORMAL
BPU ID: NORMAL
BUN SERPL-MCNC: 23 MG/DL (ref 6–23)
CALCIUM SERPL-MCNC: 7.8 MG/DL (ref 8.6–10.2)
CHLORIDE SERPL-SCNC: 109 MMOL/L (ref 98–107)
CO2 SERPL-SCNC: 19 MMOL/L (ref 22–29)
COMPONENT: NORMAL
CREAT SERPL-MCNC: 1.1 MG/DL (ref 0.5–1)
CROSSMATCH RESULT: NORMAL
DAT IGG: NEGATIVE
EOSINOPHIL # BLD: 0.21 K/UL (ref 0.05–0.5)
EOSINOPHILS RELATIVE PERCENT: 2 % (ref 0–6)
ERYTHROCYTE [DISTWIDTH] IN BLOOD BY AUTOMATED COUNT: 14.3 % (ref 11.5–15)
GFR, ESTIMATED: 53 ML/MIN/1.73M2
GLUCOSE SERPL-MCNC: 91 MG/DL (ref 74–99)
HCT VFR BLD AUTO: 28.8 % (ref 34–48)
HGB BLD-MCNC: 9.3 G/DL (ref 11.5–15.5)
LYMPHOCYTES NFR BLD: 0.95 K/UL (ref 1.5–4)
LYMPHOCYTES RELATIVE PERCENT: 9 % (ref 20–42)
MAGNESIUM SERPL-MCNC: 1.9 MG/DL (ref 1.6–2.6)
MCH RBC QN AUTO: 27.8 PG (ref 26–35)
MCHC RBC AUTO-ENTMCNC: 32.3 G/DL (ref 32–34.5)
MCV RBC AUTO: 86 FL (ref 80–99.9)
MONOCYTES NFR BLD: 1.16 K/UL (ref 0.1–0.95)
MONOCYTES NFR BLD: 11 % (ref 2–12)
NEUTROPHILS NFR BLD: 78 % (ref 43–80)
NEUTS SEG NFR BLD: 8.19 K/UL (ref 1.8–7.3)
PHOSPHATE SERPL-MCNC: 2.2 MG/DL (ref 2.5–4.5)
PLATELET # BLD AUTO: 233 K/UL (ref 130–450)
PMV BLD AUTO: 9.4 FL (ref 7–12)
POTASSIUM SERPL-SCNC: 4 MMOL/L (ref 3.5–5)
PROT SERPL-MCNC: 5.4 G/DL (ref 6.4–8.3)
RBC # BLD AUTO: 3.35 M/UL (ref 3.5–5.5)
RBC # BLD: ABNORMAL 10*6/UL
RBC # BLD: ABNORMAL 10*6/UL
SODIUM SERPL-SCNC: 139 MMOL/L (ref 132–146)
TRANSFUSION STATUS: NORMAL
UNIT DIVISION: 0
WBC OTHER # BLD: 10.5 K/UL (ref 4.5–11.5)

## 2025-03-18 PROCEDURE — 36415 COLL VENOUS BLD VENIPUNCTURE: CPT

## 2025-03-18 PROCEDURE — 6370000000 HC RX 637 (ALT 250 FOR IP): Performed by: SURGERY

## 2025-03-18 PROCEDURE — 84100 ASSAY OF PHOSPHORUS: CPT

## 2025-03-18 PROCEDURE — 80053 COMPREHEN METABOLIC PANEL: CPT

## 2025-03-18 PROCEDURE — 85025 COMPLETE CBC W/AUTO DIFF WBC: CPT

## 2025-03-18 PROCEDURE — 1200000000 HC SEMI PRIVATE

## 2025-03-18 PROCEDURE — 6360000002 HC RX W HCPCS: Performed by: INTERNAL MEDICINE

## 2025-03-18 PROCEDURE — 2500000003 HC RX 250 WO HCPCS: Performed by: SURGERY

## 2025-03-18 PROCEDURE — 6360000002 HC RX W HCPCS: Performed by: SURGERY

## 2025-03-18 PROCEDURE — 83735 ASSAY OF MAGNESIUM: CPT

## 2025-03-18 RX ORDER — METRONIDAZOLE 500 MG/1
500 TABLET ORAL EVERY 8 HOURS SCHEDULED
Status: DISCONTINUED | OUTPATIENT
Start: 2025-03-18 | End: 2025-03-22 | Stop reason: HOSPADM

## 2025-03-18 RX ADMIN — ZONISAMIDE 100 MG: 100 CAPSULE ORAL at 20:26

## 2025-03-18 RX ADMIN — ZONISAMIDE 100 MG: 100 CAPSULE ORAL at 09:41

## 2025-03-18 RX ADMIN — ZONISAMIDE 100 MG: 100 CAPSULE ORAL at 17:43

## 2025-03-18 RX ADMIN — WATER 1000 MG: 1 INJECTION INTRAMUSCULAR; INTRAVENOUS; SUBCUTANEOUS at 09:17

## 2025-03-18 RX ADMIN — ZONISAMIDE 100 MG: 100 CAPSULE ORAL at 12:40

## 2025-03-18 RX ADMIN — OXCARBAZEPINE 600 MG: 150 TABLET, FILM COATED ORAL at 09:17

## 2025-03-18 RX ADMIN — METRONIDAZOLE 500 MG: 500 INJECTION, SOLUTION INTRAVENOUS at 09:19

## 2025-03-18 RX ADMIN — ATORVASTATIN CALCIUM 10 MG: 10 TABLET, FILM COATED ORAL at 09:17

## 2025-03-18 RX ADMIN — METRONIDAZOLE 500 MG: 500 INJECTION, SOLUTION INTRAVENOUS at 00:47

## 2025-03-18 RX ADMIN — ENOXAPARIN SODIUM 40 MG: 100 INJECTION SUBCUTANEOUS at 09:17

## 2025-03-18 RX ADMIN — METRONIDAZOLE 500 MG: 500 TABLET ORAL at 17:43

## 2025-03-18 RX ADMIN — POTASSIUM CHLORIDE AND SODIUM CHLORIDE: 900; 300 INJECTION, SOLUTION INTRAVENOUS at 17:43

## 2025-03-18 RX ADMIN — OXCARBAZEPINE 600 MG: 150 TABLET, FILM COATED ORAL at 20:27

## 2025-03-18 RX ADMIN — METOPROLOL TARTRATE 75 MG: 50 TABLET, FILM COATED ORAL at 09:17

## 2025-03-18 RX ADMIN — POTASSIUM CHLORIDE AND SODIUM CHLORIDE: 900; 300 INJECTION, SOLUTION INTRAVENOUS at 03:15

## 2025-03-18 RX ADMIN — DONEPEZIL HYDROCHLORIDE 5 MG: 5 TABLET ORAL at 20:27

## 2025-03-18 RX ADMIN — SERTRALINE 100 MG: 50 TABLET, FILM COATED ORAL at 09:17

## 2025-03-18 ASSESSMENT — PAIN SCALES - GENERAL: PAINLEVEL_OUTOF10: 2

## 2025-03-18 NOTE — CARE COORDINATION
EZEKIEL note: Pt was accepted by [facility], will need [facility] orders, anticipate discharge once bowel function returns.  Family will need to have ostomy teaching by ostomy nurse as patient has dementia.  Family will provide transportation at discharge.

## 2025-03-18 NOTE — PROGRESS NOTES
Physician Progress Note      PATIENT:               FEDERICO HARDEN  CSN #:                  [id number]  :                       1955  ADMIT DATE:       3/13/2025 3:45 PM  DISCH DATE:  RESPONDING  PROVIDER #:        Levi Bunn MD          QUERY TEXT:    Dear Attending Physician,    Patient admitted with Perforated sigmoid diverticulitis, intraabdominal   abscess, diffuse peritonitis. Noted documentation of Sepsis /Severe Sepsis in   Surgical notes. WBC 19.3, Vitals 98.9 79 18 102/67. In order to support the   diagnosis of Sepsis /Severe Sepsis, please include additional clinical   indicators in your documentation.  Or please document if the diagnosis of   Sepsis /Severe Sepsis has been ruled out after further study    The medical record reflects the following:  Risk Factors:  Perforated sigmoid diverticulitis, intraabdominal abscess,   diffuse peritonitis  Clinical Indicators: Per Surgery notes,\"...Perforated sigmoid diverticulitis,   intraabdominal abscess, diffuse peritonitis, and severe sepsis...\"  WBC 19.3,   Vitals 98.9 79 18 102/67  Treatment: exploratory laparotomy with sigmoid colon resection, open   intraabdominal abscess drainage, mobilization and placement of omental flap,   mobilization of splenic flexure, and end descending colostomy placement and   drain placement    Thank you,  Marla Corey RN CCDS  Clinical Documentation Improvement Specialist  Phone# [phone]  Options provided:  -- Sepsis/Severe Sepsis present as evidenced by, Please document evidence.  -- Sepsis/Severe Sepsis was ruled out after study  -- Other - I will add my own diagnosis  -- Disagree - Not applicable / Not valid  -- Disagree - Clinically unable to determine / Unknown  -- Refer to Clinical Documentation Reviewer    PROVIDER RESPONSE TEXT:    This patient has Sepsis/Severe Sepsis present as evidenced by [email or web address]    Query created by: Marla Corey on 3/17/2025 7:39 PM      Electronically signed by:  Levi Bunn

## 2025-03-18 NOTE — PROGRESS NOTES
Date:3/18/25     This patient's order for metronazole IV has been changed to PO as approved by the Pharmacy & Therapeutics and Medical Executive Committees.     If the patient should become strict NPO while on this therapy, contact the prescriber for further orders.

## 2025-03-18 NOTE — PROGRESS NOTES
Surgery Progress Note            Chief complaint:   Chief Complaint   Patient presents with    Abdominal Pain     Abdominal pain started a few days ago from home, patient currently on antibiotics for uti      Patient Active Problem List   Diagnosis    Atrial fibrillation with RVR (HCC)    Perforated diverticulitis       S: doing well, no emesis    O:   Vitals:    03/18/25 0428   BP: (!) 99/49   Pulse: 74   Resp: 16   Temp: 99 °F (37.2 °C)   SpO2: 93%       Intake/Output Summary (Last 24 hours) at 3/18/2025 0819  Last data filed at 3/18/2025 0530  Gross per 24 hour   Intake 240 ml   Output 605 ml   Net -365 ml           Labs:  Lab Results   Component Value Date/Time    WBC 10.5 03/18/2025 04:22 AM    WBC 14.9 03/17/2025 04:52 AM    WBC 10.8 03/16/2025 04:24 AM    HGB 9.3 03/18/2025 04:22 AM    HGB 9.0 03/17/2025 04:52 AM    HGB 9.9 03/16/2025 04:24 AM    HCT 28.8 03/18/2025 04:22 AM    HCT 27.6 03/17/2025 04:52 AM    HCT 30.9 03/16/2025 04:24 AM     Lab Results   Component Value Date    CREATININE 1.1 (H) 03/18/2025    BUN 23 03/18/2025     03/18/2025    K 4.0 03/18/2025     (H) 03/18/2025    CO2 19 (L) 03/18/2025     No results found for: \"LIPASE\", \"AMYLASE\"      Physical exam:   BP (!) 99/49   Pulse 74   Temp 99 °F (37.2 °C) (Oral)   Resp 16   Ht 1.651 m (5' 5\")   Wt 87.1 kg (192 lb)   SpO2 93%   BMI 31.95 kg/m²   General appearance: NAD  Head: NCAT  Neck: supple, no masses  Lungs: equal chest rise bilateral  Heart: S1S2 present  Abdomen: soft, moderately tender, nondistended,  Incisions bandaged and ostomy pink and patent, drain with serous output  Skin; no lesions  Gu: no cva tenderness  Extremities: extremities normal, atraumatic, no cyanosis or edema    A:  POD #2 Exploratory laparotomy with sigmoid colon resection, open intraabdominal abscess drainage, mobilization and placement of omental flap, mobilization of splenic flexure, and end descending colostomy placement and drain placement.      P: remove CROW, garrison, regular diet, await bowel function, ptot    Levi Bunn MD  3/18/2025

## 2025-03-18 NOTE — PROGRESS NOTES
Inpatient wound care note  Pouch is intact    Called family  They are unavailable for a lesson today  Will meet tomorrow

## 2025-03-18 NOTE — ANESTHESIA POSTPROCEDURE EVALUATION
Department of Anesthesiology  Postprocedure Note    Patient: Susanna Arriola  MRN: [id number]  YOB: 1955  Date of evaluation: 3/18/2025    Procedure Summary       Date: 03/16/25 Room / Location: [address]    Anesthesia Start: 1706 Anesthesia Stop: 1837    Procedure: OPEN SIGMOID COLON RESECTION; COLOSTOMY (Abdomen) Diagnosis:       Pain      (Pain [R52])    Surgeons: Levi Bunn MD Responsible Provider: Stewart Stuart DO    Anesthesia Type: general ASA Status: 3            Anesthesia Type: No value filed.    Aliyah Phase I: Aliyah Score: 8    Aliyah Phase II:      Anesthesia Post Evaluation    Patient location during evaluation: PACU  Patient participation: complete - patient participated  Level of consciousness: awake and alert  Pain score: 0  Airway patency: patent  Nausea & Vomiting: no nausea and no vomiting  Cardiovascular status: blood pressure returned to baseline and hemodynamically stable  Respiratory status: acceptable  Hydration status: stable  Pain management: adequate    No notable events documented.

## 2025-03-18 NOTE — PLAN OF CARE
Problem: Discharge Planning  Goal: Discharge to home or other facility with appropriate resources  Outcome: Progressing     Problem: Pain  Goal: Verbalizes/displays adequate comfort level or baseline comfort level  3/17/2025 2306 by Marek Wei RN  Outcome: Progressing     Problem: ABCDS Injury Assessment  Goal: Absence of physical injury  3/17/2025 2306 by Marek Wei, RN  Outcome: Progressing     Problem: Safety - Adult  Goal: Free from fall injury  3/17/2025 2306 by Marek Wei, RN  Outcome: Progressing

## 2025-03-18 NOTE — PROGRESS NOTES
Internal Medicine Consult Note    [facility]    Romeo Drake D.O., ALICIAI.                    Shen Solo D.O., JOSÉ.                             Barrington Jara D.O.     Ximena Bernardo, MSN, APRN, NP-C  Jack Moscoso, MSN, APRN-CNP  Rishi Ruiz, MSN, APRN-CNP  Fernanda Tay, MSN APRN-CNP  Conchis Bradford, MSN. APRN-NP-C       Primary Care Physician: Lay Hunter MD   Admitting Physician:  Levi Bunn MD  Admission date and time: 3/13/2025  3:45 PM    Room:  [address] [place] [address]  Admitting diagnosis: Perforated viscus [R19.8]  SHALINI (acute kidney injury) [N17.9]  Acute diverticulitis [K57.92]  Diverticulitis of large intestine with perforation and abscess, unspecified bleeding status [K57.20]    Patient Name: Susanna Arriola  MRN: [id number]    Date of Service: 3/18/2025     Subjective:  Susanna is a 69 y.o. female who was seen and examined today,3/18/2025, at the bedside.  Susanna remains pleasantly confused in the setting of dementia but appears well clinically.  Minimal bloody/liquid output is identified in the ostomy collection bag.  CROW drain is in place with minimal output.  She has ongoing pain as to be expected.    Review of System:   Constitutional:   Ongoing malaise and fatigue.  HEENT:   Denies ear pain, sore throat, sinus or eye problems.  Cardiovascular:   Denies any chest pain, irregular heartbeats, or palpitations.   Respiratory:   Denies shortness of breath, coughing, sputum production, hemoptysis, or wheezing.  Gastrointestinal:   Operative abdominal discomfort as to be expected.  Genitourinary:    Denies any urgency, frequency, hematuria.  Voiding with the use of a catheter.  Extremities:   Denies lower extremity swelling, edema or cyanosis.   Neurology:    Denies any headache or focal neurological deficits, describes weakness and deconditioning.  Psch:   Anxious and mildly confused.  Musculoskeletal:    Denies  myalgias, joint complaints or back pain.  hospital-acquired delirium  Essential hypertension  Hyperlipidemia  Depression    Plan:   Susanna is maintained on IV antibiotic therapy with downward trending white blood cell count.  I anticipate transitioning to oral antibiotics in the next 24 hours.  Hemoglobin seems to have stabilized and I will discuss resumption of chronic Eliquis therapy with the surgery team today.  Further advancement in her diet will be as per the general surgery team.  We will maintain low-dose fluid resuscitation while correcting electrolyte derangements.  She requires extensive work with the therapy teams.  As per case management, the plan is for the patient to return home with home health care.  Certainly skilled nursing facility placement should be considered.  No family members were present during my examination.    More than 50% of my time was spent at the bedside counseling/coordinating care with the patient and/or family with face to face contact.  This time was spent reviewing notes and laboratory data as well as instructing and counseling the patient. Time I spent with the family or surrogate(s) is included only if the patient was incapable of providing the necessary information or participating in medical decisions. I also discussed the differential diagnosis and all of the proposed management plans with the patient and individuals accompanying the patient. I am readily available for any further decision-making and intervention.     Shen Solo DO, FACOI  7:26 AM  3/18/2025

## 2025-03-18 NOTE — PLAN OF CARE
Problem: Discharge Planning  Goal: Discharge to home or other facility with appropriate resources  3/18/2025 1145 by Misty Maldonado RN  Outcome: Progressing  3/17/2025 2306 by Marek Wei RN  Outcome: Progressing     Problem: Pain  Goal: Verbalizes/displays adequate comfort level or baseline comfort level  3/18/2025 1145 by Misty Maldonado RN  Outcome: Progressing  3/17/2025 2306 by Marek Wei RN  Outcome: Progressing     Problem: ABCDS Injury Assessment  Goal: Absence of physical injury  3/18/2025 1145 by Misty Maldonado RN  Outcome: Progressing  3/17/2025 2306 by Marek Wei RN  Outcome: Progressing     Problem: Safety - Adult  Goal: Free from fall injury  3/18/2025 1145 by Misty Maldonado RN  Outcome: Progressing  3/17/2025 2306 by Marek Wei RN  Outcome: Progressing

## 2025-03-19 LAB
ALBUMIN SERPL-MCNC: 2.3 G/DL (ref 3.5–5.2)
ALP SERPL-CCNC: 75 U/L (ref 35–104)
ALT SERPL-CCNC: 11 U/L (ref 0–32)
ANION GAP SERPL CALCULATED.3IONS-SCNC: 10 MMOL/L (ref 7–16)
AST SERPL-CCNC: 16 U/L (ref 0–31)
BASOPHILS # BLD: 0.09 K/UL (ref 0–0.2)
BASOPHILS NFR BLD: 1 % (ref 0–2)
BILIRUB SERPL-MCNC: 0.3 MG/DL (ref 0–1.2)
BUN SERPL-MCNC: 16 MG/DL (ref 6–23)
CALCIUM SERPL-MCNC: 7.8 MG/DL (ref 8.6–10.2)
CHLORIDE SERPL-SCNC: 112 MMOL/L (ref 98–107)
CO2 SERPL-SCNC: 19 MMOL/L (ref 22–29)
CREAT SERPL-MCNC: 1 MG/DL (ref 0.5–1)
EOSINOPHIL # BLD: 0 K/UL (ref 0.05–0.5)
EOSINOPHILS RELATIVE PERCENT: 0 % (ref 0–6)
ERYTHROCYTE [DISTWIDTH] IN BLOOD BY AUTOMATED COUNT: 14.7 % (ref 11.5–15)
GFR, ESTIMATED: 58 ML/MIN/1.73M2
GLUCOSE SERPL-MCNC: 91 MG/DL (ref 74–99)
HCT VFR BLD AUTO: 26.5 % (ref 34–48)
HGB BLD-MCNC: 8.3 G/DL (ref 11.5–15.5)
LYMPHOCYTES NFR BLD: 0.26 K/UL (ref 1.5–4)
LYMPHOCYTES RELATIVE PERCENT: 3 % (ref 20–42)
MAGNESIUM SERPL-MCNC: 1.9 MG/DL (ref 1.6–2.6)
MCH RBC QN AUTO: 27.8 PG (ref 26–35)
MCHC RBC AUTO-ENTMCNC: 31.3 G/DL (ref 32–34.5)
MCV RBC AUTO: 88.6 FL (ref 80–99.9)
MONOCYTES NFR BLD: 0.86 K/UL (ref 0.1–0.95)
MONOCYTES NFR BLD: 9 % (ref 2–12)
NEUTROPHILS NFR BLD: 88 % (ref 43–80)
NEUTS SEG NFR BLD: 8.6 K/UL (ref 1.8–7.3)
PHOSPHATE SERPL-MCNC: 2 MG/DL (ref 2.5–4.5)
PLATELET # BLD AUTO: 297 K/UL (ref 130–450)
PMV BLD AUTO: 9.9 FL (ref 7–12)
POTASSIUM SERPL-SCNC: 4.2 MMOL/L (ref 3.5–5)
PROT SERPL-MCNC: 5.2 G/DL (ref 6.4–8.3)
RBC # BLD AUTO: 2.99 M/UL (ref 3.5–5.5)
RBC # BLD: ABNORMAL 10*6/UL
SODIUM SERPL-SCNC: 141 MMOL/L (ref 132–146)
WBC OTHER # BLD: 9.8 K/UL (ref 4.5–11.5)

## 2025-03-19 PROCEDURE — 2580000003 HC RX 258

## 2025-03-19 PROCEDURE — 6370000000 HC RX 637 (ALT 250 FOR IP): Performed by: SURGERY

## 2025-03-19 PROCEDURE — 51701 INSERT BLADDER CATHETER: CPT

## 2025-03-19 PROCEDURE — 6360000002 HC RX W HCPCS: Performed by: INTERNAL MEDICINE

## 2025-03-19 PROCEDURE — 51798 US URINE CAPACITY MEASURE: CPT

## 2025-03-19 PROCEDURE — 83735 ASSAY OF MAGNESIUM: CPT

## 2025-03-19 PROCEDURE — 36415 COLL VENOUS BLD VENIPUNCTURE: CPT

## 2025-03-19 PROCEDURE — 84100 ASSAY OF PHOSPHORUS: CPT

## 2025-03-19 PROCEDURE — 97165 OT EVAL LOW COMPLEX 30 MIN: CPT

## 2025-03-19 PROCEDURE — 6360000002 HC RX W HCPCS: Performed by: SURGERY

## 2025-03-19 PROCEDURE — 2500000003 HC RX 250 WO HCPCS: Performed by: SURGERY

## 2025-03-19 PROCEDURE — 80053 COMPREHEN METABOLIC PANEL: CPT

## 2025-03-19 PROCEDURE — 85025 COMPLETE CBC W/AUTO DIFF WBC: CPT

## 2025-03-19 PROCEDURE — 1200000000 HC SEMI PRIVATE

## 2025-03-19 PROCEDURE — 97530 THERAPEUTIC ACTIVITIES: CPT

## 2025-03-19 RX ORDER — SODIUM CHLORIDE 9 MG/ML
INJECTION, SOLUTION INTRAVENOUS CONTINUOUS
Status: DISCONTINUED | OUTPATIENT
Start: 2025-03-19 | End: 2025-03-22

## 2025-03-19 RX ADMIN — OXCARBAZEPINE 600 MG: 150 TABLET, FILM COATED ORAL at 08:44

## 2025-03-19 RX ADMIN — TRAMADOL HYDROCHLORIDE 50 MG: 50 TABLET, COATED ORAL at 08:44

## 2025-03-19 RX ADMIN — SERTRALINE 100 MG: 50 TABLET, FILM COATED ORAL at 08:44

## 2025-03-19 RX ADMIN — MORPHINE SULFATE 4 MG: 4 INJECTION, SOLUTION INTRAMUSCULAR; INTRAVENOUS at 12:59

## 2025-03-19 RX ADMIN — POTASSIUM CHLORIDE AND SODIUM CHLORIDE: 900; 300 INJECTION, SOLUTION INTRAVENOUS at 08:43

## 2025-03-19 RX ADMIN — ENOXAPARIN SODIUM 40 MG: 100 INJECTION SUBCUTANEOUS at 08:43

## 2025-03-19 RX ADMIN — ZONISAMIDE 100 MG: 100 CAPSULE ORAL at 17:01

## 2025-03-19 RX ADMIN — METRONIDAZOLE 500 MG: 500 TABLET ORAL at 21:28

## 2025-03-19 RX ADMIN — ATORVASTATIN CALCIUM 10 MG: 10 TABLET, FILM COATED ORAL at 08:44

## 2025-03-19 RX ADMIN — ZONISAMIDE 100 MG: 100 CAPSULE ORAL at 21:28

## 2025-03-19 RX ADMIN — METRONIDAZOLE 500 MG: 500 TABLET ORAL at 12:59

## 2025-03-19 RX ADMIN — ONDANSETRON 4 MG: 2 INJECTION INTRAMUSCULAR; INTRAVENOUS at 08:54

## 2025-03-19 RX ADMIN — OXCARBAZEPINE 600 MG: 150 TABLET, FILM COATED ORAL at 21:28

## 2025-03-19 RX ADMIN — ZONISAMIDE 100 MG: 100 CAPSULE ORAL at 12:59

## 2025-03-19 RX ADMIN — WATER 1000 MG: 1 INJECTION INTRAMUSCULAR; INTRAVENOUS; SUBCUTANEOUS at 08:43

## 2025-03-19 RX ADMIN — METRONIDAZOLE 500 MG: 500 TABLET ORAL at 05:32

## 2025-03-19 RX ADMIN — DONEPEZIL HYDROCHLORIDE 5 MG: 5 TABLET ORAL at 21:28

## 2025-03-19 RX ADMIN — ZONISAMIDE 100 MG: 100 CAPSULE ORAL at 08:43

## 2025-03-19 RX ADMIN — SODIUM CHLORIDE: 0.9 INJECTION, SOLUTION INTRAVENOUS at 17:00

## 2025-03-19 ASSESSMENT — PAIN DESCRIPTION - LOCATION
LOCATION: ABDOMEN
LOCATION: ABDOMEN

## 2025-03-19 ASSESSMENT — PAIN SCALES - GENERAL
PAINLEVEL_OUTOF10: 7
PAINLEVEL_OUTOF10: 9

## 2025-03-19 ASSESSMENT — PAIN DESCRIPTION - DESCRIPTORS
DESCRIPTORS: ACHING;SHARP
DESCRIPTORS: ACHING

## 2025-03-19 ASSESSMENT — PAIN DESCRIPTION - ORIENTATION: ORIENTATION: MID

## 2025-03-19 NOTE — PROGRESS NOTES
Comprehensive Nutrition Assessment    Type and Reason for Visit:  Initial, Positive nutrition screen, LOS    Nutrition Recommendations/Plan:   Continue Current Diet (Regular)  Begin ONS (low corey/high protein) once daily  Consult RD as needed      Malnutrition Assessment:  Malnutrition Status:  At risk for malnutrition (03/19/25 1302)    Context:  Acute Illness     Findings of the 6 clinical characteristics of malnutrition:  Energy Intake:  Mild decrease in energy intake  Weight Loss:  No weight loss     Body Fat Loss:  No body fat loss     Muscle Mass Loss:  No muscle mass loss    Fluid Accumulation:  No fluid accumulation     Strength:  Not Performed    Nutrition Assessment:    Pt admit w/ Perforated Diverticulitis w/ abscess w/ bleeding,  SHALINI. Pt s/p POD #3 Exploratory laparotomy with sigmoid colon resection, open intraabdominal abscess drainage, mobilization and placement of omental flap, mobilization of splenic flexure, and end descending colostomy placement and drain placement. Pt on regular diet and tolerating w/ po intake % of meals. Will add ONS once daily to optimize nutritional status, f/up per policy.    Nutrition Related Findings:    A/O x4, I/O +4063 since admit, abd wdl, +BS x4, Ostomy, Trace Edema, Phos 2.0, Hgb 8.3 Wound Type: Surgical Incision       Current Nutrition Intake & Therapies:    Average Meal Intake: %  Average Supplements Intake: None Ordered  ADULT DIET; Regular  ADULT ORAL NUTRITION SUPPLEMENT; Dinner; Low Calorie/High Protein Oral Supplement    Anthropometric Measures:  Height: 165.1 cm (5' 5\")  Ideal Body Weight (IBW): 125 lbs (57 kg)    Admission Body Weight: 87.1 kg (192 lb 0.3 oz) (03/13/25 stated)  Current Body Weight: 87.1 kg (192 lb 0.3 oz), 153.6 % IBW. Weight Source: Stated (03/13/25)  Current BMI (kg/m2): 32  Usual Body Weight: 73.9 kg (162 lb 14.7 oz) (10/25/24 stated)     % Weight Change (Calculated): 17.9  Weight Adjustment For: No Adjustment        BMI

## 2025-03-19 NOTE — PLAN OF CARE
Problem: Discharge Planning  Goal: Discharge to home or other facility with appropriate resources  3/18/2025 2210 by Annette Asencio RN  Outcome: Progressing  3/18/2025 1145 by Misty Maldonado RN  Outcome: Progressing     Problem: Pain  Goal: Verbalizes/displays adequate comfort level or baseline comfort level  3/18/2025 2210 by Annette Asencio RN  Outcome: Progressing  3/18/2025 1145 by Misty Maldonado RN  Outcome: Progressing     Problem: ABCDS Injury Assessment  Goal: Absence of physical injury  3/18/2025 2210 by Annette Asencio RN  Outcome: Progressing  3/18/2025 1145 by Misty Maldonado RN  Outcome: Progressing     Problem: Safety - Adult  Goal: Free from fall injury  3/18/2025 2210 by Annette Asencio RN  Outcome: Progressing  3/18/2025 1145 by Misty Maldonado RN  Outcome: Progressing

## 2025-03-19 NOTE — PROGRESS NOTES
Physician Progress Note      PATIENT:               FEDERICO HARDEN  [facility] #:                  [id number]  :                       1955  ADMIT DATE:       3/13/2025 3:45 PM  DISCH DATE:  RESPONDING  PROVIDER #:        Levi Bunn MD          QUERY TEXT:    Dear Attending Physician,    Patient admitted with Perforated sigmoid diverticulitis, intraabdominal   abscess, diffuse peritonitis. Noted documentation of Sepsis /Severe Sepsis in   Surgery notes only on 3/14 & 3/16. WBC 19.3, Vitals 98.9 79 18 102/67. In   order to support the diagnosis of Sepsis /Severe Sepsis, please include   additional clinical indicators in your documentation.  Or please document if   the diagnosis of Sepsis /Severe Sepsis has been ruled out after further study    The medical record reflects the following:  Risk Factors:  Perforated sigmoid diverticulitis, intraabdominal abscess,   diffuse peritonitis  Clinical Indicators: Per Surgery notes only on 3/14 & 3/16,\"...Perforated   sigmoid diverticulitis, intraabdominal abscess, diffuse peritonitis, and   severe sepsis...\"  WBC 19.3, Vitals 98.9 79 18 102/67  Treatment: exploratory laparotomy with sigmoid colon resection, open   intraabdominal abscess drainage, mobilization and placement of omental flap,   mobilization of splenic flexure, and end descending colostomy placement and   drain placement    Thank you,  Marla Corey RN CCDS  Clinical Documentation Improvement Specialist  Phone# [phone]  Options provided:  -- Sepsis/Severe Sepsis present as evidenced by, Please document evidence,   along with, type of organ dysfunction.  -- Sepsis/Severe Sepsis was ruled out after study  -- Other - I will add my own diagnosis  -- Disagree - Not applicable / Not valid  -- Disagree - Clinically unable to determine / Unknown  -- Refer to Clinical Documentation Reviewer    PROVIDER RESPONSE TEXT:    This patient has Sepsis/Severe Sepsis present as evidenced by vitals signs,   elevated wbcif

## 2025-03-19 NOTE — PROGRESS NOTES
Internal Medicine Consult Note    [facility]    Romeo Drake D.O., AJOLisaI.                    Shen Solo D.O., ALICIAI.                             Barrington Jara D.O.     Ximena Bernardo, MSN, APRN, NP-C  Jack Moscoso, MSN, APRN-CNP  Rishi Ruiz, MSN, APRN-CNP  Fernanda Tay, MSN APRN-CNP  Conchis Bradford, MSN. APRN-NP-C       Primary Care Physician: Lay Hunter MD   Admitting Physician:  Levi Bunn MD  Admission date and time: 3/13/2025  3:45 PM    Room:  [address] [place] [address]  Admitting diagnosis: Perforated viscus [R19.8]  SHALINI (acute kidney injury) [N17.9]  Acute diverticulitis [K57.92]  Diverticulitis of large intestine with perforation and abscess, unspecified bleeding status [K57.20]    Patient Name: Susanna Arriola  MRN: [id number]    Date of Service: 3/19/2025     Subjective:  Susanna is a 69 y.o. female who was seen and examined today,3/19/2025, at the bedside.  Susanna remains pleasantly confused in the setting of dementia but appears well clinically.  Patient has a small amount of liquid output in her colostomy..  CROW drain has been discontinued.  Patient denies any pain or discomfort.  Patient is tolerating her diet    Review of System: Limited due to confusion  Constitutional:   Ongoing malaise and fatigue.  HEENT:   Denies ear pain, sore throat, sinus or eye problems.  Cardiovascular:   Denies any chest pain, irregular heartbeats, or palpitations.   Respiratory:   Denies shortness of breath, coughing, sputum production, hemoptysis, or wheezing.  Gastrointestinal:   Operative abdominal discomfort as to be expected.  Genitourinary:    Denies any urgency, frequency, hematuria.  Voiding with the use of a catheter.  Extremities:   Denies lower extremity swelling, edema or cyanosis.   Neurology:    Denies any headache or focal neurological deficits, describes weakness and deconditioning.  Psch:   Anxious and mildly confused.  Musculoskeletal:    Denies   delirium  Essential hypertension  Hyperlipidemia  Depression    Plan:   Change IV fluids to normal saline  Ostomy minimal liquid output  Tolerating diet  CROW drain discontinued by general surgery  Antibiotic therapy with Rocephin and Flagyl  PT OT  Lovenox for DVT prophylaxis    More than 50% of my time was spent at the bedside counseling/coordinating care with the patient and/or family with face to face contact.  This time was spent reviewing notes and laboratory data as well as instructing and counseling the patient. Time I spent with the family or surrogate(s) is included only if the patient was incapable of providing the necessary information or participating in medical decisions. I also discussed the differential diagnosis and all of the proposed management plans with the patient and individuals accompanying the patient. I am readily available for any further decision-making and intervention.     The patient was seen, examined and then discussed with Dr. Drake.      MARLYN Haro - CNP  4:45 PM  3/19/2025      I saw and evaluated the patient. I agree with the findings and the plan of care as documented in Rishi CLINE-CNP note.    Romeo Drake, DO, FACOI  4:52 PM  3/19/2025

## 2025-03-19 NOTE — PROGRESS NOTES
Surgery Progress Note            Chief complaint:   Chief Complaint   Patient presents with    Abdominal Pain     Abdominal pain started a few days ago from home, patient currently on antibiotics for uti      Patient Active Problem List   Diagnosis    Atrial fibrillation with RVR (HCC)    Perforated diverticulitis       S: doing well, no emesis    O:   Vitals:    03/19/25 0856   BP: (!) 99/31   Pulse: 98   Resp:    Temp:    SpO2:        Intake/Output Summary (Last 24 hours) at 3/19/2025 0918  Last data filed at 3/19/2025 0559  Gross per 24 hour   Intake 420 ml   Output 495 ml   Net -75 ml           Labs:  Lab Results   Component Value Date/Time    WBC 9.8 03/19/2025 04:28 AM    WBC 10.5 03/18/2025 04:22 AM    WBC 14.9 03/17/2025 04:52 AM    HGB 8.3 03/19/2025 04:28 AM    HGB 9.3 03/18/2025 04:22 AM    HGB 9.0 03/17/2025 04:52 AM    HCT 26.5 03/19/2025 04:28 AM    HCT 28.8 03/18/2025 04:22 AM    HCT 27.6 03/17/2025 04:52 AM     Lab Results   Component Value Date    CREATININE 1.0 03/19/2025    BUN 16 03/19/2025     03/19/2025    K 4.2 03/19/2025     (H) 03/19/2025    CO2 19 (L) 03/19/2025     No results found for: \"LIPASE\", \"AMYLASE\"      Physical exam:   BP (!) 99/31   Pulse 98   Temp 99.1 °F (37.3 °C) (Oral)   Resp 18   Ht 1.651 m (5' 5\")   Wt 87.1 kg (192 lb)   SpO2 97%   BMI 31.95 kg/m²   General appearance: NAD  Head: NCAT  Neck: supple, no masses  Lungs: equal chest rise bilateral  Heart: S1S2 present  Abdomen: soft, moderately tender, nondistended,  Incisions bandaged and ostomy pink and patent, drain with serous output  Skin; no lesions  Gu: no cva tenderness  Extremities: extremities normal, atraumatic, no cyanosis or edema    A:  POD #3 Exploratory laparotomy with sigmoid colon resection, open intraabdominal abscess drainage, mobilization and placement of omental flap, mobilization of splenic flexure, and end descending colostomy placement and drain placement.     P: remove CROW, bladimir,

## 2025-03-19 NOTE — PROGRESS NOTES
OCCUPATIONAL THERAPY INITIAL EVALUATION    [facility]  [address] [place]. [place]        Date:3/19/2025                                                  Patient Name: Susanna Arriola    MRN: [id number]    : 1955    Room: [address] [place] [address]      Evaluating OT: Mirna Dangelo OTR/L; [id number]     Referring Provider and Specific Provider Orders/Date:      25  OT eval and treat  Start:  25,   End:  25,   ONE TIME,   Standing Count:  1 Occurrences,   R         Levi Bunn MD      Placement Recommendation: Subacute        Diagnosis:   1. Acute diverticulitis    2. Perforated viscus    3. Diverticulitis of large intestine with perforation and abscess, unspecified bleeding status    4. SHALINI (acute kidney injury)    5. Pain         Surgery: Exploratory laparotomy with sigmoid colon resection, open intraabdominal abscess drainage, mobilization and placement of omental flap, mobilization of splenic flexure, and end descending colostomy placement       Pertinent Medical History:       Past Medical History:   Diagnosis Date    Atrial fibrillation (HCC)     Dementia (HCC)     History of cardioversion 10/2021         Past Surgical History:   Procedure Laterality Date    COLECTOMY N/A 3/16/2025    OPEN SIGMOID COLON RESECTION; COLOSTOMY performed by Levi Bunn MD at [facility] OR      Precautions:  Fall Risk, s/p colostomy, poor historian, alarm    Pt states she had a brain aneurism in the past, pt often repeated \"I dont know\" to various questions.     Assessment of current deficits:     [x] Functional mobility  [x]ADLs  [x] Strength               [x]Cognition    [x] Functional transfers   [x] IADLs         [x] Safety Awareness   [x]Endurance    [x] Fine Coordination              [x] Balance      [] Vision/perception   []Sensation     [x]Gross Motor Coordination  [x] ROM  [] Delirium                   [] Motor Control     OT PLAN OF CARE  directions   Memory: poor   Sequencing: poor   Problem solving: poor   Judgement/safety: poor    [facility]   AM-PAC Daily Activity - Inpatient   How much help is needed for putting on and taking off regular lower body clothing?: Total  How much help is needed for bathing (which includes washing, rinsing, drying)?: A Lot  How much help is needed for toileting (which includes using toilet, bedpan, or urinal)?: Total  How much help is needed for putting on and taking off regular upper body clothing?: A Lot  How much help is needed for taking care of personal grooming?: A Lot  How much help for eating meals?: A Little  AM-[facility] Inpatient Daily Activity Raw Score: 11  AM-PAC Inpatient ADL T-Scale Score : 29.04  ADL Inpatient CMS 0-100% Score: 70.42  ADL Inpatient CMS G-Code Modifier : CL     Functional Assessment:    Initial Eval Status  Date: 3/19/25 Treatment Status  Date: STGs = LTGs  Time frame: 10-14 days   Feeding Supervision    Independent    Grooming Moderate Assist   Independent    UB Dressing Maximal Assist   Supervision    LB Dressing Dependent   Minimal Assist    Bathing Maximal Assist   Minimal Assist    Toileting Dependent    Minimal Assist    Bed Mobility  Supine to sit: Maximal Assist   Sit to supine: Maximal Assist x 2   Rolling: Maximal Assist x 2   Maximal Assist x 2 to scoot up in bed.    Supine to sit: Supervision   Sit to supine: Supervision   Rolling:Supervision     Functional Transfers Maximal Assist x 2 from EOB to wheeled walker.  Transfer training with verbal cues for hand placement throughout session to improve safety.   Supervision    Functional Mobility Maximal Assist x 2 with wheeled walker to improve balance, verbal cues for walker sequence and safety.   Modified [place]    Balance Sitting:     Static: poor at EOB with Moderate Assist    Dynamic: poor  Standing: poor with maximal assist x 2   Sitting:     Static: good     Dynamic: good   Standing: good  with wheeled walker   Activity

## 2025-03-19 NOTE — PROGRESS NOTES
ET Nurse  Admit Date: 3/13/2025  3:45 PM    Reason for consult:  new colostomy    Significant history:    Past Medical History:   Diagnosis Date    Atrial fibrillation (HCC)     Dementia (HCC)     History of cardioversion 10/2021       Stoma assessment:  stoma good red color  not functioning  Peristomal skin is clear    Plan:  met with family  Inst on pouch change  Inst on diet   Inst re emptying  Supplies at bedside  Applied one piece system #[id number]    Anny Leary RN 3/19/2025 1:57 PM

## 2025-03-19 NOTE — PLAN OF CARE
Problem: Discharge Planning  Goal: Discharge to home or other facility with appropriate resources  3/19/2025 0723 by Constanza Phan, RN  Outcome: Progressing  3/18/2025 2210 by Annette Asencio, RN  Outcome: Progressing      on the discharge service for the patient. I have reviewed and made amendments to the documentation where necessary.

## 2025-03-20 LAB
ALBUMIN SERPL-MCNC: 2.2 G/DL (ref 3.5–5.2)
ALP SERPL-CCNC: 75 U/L (ref 35–104)
ALT SERPL-CCNC: 11 U/L (ref 0–32)
ANION GAP SERPL CALCULATED.3IONS-SCNC: 8 MMOL/L (ref 7–16)
AST SERPL-CCNC: 20 U/L (ref 0–31)
BASOPHILS # BLD: 0.04 K/UL (ref 0–0.2)
BASOPHILS NFR BLD: 1 % (ref 0–2)
BILIRUB SERPL-MCNC: 0.2 MG/DL (ref 0–1.2)
BUN SERPL-MCNC: 11 MG/DL (ref 6–23)
CALCIUM SERPL-MCNC: 7.9 MG/DL (ref 8.6–10.2)
CHLORIDE SERPL-SCNC: 111 MMOL/L (ref 98–107)
CO2 SERPL-SCNC: 19 MMOL/L (ref 22–29)
CREAT SERPL-MCNC: 0.9 MG/DL (ref 0.5–1)
EOSINOPHIL # BLD: 0.11 K/UL (ref 0.05–0.5)
EOSINOPHILS RELATIVE PERCENT: 1 % (ref 0–6)
ERYTHROCYTE [DISTWIDTH] IN BLOOD BY AUTOMATED COUNT: 14.9 % (ref 11.5–15)
GFR, ESTIMATED: 67 ML/MIN/1.73M2
GLUCOSE SERPL-MCNC: 106 MG/DL (ref 74–99)
HCT VFR BLD AUTO: 25.3 % (ref 34–48)
HGB BLD-MCNC: 7.8 G/DL (ref 11.5–15.5)
IMM GRANULOCYTES # BLD AUTO: 0.23 K/UL (ref 0–0.58)
IMM GRANULOCYTES NFR BLD: 3 % (ref 0–5)
LYMPHOCYTES NFR BLD: 1.31 K/UL (ref 1.5–4)
LYMPHOCYTES RELATIVE PERCENT: 17 % (ref 20–42)
MAGNESIUM SERPL-MCNC: 2 MG/DL (ref 1.6–2.6)
MCH RBC QN AUTO: 27.5 PG (ref 26–35)
MCHC RBC AUTO-ENTMCNC: 30.8 G/DL (ref 32–34.5)
MCV RBC AUTO: 89.1 FL (ref 80–99.9)
MICROORGANISM SPEC CULT: ABNORMAL
MICROORGANISM SPEC CULT: NORMAL
MICROORGANISM SPEC CULT: NORMAL
MICROORGANISM/AGENT SPEC: ABNORMAL
MICROORGANISM/AGENT SPEC: NORMAL
MICROORGANISM/AGENT SPEC: NORMAL
MONOCYTES NFR BLD: 0.54 K/UL (ref 0.1–0.95)
MONOCYTES NFR BLD: 7 % (ref 2–12)
NEUTROPHILS NFR BLD: 71 % (ref 43–80)
NEUTS SEG NFR BLD: 5.47 K/UL (ref 1.8–7.3)
PHOSPHATE SERPL-MCNC: 2.5 MG/DL (ref 2.5–4.5)
PLATELET # BLD AUTO: 329 K/UL (ref 130–450)
PMV BLD AUTO: 9.4 FL (ref 7–12)
POTASSIUM SERPL-SCNC: 3.8 MMOL/L (ref 3.5–5)
PROT SERPL-MCNC: 5.6 G/DL (ref 6.4–8.3)
RBC # BLD AUTO: 2.84 M/UL (ref 3.5–5.5)
SERVICE CMNT-IMP: ABNORMAL
SERVICE CMNT-IMP: NORMAL
SERVICE CMNT-IMP: NORMAL
SODIUM SERPL-SCNC: 138 MMOL/L (ref 132–146)
SPECIMEN DESCRIPTION: ABNORMAL
SPECIMEN DESCRIPTION: NORMAL
SPECIMEN DESCRIPTION: NORMAL
WBC OTHER # BLD: 7.7 K/UL (ref 4.5–11.5)

## 2025-03-20 PROCEDURE — 97530 THERAPEUTIC ACTIVITIES: CPT | Performed by: PHYSICAL THERAPIST

## 2025-03-20 PROCEDURE — 6360000002 HC RX W HCPCS: Performed by: SURGERY

## 2025-03-20 PROCEDURE — 1200000000 HC SEMI PRIVATE

## 2025-03-20 PROCEDURE — 36415 COLL VENOUS BLD VENIPUNCTURE: CPT

## 2025-03-20 PROCEDURE — 99024 POSTOP FOLLOW-UP VISIT: CPT | Performed by: SURGERY

## 2025-03-20 PROCEDURE — 6360000002 HC RX W HCPCS: Performed by: INTERNAL MEDICINE

## 2025-03-20 PROCEDURE — 6370000000 HC RX 637 (ALT 250 FOR IP): Performed by: SURGERY

## 2025-03-20 PROCEDURE — 97161 PT EVAL LOW COMPLEX 20 MIN: CPT | Performed by: PHYSICAL THERAPIST

## 2025-03-20 PROCEDURE — 76937 US GUIDE VASCULAR ACCESS: CPT

## 2025-03-20 PROCEDURE — 80053 COMPREHEN METABOLIC PANEL: CPT

## 2025-03-20 PROCEDURE — 84100 ASSAY OF PHOSPHORUS: CPT

## 2025-03-20 PROCEDURE — 2500000003 HC RX 250 WO HCPCS: Performed by: SURGERY

## 2025-03-20 PROCEDURE — 83735 ASSAY OF MAGNESIUM: CPT

## 2025-03-20 PROCEDURE — 85025 COMPLETE CBC W/AUTO DIFF WBC: CPT

## 2025-03-20 RX ADMIN — METRONIDAZOLE 500 MG: 500 TABLET ORAL at 05:47

## 2025-03-20 RX ADMIN — ATORVASTATIN CALCIUM 10 MG: 10 TABLET, FILM COATED ORAL at 08:36

## 2025-03-20 RX ADMIN — TRAMADOL HYDROCHLORIDE 50 MG: 50 TABLET, COATED ORAL at 09:43

## 2025-03-20 RX ADMIN — DONEPEZIL HYDROCHLORIDE 5 MG: 5 TABLET ORAL at 19:57

## 2025-03-20 RX ADMIN — OXCARBAZEPINE 600 MG: 150 TABLET, FILM COATED ORAL at 08:37

## 2025-03-20 RX ADMIN — WATER 1000 MG: 1 INJECTION INTRAMUSCULAR; INTRAVENOUS; SUBCUTANEOUS at 08:43

## 2025-03-20 RX ADMIN — METRONIDAZOLE 500 MG: 500 TABLET ORAL at 13:08

## 2025-03-20 RX ADMIN — MORPHINE SULFATE 2 MG: 2 INJECTION, SOLUTION INTRAMUSCULAR; INTRAVENOUS at 14:30

## 2025-03-20 RX ADMIN — OXCARBAZEPINE 600 MG: 150 TABLET, FILM COATED ORAL at 19:56

## 2025-03-20 RX ADMIN — METRONIDAZOLE 500 MG: 500 TABLET ORAL at 20:00

## 2025-03-20 RX ADMIN — ZONISAMIDE 100 MG: 100 CAPSULE ORAL at 08:37

## 2025-03-20 RX ADMIN — ENOXAPARIN SODIUM 40 MG: 100 INJECTION SUBCUTANEOUS at 08:37

## 2025-03-20 RX ADMIN — TRAMADOL HYDROCHLORIDE 50 MG: 50 TABLET, COATED ORAL at 19:56

## 2025-03-20 RX ADMIN — ZONISAMIDE 100 MG: 100 CAPSULE ORAL at 13:08

## 2025-03-20 RX ADMIN — ZONISAMIDE 100 MG: 100 CAPSULE ORAL at 16:16

## 2025-03-20 RX ADMIN — SERTRALINE 100 MG: 50 TABLET, FILM COATED ORAL at 08:36

## 2025-03-20 RX ADMIN — ZONISAMIDE 100 MG: 100 CAPSULE ORAL at 19:57

## 2025-03-20 ASSESSMENT — PAIN DESCRIPTION - PAIN TYPE
TYPE: SURGICAL PAIN

## 2025-03-20 ASSESSMENT — PAIN DESCRIPTION - LOCATION
LOCATION: ABDOMEN

## 2025-03-20 ASSESSMENT — PAIN SCALES - GENERAL
PAINLEVEL_OUTOF10: 2
PAINLEVEL_OUTOF10: 4
PAINLEVEL_OUTOF10: 7
PAINLEVEL_OUTOF10: 2
PAINLEVEL_OUTOF10: 4
PAINLEVEL_OUTOF10: 5
PAINLEVEL_OUTOF10: 0
PAINLEVEL_OUTOF10: 8

## 2025-03-20 ASSESSMENT — PAIN DESCRIPTION - DESCRIPTORS
DESCRIPTORS: DISCOMFORT
DESCRIPTORS: ACHING

## 2025-03-20 NOTE — PLAN OF CARE
Problem: Discharge Planning  Goal: Discharge to home or other facility with appropriate resources  3/20/2025 1105 by Tamika Esquivel RN  Outcome: Progressing  3/19/2025 2148 by Annette Asencio RN  Outcome: Progressing     Problem: Pain  Goal: Verbalizes/displays adequate comfort level or baseline comfort level  3/20/2025 1105 by Tamika Esquivel RN  Outcome: Progressing  3/19/2025 2148 by Annette Asencio RN  Outcome: Progressing     Problem: ABCDS Injury Assessment  Goal: Absence of physical injury  3/20/2025 1105 by Tamika Esquivel RN  Outcome: Progressing  3/19/2025 2148 by Annette Asencio RN  Outcome: Progressing     Problem: Safety - Adult  Goal: Free from fall injury  3/20/2025 1105 by Tamika Esquivel RN  Outcome: Progressing  3/19/2025 2148 by Annette Asencio RN  Outcome: Progressing     Problem: Nutrition Deficit:  Goal: Optimize nutritional status  3/20/2025 1105 by Tamika Esquivel RN  Outcome: Progressing  3/19/2025 2148 by Annette Asencio RN  Outcome: Progressing

## 2025-03-20 NOTE — PROGRESS NOTES
Surgery Progress Note            Chief complaint:   Chief Complaint   Patient presents with    Abdominal Pain     Abdominal pain started a few days ago from home, patient currently on antibiotics for uti      Patient Active Problem List   Diagnosis    Atrial fibrillation with RVR (HCC)    Perforated diverticulitis       S: doing well, no emesis    O:   Vitals:    03/20/25 1135   BP: (!) 100/51   Pulse:    Resp:    Temp:    SpO2:        Intake/Output Summary (Last 24 hours) at 3/20/2025 1405  Last data filed at 3/20/2025 1400  Gross per 24 hour   Intake 240 ml   Output 620 ml   Net -380 ml           Labs:  Lab Results   Component Value Date/Time    WBC 7.7 03/20/2025 04:06 AM    WBC 9.8 03/19/2025 04:28 AM    WBC 10.5 03/18/2025 04:22 AM    HGB 7.8 03/20/2025 04:06 AM    HGB 8.3 03/19/2025 04:28 AM    HGB 9.3 03/18/2025 04:22 AM    HCT 25.3 03/20/2025 04:06 AM    HCT 26.5 03/19/2025 04:28 AM    HCT 28.8 03/18/2025 04:22 AM     Lab Results   Component Value Date    CREATININE 0.9 03/20/2025    BUN 11 03/20/2025     03/20/2025    K 3.8 03/20/2025     (H) 03/20/2025    CO2 19 (L) 03/20/2025     No results found for: \"LIPASE\", \"AMYLASE\"      Physical exam:   BP (!) 100/51   Pulse 71   Temp 98.6 °F (37 °C) (Oral)   Resp 17   Ht 1.651 m (5' 5\")   Wt 87.1 kg (192 lb)   SpO2 93%   BMI 31.95 kg/m²   General appearance: NAD  Head: NCAT  Neck: supple, no masses  Lungs: equal chest rise bilateral  Heart: S1S2 present  Abdomen: soft, moderately tender, nondistended,  Incisions bandaged and ostomy pink and patent, no output  Skin; no lesions  Gu: no cva tenderness  Extremities: extremities normal, atraumatic, no cyanosis or edema    A:  POD #4 Exploratory laparotomy with sigmoid colon resection, open intraabdominal abscess drainage, mobilization and placement of omental flap, mobilization of splenic flexure, and end descending colostomy placement and drain placement.     P: when ostomy functions she will be  ready for discharge    Levi Bunn MD  3/20/2025

## 2025-03-20 NOTE — DISCHARGE INSTR - COC
Continuity of Care Form    Patient Name: Susanna Arriola   :  1955  MRN:  [id number]    Admit date:  3/13/2025  Discharge date:  ***    Code Status Order: Full Code   Advance Directives:    Date/Time Healthcare Directive Type of Healthcare Directive Copy in Chart Healthcare Agent Appointed Healthcare Agent's Name Healthcare Agent's Phone Number    25 0952 No, patient does not have an advance directive for healthcare treatment  --  --  --  --  --     25 0745 No, patient does not have an advance directive for healthcare treatment  --  --  --  --  --             Admitting Physician:  Levi Bunn MD  PCP: Lay Hunter MD    Discharging Nurse: ***  Discharging Hospital Unit/Room#: 0317/0317-01  Discharging Unit Phone Number: ***    Emergency Contact:   Extended Emergency Contact Information  Primary Emergency Contact: Erasmo Arriola  Address: [address] [place], [place] [address]  Home Phone: [phone]  Mobile Phone: [phone]  Relation: Spouse   needed? No  Secondary Emergency Contact: SHANELANNA SKY  Home Phone: [phone]  Relation: Child  Preferred language: English   needed? No    Past Surgical History:  Past Surgical History:   Procedure Laterality Date    COLECTOMY N/A 3/16/2025    OPEN SIGMOID COLON RESECTION; COLOSTOMY performed by Levi Bunn MD at [facility] OR       Immunization History:   Immunization History   Administered Date(s) Administered    COVID-19, MODERNA, , (age 12y+), IM, 50mcg/0.5mL 10/09/2024    COVID-19, PFIZER PURPLE top, DILUTE for use, (age 12 y+), 30mcg/0.3mL 2021, 2021, 10/12/2021       Active Problems:  Patient Active Problem List   Diagnosis Code    Atrial fibrillation with RVR (HCC) I48.91    Perforated diverticulitis K57.92       Isolation/Infection:   Isolation            No Isolation          Patient Infection Status    None to display         Assessment: Unable to assess  Colostomy LLQ-Treatment: Ice applied  Colostomy LLQ-Stool Appearance: Watery  Colostomy LLQ-Stool Amount:  (none scant sanguineous drainage)  Colostomy LLQ-Output (mL): 0 ml    Date of Last BM: ***    Intake/Output Summary (Last 24 hours) at 3/20/2025 1427  Last data filed at 3/20/2025 1419  Gross per 24 hour   Intake 600 ml   Output 620 ml   Net -20 ml     I/O last 3 completed shifts:  In: 700 [P.O.:700]  Out: 665 [Urine:645; Stool:20]    Safety Concerns:     At Risk for Falls    Impairments/Disabilities:      Vision and Hearing    Nutrition Therapy:  Current Nutrition Therapy:   - Oral Diet:  General    Routes of Feeding: Oral  Liquids: Thin Liquids  Daily Fluid Restriction: no  Last Modified Barium Swallow with Video (Video Swallowing Test): not done    Treatments at the Time of Hospital Discharge:   Respiratory Treatments: ***  Oxygen Therapy:  is not on home oxygen therapy.  Ventilator:    - No ventilator support    Rehab Therapies: Physical Therapy and Occupational Therapy  Weight Bearing Status/Restrictions: No weight bearing restrictions  Other Medical Equipment (for information only, NOT a DME order):  walker  Other Treatments: ***    Patient's personal belongings (please select all that are sent with patient):  Glasses    RN SIGNATURE:  Electronically signed by Heather Saldivar RN on 3/22/25 at 2:27 PM EDT    CASE MANAGEMENT/SOCIAL WORK SECTION    Inpatient Status Date: 3/13/2025    Readmission Risk Assessment Score:  [facility] RISK OF UNPLANNED READMISSION 2.0             16 Total Score        Discharging to Facility/ Agency   Name: [facility]  Address:  Phone:  [phone]   Fax:    Dialysis Facility (if applicable)   Name:  Address:  Dialysis Schedule:  Phone:  Fax:    / signature: Electronically signed by Yee Joshi RN on 3/20/25 at 2:27 PM EDT    PHYSICIAN SECTION    Prognosis: Fair    Condition at Discharge:

## 2025-03-20 NOTE — CARE COORDINATION
CM note: Pt was accepted by [facility] but declined by [facility].  Will have [facility] submit for pre-cert for potential weekend discharge.  For EL, pt will NEED PRE-CERT, HENS and JANA

## 2025-03-20 NOTE — FLOWSHEET NOTE
Inpatient Wound Care    Admit Date: 3/13/2025  3:45 PM    Reason for consult:  abdominal wound    Significant history:    Past Medical History:   Diagnosis Date    Atrial fibrillation (HCC)     Dementia (HCC)     History of cardioversion 10/2021         Findings:     03/20/25 0937   Wound 03/19/25 Abdomen Mid   Date First Assessed/Time First Assessed: 03/19/25 1400   Present on Original Admission: No  Primary Wound Type: Surgical  Location: Abdomen  Wound Location Orientation: Mid   Wound Image    Wound Etiology Surgical   Wound Cleansed Cleansed with saline   Wound Length (cm) 18 cm   Wound Width (cm) 5.6 cm   Wound Depth (cm) 2.8 cm   Wound Surface Area (cm^2) 100.8 cm^2   Wound Volume (cm^3) 282.24 cm^3   Drainage Amount Small (< 25%)   Drainage Description Serosanguinous   Odor None   Anupama-wound Assessment Intact       **Informed Consent**    The patient has given verbal consent to have photos taken of wound and inserted into their chart as part of their permanent medical record for purposes of documentation, treatment management and/or medical review.   All Images taken on 3/20/25 of patient name: Susanna Arriola were transmitted and stored on secured Epic  Site located within Media Folder Tab by a registered Epic-[employer] Mobile Application Device.       Impression:  surgical wound    Interventions in place:  using moistened normal saline gauze dressing cover with dsd abd    Plan:  Cont daily packing          Anny Leary RN 3/20/2025 9:39 AM

## 2025-03-20 NOTE — PLAN OF CARE
Problem: Discharge Planning  Goal: Discharge to home or other facility with appropriate resources  Outcome: Progressing     Problem: Pain  Goal: Verbalizes/displays adequate comfort level or baseline comfort level  Outcome: Progressing     Problem: ABCDS Injury Assessment  Goal: Absence of physical injury  Outcome: Progressing     Problem: Safety - Adult  Goal: Free from fall injury  Outcome: Progressing     Problem: Nutrition Deficit:  Goal: Optimize nutritional status  3/19/2025 2148 by Annette Asencio, RN  Outcome: Progressing  3/19/2025 1304 by Constanza Jeffries RD, LD  Outcome: Progressing  Flowsheets (Taken 3/19/2025 1304)  Nutrient intake appropriate for improving, restoring, or maintaining nutritional needs:   Assess nutritional status and recommend course of action   Monitor oral intake, labs, and treatment plans   Recommend appropriate diets, oral nutritional supplements, and vitamin/mineral supplements   Order, calculate, and assess calorie counts as needed

## 2025-03-20 NOTE — PROGRESS NOTES
Internal Medicine Consult Note    [facility]    Romeo Drake D.O., ALICIAI.                    Shen Solo D.O., JOSÉ.                             ABDULLAHI MonroeO.     Ximena Bernardo, MSN, APRN, NP-C  Jack Moscoso, MSN, APRN-CNP  Rishi Ruiz, MSN, APRN-CNP  Fernanda Tay, MSN APRN-CNP  Conchis Bradford, MSN. APRN-NP-C       Primary Care Physician: Lay Hunter MD   Admitting Physician:  Levi Bunn MD  Admission date and time: 3/13/2025  3:45 PM    Room:  [address] [place] [address]  Admitting diagnosis: Perforated viscus [R19.8]  SHALINI (acute kidney injury) [N17.9]  Acute diverticulitis [K57.92]  Diverticulitis of large intestine with perforation and abscess, unspecified bleeding status [K57.20]    Patient Name: Susanna Arriola  MRN: [id number]    Date of Service: 3/20/2025     Subjective:  Susanna is a 69 y.o. female who was seen and examined today,3/20/2025, at the bedside.  Patient resting comfortably at the present time patient dressing is dry with little output from the ostomy IVs continue to run.  Imaging studies suggest possible ileus.  Stoma appeared pink bowel sound present    Review of System: Limited due to dementia  Constitutional:   Ongoing malaise and fatigue.  HEENT:   Denies ear pain, sore throat, sinus or eye problems.  Cardiovascular:   Denies any chest pain, irregular heartbeats, or palpitations.   Respiratory:   Denies shortness of breath, coughing, sputum production, hemoptysis, or wheezing.  Gastrointestinal:   Operative abdominal discomfort as to be expected.  Genitourinary:    Denies any urgency, frequency, hematuria.  Voiding with the use of a catheter.  Extremities:   Denies lower extremity swelling, edema or cyanosis.   Neurology:    Denies any headache or focal neurological deficits, describes weakness and deconditioning.  Psch:   Anxious and mildly confused.  Musculoskeletal:    Denies  myalgias, joint complaints or back pain.   Integumentary:

## 2025-03-20 NOTE — PROGRESS NOTES
Physical Therapy Initial Evaluation/Plan of Care    Room #:  0317/0317-01  Patient Name: Susanna Arriola  YOB: 1955  MRN: [id number]    Date of Service: 3/20/2025     Tentative placement recommendation: Subacute Rehab  Equipment recommendation: To be determined      Evaluating Physical Therapist: Maryann Valadez, PT #[id number]      Specific Provider Orders/Date/Referring Provider :  03/18/25 0830    PT eval and treat  Start:  03/18/25 0830,   End:  03/18/25 0830,   ONE TIME,   Standing Count:  1 Occurrences,   R       Levi Bunn MD    Admitting Diagnosis:   Perforated viscus [R19.8]  SHALINI (acute kidney injury) [N17.9]  Acute diverticulitis [K57.92]  Diverticulitis of large intestine with perforation and abscess, unspecified bleeding status [K57.20]     lower abdominal pain.  Surgery:   Date of Procedure: 3/16/2025     Pre-Op Diagnosis Codes:      * Pain [R52] perforated sigmoid diverticulitis with diffuse peritonitis, intraabdominal abscess     Post-Op Diagnosis: Same       Procedure(s):  OPEN SIGMOID COLON RESECTION; COLOSTOMY drainage of intraabdominal abscess     Surgeon(s):  Levi Bunn MD  Visit Diagnoses         Codes      Perforated viscus     R19.8      Diverticulitis of large intestine with perforation and abscess, unspecified bleeding status     K57.20      SHALINI (acute kidney injury)     N17.9      Pain     R52            Patient Active Problem List   Diagnosis    Atrial fibrillation with RVR (HCC)    Perforated diverticulitis        ASSESSMENT of Current Deficits Patient exhibits decreased strength, balance, and endurance impairing functional mobility, transfers, gait , gait distance, and tolerance to activity  Self limiting in sitting and standing requires max encouragement. Assist of 2 for standing and functional mobility. Patient requires continued skilled physical therapy to address concerns listed above for increased safety and function at discharge.        PHYSICAL THERAPY  PLAN  Pt demonstrated skill Pt requires further education in this area   Yes Partial Yes      Treatment:  Patient practiced and was instructed/facilitated in the following treatment: Patient  Patient educated/performed log roll, assisted to edge of bed, Sat edge of bed 10 minutes with Moderate progressing to minimal assist to increase dynamic sitting balance and activity tolerance. Stood as above, steps to Head of bed. Returned to bed, Dependent of  2 to scoot to Head of bed in trendelenburg.       Therapeutic Exercises:  ankle pumps  x 20 reps.       At end of session, patient in bed with alarm call light and phone within reach,  all lines and tubes intact, nursing notified.      Patient would benefit from continued skilled Physical Therapy to improve functional independence and quality of life.         Patient's/ family goals   none stated    Time in  0918  Time out  0948    Total Treatment Time  10 minutes    Evaluation time includes thorough review of current medical information, gathering information on past medical history/social history and prior level of function, completion of standardized testing/informal observation of tasks, assessment of data, and development of Plan of care and goals.     CPT codes:  Low Complexity PT evaluation (74824)  Therapeutic activities (27897)   10 minutes  1 unit(s)    Maryann Valadez, PT

## 2025-03-20 NOTE — CARE COORDINATION
CM note: left message for patient's  to review therapy recommendations.  Pt is requiring max assist of two for all ADLs at this time, has a large open abdominal wound and a new ostomy.        ADDENDUM:11:34 AM   returned call and after discussing patient's current status he is agreeable to facility placement.  Pt has a hx of [facility] however he is requesting either facility in [facility] if able d/t proximity to his home.  Referral given to [facility] liaison  and [facility] liaisons, awaiting their review/determination. For EL, pt will NEED PRE-CERT, HENS and JANA.

## 2025-03-21 ENCOUNTER — APPOINTMENT (OUTPATIENT)
Dept: CT IMAGING | Age: 70
DRG: 329 | End: 2025-03-21
Payer: MEDICARE

## 2025-03-21 LAB
ALBUMIN SERPL-MCNC: 2.2 G/DL (ref 3.5–5.2)
ALP SERPL-CCNC: 64 U/L (ref 35–104)
ALT SERPL-CCNC: 11 U/L (ref 0–32)
ANION GAP SERPL CALCULATED.3IONS-SCNC: 9 MMOL/L (ref 7–16)
AST SERPL-CCNC: 17 U/L (ref 0–31)
BASOPHILS # BLD: 0.03 K/UL (ref 0–0.2)
BASOPHILS NFR BLD: 0 % (ref 0–2)
BILIRUB SERPL-MCNC: 0.2 MG/DL (ref 0–1.2)
BUN SERPL-MCNC: 8 MG/DL (ref 6–23)
CALCIUM SERPL-MCNC: 7.8 MG/DL (ref 8.6–10.2)
CHLORIDE SERPL-SCNC: 109 MMOL/L (ref 98–107)
CO2 SERPL-SCNC: 19 MMOL/L (ref 22–29)
CREAT SERPL-MCNC: 0.9 MG/DL (ref 0.5–1)
EOSINOPHIL # BLD: 0.07 K/UL (ref 0.05–0.5)
EOSINOPHILS RELATIVE PERCENT: 1 % (ref 0–6)
ERYTHROCYTE [DISTWIDTH] IN BLOOD BY AUTOMATED COUNT: 15.1 % (ref 11.5–15)
GFR, ESTIMATED: 66 ML/MIN/1.73M2
GLUCOSE SERPL-MCNC: 84 MG/DL (ref 74–99)
HCT VFR BLD AUTO: 24.7 % (ref 34–48)
HGB BLD-MCNC: 7.8 G/DL (ref 11.5–15.5)
IMM GRANULOCYTES # BLD AUTO: 0.22 K/UL (ref 0–0.58)
IMM GRANULOCYTES NFR BLD: 3 % (ref 0–5)
LYMPHOCYTES NFR BLD: 1.23 K/UL (ref 1.5–4)
LYMPHOCYTES RELATIVE PERCENT: 17 % (ref 20–42)
MAGNESIUM SERPL-MCNC: 1.9 MG/DL (ref 1.6–2.6)
MCH RBC QN AUTO: 28.1 PG (ref 26–35)
MCHC RBC AUTO-ENTMCNC: 31.6 G/DL (ref 32–34.5)
MCV RBC AUTO: 88.8 FL (ref 80–99.9)
MONOCYTES NFR BLD: 0.53 K/UL (ref 0.1–0.95)
MONOCYTES NFR BLD: 7 % (ref 2–12)
NEUTROPHILS NFR BLD: 71 % (ref 43–80)
NEUTS SEG NFR BLD: 5.1 K/UL (ref 1.8–7.3)
PHOSPHATE SERPL-MCNC: 2.6 MG/DL (ref 2.5–4.5)
PLATELET # BLD AUTO: 366 K/UL (ref 130–450)
PMV BLD AUTO: 9.1 FL (ref 7–12)
POTASSIUM SERPL-SCNC: 3.7 MMOL/L (ref 3.5–5)
PROT SERPL-MCNC: 5.3 G/DL (ref 6.4–8.3)
RBC # BLD AUTO: 2.78 M/UL (ref 3.5–5.5)
SODIUM SERPL-SCNC: 137 MMOL/L (ref 132–146)
WBC OTHER # BLD: 7.2 K/UL (ref 4.5–11.5)

## 2025-03-21 PROCEDURE — 6360000002 HC RX W HCPCS

## 2025-03-21 PROCEDURE — 6360000004 HC RX CONTRAST MEDICATION: Performed by: RADIOLOGY

## 2025-03-21 PROCEDURE — 97530 THERAPEUTIC ACTIVITIES: CPT

## 2025-03-21 PROCEDURE — 83735 ASSAY OF MAGNESIUM: CPT

## 2025-03-21 PROCEDURE — 80053 COMPREHEN METABOLIC PANEL: CPT

## 2025-03-21 PROCEDURE — 1200000000 HC SEMI PRIVATE

## 2025-03-21 PROCEDURE — 97535 SELF CARE MNGMENT TRAINING: CPT

## 2025-03-21 PROCEDURE — 74176 CT ABD & PELVIS W/O CONTRAST: CPT

## 2025-03-21 PROCEDURE — 6370000000 HC RX 637 (ALT 250 FOR IP): Performed by: SURGERY

## 2025-03-21 PROCEDURE — 36415 COLL VENOUS BLD VENIPUNCTURE: CPT

## 2025-03-21 PROCEDURE — 6360000002 HC RX W HCPCS: Performed by: INTERNAL MEDICINE

## 2025-03-21 PROCEDURE — 2500000003 HC RX 250 WO HCPCS: Performed by: SURGERY

## 2025-03-21 PROCEDURE — 84100 ASSAY OF PHOSPHORUS: CPT

## 2025-03-21 PROCEDURE — 85025 COMPLETE CBC W/AUTO DIFF WBC: CPT

## 2025-03-21 PROCEDURE — 2580000003 HC RX 258

## 2025-03-21 RX ORDER — IOPAMIDOL 755 MG/ML
18 INJECTION, SOLUTION INTRAVASCULAR
Status: COMPLETED | OUTPATIENT
Start: 2025-03-21 | End: 2025-03-21

## 2025-03-21 RX ORDER — METOCLOPRAMIDE HYDROCHLORIDE 5 MG/ML
5 INJECTION INTRAMUSCULAR; INTRAVENOUS
Status: DISCONTINUED | OUTPATIENT
Start: 2025-03-21 | End: 2025-03-22

## 2025-03-21 RX ADMIN — ZONISAMIDE 100 MG: 100 CAPSULE ORAL at 09:00

## 2025-03-21 RX ADMIN — OXCARBAZEPINE 600 MG: 150 TABLET, FILM COATED ORAL at 20:06

## 2025-03-21 RX ADMIN — METOCLOPRAMIDE HYDROCHLORIDE 5 MG: 5 INJECTION INTRAMUSCULAR; INTRAVENOUS at 20:07

## 2025-03-21 RX ADMIN — ZONISAMIDE 100 MG: 100 CAPSULE ORAL at 20:06

## 2025-03-21 RX ADMIN — METRONIDAZOLE 500 MG: 500 TABLET ORAL at 05:41

## 2025-03-21 RX ADMIN — SERTRALINE 100 MG: 50 TABLET, FILM COATED ORAL at 09:00

## 2025-03-21 RX ADMIN — METRONIDAZOLE 500 MG: 500 TABLET ORAL at 13:27

## 2025-03-21 RX ADMIN — METRONIDAZOLE 500 MG: 500 TABLET ORAL at 23:50

## 2025-03-21 RX ADMIN — TRAMADOL HYDROCHLORIDE 50 MG: 50 TABLET, COATED ORAL at 09:04

## 2025-03-21 RX ADMIN — ZONISAMIDE 100 MG: 100 CAPSULE ORAL at 13:27

## 2025-03-21 RX ADMIN — ZONISAMIDE 100 MG: 100 CAPSULE ORAL at 17:48

## 2025-03-21 RX ADMIN — METOPROLOL TARTRATE 75 MG: 50 TABLET, FILM COATED ORAL at 09:00

## 2025-03-21 RX ADMIN — SODIUM CHLORIDE: 0.9 INJECTION, SOLUTION INTRAVENOUS at 13:28

## 2025-03-21 RX ADMIN — CEFEPIME 2000 MG: 2 INJECTION, POWDER, FOR SOLUTION INTRAVENOUS at 20:11

## 2025-03-21 RX ADMIN — TRAMADOL HYDROCHLORIDE 50 MG: 50 TABLET, COATED ORAL at 20:07

## 2025-03-21 RX ADMIN — DONEPEZIL HYDROCHLORIDE 5 MG: 5 TABLET ORAL at 20:07

## 2025-03-21 RX ADMIN — IOPAMIDOL 18 ML: 755 INJECTION, SOLUTION INTRAVENOUS at 12:22

## 2025-03-21 RX ADMIN — ENOXAPARIN SODIUM 40 MG: 100 INJECTION SUBCUTANEOUS at 13:26

## 2025-03-21 RX ADMIN — CEFEPIME 2000 MG: 2 INJECTION, POWDER, FOR SOLUTION INTRAVENOUS at 08:59

## 2025-03-21 RX ADMIN — SODIUM CHLORIDE, PRESERVATIVE FREE 10 ML: 5 INJECTION INTRAVENOUS at 09:08

## 2025-03-21 RX ADMIN — ATORVASTATIN CALCIUM 10 MG: 10 TABLET, FILM COATED ORAL at 09:08

## 2025-03-21 RX ADMIN — OXCARBAZEPINE 600 MG: 150 TABLET, FILM COATED ORAL at 09:00

## 2025-03-21 ASSESSMENT — PAIN DESCRIPTION - LOCATION
LOCATION: ABDOMEN
LOCATION: ABDOMEN

## 2025-03-21 ASSESSMENT — PAIN SCALES - GENERAL
PAINLEVEL_OUTOF10: 7
PAINLEVEL_OUTOF10: 7
PAINLEVEL_OUTOF10: 4

## 2025-03-21 ASSESSMENT — PAIN DESCRIPTION - ORIENTATION: ORIENTATION: LEFT;LOWER

## 2025-03-21 ASSESSMENT — PAIN DESCRIPTION - DESCRIPTORS
DESCRIPTORS: ACHING;SORE;TENDER
DESCRIPTORS: ACHING

## 2025-03-21 NOTE — PROGRESS NOTES
Internal Medicine Consult Note    [facility]    Romeo Drake D.O., AJOLisaI.                    Shen Solo D.O., JAOLisaI.                             Barrington Jara D.O.     Ximena Bernardo, MSN, APRN, NP-C  Jack Moscoso, MSN, APRN-CNP  Rishi Ruiz, MSN, APRN-CNP  Fernanda Tay, MSN APRN-CNP  Conchis Bradford, MSN. APRN-NP-C       Primary Care Physician: Lay Hunter MD   Admitting Physician:  Levi Bunn MD  Admission date and time: 3/13/2025  3:45 PM    Room:  [address] [place] [address]  Admitting diagnosis: Perforated viscus [R19.8]  SHALINI (acute kidney injury) [N17.9]  Acute diverticulitis [K57.92]  Diverticulitis of large intestine with perforation and abscess, unspecified bleeding status [K57.20]    Patient Name: Susanna Arriola  MRN: [id number]    Date of Service: 3/21/2025     Subjective:  Susanna is a 69 y.o. female who was seen and examined today,3/21/2025, at the bedside.  Patient does seem uncomfortable today patient has very little output from the ostomy but bowel sounds are present patient is complaining of generalized abdominal discomfort.  Polymicrobial infection is noted from the abdominal fluid and change it has been many antibiotics.  I have discussed the condition with Dr. Bunn and we will obtain repeat CT    Review of System: Limited due to dementia  Constitutional:   Ongoing malaise and fatigue.  HEENT:   Denies ear pain, sore throat, sinus or eye problems.  Cardiovascular:   Denies any chest pain, irregular heartbeats, or palpitations.   Respiratory:   Denies shortness of breath, coughing, sputum production, hemoptysis, or wheezing.  Gastrointestinal:   Complain of generalized pain.  Little output from ostomy  Genitourinary:    Denies any urgency, frequency, hematuria.  Voiding with the use of a catheter.  Extremities:   Denies lower extremity swelling, edema or cyanosis.   Neurology:    Denies any headache or focal neurological deficits, describes

## 2025-03-21 NOTE — CARE COORDINATION
CM note:  Pt was accepted by [facility] but declined by [facility].  Will have [facility] submit for pre-cert for potential weekend discharge.  For EL, pt will NEED PRE-CERT, HENS and JANA.  HENS completed..  JANA will need signed by physician.        ADDENDUM:1:32 PM  Pt placed on \"will call\" with physician ambulance for possible weekend discharge.

## 2025-03-21 NOTE — PLAN OF CARE
Problem: Discharge Planning  Goal: Discharge to home or other facility with appropriate resources  3/21/2025 0942 by Ines Victoria RN  Outcome: Progressing  3/20/2025 2223 by Yuki Lr RN  Outcome: Progressing     Problem: Pain  Goal: Verbalizes/displays adequate comfort level or baseline comfort level  3/21/2025 0942 by Ines Victoria RN  Outcome: Progressing  3/20/2025 2223 by Yuki Lr RN  Outcome: Progressing     Problem: ABCDS Injury Assessment  Goal: Absence of physical injury  3/21/2025 0942 by Ines Victoria RN  Outcome: Progressing  3/20/2025 2223 by Yuki Lr RN  Outcome: Progressing     Problem: Safety - Adult  Goal: Free from fall injury  3/21/2025 0942 by Ines Victoria RN  Outcome: Progressing  3/20/2025 2223 by Yuki Lr RN  Outcome: Progressing     Problem: Nutrition Deficit:  Goal: Optimize nutritional status  3/21/2025 0942 by Ines Victoria RN  Outcome: Progressing  3/20/2025 2223 by Yuki Lr RN  Outcome: Progressing

## 2025-03-21 NOTE — PROGRESS NOTES
Occupational Therapy  OCCUPATIONAL THERAPY TREATMENT NOTE    [facility]  [address]. [facility]  [address] [place], [place]   OT BEDSIDE TREATMENT NOTE      Date:3/21/2025  Patient Name: Susanna Arriola  MRN: [id number]  : 1955  Room: [address] [place] [address]     Evaluating OT: Mirna Dangelo OTR/L; [id number]      Referring Provider and Specific Provider Orders/Date:      25   OT eval and treat  Start:  25,   End:  25,   ONE TIME,   Standing Count:  1 Occurrences,   R         Levi Bunn MD       Placement Recommendation: Subacute         Diagnosis:   1. Acute diverticulitis    2. Perforated viscus    3. Diverticulitis of large intestine with perforation and abscess, unspecified bleeding status    4. SHALINI (acute kidney injury)    5. Pain         Surgery: Exploratory laparotomy with sigmoid colon resection, open intraabdominal abscess drainage, mobilization and placement of omental flap, mobilization of splenic flexure, and end descending colostomy placement        Pertinent Medical History:       Past Medical History        Past Medical History:   Diagnosis Date    Atrial fibrillation (HCC)      Dementia (HCC)      History of cardioversion 10/2021            Past Surgical History         Past Surgical History:   Procedure Laterality Date    COLECTOMY N/A 3/16/2025     OPEN SIGMOID COLON RESECTION; COLOSTOMY performed by Levi Bunn MD at [facility] OR          Precautions:  Fall Risk, s/p colostomy, poor historian, alarm    Pt states she had a brain aneurism in the past, pt often repeated \"I dont know\" to various questions.      Assessment of current deficits:     [x] Functional mobility            [x]ADLs           [x] Strength                   [x]Cognition    [x] Functional transfers          [x] IADLs          [x] Safety Awareness   [x]Endurance    [x] Fine Coordination              [x]  functional transfers, functional mobility, bed mobility to address safety awareness, implementation of fall prevention strategies, & functional engagement throughout ADLs. Pt would benefit from continued skilled OT to increase safety and independence with completion of ADL/IADL tasks for functional independence and quality of life.    Treatment: OT treatment provided this date includes:   ADL-  Instruction/training on safety and adapted techniques for completion of ADLs: Therapist facilitated & pt educated on activity modifications/adaptations to promote implementation of fall prevention strategies, EC/WS strategies, & safety awareness throughout ADLs.   Mobility-  Instruction/training on safety and improved independence with bed mobility/functional transfers  and functional mobility.   Sitting/Standing Balance/Tolerance:  to increase balance and activity tolerance during ADLs and facilitate proper posture and positioning.   Activity tolerance- Instruction/training on energy conservation/work simplification for completion of ADLs:.     Neuromuscular Reeducation to facilitate balance/righting reactions for increased function with ADLs tasks:    Cognitive retraining -  Cues for safety, sequencing, and problem solving    Skilled positioning/alignment-  Therapist facilitated proper positioning/alignment throughout session to maintain skin/joint integrity & proper body mechanics.      Pt has made fair progress towards set goals.   Continue with current plan of care      Treatment Time In:0932            Treatment Time Out: 1000                Treatment Charges: Mins Units   Ther Ex  09063     Manual Therapy 83443     Thera Activities 55484 18 1   ADL/Home Mgt 97947 10 1   Neuro Re-ed 18680     Group Therapy      Orthotic manage/training  76123     Non-Billable Time     Total Timed Treatment 28 2         Erika HENSON/JEREMY [id number]

## 2025-03-21 NOTE — PROGRESS NOTES
CMS 0-100% Score: 76.75  Mobility Inpatient CMS G-Code Modifier : CL    Nursing cleared patient for PT treatment.      OBJECTIVE:   Initial Evaluation  Date: 3/20/2025 Treatment Date:  3/21/2025       Short Term/ Long Term   Goals   Was pt agreeable to Eval/treatment? Yes yes To be met in 3 days   Pain level   8/10  abdomen 8/10  abdomen    Bed Mobility  Using rails and head of bed elevated:       Rolling: Maximal assist of 1    Supine to sit: Maximal assist of 1    Sit to supine: Maximal assist of  2    Scooting: Maximal assist of  2      Dependent of  2 to scoot to Head of bed in trendelenburg   Using rails and head of bed elevated:     Rolling: Maximal assist of 1   Supine to sit: Maximal assist of  2   Sit to supine: Maximal assist of  2   Scooting: Maximal assist of 1    Rolling: Moderate assist of 1    Supine to sit: Moderate assist of 1    Sit to supine: Moderate assist of 1    Scooting: Moderate assist of 1     Transfers Sit to stand: Maximal assist of  2 chux to elevate hips Sit to stand: Maximal assist of  2 Cues for hand placement and safety     Sit to stand: Moderate assist of 1     Ambulation     4 side steps using  hand held assist with Maximal assist of  2   for balance, Patient with shuffling steps, and cues for sequencing, upright posture, and safety 3 side steps using  wheeled walker with Maximal assist of  2   for balance and cues for sequencing, upright posture, increased base of support, increased step length, and safety     20 feet using  wheeled walker with Moderate assist of 1      Stair negotiation: ascended and descended   Not assessed          ROM Within functional limits    Increase range of motion 10% of affected joints    Strength BUE:  refer to OT eval  RLE:  3 3+/5  LLE:  3 3+/5  Increase strength in affected mm groups by 1/3 grade   Balance Sitting EOB:  fair - moderate progressing to minimal assist  Dynamic Standing:  poor assist x   Sitting EOB: fair   Dynamic Standing: poor using  HHA x 2   Sitting EOB:  good    Dynamic Standing: fair wheeled walker      Patient is Alert & Oriented x person, place, and time and follows directions    Sensation:  Patient  denies numbness/tingling   Edema:  none noted   Endurance: fair       Vitals: room air   Blood Pressure at rest  Blood Pressure during session    Heart Rate at rest   Heart Rate during session    SPO2 at rest  %  SPO2 during session %     Patient education  Patient educated on role of Physical Therapy, risks of immobility, safety and plan of care,  importance of mobility while in hospital , safety , and positioning for skin integrity and comfort     Patient response to education:   Pt verbalized understanding Pt demonstrated skill Pt requires further education in this area   Yes Partial Yes      Treatment:  Patient practiced and was instructed/facilitated in the following treatment: Patient  Patient educated/performed log roll, assisted to edge of bed, Sat edge of bed 17 minutes with Minimal progressing to supervision to increase dynamic sitting balance and activity tolerance. Working on sitting balance due to right lateral lean, brushing her hair, and working on upright posture. Stood as above, steps to Head of bed. Returned to bed, Dependent of  2 to scoot to Head of bed in trendelenburg.       Therapeutic Exercises:  not performed      At end of session, patient in bed with alarm call light and phone within reach,  all lines and tubes intact, nursing notified.      Patient would benefit from continued skilled Physical Therapy to improve functional independence and quality of life.         Patient's/ family goals   none stated    Time in  09:34  Time out  09:59    Total Treatment Time  25 minutes        CPT codes:    Therapeutic activities (98488)   25 minutes  2 unit(s)    Sanjiv Rodarte  [facility]  LIC # [id number]

## 2025-03-21 NOTE — PROGRESS NOTES
Surgery Progress Note            Chief complaint:   Chief Complaint   Patient presents with    Abdominal Pain     Abdominal pain started a few days ago from home, patient currently on antibiotics for uti      Patient Active Problem List   Diagnosis    Atrial fibrillation with RVR (HCC)    Perforated diverticulitis       S: doing well, no emesis    O:   Vitals:    03/21/25 0904   BP:    Pulse:    Resp: 18   Temp:    SpO2:        Intake/Output Summary (Last 24 hours) at 3/21/2025 1013  Last data filed at 3/21/2025 1004  Gross per 24 hour   Intake 7196.31 ml   Output 275 ml   Net 6921.31 ml           Labs:  Lab Results   Component Value Date/Time    WBC 7.2 03/21/2025 03:35 AM    WBC 7.7 03/20/2025 04:06 AM    WBC 9.8 03/19/2025 04:28 AM    HGB 7.8 03/21/2025 03:35 AM    HGB 7.8 03/20/2025 04:06 AM    HGB 8.3 03/19/2025 04:28 AM    HCT 24.7 03/21/2025 03:35 AM    HCT 25.3 03/20/2025 04:06 AM    HCT 26.5 03/19/2025 04:28 AM     Lab Results   Component Value Date    CREATININE 0.9 03/21/2025    BUN 8 03/21/2025     03/21/2025    K 3.7 03/21/2025     (H) 03/21/2025    CO2 19 (L) 03/21/2025     No results found for: \"LIPASE\", \"AMYLASE\"      Physical exam:   /70   Pulse 71   Temp 97.5 °F (36.4 °C) (Oral)   Resp 18   Ht 1.651 m (5' 5\")   Wt 87.1 kg (192 lb)   SpO2 97%   BMI 31.95 kg/m²   General appearance: NAD  Head: NCAT  Neck: supple, no masses  Lungs: equal chest rise bilateral  Heart: S1S2 present  Abdomen: soft, moderately tender, nondistended,  Incisions bandaged and ostomy pink and patent, no output  Skin; no lesions  Gu: no cva tenderness  Extremities: extremities normal, atraumatic, no cyanosis or edema    A:  POD #5 Exploratory laparotomy with sigmoid colon resection, open intraabdominal abscess drainage, mobilization and placement of omental flap, mobilization of splenic flexure, and end descending colostomy placement and drain placement.     P: for CT today    Levi Bunn,  MD  3/21/2025

## 2025-03-21 NOTE — PLAN OF CARE
Problem: Discharge Planning  Goal: Discharge to home or other facility with appropriate resources  3/20/2025 2223 by Yuki Lr RN  Outcome: Progressing  3/20/2025 1105 by Tamika Esquivel RN  Outcome: Progressing     Problem: Pain  Goal: Verbalizes/displays adequate comfort level or baseline comfort level  3/20/2025 2223 by Yuki Lr RN  Outcome: Progressing  3/20/2025 1105 by Tamika Esquivel RN  Outcome: Progressing     Problem: ABCDS Injury Assessment  Goal: Absence of physical injury  3/20/2025 2223 by Yuki Lr RN  Outcome: Progressing  3/20/2025 1105 by Tamika Esquivel RN  Outcome: Progressing     Problem: Safety - Adult  Goal: Free from fall injury  3/20/2025 2223 by Yuki Lr RN  Outcome: Progressing  3/20/2025 1105 by Tamkia Esquivel, RN  Outcome: Progressing     Problem: Nutrition Deficit:  Goal: Optimize nutritional status  3/20/2025 2223 by Yuki Lr RN  Outcome: Progressing  3/20/2025 1105 by Tamika Esquivel RN  Outcome: Progressing

## 2025-03-22 VITALS
BODY MASS INDEX: 31.99 KG/M2 | SYSTOLIC BLOOD PRESSURE: 135 MMHG | HEART RATE: 72 BPM | DIASTOLIC BLOOD PRESSURE: 66 MMHG | TEMPERATURE: 98.6 F | OXYGEN SATURATION: 96 % | WEIGHT: 192 LBS | RESPIRATION RATE: 17 BRPM | HEIGHT: 65 IN

## 2025-03-22 LAB
ALBUMIN SERPL-MCNC: 2.4 G/DL (ref 3.5–5.2)
ALP SERPL-CCNC: 76 U/L (ref 35–104)
ALT SERPL-CCNC: 12 U/L (ref 0–32)
ANION GAP SERPL CALCULATED.3IONS-SCNC: 8 MMOL/L (ref 7–16)
AST SERPL-CCNC: 24 U/L (ref 0–31)
BASOPHILS # BLD: 0.04 K/UL (ref 0–0.2)
BASOPHILS NFR BLD: 0 % (ref 0–2)
BILIRUB SERPL-MCNC: 0.2 MG/DL (ref 0–1.2)
BUN SERPL-MCNC: 6 MG/DL (ref 6–23)
CALCIUM SERPL-MCNC: 7.8 MG/DL (ref 8.6–10.2)
CHLORIDE SERPL-SCNC: 110 MMOL/L (ref 98–107)
CO2 SERPL-SCNC: 19 MMOL/L (ref 22–29)
CREAT SERPL-MCNC: 0.9 MG/DL (ref 0.5–1)
EOSINOPHIL # BLD: 0.04 K/UL (ref 0.05–0.5)
EOSINOPHILS RELATIVE PERCENT: 0 % (ref 0–6)
ERYTHROCYTE [DISTWIDTH] IN BLOOD BY AUTOMATED COUNT: 15.5 % (ref 11.5–15)
GFR, ESTIMATED: 69 ML/MIN/1.73M2
GLUCOSE SERPL-MCNC: 93 MG/DL (ref 74–99)
HCT VFR BLD AUTO: 25.8 % (ref 34–48)
HGB BLD-MCNC: 8.1 G/DL (ref 11.5–15.5)
IMM GRANULOCYTES # BLD AUTO: 0.12 K/UL (ref 0–0.58)
IMM GRANULOCYTES NFR BLD: 1 % (ref 0–5)
LYMPHOCYTES NFR BLD: 1.51 K/UL (ref 1.5–4)
LYMPHOCYTES RELATIVE PERCENT: 17 % (ref 20–42)
MAGNESIUM SERPL-MCNC: 1.9 MG/DL (ref 1.6–2.6)
MCH RBC QN AUTO: 27.3 PG (ref 26–35)
MCHC RBC AUTO-ENTMCNC: 31.4 G/DL (ref 32–34.5)
MCV RBC AUTO: 86.9 FL (ref 80–99.9)
MONOCYTES NFR BLD: 0.61 K/UL (ref 0.1–0.95)
MONOCYTES NFR BLD: 7 % (ref 2–12)
NEUTROPHILS NFR BLD: 75 % (ref 43–80)
NEUTS SEG NFR BLD: 6.74 K/UL (ref 1.8–7.3)
PHOSPHATE SERPL-MCNC: 2.6 MG/DL (ref 2.5–4.5)
PLATELET # BLD AUTO: 434 K/UL (ref 130–450)
PMV BLD AUTO: 9.2 FL (ref 7–12)
POTASSIUM SERPL-SCNC: 3.3 MMOL/L (ref 3.5–5)
PROT SERPL-MCNC: 5.6 G/DL (ref 6.4–8.3)
RBC # BLD AUTO: 2.97 M/UL (ref 3.5–5.5)
SODIUM SERPL-SCNC: 137 MMOL/L (ref 132–146)
WBC OTHER # BLD: 9.1 K/UL (ref 4.5–11.5)

## 2025-03-22 PROCEDURE — 80053 COMPREHEN METABOLIC PANEL: CPT

## 2025-03-22 PROCEDURE — 6360000002 HC RX W HCPCS: Performed by: INTERNAL MEDICINE

## 2025-03-22 PROCEDURE — 6360000002 HC RX W HCPCS

## 2025-03-22 PROCEDURE — 85025 COMPLETE CBC W/AUTO DIFF WBC: CPT

## 2025-03-22 PROCEDURE — 83735 ASSAY OF MAGNESIUM: CPT

## 2025-03-22 PROCEDURE — 6370000000 HC RX 637 (ALT 250 FOR IP): Performed by: SURGERY

## 2025-03-22 PROCEDURE — 84100 ASSAY OF PHOSPHORUS: CPT

## 2025-03-22 PROCEDURE — 36415 COLL VENOUS BLD VENIPUNCTURE: CPT

## 2025-03-22 PROCEDURE — 6370000000 HC RX 637 (ALT 250 FOR IP): Performed by: INTERNAL MEDICINE

## 2025-03-22 RX ORDER — LEVOFLOXACIN 750 MG/1
750 TABLET, FILM COATED ORAL EVERY 24 HOURS
Status: DISCONTINUED | OUTPATIENT
Start: 2025-03-22 | End: 2025-03-22 | Stop reason: HOSPADM

## 2025-03-22 RX ORDER — METOCLOPRAMIDE 5 MG/1
5 TABLET ORAL
Qty: 15 TABLET | Refills: 0 | Status: SHIPPED | OUTPATIENT
Start: 2025-03-22 | End: 2025-03-27

## 2025-03-22 RX ORDER — METRONIDAZOLE 500 MG/1
500 TABLET ORAL EVERY 8 HOURS SCHEDULED
Qty: 15 TABLET | Refills: 0 | Status: SHIPPED | OUTPATIENT
Start: 2025-03-22 | End: 2025-03-27

## 2025-03-22 RX ORDER — METOCLOPRAMIDE 5 MG/1
5 TABLET ORAL
Status: DISCONTINUED | OUTPATIENT
Start: 2025-03-22 | End: 2025-03-22 | Stop reason: HOSPADM

## 2025-03-22 RX ORDER — TRAMADOL HYDROCHLORIDE 50 MG/1
50 TABLET ORAL EVERY 6 HOURS PRN
Qty: 10 TABLET | Refills: 0 | Status: SHIPPED | OUTPATIENT
Start: 2025-03-22 | End: 2025-03-27

## 2025-03-22 RX ORDER — ONDANSETRON 4 MG/1
4 TABLET, ORALLY DISINTEGRATING ORAL EVERY 8 HOURS PRN
DISCHARGE
Start: 2025-03-22

## 2025-03-22 RX ORDER — LEVOFLOXACIN 750 MG/1
750 TABLET, FILM COATED ORAL EVERY 24 HOURS
Qty: 5 TABLET | Refills: 0 | Status: SHIPPED | OUTPATIENT
Start: 2025-03-22 | End: 2025-03-27

## 2025-03-22 RX ADMIN — METOCLOPRAMIDE 5 MG: 5 TABLET ORAL at 10:08

## 2025-03-22 RX ADMIN — METRONIDAZOLE 500 MG: 500 TABLET ORAL at 06:16

## 2025-03-22 RX ADMIN — ATORVASTATIN CALCIUM 10 MG: 10 TABLET, FILM COATED ORAL at 08:16

## 2025-03-22 RX ADMIN — METRONIDAZOLE 500 MG: 500 TABLET ORAL at 13:05

## 2025-03-22 RX ADMIN — METOPROLOL TARTRATE 75 MG: 50 TABLET, FILM COATED ORAL at 08:16

## 2025-03-22 RX ADMIN — ENOXAPARIN SODIUM 40 MG: 100 INJECTION SUBCUTANEOUS at 08:16

## 2025-03-22 RX ADMIN — ZONISAMIDE 100 MG: 100 CAPSULE ORAL at 16:14

## 2025-03-22 RX ADMIN — SERTRALINE 100 MG: 50 TABLET, FILM COATED ORAL at 08:16

## 2025-03-22 RX ADMIN — METOCLOPRAMIDE HYDROCHLORIDE 5 MG: 5 INJECTION INTRAMUSCULAR; INTRAVENOUS at 06:17

## 2025-03-22 RX ADMIN — ZONISAMIDE 100 MG: 100 CAPSULE ORAL at 13:05

## 2025-03-22 RX ADMIN — ZONISAMIDE 100 MG: 100 CAPSULE ORAL at 08:15

## 2025-03-22 RX ADMIN — METOCLOPRAMIDE 5 MG: 5 TABLET ORAL at 16:14

## 2025-03-22 RX ADMIN — LEVOFLOXACIN 750 MG: 750 TABLET, FILM COATED ORAL at 06:29

## 2025-03-22 RX ADMIN — TRAMADOL HYDROCHLORIDE 50 MG: 50 TABLET, COATED ORAL at 08:26

## 2025-03-22 RX ADMIN — FUROSEMIDE 20 MG: 10 INJECTION, SOLUTION INTRAMUSCULAR; INTRAVENOUS at 06:28

## 2025-03-22 RX ADMIN — POTASSIUM CHLORIDE 40 MEQ: 1500 TABLET, EXTENDED RELEASE ORAL at 10:08

## 2025-03-22 RX ADMIN — OXCARBAZEPINE 600 MG: 150 TABLET, FILM COATED ORAL at 08:16

## 2025-03-22 ASSESSMENT — PAIN SCALES - GENERAL: PAINLEVEL_OUTOF10: 9

## 2025-03-22 ASSESSMENT — PAIN DESCRIPTION - LOCATION: LOCATION: ABDOMEN

## 2025-03-22 NOTE — CARE COORDINATION
3/22/2025: SS NOTE:   Notified by Mist[facility] liaison for [facility] that she received PRE-CERT, transfer confirmed for today via PAS by stretcher at 2:30 pm, HENS and JANA completed. Nursing and pt's spouse informed of transfer arrangements..Electronically signed by PANCHO Goel on 3/22/2025 at 11:16 AM

## 2025-03-22 NOTE — PROGRESS NOTES
Surgery Progress Note            Chief complaint:   Chief Complaint   Patient presents with    Abdominal Pain     Abdominal pain started a few days ago from home, patient currently on antibiotics for uti      Patient Active Problem List   Diagnosis    Atrial fibrillation with RVR (HCC)    Perforated diverticulitis       S: doing well, no emesis    O:   Vitals:    03/22/25 0630   BP:    Pulse:    Resp: 17   Temp:    SpO2:        Intake/Output Summary (Last 24 hours) at 3/22/2025 1253  Last data filed at 3/22/2025 1238  Gross per 24 hour   Intake --   Output 1050 ml   Net -1050 ml           Labs:  Lab Results   Component Value Date/Time    WBC 9.1 03/22/2025 04:23 AM    WBC 7.2 03/21/2025 03:35 AM    WBC 7.7 03/20/2025 04:06 AM    HGB 8.1 03/22/2025 04:23 AM    HGB 7.8 03/21/2025 03:35 AM    HGB 7.8 03/20/2025 04:06 AM    HCT 25.8 03/22/2025 04:23 AM    HCT 24.7 03/21/2025 03:35 AM    HCT 25.3 03/20/2025 04:06 AM     Lab Results   Component Value Date    CREATININE 0.9 03/22/2025    BUN 6 03/22/2025     03/22/2025    K 3.3 (L) 03/22/2025     (H) 03/22/2025    CO2 19 (L) 03/22/2025     No results found for: \"LIPASE\", \"AMYLASE\"      Physical exam:   /66   Pulse 72   Temp 98.6 °F (37 °C) (Oral)   Resp 17   Ht 1.651 m (5' 5\")   Wt 87.1 kg (192 lb)   SpO2 96%   BMI 31.95 kg/m²   General appearance: NAD  Head: NCAT  Neck: supple, no masses  Lungs: equal chest rise bilateral  Heart: S1S2 present  Abdomen: soft, moderately tender, nondistended,  Incisions bandaged and ostomy pink and patent, no output  Skin; no lesions  Gu: no cva tenderness  Extremities: extremities normal, atraumatic, no cyanosis or edema    A:  POD #6 Exploratory laparotomy with sigmoid colon resection, open intraabdominal abscess drainage, mobilization and placement of omental flap, mobilization of splenic flexure, and end descending colostomy placement and drain placement.     P: ostomy functioning, ok for d/c     Levi HURST

## 2025-03-22 NOTE — PLAN OF CARE
Problem: Discharge Planning  Goal: Discharge to home or other facility with appropriate resources  3/21/2025 2147 by Yuki Lr RN  Outcome: Progressing  3/21/2025 0942 by Ines Victoria RN  Outcome: Progressing     Problem: Pain  Goal: Verbalizes/displays adequate comfort level or baseline comfort level  3/21/2025 2147 by Yuki Lr RN  Outcome: Progressing  3/21/2025 0942 by Ines Victoria RN  Outcome: Progressing     Problem: ABCDS Injury Assessment  Goal: Absence of physical injury  3/21/2025 2147 by Yuki Lr RN  Outcome: Progressing  3/21/2025 0942 by Ines Victoria, RN  Outcome: Progressing     Problem: Nutrition Deficit:  Goal: Optimize nutritional status  3/21/2025 2147 by Yuki Lr RN  Outcome: Progressing  3/21/2025 0942 by Ines Victoria, RN  Outcome: Progressing

## 2025-03-22 NOTE — PROGRESS NOTES
Internal Medicine Consult Note    [facility]    Romeo Drake D.O., AJOLisaI.                    Shen Solo D.O., AJOLisaI.                             ABDULLAHI MonroeO.     Ximena Bernardo, MSN, APRN, NP-C  Jack Moscoso, MSN, APRN-CNP  Rishi Ruiz, MSN, APRN-CNP  Fernanda Tay, MSN APRN-CNP  Conchis Bradford, MSN. APRN-NP-C       Primary Care Physician: Lay Hunter MD   Admitting Physician:  Levi Bunn MD  Admission date and time: 3/13/2025  3:45 PM    Room:  [address] [place] [address]  Admitting diagnosis: Perforated viscus [R19.8]  SHALINI (acute kidney injury) [N17.9]  Acute diverticulitis [K57.92]  Diverticulitis of large intestine with perforation and abscess, unspecified bleeding status [K57.20]    Patient Name: Susanna Arriola  MRN: [id number]    Date of Service: 3/22/2025     Subjective:  Susanna is a 69 y.o. female who was seen and examined today,3/22/2025, at the bedside.  Susanna seems comfortable during my examination with increasing ostomy output.  She denies overt abdominal pain but does have some mild shortness of breath.  She is maintained on a regular diet without complication.  Discharge planning is underway as we await the precertification process.    Review of System:   Limited due to dementia  Constitutional:   Stable malaise and fatigue with ongoing daily improvement  HEENT:   Denies ear pain, sore throat, sinus or eye problems.  Cardiovascular:   Denies any chest pain, irregular heartbeats, or palpitations.   Respiratory:   Mild shortness of breath.  Gastrointestinal:   Increasing ostomy output.  She denies overt abdominal pain.  Genitourinary:    Denies any urgency, frequency, hematuria.  Voiding with the use of a catheter.  Extremities:   Denies lower extremity swelling, edema or cyanosis.   Neurology:    Denies any headache or focal neurological deficits, acute on chronic weakness and deconditioning.  Psch:   Confused compatible with  hospital-acquired delirium  Essential hypertension  Hyperlipidemia  Depression    Plan:   Repeat CT scan results have been reviewed with stable findings.  She is having increasing ostomy output at this point.  Antibiotics are being adjusted and transitioned to oral.  Diet is being advanced.  IV fluids can be discontinued today particularly as imaging suggests mild pleural effusion development.  A one-time dose of Lasix will be administered.  We will continue to hold Eliquis therapy and we can consider resumption down the line as an outpatient.  The patient is acceptable for discharge once precertification has been obtained and we will reach out to our discharge planning team to determine if this may have been obtained late last evening or early this morning.  Medication reconciliation has been completed in the event precertification is obtained today.    More than 50% of my time was spent at the bedside counseling/coordinating care with the patient and/or family with face to face contact.  This time was spent reviewing notes and laboratory data as well as instructing and counseling the patient. Time I spent with the family or surrogate(s) is included only if the patient was incapable of providing the necessary information or participating in medical decisions. I also discussed the differential diagnosis and all of the proposed management plans with the patient and individuals accompanying the patient. I am readily available for any further decision-making and intervention.       Shen Solo DO, FACOI  6:21 AM  3/22/2025

## 2025-03-22 NOTE — CARE COORDINATION
3/22/2025: SS NOTE:   SS Consult noted to \"check if PRECERTIFICATION was obtained as pt is acceptable for discharge today 3/22\", Misty, liaison for [facility] notified and will contact  or nurses station if she received insurance authorization and to confirm pt's transfer, Nursing informed.Electronically signed by PANCHO Goel on 3/22/2025 at 8:43 AM

## 2025-03-22 NOTE — DISCHARGE INSTRUCTIONS
Your information:  Name: Susanna Arriola  : 1955    Your instructions:    Discharge to [facility]    What to do after you leave the hospital:    Recommended diet: regular diet    Recommended activity: activity as tolerated        The following personal items were collected during your admission and were returned to you:    Belongings  Dental Appliances: None  Vision - Corrective Lenses: Eyeglasses, At bedside  Hearing Aid: None  Clothing: Sweater, Pants, Jacket/Coat, Undergarments  Jewelry: Earrings, Ring, At home  Body Piercings Removed: N/A  Electronic Devices: None  Weapons (Notify Protective Services/Security): None  Home Medications: None  Valuables Given To: Other (Comment) (n/a)  Provide Name(s) of Who Valuable(s) Were Given To: n/a    Information obtained by:  By signing below, I understand that if any problems occur once I leave the hospital I am to contact Dr. Bunn.  I understand and acknowledge receipt of the instructions indicated above.

## 2025-03-24 LAB — SURGICAL PATHOLOGY REPORT: NORMAL

## 2025-03-24 NOTE — DISCHARGE SUMMARY
Physician Discharge Summary     Susanna Arriola  [id number]    Admit date: 3/13/2025    Discharge date and time: 3/24/2025     Admitting Physician: Levi Bunn MD     Admission Diagnoses: Perforated viscus [R19.8]  SHALINI (acute kidney injury) [N17.9]  Acute diverticulitis [K57.92]  Diverticulitis of large intestine with perforation and abscess, unspecified bleeding status [K57.20]    Discharge diagnosis: same    Condition at discharge: stable        Hospital Course: Had uncomplicated ex lap and sigmoid resection with ostomy and uncomplicated post operative course and was discharged tolerating a general diet, having good bowel function, ambulating independently and with adequate analgesia.    Discharge Exam: /66   Pulse 72   Temp 98.6 °F (37 °C) (Oral)   Resp 17   Ht 1.651 m (5' 5\")   Wt 87.1 kg (192 lb)   SpO2 96%   BMI 31.95 kg/m²     General appearance: alert, appears stated age and cooperative  Head: Normocephalic, without obvious abnormality, atraumatic  Neck: no adenopathy, no carotid bruit, no JVD, supple, symmetrical, trachea midline and thyroid not enlarged, symmetric, no tenderness/mass/nodules  Lungs: clear to auscultation bilaterally  Heart: regular rate and rhythm, S1, S2 normal, no murmur, click, rub or gallop  Abdomen: soft, non-tender; bowel sounds normal; no masses,  no organomegaly and incisions clean and dry, packed, ostomy pink and patent with stool  Extremities: extremities normal, atraumatic, no cyanosis or edema      Disposition: home    Patient Instructions:     Activity: activity as tolerated  Diet: regular diet  Wound Care: keep wound clean and dry    Follow-up with Dr. Bunn in 2 weeks.  Over 30 min spent preparing discharge and discussing it and follow up instructions with patient.  Signed:  Levi Bunn MD  3/24/2025  12:13 PM  3

## 2025-03-25 LAB
MICROORGANISM SPEC CULT: NORMAL
MICROORGANISM/AGENT SPEC: NORMAL
SERVICE CMNT-IMP: NORMAL
SPECIMEN DESCRIPTION: NORMAL

## 2025-03-26 ENCOUNTER — OUTSIDE SERVICES (OUTPATIENT)
Dept: PRIMARY CARE CLINIC | Age: 70
End: 2025-03-26
Payer: MEDICARE

## 2025-03-26 DIAGNOSIS — I10 PRIMARY HYPERTENSION: ICD-10-CM

## 2025-03-26 DIAGNOSIS — E78.2 MIXED HYPERLIPIDEMIA: ICD-10-CM

## 2025-03-26 DIAGNOSIS — R53.1 WEAKNESS: ICD-10-CM

## 2025-03-26 DIAGNOSIS — N17.9 AKI (ACUTE KIDNEY INJURY): Primary | ICD-10-CM

## 2025-03-26 DIAGNOSIS — G91.2 NPH (NORMAL PRESSURE HYDROCEPHALUS) (HCC): ICD-10-CM

## 2025-03-26 DIAGNOSIS — Z85.3 HX OF BREAST CANCER: ICD-10-CM

## 2025-03-26 DIAGNOSIS — Z93.3 COLOSTOMY STATUS (HCC): ICD-10-CM

## 2025-03-26 DIAGNOSIS — F03.C3 SEVERE DEMENTIA WITH MOOD DISTURBANCE, UNSPECIFIED DEMENTIA TYPE (HCC): ICD-10-CM

## 2025-03-26 DIAGNOSIS — K57.92 ACUTE DIVERTICULITIS: ICD-10-CM

## 2025-03-26 DIAGNOSIS — Z98.2 PRESENCE OF CEREBROSPINAL FLUID DRAINAGE DEVICE: ICD-10-CM

## 2025-03-26 PROCEDURE — 99306 1ST NF CARE HIGH MDM 50: CPT | Performed by: INTERNAL MEDICINE

## 2025-03-26 PROCEDURE — 99497 ADVNCD CARE PLAN 30 MIN: CPT | Performed by: INTERNAL MEDICINE

## 2025-04-01 NOTE — PROGRESS NOTES
Advanced Care Planning: Discussed the patient’s choices for care and treatment in case of a health event that adversely affects decision-making abilities. Also discussed the patient’s long-term treatment options. Reviewed with the patient the appropriate state-specific advance directive documents. Reviewed the process of designating a competent adult as an Agent (or -in-fact) that could take make health care decisions for the patient if incompetent. Patient was asked to complete the declaration forms, if they have not already, either acknowledge the forms by a public notary or an eligible witness and provide a signed copy to the practice office. Patient is a full code     Time spent (minutes): 18      IWilliam MD, personally performed the services described in this documentation as scribed by Fernanda Pitts and it is both accurate and complete

## 2025-04-04 ENCOUNTER — OUTSIDE SERVICES (OUTPATIENT)
Dept: PRIMARY CARE CLINIC | Age: 70
End: 2025-04-04
Payer: MEDICARE

## 2025-04-04 DIAGNOSIS — F03.C3 SEVERE DEMENTIA WITH MOOD DISTURBANCE, UNSPECIFIED DEMENTIA TYPE (HCC): ICD-10-CM

## 2025-04-04 DIAGNOSIS — K57.92 ACUTE DIVERTICULITIS: ICD-10-CM

## 2025-04-04 DIAGNOSIS — G91.2 NPH (NORMAL PRESSURE HYDROCEPHALUS) (HCC): ICD-10-CM

## 2025-04-04 DIAGNOSIS — Z85.3 HX OF BREAST CANCER: ICD-10-CM

## 2025-04-04 DIAGNOSIS — Z93.3 COLOSTOMY STATUS (HCC): ICD-10-CM

## 2025-04-04 DIAGNOSIS — E78.2 MIXED HYPERLIPIDEMIA: ICD-10-CM

## 2025-04-04 DIAGNOSIS — I10 PRIMARY HYPERTENSION: ICD-10-CM

## 2025-04-04 DIAGNOSIS — N17.9 AKI (ACUTE KIDNEY INJURY): Primary | ICD-10-CM

## 2025-04-04 DIAGNOSIS — Z98.2 PRESENCE OF CEREBROSPINAL FLUID DRAINAGE DEVICE: ICD-10-CM

## 2025-04-04 DIAGNOSIS — R53.1 WEAKNESS: ICD-10-CM

## 2025-04-04 PROCEDURE — 99309 SBSQ NF CARE MODERATE MDM 30: CPT | Performed by: INTERNAL MEDICINE

## 2025-04-07 ASSESSMENT — VISUAL ACUITY: OU: 1

## 2025-04-07 ASSESSMENT — ENCOUNTER SYMPTOMS: ABDOMINAL PAIN: 1

## 2025-04-07 NOTE — PROGRESS NOTES
ELE MCCOLLUM, William Doyle MD, personally performed the services described in this documentation as scribed by Albania Yan and it is both accurate and complete

## 2025-04-14 ENCOUNTER — APPOINTMENT (OUTPATIENT)
Dept: CT IMAGING | Age: 70
DRG: 393 | End: 2025-04-14
Payer: MEDICARE

## 2025-04-14 ENCOUNTER — HOSPITAL ENCOUNTER (INPATIENT)
Age: 70
LOS: 7 days | Discharge: HOME HEALTH | DRG: 393 | End: 2025-04-21
Attending: EMERGENCY MEDICINE | Admitting: INTERNAL MEDICINE
Payer: MEDICARE

## 2025-04-14 ENCOUNTER — APPOINTMENT (OUTPATIENT)
Dept: GENERAL RADIOLOGY | Age: 70
DRG: 393 | End: 2025-04-14
Payer: MEDICARE

## 2025-04-14 DIAGNOSIS — E87.6 HYPOKALEMIA: ICD-10-CM

## 2025-04-14 DIAGNOSIS — R57.9 SHOCK (HCC): Primary | ICD-10-CM

## 2025-04-14 DIAGNOSIS — I48.11 LONGSTANDING PERSISTENT ATRIAL FIBRILLATION (HCC): ICD-10-CM

## 2025-04-14 DIAGNOSIS — K94.00 COLOSTOMY COMPLICATION (HCC): ICD-10-CM

## 2025-04-14 LAB
ALBUMIN SERPL-MCNC: 3.6 G/DL (ref 3.5–5.2)
ALP SERPL-CCNC: 99 U/L (ref 35–104)
ALT SERPL-CCNC: 12 U/L (ref 0–32)
ANION GAP SERPL CALCULATED.3IONS-SCNC: 14 MMOL/L (ref 7–16)
AST SERPL-CCNC: 19 U/L (ref 0–31)
BASOPHILS # BLD: 0.03 K/UL (ref 0–0.2)
BASOPHILS NFR BLD: 1 % (ref 0–2)
BILIRUB SERPL-MCNC: 0.3 MG/DL (ref 0–1.2)
BILIRUB UR QL STRIP: NEGATIVE
BUN SERPL-MCNC: 13 MG/DL (ref 6–23)
CALCIUM SERPL-MCNC: 9.5 MG/DL (ref 8.6–10.2)
CHLORIDE SERPL-SCNC: 102 MMOL/L (ref 98–107)
CLARITY UR: CLEAR
CO2 SERPL-SCNC: 26 MMOL/L (ref 22–29)
COLOR UR: YELLOW
CREAT SERPL-MCNC: 1 MG/DL (ref 0.5–1)
EOSINOPHIL # BLD: 0.06 K/UL (ref 0.05–0.5)
EOSINOPHILS RELATIVE PERCENT: 1 % (ref 0–6)
EPI CELLS #/AREA URNS HPF: NORMAL /HPF
ERYTHROCYTE [DISTWIDTH] IN BLOOD BY AUTOMATED COUNT: 15.2 % (ref 11.5–15)
GFR, ESTIMATED: 58 ML/MIN/1.73M2
GLUCOSE SERPL-MCNC: 92 MG/DL (ref 74–99)
GLUCOSE UR STRIP-MCNC: NEGATIVE MG/DL
HCT VFR BLD AUTO: 36.6 % (ref 34–48)
HGB BLD-MCNC: 11.5 G/DL (ref 11.5–15.5)
HGB UR QL STRIP.AUTO: NEGATIVE
IMM GRANULOCYTES # BLD AUTO: <0.03 K/UL (ref 0–0.58)
IMM GRANULOCYTES NFR BLD: 0 % (ref 0–5)
KETONES UR STRIP-MCNC: NEGATIVE MG/DL
LACTATE BLDV-SCNC: 1.3 MMOL/L (ref 0.5–1.9)
LEUKOCYTE ESTERASE UR QL STRIP: NEGATIVE
LYMPHOCYTES NFR BLD: 1.37 K/UL (ref 1.5–4)
LYMPHOCYTES RELATIVE PERCENT: 24 % (ref 20–42)
MAGNESIUM SERPL-MCNC: 1.8 MG/DL (ref 1.6–2.6)
MCH RBC QN AUTO: 26.9 PG (ref 26–35)
MCHC RBC AUTO-ENTMCNC: 31.4 G/DL (ref 32–34.5)
MCV RBC AUTO: 85.5 FL (ref 80–99.9)
MONOCYTES NFR BLD: 0.5 K/UL (ref 0.1–0.95)
MONOCYTES NFR BLD: 9 % (ref 2–12)
NEUTROPHILS NFR BLD: 66 % (ref 43–80)
NEUTS SEG NFR BLD: 3.86 K/UL (ref 1.8–7.3)
NITRITE UR QL STRIP: NEGATIVE
PH UR STRIP: 6 [PH] (ref 5–8)
PLATELET # BLD AUTO: 338 K/UL (ref 130–450)
PMV BLD AUTO: 9.9 FL (ref 7–12)
POTASSIUM SERPL-SCNC: 3.2 MMOL/L (ref 3.5–5)
PROT SERPL-MCNC: 7.3 G/DL (ref 6.4–8.3)
PROT UR STRIP-MCNC: NEGATIVE MG/DL
RBC # BLD AUTO: 4.28 M/UL (ref 3.5–5.5)
RBC #/AREA URNS HPF: NORMAL /HPF
SODIUM SERPL-SCNC: 142 MMOL/L (ref 132–146)
SP GR UR STRIP: 1.01 (ref 1–1.03)
TROPONIN I SERPL HS-MCNC: 13 NG/L (ref 0–9)
TROPONIN I SERPL HS-MCNC: 17 NG/L (ref 0–9)
UROBILINOGEN UR STRIP-ACNC: 0.2 EU/DL (ref 0–1)
WBC #/AREA URNS HPF: NORMAL /HPF
WBC OTHER # BLD: 5.8 K/UL (ref 4.5–11.5)

## 2025-04-14 PROCEDURE — 93005 ELECTROCARDIOGRAM TRACING: CPT

## 2025-04-14 PROCEDURE — 96366 THER/PROPH/DIAG IV INF ADDON: CPT

## 2025-04-14 PROCEDURE — 2000000000 HC ICU R&B

## 2025-04-14 PROCEDURE — 96368 THER/DIAG CONCURRENT INF: CPT

## 2025-04-14 PROCEDURE — 36556 INSERT NON-TUNNEL CV CATH: CPT

## 2025-04-14 PROCEDURE — 6360000002 HC RX W HCPCS

## 2025-04-14 PROCEDURE — 6360000004 HC RX CONTRAST MEDICATION: Performed by: RADIOLOGY

## 2025-04-14 PROCEDURE — 96365 THER/PROPH/DIAG IV INF INIT: CPT

## 2025-04-14 PROCEDURE — 74177 CT ABD & PELVIS W/CONTRAST: CPT

## 2025-04-14 PROCEDURE — 96361 HYDRATE IV INFUSION ADD-ON: CPT

## 2025-04-14 PROCEDURE — 81001 URINALYSIS AUTO W/SCOPE: CPT

## 2025-04-14 PROCEDURE — 02HV33Z INSERTION OF INFUSION DEVICE INTO SUPERIOR VENA CAVA, PERCUTANEOUS APPROACH: ICD-10-PCS | Performed by: INTERNAL MEDICINE

## 2025-04-14 PROCEDURE — 2500000003 HC RX 250 WO HCPCS

## 2025-04-14 PROCEDURE — 85025 COMPLETE CBC W/AUTO DIFF WBC: CPT

## 2025-04-14 PROCEDURE — 99285 EMERGENCY DEPT VISIT HI MDM: CPT

## 2025-04-14 PROCEDURE — 83605 ASSAY OF LACTIC ACID: CPT

## 2025-04-14 PROCEDURE — 71046 X-RAY EXAM CHEST 2 VIEWS: CPT

## 2025-04-14 PROCEDURE — 84484 ASSAY OF TROPONIN QUANT: CPT

## 2025-04-14 PROCEDURE — 87040 BLOOD CULTURE FOR BACTERIA: CPT

## 2025-04-14 PROCEDURE — 83735 ASSAY OF MAGNESIUM: CPT

## 2025-04-14 PROCEDURE — 3E033XZ INTRODUCTION OF VASOPRESSOR INTO PERIPHERAL VEIN, PERCUTANEOUS APPROACH: ICD-10-PCS | Performed by: INTERNAL MEDICINE

## 2025-04-14 PROCEDURE — 96367 TX/PROPH/DG ADDL SEQ IV INF: CPT

## 2025-04-14 PROCEDURE — 87086 URINE CULTURE/COLONY COUNT: CPT

## 2025-04-14 PROCEDURE — 2580000003 HC RX 258

## 2025-04-14 PROCEDURE — 80053 COMPREHEN METABOLIC PANEL: CPT

## 2025-04-14 RX ORDER — 0.9 % SODIUM CHLORIDE 0.9 %
30 INTRAVENOUS SOLUTION INTRAVENOUS ONCE
Status: COMPLETED | OUTPATIENT
Start: 2025-04-14 | End: 2025-04-14

## 2025-04-14 RX ORDER — POTASSIUM CHLORIDE 7.45 MG/ML
10 INJECTION INTRAVENOUS ONCE
Status: COMPLETED | OUTPATIENT
Start: 2025-04-14 | End: 2025-04-14

## 2025-04-14 RX ORDER — IOPAMIDOL 755 MG/ML
75 INJECTION, SOLUTION INTRAVASCULAR
Status: COMPLETED | OUTPATIENT
Start: 2025-04-14 | End: 2025-04-14

## 2025-04-14 RX ORDER — NOREPINEPHRINE BITARTRATE 0.06 MG/ML
5 INJECTION, SOLUTION INTRAVENOUS CONTINUOUS
Status: DISCONTINUED | OUTPATIENT
Start: 2025-04-14 | End: 2025-04-16

## 2025-04-14 RX ADMIN — POTASSIUM CHLORIDE 10 MEQ: 7.46 INJECTION, SOLUTION INTRAVENOUS at 18:55

## 2025-04-14 RX ADMIN — IOPAMIDOL 75 ML: 755 INJECTION, SOLUTION INTRAVENOUS at 18:15

## 2025-04-14 RX ADMIN — SODIUM CHLORIDE 2613 ML: 0.9 INJECTION, SOLUTION INTRAVENOUS at 16:59

## 2025-04-14 RX ADMIN — VANCOMYCIN HYDROCHLORIDE 2250 MG: 1 INJECTION, POWDER, LYOPHILIZED, FOR SOLUTION INTRAVENOUS at 22:16

## 2025-04-14 RX ADMIN — PIPERACILLIN SODIUM AND TAZOBACTAM SODIUM 3375 MG: 3; .375 INJECTION, POWDER, LYOPHILIZED, FOR SOLUTION INTRAVENOUS at 21:38

## 2025-04-14 RX ADMIN — NOREPINEPHRINE BITARTRATE 5 MCG/MIN: 64 SOLUTION INTRAVENOUS at 22:19

## 2025-04-14 ASSESSMENT — LIFESTYLE VARIABLES
HOW MANY STANDARD DRINKS CONTAINING ALCOHOL DO YOU HAVE ON A TYPICAL DAY: PATIENT DOES NOT DRINK
HOW OFTEN DO YOU HAVE A DRINK CONTAINING ALCOHOL: NEVER

## 2025-04-14 ASSESSMENT — PAIN DESCRIPTION - PAIN TYPE: TYPE: ACUTE PAIN

## 2025-04-14 ASSESSMENT — PAIN DESCRIPTION - DESCRIPTORS: DESCRIPTORS: ACHING

## 2025-04-14 ASSESSMENT — PAIN - FUNCTIONAL ASSESSMENT: PAIN_FUNCTIONAL_ASSESSMENT: 0-10

## 2025-04-14 ASSESSMENT — PAIN SCALES - GENERAL: PAINLEVEL_OUTOF10: 0

## 2025-04-14 NOTE — ED PROVIDER NOTES
04/14/25 2304   BP:   (!) 149/89 (!) 140/88   Pulse: (!) 108 (!) 119  (!) 106   Resp: 20 23  18   Temp:       TempSrc:       SpO2: (!) 84% (!) 87%  93%   Weight:       Height:           Patient was given the following medications:  Medications   vancomycin (VANCOCIN) 2,250 mg in sodium chloride 0.9 % 500 mL IVPB (2,250 mg IntraVENous New Bag 4/14/25 2216)   norepinephrine (LEVOPHED) 16 mg in sodium chloride 0.9 % 250 mL infusion (premix) (3 mcg/min IntraVENous Rate/Dose Change 4/14/25 2302)   sodium chloride 0.9 % bolus 2,613 mL (0 mLs IntraVENous Stopped 4/14/25 2104)   potassium chloride 10 mEq/100 mL IVPB (Peripheral Line) (0 mEq IntraVENous Stopped 4/14/25 2104)   iopamidol (ISOVUE-370) 76 % injection 75 mL (75 mLs IntraVENous Given 4/14/25 1815)   piperacillin-tazobactam (ZOSYN) 3,375 mg in sodium chloride 0.9 % 50 mL IVPB (addEASE) (3,375 mg IntraVENous New Bag 4/14/25 2138)           Medical Decision Making/Differential Diagnosis:    CC/HPI Summary, Social Determinants of health, Records Reviewed, DDx, testing done/not done, ED Course, Reassessment, disposition considerations/shared decision making with patient, consults, disposition:      ED Course as of 04/14/25 2352 Mon Apr 14, 2025 2119 No clear infection at this point.  Afebrile, remains persistently tachycardic, was normotensive until 2105 when she became hypotensive.  Patient did already receive 30 cc/kg of normal saline.  Will start her on Levophed at this point.  Will give antibiotics though this is not known specifically to be septic shock picture.  This might be hypovolemic shock ongoing at this point. [SO]   2331 Discussed the case with More, admitting provider for the ICU, who accepts the patient for admission  [BK]   2349 Discussed the case with Dr. Joe, admitting provider, who accepts the patient for admission  [BK]      ED Course User Index  [BK] Shey Hoang DO  [SO] Yahir Herron,         70-year-old female with past medical

## 2025-04-15 LAB
ANION GAP SERPL CALCULATED.3IONS-SCNC: 11 MMOL/L (ref 7–16)
ANION GAP SERPL CALCULATED.3IONS-SCNC: 14 MMOL/L (ref 7–16)
B PARAP IS1001 DNA NPH QL NAA+NON-PROBE: NOT DETECTED
B PERT DNA SPEC QL NAA+PROBE: NOT DETECTED
BUN SERPL-MCNC: 9 MG/DL (ref 6–23)
BUN SERPL-MCNC: 9 MG/DL (ref 6–23)
C PNEUM DNA NPH QL NAA+NON-PROBE: NOT DETECTED
CALCIUM SERPL-MCNC: 8 MG/DL (ref 8.6–10.2)
CALCIUM SERPL-MCNC: 8.1 MG/DL (ref 8.6–10.2)
CHLORIDE SERPL-SCNC: 106 MMOL/L (ref 98–107)
CHLORIDE SERPL-SCNC: 106 MMOL/L (ref 98–107)
CK SERPL-CCNC: 26 U/L (ref 20–180)
CO2 SERPL-SCNC: 20 MMOL/L (ref 22–29)
CO2 SERPL-SCNC: 21 MMOL/L (ref 22–29)
CREAT SERPL-MCNC: 0.9 MG/DL (ref 0.5–1)
CREAT SERPL-MCNC: 0.9 MG/DL (ref 0.5–1)
CRP SERPL HS-MCNC: 39.3 MG/L (ref 0–5)
EKG ATRIAL RATE: 117 BPM
EKG Q-T INTERVAL: 418 MS
EKG QRS DURATION: 92 MS
EKG QTC CALCULATION (BAZETT): 583 MS
EKG R AXIS: 11 DEGREES
EKG T AXIS: -62 DEGREES
EKG VENTRICULAR RATE: 117 BPM
ERYTHROCYTE [SEDIMENTATION RATE] IN BLOOD BY WESTERGREN METHOD: 45 MM/HR (ref 0–20)
FLUAV RNA NPH QL NAA+NON-PROBE: NOT DETECTED
FLUBV RNA NPH QL NAA+NON-PROBE: NOT DETECTED
GFR, ESTIMATED: 64 ML/MIN/1.73M2
GFR, ESTIMATED: 68 ML/MIN/1.73M2
GLUCOSE SERPL-MCNC: 100 MG/DL (ref 74–99)
GLUCOSE SERPL-MCNC: 89 MG/DL (ref 74–99)
HADV DNA NPH QL NAA+NON-PROBE: NOT DETECTED
HCOV 229E RNA NPH QL NAA+NON-PROBE: NOT DETECTED
HCOV HKU1 RNA NPH QL NAA+NON-PROBE: NOT DETECTED
HCOV NL63 RNA NPH QL NAA+NON-PROBE: NOT DETECTED
HCOV OC43 RNA NPH QL NAA+NON-PROBE: NOT DETECTED
HMPV RNA NPH QL NAA+NON-PROBE: NOT DETECTED
HPIV1 RNA NPH QL NAA+NON-PROBE: NOT DETECTED
HPIV2 RNA NPH QL NAA+NON-PROBE: NOT DETECTED
HPIV3 RNA NPH QL NAA+NON-PROBE: NOT DETECTED
HPIV4 RNA NPH QL NAA+NON-PROBE: NOT DETECTED
LACTATE BLDV-SCNC: 0.9 MMOL/L (ref 0.5–2.2)
M PNEUMO DNA NPH QL NAA+NON-PROBE: NOT DETECTED
MAGNESIUM SERPL-MCNC: 1.6 MG/DL (ref 1.6–2.6)
MICROORGANISM SPEC CULT: ABNORMAL
MICROORGANISM SPEC CULT: NORMAL
MICROORGANISM SPEC CULT: NORMAL
MICROORGANISM/AGENT SPEC: NORMAL
MICROORGANISM/AGENT SPEC: NORMAL
PHOSPHATE SERPL-MCNC: 2.6 MG/DL (ref 2.5–4.5)
POTASSIUM SERPL-SCNC: 2.9 MMOL/L (ref 3.5–5)
POTASSIUM SERPL-SCNC: 3.1 MMOL/L (ref 3.5–5)
PROCALCITONIN SERPL-MCNC: 0.04 NG/ML (ref 0–0.08)
PROCALCITONIN SERPL-MCNC: 0.06 NG/ML (ref 0–0.08)
RSV RNA NPH QL NAA+NON-PROBE: NOT DETECTED
RV+EV RNA NPH QL NAA+NON-PROBE: NOT DETECTED
SARS-COV-2 RNA NPH QL NAA+NON-PROBE: NOT DETECTED
SERVICE CMNT-IMP: ABNORMAL
SERVICE CMNT-IMP: NORMAL
SERVICE CMNT-IMP: NORMAL
SODIUM SERPL-SCNC: 138 MMOL/L (ref 132–146)
SODIUM SERPL-SCNC: 140 MMOL/L (ref 132–146)
SPECIMEN DESCRIPTION: ABNORMAL
SPECIMEN DESCRIPTION: NORMAL

## 2025-04-15 PROCEDURE — 87070 CULTURE OTHR SPECIMN AEROBIC: CPT

## 2025-04-15 PROCEDURE — 87081 CULTURE SCREEN ONLY: CPT

## 2025-04-15 PROCEDURE — 93010 ELECTROCARDIOGRAM REPORT: CPT | Performed by: INTERNAL MEDICINE

## 2025-04-15 PROCEDURE — 2500000003 HC RX 250 WO HCPCS: Performed by: INTERNAL MEDICINE

## 2025-04-15 PROCEDURE — 86140 C-REACTIVE PROTEIN: CPT

## 2025-04-15 PROCEDURE — 0202U NFCT DS 22 TRGT SARS-COV-2: CPT

## 2025-04-15 PROCEDURE — 87077 CULTURE AEROBIC IDENTIFY: CPT

## 2025-04-15 PROCEDURE — 6370000000 HC RX 637 (ALT 250 FOR IP)

## 2025-04-15 PROCEDURE — 6370000000 HC RX 637 (ALT 250 FOR IP): Performed by: INTERNAL MEDICINE

## 2025-04-15 PROCEDURE — 36556 INSERT NON-TUNNEL CV CATH: CPT

## 2025-04-15 PROCEDURE — 83605 ASSAY OF LACTIC ACID: CPT

## 2025-04-15 PROCEDURE — 80048 BASIC METABOLIC PNL TOTAL CA: CPT

## 2025-04-15 PROCEDURE — 85652 RBC SED RATE AUTOMATED: CPT

## 2025-04-15 PROCEDURE — 6360000002 HC RX W HCPCS

## 2025-04-15 PROCEDURE — 2000000000 HC ICU R&B

## 2025-04-15 PROCEDURE — 87205 SMEAR GRAM STAIN: CPT

## 2025-04-15 PROCEDURE — 84145 PROCALCITONIN (PCT): CPT

## 2025-04-15 PROCEDURE — 6360000002 HC RX W HCPCS: Performed by: INTERNAL MEDICINE

## 2025-04-15 PROCEDURE — 84100 ASSAY OF PHOSPHORUS: CPT

## 2025-04-15 PROCEDURE — 83735 ASSAY OF MAGNESIUM: CPT

## 2025-04-15 PROCEDURE — 6360000002 HC RX W HCPCS: Performed by: NURSE PRACTITIONER

## 2025-04-15 PROCEDURE — 2500000003 HC RX 250 WO HCPCS: Performed by: NURSE PRACTITIONER

## 2025-04-15 PROCEDURE — 99291 CRITICAL CARE FIRST HOUR: CPT | Performed by: INTERNAL MEDICINE

## 2025-04-15 PROCEDURE — 82550 ASSAY OF CK (CPK): CPT

## 2025-04-15 PROCEDURE — 2580000003 HC RX 258: Performed by: NURSE PRACTITIONER

## 2025-04-15 PROCEDURE — 2500000003 HC RX 250 WO HCPCS

## 2025-04-15 PROCEDURE — 87040 BLOOD CULTURE FOR BACTERIA: CPT

## 2025-04-15 RX ORDER — DEXTROSE MONOHYDRATE, SODIUM CHLORIDE, AND POTASSIUM CHLORIDE 50; 1.49; 4.5 G/1000ML; G/1000ML; G/1000ML
INJECTION, SOLUTION INTRAVENOUS CONTINUOUS
Status: DISCONTINUED | OUTPATIENT
Start: 2025-04-15 | End: 2025-04-16

## 2025-04-15 RX ORDER — ANASTROZOLE 1 MG/1
1 TABLET ORAL DAILY
Status: DISCONTINUED | OUTPATIENT
Start: 2025-04-15 | End: 2025-04-21 | Stop reason: HOSPADM

## 2025-04-15 RX ORDER — SODIUM CHLORIDE AND POTASSIUM CHLORIDE 300; 900 MG/100ML; MG/100ML
INJECTION, SOLUTION INTRAVENOUS CONTINUOUS
Status: DISCONTINUED | OUTPATIENT
Start: 2025-04-15 | End: 2025-04-15

## 2025-04-15 RX ORDER — POTASSIUM CHLORIDE 7.45 MG/ML
10 INJECTION INTRAVENOUS PRN
Status: DISCONTINUED | OUTPATIENT
Start: 2025-04-15 | End: 2025-04-21 | Stop reason: HOSPADM

## 2025-04-15 RX ORDER — SODIUM CHLORIDE 0.9 % (FLUSH) 0.9 %
5-40 SYRINGE (ML) INJECTION EVERY 12 HOURS SCHEDULED
Status: DISCONTINUED | OUTPATIENT
Start: 2025-04-15 | End: 2025-04-21 | Stop reason: HOSPADM

## 2025-04-15 RX ORDER — ENOXAPARIN SODIUM 100 MG/ML
40 INJECTION SUBCUTANEOUS DAILY
Status: DISCONTINUED | OUTPATIENT
Start: 2025-04-15 | End: 2025-04-15 | Stop reason: DRUGHIGH

## 2025-04-15 RX ORDER — METOPROLOL TARTRATE 25 MG/1
75 TABLET, FILM COATED ORAL 2 TIMES DAILY
Status: DISCONTINUED | OUTPATIENT
Start: 2025-04-15 | End: 2025-04-17

## 2025-04-15 RX ORDER — MAGNESIUM SULFATE IN WATER 40 MG/ML
2000 INJECTION, SOLUTION INTRAVENOUS PRN
Status: DISCONTINUED | OUTPATIENT
Start: 2025-04-15 | End: 2025-04-16 | Stop reason: SDUPTHER

## 2025-04-15 RX ORDER — SODIUM CHLORIDE 0.9 % (FLUSH) 0.9 %
5-40 SYRINGE (ML) INJECTION PRN
Status: DISCONTINUED | OUTPATIENT
Start: 2025-04-15 | End: 2025-04-21 | Stop reason: HOSPADM

## 2025-04-15 RX ORDER — ENOXAPARIN SODIUM 100 MG/ML
1 INJECTION SUBCUTANEOUS 2 TIMES DAILY
Status: DISCONTINUED | OUTPATIENT
Start: 2025-04-15 | End: 2025-04-16

## 2025-04-15 RX ORDER — POTASSIUM CHLORIDE 7.45 MG/ML
10 INJECTION INTRAVENOUS
Status: COMPLETED | OUTPATIENT
Start: 2025-04-15 | End: 2025-04-15

## 2025-04-15 RX ORDER — ATORVASTATIN CALCIUM 10 MG/1
10 TABLET, FILM COATED ORAL NIGHTLY
Status: DISCONTINUED | OUTPATIENT
Start: 2025-04-15 | End: 2025-04-21 | Stop reason: HOSPADM

## 2025-04-15 RX ORDER — WARFARIN SODIUM 3 MG/1
3 TABLET ORAL
Status: ON HOLD | COMMUNITY
End: 2025-04-21

## 2025-04-15 RX ORDER — DEXTROSE MONOHYDRATE 100 MG/ML
INJECTION, SOLUTION INTRAVENOUS CONTINUOUS PRN
Status: DISCONTINUED | OUTPATIENT
Start: 2025-04-15 | End: 2025-04-15

## 2025-04-15 RX ORDER — WARFARIN SODIUM 4 MG/1
4 TABLET ORAL
Status: ON HOLD | COMMUNITY
End: 2025-04-21

## 2025-04-15 RX ORDER — ACETAMINOPHEN 650 MG/1
650 SUPPOSITORY RECTAL EVERY 6 HOURS PRN
Status: DISCONTINUED | OUTPATIENT
Start: 2025-04-15 | End: 2025-04-21 | Stop reason: HOSPADM

## 2025-04-15 RX ORDER — GLUCAGON 1 MG/ML
1 KIT INJECTION PRN
Status: DISCONTINUED | OUTPATIENT
Start: 2025-04-15 | End: 2025-04-15

## 2025-04-15 RX ORDER — MAGNESIUM SULFATE IN WATER 40 MG/ML
2000 INJECTION, SOLUTION INTRAVENOUS ONCE
Status: COMPLETED | OUTPATIENT
Start: 2025-04-15 | End: 2025-04-16

## 2025-04-15 RX ORDER — ASPIRIN 81 MG/1
81 TABLET ORAL DAILY
Status: DISCONTINUED | OUTPATIENT
Start: 2025-04-15 | End: 2025-04-21 | Stop reason: HOSPADM

## 2025-04-15 RX ORDER — ACETAMINOPHEN 325 MG/1
650 TABLET ORAL EVERY 6 HOURS PRN
Status: DISCONTINUED | OUTPATIENT
Start: 2025-04-15 | End: 2025-04-21 | Stop reason: HOSPADM

## 2025-04-15 RX ORDER — ONDANSETRON 4 MG/1
4 TABLET, ORALLY DISINTEGRATING ORAL EVERY 8 HOURS PRN
Status: DISCONTINUED | OUTPATIENT
Start: 2025-04-15 | End: 2025-04-15

## 2025-04-15 RX ORDER — ZONISAMIDE 100 MG/1
200 CAPSULE ORAL 2 TIMES DAILY
Status: DISCONTINUED | OUTPATIENT
Start: 2025-04-15 | End: 2025-04-21 | Stop reason: HOSPADM

## 2025-04-15 RX ORDER — THIAMINE HYDROCHLORIDE 100 MG/ML
100 INJECTION, SOLUTION INTRAMUSCULAR; INTRAVENOUS DAILY
Status: DISCONTINUED | OUTPATIENT
Start: 2025-04-15 | End: 2025-04-16

## 2025-04-15 RX ORDER — FUROSEMIDE 40 MG/1
40 TABLET ORAL DAILY
Status: ON HOLD | COMMUNITY
End: 2025-04-21

## 2025-04-15 RX ORDER — POLYETHYLENE GLYCOL 3350 17 G/17G
17 POWDER, FOR SOLUTION ORAL DAILY PRN
Status: DISCONTINUED | OUTPATIENT
Start: 2025-04-15 | End: 2025-04-21 | Stop reason: HOSPADM

## 2025-04-15 RX ORDER — AMIODARONE HYDROCHLORIDE 200 MG/1
200 TABLET ORAL 2 TIMES DAILY
Status: ON HOLD | COMMUNITY
End: 2025-04-21 | Stop reason: HOSPADM

## 2025-04-15 RX ORDER — POTASSIUM CHLORIDE 29.8 MG/ML
40 INJECTION INTRAVENOUS ONCE
Status: DISCONTINUED | OUTPATIENT
Start: 2025-04-15 | End: 2025-04-15

## 2025-04-15 RX ORDER — MIDODRINE HYDROCHLORIDE 10 MG/1
10 TABLET ORAL 2 TIMES DAILY WITH MEALS
Status: DISCONTINUED | OUTPATIENT
Start: 2025-04-15 | End: 2025-04-16

## 2025-04-15 RX ORDER — PROCHLORPERAZINE EDISYLATE 5 MG/ML
10 INJECTION INTRAMUSCULAR; INTRAVENOUS EVERY 6 HOURS PRN
Status: DISCONTINUED | OUTPATIENT
Start: 2025-04-15 | End: 2025-04-21 | Stop reason: HOSPADM

## 2025-04-15 RX ORDER — OXCARBAZEPINE 150 MG/1
600 TABLET, FILM COATED ORAL 2 TIMES DAILY
Status: DISCONTINUED | OUTPATIENT
Start: 2025-04-15 | End: 2025-04-21 | Stop reason: HOSPADM

## 2025-04-15 RX ORDER — POTASSIUM CHLORIDE 1500 MG/1
40 TABLET, EXTENDED RELEASE ORAL PRN
Status: DISCONTINUED | OUTPATIENT
Start: 2025-04-15 | End: 2025-04-21 | Stop reason: HOSPADM

## 2025-04-15 RX ORDER — ONDANSETRON 2 MG/ML
4 INJECTION INTRAMUSCULAR; INTRAVENOUS EVERY 6 HOURS PRN
Status: DISCONTINUED | OUTPATIENT
Start: 2025-04-15 | End: 2025-04-15

## 2025-04-15 RX ORDER — DONEPEZIL HYDROCHLORIDE 5 MG/1
5 TABLET, FILM COATED ORAL NIGHTLY
Status: DISCONTINUED | OUTPATIENT
Start: 2025-04-15 | End: 2025-04-21 | Stop reason: HOSPADM

## 2025-04-15 RX ORDER — SODIUM CHLORIDE 9 MG/ML
INJECTION, SOLUTION INTRAVENOUS PRN
Status: DISCONTINUED | OUTPATIENT
Start: 2025-04-15 | End: 2025-04-21 | Stop reason: HOSPADM

## 2025-04-15 RX ORDER — PROCHLORPERAZINE MALEATE 5 MG/1
5 TABLET ORAL EVERY 6 HOURS PRN
Status: DISCONTINUED | OUTPATIENT
Start: 2025-04-15 | End: 2025-04-21 | Stop reason: HOSPADM

## 2025-04-15 RX ORDER — DIGOXIN 0.25 MG/ML
250 INJECTION INTRAMUSCULAR; INTRAVENOUS EVERY 4 HOURS
Status: DISCONTINUED | OUTPATIENT
Start: 2025-04-15 | End: 2025-04-16

## 2025-04-15 RX ADMIN — POTASSIUM CHLORIDE 10 MEQ: 7.46 INJECTION, SOLUTION INTRAVENOUS at 08:28

## 2025-04-15 RX ADMIN — Medication 10 ML: at 20:32

## 2025-04-15 RX ADMIN — ANASTROZOLE 1 MG: 1 TABLET ORAL at 09:54

## 2025-04-15 RX ADMIN — ZONISAMIDE 200 MG: 100 CAPSULE ORAL at 20:31

## 2025-04-15 RX ADMIN — MIDODRINE HYDROCHLORIDE 10 MG: 10 TABLET ORAL at 09:39

## 2025-04-15 RX ADMIN — PIPERACILLIN SODIUM AND TAZOBACTAM SODIUM 3375 MG: 3; .375 INJECTION, POWDER, LYOPHILIZED, FOR SOLUTION INTRAVENOUS at 14:30

## 2025-04-15 RX ADMIN — ENOXAPARIN SODIUM 90 MG: 100 INJECTION SUBCUTANEOUS at 14:28

## 2025-04-15 RX ADMIN — ATORVASTATIN CALCIUM 10 MG: 10 TABLET, FILM COATED ORAL at 20:31

## 2025-04-15 RX ADMIN — OXCARBAZEPINE 600 MG: 300 TABLET, FILM COATED ORAL at 09:54

## 2025-04-15 RX ADMIN — MIDODRINE HYDROCHLORIDE 10 MG: 10 TABLET ORAL at 17:18

## 2025-04-15 RX ADMIN — OXCARBAZEPINE 600 MG: 300 TABLET, FILM COATED ORAL at 20:31

## 2025-04-15 RX ADMIN — DIGOXIN 250 MCG: 250 INJECTION, SOLUTION INTRAMUSCULAR; INTRAVENOUS; PARENTERAL at 17:28

## 2025-04-15 RX ADMIN — DIGOXIN 250 MCG: 250 INJECTION, SOLUTION INTRAMUSCULAR; INTRAVENOUS; PARENTERAL at 21:16

## 2025-04-15 RX ADMIN — POTASSIUM CHLORIDE 10 MEQ: 7.46 INJECTION, SOLUTION INTRAVENOUS at 10:37

## 2025-04-15 RX ADMIN — SODIUM CHLORIDE, PRESERVATIVE FREE 40 MG: 5 INJECTION INTRAVENOUS at 09:40

## 2025-04-15 RX ADMIN — DEXTROSE, SODIUM CHLORIDE, AND POTASSIUM CHLORIDE: 5; .45; .15 INJECTION INTRAVENOUS at 14:29

## 2025-04-15 RX ADMIN — POTASSIUM CHLORIDE 10 MEQ: 7.46 INJECTION, SOLUTION INTRAVENOUS at 07:23

## 2025-04-15 RX ADMIN — PROCHLORPERAZINE EDISYLATE 10 MG: 5 INJECTION INTRAMUSCULAR; INTRAVENOUS at 09:49

## 2025-04-15 RX ADMIN — THIAMINE HYDROCHLORIDE 100 MG: 100 INJECTION, SOLUTION INTRAMUSCULAR; INTRAVENOUS at 09:46

## 2025-04-15 RX ADMIN — POTASSIUM CHLORIDE 10 MEQ: 7.46 INJECTION, SOLUTION INTRAVENOUS at 09:33

## 2025-04-15 RX ADMIN — MIDODRINE HYDROCHLORIDE 10 MG: 10 TABLET ORAL at 02:50

## 2025-04-15 RX ADMIN — DONEPEZIL HYDROCHLORIDE 5 MG: 5 TABLET ORAL at 20:31

## 2025-04-15 RX ADMIN — SERTRALINE 100 MG: 50 TABLET, FILM COATED ORAL at 09:39

## 2025-04-15 RX ADMIN — ENOXAPARIN SODIUM 90 MG: 100 INJECTION SUBCUTANEOUS at 02:50

## 2025-04-15 RX ADMIN — ZONISAMIDE 200 MG: 100 CAPSULE ORAL at 09:54

## 2025-04-15 RX ADMIN — ASPIRIN 81 MG: 81 TABLET, COATED ORAL at 09:39

## 2025-04-15 RX ADMIN — PIPERACILLIN SODIUM AND TAZOBACTAM SODIUM 3375 MG: 3; .375 INJECTION, POWDER, LYOPHILIZED, FOR SOLUTION INTRAVENOUS at 22:44

## 2025-04-15 ASSESSMENT — PAIN SCALES - PAIN ASSESSMENT IN ADVANCED DEMENTIA (PAINAD)
TOTALSCORE: 0
BODYLANGUAGE: RELAXED
CONSOLABILITY: NO NEED TO CONSOLE
BREATHING: NORMAL
FACIALEXPRESSION: SMILING OR INEXPRESSIVE

## 2025-04-15 ASSESSMENT — PAIN SCALES - GENERAL
PAINLEVEL_OUTOF10: 0

## 2025-04-15 NOTE — H&P
Department of Internal Medicine  History and Physical    PCP: Lay Hunter MD  Admitting Physician: Dr. Drake/Black  Consultants:   Date of Service: 4/14/2025    CHIEF COMPLAINT:  no output from ostomy    HISTORY OF PRESENT ILLNESS:     Patient is 70-year-old female presents to the ED due to no output from ostomy.  Patient had sigmoid resection ostomy after perforated viscus.  She was sent home on 24 March.  However since Friday she has not had any output from the ostomy site.  She has no acute complaints on exam.  Denies any increased pain.  Denies any fever or chills.  Denies any nausea or emesis.  Does not.  That she has not been receiving her tube feeds.    PAST MEDICAL Hx:  Past Medical History:   Diagnosis Date    Atrial fibrillation (HCC)     Dementia (HCC)     History of cardioversion 10/2021       PAST SURGICAL Hx:   Past Surgical History:   Procedure Laterality Date    COLECTOMY N/A 3/16/2025    OPEN SIGMOID COLON RESECTION; COLOSTOMY performed by Levi Bunn MD at [facility] OR       FAMILY Hx:  History reviewed. No pertinent family history.    HOME MEDICATIONS:  Prior to Admission medications    Medication Sig Start Date End Date Taking? Authorizing Provider   metoclopramide (REGLAN) 5 MG tablet Take 1 tablet by mouth 3 times daily (before meals) for 5 days 3/22/25 3/27/25  Shen Solo DO   ondansetron (ZOFRAN-ODT) 4 MG disintegrating tablet Take 1 tablet by mouth every 8 hours as needed for Nausea or Vomiting 3/22/25   Shen Solo DO   sertraline (ZOLOFT) 100 MG tablet Take 1 tablet by mouth daily 7/6/24   Jack Moscoso APRN - CNP   metoprolol tartrate 75 MG TABS Take 75 mg by mouth 2 times daily 7/6/24   Jack Moscoso APRN - CNP   anastrozole (ARIMIDEX) 1 MG tablet Take 1 tablet by mouth daily    Kelvin Maldonado MD   simvastatin (ZOCOR) 20 MG tablet Take 1 tablet by mouth nightly    Kelvin Maldonado MD   zonisamide (ZONEGRAN) 100 MG capsule Take 1 capsule by mouth 4 times

## 2025-04-15 NOTE — ACP (ADVANCE CARE PLANNING)
Advance Care Planning   Healthcare Decision Maker:    Primary Decision Maker: Erasmo Arriola - Spouse - [phone]    Secondary Decision Maker: ANNA ARRIOLA - Child - [phone]    Secondary Decision Maker: Marla Richter - Child - [phone]    Click here to complete Healthcare Decision Makers including selection of the Healthcare Decision Maker Relationship (ie \"Primary\").  Today we documented Decision Maker(s) consistent with Legal Next of Kin hierarchy.   Electronically signed by PANCHO Reza on 4/15/2025 at 10:31 AM

## 2025-04-15 NOTE — ED NOTES
Zonisamide, Oxcarbazepine, and Anastrozole given to pt. Pt vomited shortly after. RN unsure how much of the medications were absorbed by pt.

## 2025-04-15 NOTE — ED NOTES
Had Dr. Coelho paged to let him know that Levophed has been discontinued due to the map being 123. Provider returned call, and was notified of situation.

## 2025-04-15 NOTE — CARE COORDINATION
Case Management Assessment  Initial Evaluation    Date/Time of Evaluation: 4/15/2025 10:22 AM  Assessment Completed by: PANCHO Reza    If patient is discharged prior to next notation, then this note serves as note for discharge by case management.    Patient Name: Susanna Arriola                   YOB: 1955  Diagnosis: Shock (HCC) [R57.9]                   Date / Time: 4/14/2025  3:28 PM    Patient Admission Status: Inpatient   Readmission Risk (Low < 19, Mod (19-27), High > 27): Readmission Risk Score: 17.1    Current PCP: Lay Hunter MD  PCP verified by CM? Yes    Chart Reviewed: Yes      History Provided by: Patient, Spouse, Medical Record  Patient Orientation: Alert and Oriented, Person, Self    Patient Cognition: Other (see comment) (Alert but some memory issues)    Hospitalization in the last 30 days (Readmission):  Yes    Readmission Assessment  Number of Days since last admission?: 8-30 days  Previous Disposition: SNF  Who is being Interviewed: Caregiver (spouse- Erasmo)  What was the patient's/caregiver's perception as to why they think they needed to return back to the hospital?: Other (Comment) (no out put for ostomy)  Did you visit your Primary Care Physician after you left the hospital, before you returned this time?: No  Why weren't you able to visit your PCP?: Did not have an appointment  Did you see a specialist, such as Cardiac, Pulmonary, Orthopedic Physician, etc. after you left the hospital?: No  Who advised the patient to return to the hospital?: Home Health Staff  Does the patient report anything that got in the way of taking their medications?: No  In our efforts to provide the best possible care to you and others like you, can you think of anything that we could have done to help you after you left the hospital the first time, so that you might not have needed to return so soon?: Other (Comment) (nothing)      Advance Directives:      Code Status: Full Code

## 2025-04-15 NOTE — ED NOTES
Patient is having trouble keeping medical equipment on, states that she does not remember having memory issues.

## 2025-04-16 ENCOUNTER — APPOINTMENT (OUTPATIENT)
Age: 70
DRG: 393 | End: 2025-04-16
Payer: MEDICARE

## 2025-04-16 LAB
ANION GAP SERPL CALCULATED.3IONS-SCNC: 8 MMOL/L (ref 7–16)
BASOPHILS # BLD: 0.07 K/UL (ref 0–0.2)
BASOPHILS NFR BLD: 2 % (ref 0–2)
BUN SERPL-MCNC: 9 MG/DL (ref 6–23)
CALCIUM SERPL-MCNC: 8.1 MG/DL (ref 8.6–10.2)
CHLORIDE SERPL-SCNC: 108 MMOL/L (ref 98–107)
CO2 SERPL-SCNC: 22 MMOL/L (ref 22–29)
CREAT SERPL-MCNC: 1 MG/DL (ref 0.5–1)
EOSINOPHIL # BLD: 0.24 K/UL (ref 0.05–0.5)
EOSINOPHILS RELATIVE PERCENT: 5 % (ref 0–6)
ERYTHROCYTE [DISTWIDTH] IN BLOOD BY AUTOMATED COUNT: 15.2 % (ref 11.5–15)
GFR, ESTIMATED: 63 ML/MIN/1.73M2
GLUCOSE SERPL-MCNC: 95 MG/DL (ref 74–99)
HCT VFR BLD AUTO: 29.6 % (ref 34–48)
HGB BLD-MCNC: 9.4 G/DL (ref 11.5–15.5)
IMM GRANULOCYTES # BLD AUTO: <0.03 K/UL (ref 0–0.58)
IMM GRANULOCYTES NFR BLD: 0 % (ref 0–5)
INR PPP: 2.7
LYMPHOCYTES NFR BLD: 1.4 K/UL (ref 1.5–4)
LYMPHOCYTES RELATIVE PERCENT: 30 % (ref 20–42)
MAGNESIUM SERPL-MCNC: 1.7 MG/DL (ref 1.6–2.6)
MCH RBC QN AUTO: 27.3 PG (ref 26–35)
MCHC RBC AUTO-ENTMCNC: 31.8 G/DL (ref 32–34.5)
MCV RBC AUTO: 86 FL (ref 80–99.9)
MICROORGANISM SPEC CULT: NORMAL
MICROORGANISM SPEC CULT: NORMAL
MONOCYTES NFR BLD: 0.6 K/UL (ref 0.1–0.95)
MONOCYTES NFR BLD: 13 % (ref 2–12)
NEUTROPHILS NFR BLD: 50 % (ref 43–80)
NEUTS SEG NFR BLD: 2.36 K/UL (ref 1.8–7.3)
PHOSPHATE SERPL-MCNC: 2.3 MG/DL (ref 2.5–4.5)
PLATELET # BLD AUTO: 299 K/UL (ref 130–450)
PMV BLD AUTO: 9.4 FL (ref 7–12)
POTASSIUM SERPL-SCNC: 3.5 MMOL/L (ref 3.5–5)
PROTHROMBIN TIME: 29.7 SEC (ref 9.3–12.4)
RBC # BLD AUTO: 3.44 M/UL (ref 3.5–5.5)
SODIUM SERPL-SCNC: 138 MMOL/L (ref 132–146)
SPECIMEN DESCRIPTION: NORMAL
SPECIMEN DESCRIPTION: NORMAL
WBC OTHER # BLD: 4.7 K/UL (ref 4.5–11.5)

## 2025-04-16 PROCEDURE — 85610 PROTHROMBIN TIME: CPT

## 2025-04-16 PROCEDURE — 2500000003 HC RX 250 WO HCPCS: Performed by: NURSE PRACTITIONER

## 2025-04-16 PROCEDURE — 83735 ASSAY OF MAGNESIUM: CPT

## 2025-04-16 PROCEDURE — 99291 CRITICAL CARE FIRST HOUR: CPT | Performed by: INTERNAL MEDICINE

## 2025-04-16 PROCEDURE — 2580000003 HC RX 258: Performed by: NURSE PRACTITIONER

## 2025-04-16 PROCEDURE — 97161 PT EVAL LOW COMPLEX 20 MIN: CPT | Performed by: PHYSICAL THERAPIST

## 2025-04-16 PROCEDURE — 6370000000 HC RX 637 (ALT 250 FOR IP)

## 2025-04-16 PROCEDURE — 2500000003 HC RX 250 WO HCPCS

## 2025-04-16 PROCEDURE — 85025 COMPLETE CBC W/AUTO DIFF WBC: CPT

## 2025-04-16 PROCEDURE — 6360000002 HC RX W HCPCS

## 2025-04-16 PROCEDURE — 6370000000 HC RX 637 (ALT 250 FOR IP): Performed by: SPECIALIST

## 2025-04-16 PROCEDURE — 6370000000 HC RX 637 (ALT 250 FOR IP): Performed by: INTERNAL MEDICINE

## 2025-04-16 PROCEDURE — 2000000000 HC ICU R&B

## 2025-04-16 PROCEDURE — 93308 TTE F-UP OR LMTD: CPT

## 2025-04-16 PROCEDURE — 97116 GAIT TRAINING THERAPY: CPT | Performed by: PHYSICAL THERAPIST

## 2025-04-16 PROCEDURE — 84100 ASSAY OF PHOSPHORUS: CPT

## 2025-04-16 PROCEDURE — 2500000003 HC RX 250 WO HCPCS: Performed by: INTERNAL MEDICINE

## 2025-04-16 PROCEDURE — 2580000003 HC RX 258

## 2025-04-16 PROCEDURE — 6360000002 HC RX W HCPCS: Performed by: NURSE PRACTITIONER

## 2025-04-16 PROCEDURE — 80048 BASIC METABOLIC PNL TOTAL CA: CPT

## 2025-04-16 RX ORDER — ENOXAPARIN SODIUM 100 MG/ML
1 INJECTION SUBCUTANEOUS 2 TIMES DAILY
Status: DISCONTINUED | OUTPATIENT
Start: 2025-04-16 | End: 2025-04-16

## 2025-04-16 RX ORDER — WARFARIN SODIUM 2 MG/1
4 TABLET ORAL
Status: COMPLETED | OUTPATIENT
Start: 2025-04-16 | End: 2025-04-16

## 2025-04-16 RX ORDER — GAUZE BANDAGE 2" X 2"
100 BANDAGE TOPICAL DAILY
Status: DISCONTINUED | OUTPATIENT
Start: 2025-04-17 | End: 2025-04-21 | Stop reason: HOSPADM

## 2025-04-16 RX ORDER — PANTOPRAZOLE SODIUM 40 MG/1
40 TABLET, DELAYED RELEASE ORAL
Status: DISCONTINUED | OUTPATIENT
Start: 2025-04-17 | End: 2025-04-21 | Stop reason: HOSPADM

## 2025-04-16 RX ORDER — MAGNESIUM SULFATE IN WATER 40 MG/ML
2000 INJECTION, SOLUTION INTRAVENOUS ONCE
Status: DISCONTINUED | OUTPATIENT
Start: 2025-04-16 | End: 2025-04-16

## 2025-04-16 RX ORDER — AMIODARONE HYDROCHLORIDE 200 MG/1
200 TABLET ORAL DAILY
Status: DISCONTINUED | OUTPATIENT
Start: 2025-04-16 | End: 2025-04-21 | Stop reason: HOSPADM

## 2025-04-16 RX ORDER — SODIUM CHLORIDE, SODIUM LACTATE, POTASSIUM CHLORIDE, AND CALCIUM CHLORIDE .6; .31; .03; .02 G/100ML; G/100ML; G/100ML; G/100ML
500 INJECTION, SOLUTION INTRAVENOUS ONCE
Status: COMPLETED | OUTPATIENT
Start: 2025-04-16 | End: 2025-04-16

## 2025-04-16 RX ORDER — POTASSIUM CHLORIDE 29.8 MG/ML
20 INJECTION INTRAVENOUS ONCE
Status: COMPLETED | OUTPATIENT
Start: 2025-04-16 | End: 2025-04-16

## 2025-04-16 RX ORDER — GAUZE BANDAGE 2" X 2"
100 BANDAGE TOPICAL DAILY
Status: DISCONTINUED | OUTPATIENT
Start: 2025-04-16 | End: 2025-04-16

## 2025-04-16 RX ORDER — 0.9 % SODIUM CHLORIDE 0.9 %
500 INTRAVENOUS SOLUTION INTRAVENOUS ONCE
Status: DISCONTINUED | OUTPATIENT
Start: 2025-04-16 | End: 2025-04-16

## 2025-04-16 RX ADMIN — MAGNESIUM SULFATE IN WATER 2000 MG: 40 INJECTION, SOLUTION INTRAVENOUS at 05:58

## 2025-04-16 RX ADMIN — WARFARIN SODIUM 4 MG: 2 TABLET ORAL at 17:45

## 2025-04-16 RX ADMIN — Medication 10 ML: at 08:50

## 2025-04-16 RX ADMIN — SERTRALINE 100 MG: 50 TABLET, FILM COATED ORAL at 08:49

## 2025-04-16 RX ADMIN — ANASTROZOLE 1 MG: 1 TABLET ORAL at 08:49

## 2025-04-16 RX ADMIN — DEXTROSE, SODIUM CHLORIDE, AND POTASSIUM CHLORIDE: 5; .45; .15 INJECTION INTRAVENOUS at 04:23

## 2025-04-16 RX ADMIN — OXCARBAZEPINE 600 MG: 300 TABLET, FILM COATED ORAL at 11:54

## 2025-04-16 RX ADMIN — METOPROLOL TARTRATE 75 MG: 25 TABLET, FILM COATED ORAL at 21:14

## 2025-04-16 RX ADMIN — SODIUM CHLORIDE, PRESERVATIVE FREE 40 MG: 5 INJECTION INTRAVENOUS at 08:50

## 2025-04-16 RX ADMIN — Medication 10 ML: at 21:17

## 2025-04-16 RX ADMIN — AMIODARONE HYDROCHLORIDE 200 MG: 200 TABLET ORAL at 11:54

## 2025-04-16 RX ADMIN — SODIUM PHOSPHATE, MONOBASIC, MONOHYDRATE AND SODIUM PHOSPHATE, DIBASIC, ANHYDROUS 20 MMOL: 142; 276 INJECTION, SOLUTION INTRAVENOUS at 05:59

## 2025-04-16 RX ADMIN — ATORVASTATIN CALCIUM 10 MG: 10 TABLET, FILM COATED ORAL at 21:13

## 2025-04-16 RX ADMIN — ENOXAPARIN SODIUM 90 MG: 100 INJECTION SUBCUTANEOUS at 08:49

## 2025-04-16 RX ADMIN — POLYETHYLENE GLYCOL 3350 17 G: 17 POWDER, FOR SOLUTION ORAL at 06:01

## 2025-04-16 RX ADMIN — ASPIRIN 81 MG: 81 TABLET, COATED ORAL at 08:49

## 2025-04-16 RX ADMIN — PIPERACILLIN SODIUM AND TAZOBACTAM SODIUM 3375 MG: 3; .375 INJECTION, POWDER, LYOPHILIZED, FOR SOLUTION INTRAVENOUS at 06:17

## 2025-04-16 RX ADMIN — ZONISAMIDE 200 MG: 100 CAPSULE ORAL at 21:14

## 2025-04-16 RX ADMIN — SODIUM CHLORIDE, SODIUM LACTATE, POTASSIUM CHLORIDE, AND CALCIUM CHLORIDE 500 ML: .6; .31; .03; .02 INJECTION, SOLUTION INTRAVENOUS at 00:21

## 2025-04-16 RX ADMIN — POTASSIUM CHLORIDE 20 MEQ: 29.8 INJECTION, SOLUTION INTRAVENOUS at 05:58

## 2025-04-16 RX ADMIN — THIAMINE HYDROCHLORIDE 100 MG: 100 INJECTION, SOLUTION INTRAMUSCULAR; INTRAVENOUS at 08:50

## 2025-04-16 RX ADMIN — ZONISAMIDE 200 MG: 100 CAPSULE ORAL at 08:49

## 2025-04-16 RX ADMIN — DONEPEZIL HYDROCHLORIDE 5 MG: 5 TABLET ORAL at 21:14

## 2025-04-16 RX ADMIN — MIDODRINE HYDROCHLORIDE 10 MG: 10 TABLET ORAL at 08:50

## 2025-04-16 RX ADMIN — OXCARBAZEPINE 600 MG: 300 TABLET, FILM COATED ORAL at 21:15

## 2025-04-16 ASSESSMENT — PAIN SCALES - GENERAL
PAINLEVEL_OUTOF10: 0

## 2025-04-16 NOTE — FLOWSHEET NOTE
ET Nurse  Admit Date: 4/14/2025  3:28 PM    Reason for consult:  colostomy    Significant history:  see wound care note     Stoma assessment:     04/16/25 1809   Colostomy LLQ   Placement Date/Time: 03/16/25 1740   Present on Admission/Arrival: No  Location: LLQ   Stomal Appliance 1 piece   Treatment   (pouch intact)   Stool Appearance Soft   Stool Color Brown       Plan:  Pouch intact. Small amount green brown stool over stoma, unable to view stoma color. Extra supplies in room. Will assist as needed for pouch changes.     Wanda Villatoro RN 4/16/2025 6:15 PM

## 2025-04-16 NOTE — CARE COORDINATION
4/16/2025 ICU, room air, IVF, Electrolyte replacement, Protonix, Zosyn, Thiamine.   Referral to Misty woodard [facility], following. NEEDS Precert, JANA, HENS if SNF at discharge. Pt active w/[facility] (PT/OT/RN) requested them to follow Monika SHORT. Will need MANUEL if d/c home. Left VM to  Erasmo. Pt with AMS- unclear where her ostomy supplies come from. Electronically signed by Elsie Frankel RN-BC on 4/16/2025 at 9:32 AM

## 2025-04-16 NOTE — FLOWSHEET NOTE
Inpatient Wound Care    Admit Date: 4/14/2025  3:28 PM    Reason for consult:  abdomen    Significant history:  Per H&P:     Patient is 70-year-old female presents to the ED due to no output from ostomy. Patient had sigmoid resection ostomy after perforated viscus. She was sent home on 24 March. However since Friday she has not had any output from the ostomy site. She has no acute complaints on exam. Denies any increased pain. Denies any fever or chills. Denies any nausea or emesis. Does not. That she has not been receiving her tube feeds.   Findings:     04/16/25 1809   Wound Abdomen Mid   No Date First Assessed or Time First Assessed found.   Present on Original Admission: Yes  Primary Wound Type: Surgical  Location: Abdomen  Wound Location Orientation: Mid   Wound Image    Wound Etiology Surgical   Dressing Status New dressing applied   Wound Cleansed Cleansed with saline   Dressing/Treatment Alginate;ABD   Wound Length (cm) 13 cm   Wound Width (cm) 4.6 cm   Wound Depth (cm) 1.5 cm   Wound Surface Area (cm^2) 59.8 cm^2   Wound Volume (cm^3) 89.7 cm^3   Wound Assessment Granulation tissue  (retention sutures)   Drainage Amount Small (< 25%)   Drainage Description Serosanguinous   Odor None   Anupama-wound Assessment Intact       **Informed Consent**    The patient has given verbal consent to have photos taken of wound and inserted into their chart as part of their permanent medical record for purposes of documentation, treatment management and/or medical review.   All Images taken on 4/16/25 of patient name: Susanna Arriola were transmitted and stored on secured Epic  Site located within Media Folder Tab by a registered [employer] Mobile Application Device.      Impression:  surgical abdominal wound    Plan: Zhanna and ABD pad  Dietician consult  Dr. Bunn consulted  TAPS  Float heels  Will need continued preventive care      Wanda Villatoro RN 4/16/2025 6:12 PM

## 2025-04-17 LAB
ANION GAP SERPL CALCULATED.3IONS-SCNC: 11 MMOL/L (ref 7–16)
BASOPHILS # BLD: 0.06 K/UL (ref 0–0.2)
BASOPHILS NFR BLD: 1 % (ref 0–2)
BUN SERPL-MCNC: 7 MG/DL (ref 6–23)
CALCIUM SERPL-MCNC: 8.8 MG/DL (ref 8.6–10.2)
CHLORIDE SERPL-SCNC: 108 MMOL/L (ref 98–107)
CO2 SERPL-SCNC: 19 MMOL/L (ref 22–29)
CREAT SERPL-MCNC: 0.9 MG/DL (ref 0.5–1)
ECHO AR MAX VEL PISA: 4.2 M/S
ECHO AV CUSP MM: 1.9 CM
ECHO AV MEAN GRADIENT: 8 MMHG
ECHO AV MEAN VELOCITY: 1.3 M/S
ECHO AV PEAK GRADIENT: 14 MMHG
ECHO AV PEAK GRADIENT: 16 MMHG
ECHO AV PEAK VELOCITY: 1.8 M/S
ECHO AV PEAK VELOCITY: 2 M/S
ECHO AV REGURGITANT PHT: 920 MS
ECHO AV VTI: 39.6 CM
ECHO BSA: 1.95 M2
ECHO EST RA PRESSURE: 3 MMHG
ECHO LA DIAMETER INDEX: 2.47 CM/M2
ECHO LA DIAMETER: 4.7 CM
ECHO LA VOL A-L A2C: 169 ML (ref 22–52)
ECHO LA VOL A-L A4C: 136 ML (ref 22–52)
ECHO LA VOL BP: 141 ML (ref 22–52)
ECHO LA VOL MOD A2C: 154 ML (ref 22–52)
ECHO LA VOL MOD A4C: 122 ML (ref 22–52)
ECHO LA VOL/BSA BIPLANE: 74 ML/M2 (ref 16–34)
ECHO LA VOLUME AREA LENGTH: 157 ML
ECHO LA VOLUME INDEX A-L A2C: 89 ML/M2 (ref 16–34)
ECHO LA VOLUME INDEX A-L A4C: 72 ML/M2 (ref 16–34)
ECHO LA VOLUME INDEX AREA LENGTH: 83 ML/M2 (ref 16–34)
ECHO LA VOLUME INDEX MOD A2C: 81 ML/M2 (ref 16–34)
ECHO LA VOLUME INDEX MOD A4C: 64 ML/M2 (ref 16–34)
ECHO LV EDV A2C: 89 ML
ECHO LV EDV A4C: 114 ML
ECHO LV EDV BP: 102 ML (ref 56–104)
ECHO LV EDV INDEX A4C: 60 ML/M2
ECHO LV EDV INDEX BP: 54 ML/M2
ECHO LV EDV NDEX A2C: 47 ML/M2
ECHO LV EF PHYSICIAN: 45 %
ECHO LV EJECTION FRACTION A2C: 32 %
ECHO LV EJECTION FRACTION A4C: 51 %
ECHO LV EJECTION FRACTION BIPLANE: 42 % (ref 55–100)
ECHO LV ESV A2C: 60 ML
ECHO LV ESV A4C: 56 ML
ECHO LV ESV BP: 59 ML (ref 19–49)
ECHO LV ESV INDEX A2C: 32 ML/M2
ECHO LV ESV INDEX A4C: 29 ML/M2
ECHO LV ESV INDEX BP: 31 ML/M2
ECHO LV FRACTIONAL SHORTENING: 24 % (ref 28–44)
ECHO LV INTERNAL DIMENSION DIASTOLE INDEX: 2.63 CM/M2
ECHO LV INTERNAL DIMENSION DIASTOLIC: 5 CM (ref 3.9–5.3)
ECHO LV INTERNAL DIMENSION SYSTOLIC INDEX: 2 CM/M2
ECHO LV INTERNAL DIMENSION SYSTOLIC: 3.8 CM
ECHO LV IVSD: 1 CM (ref 0.6–0.9)
ECHO LV IVSS: 1.2 CM
ECHO LV MASS 2D: 169.9 G (ref 67–162)
ECHO LV MASS INDEX 2D: 89.4 G/M2 (ref 43–95)
ECHO LV POSTERIOR WALL DIASTOLIC: 0.9 CM (ref 0.6–0.9)
ECHO LV POSTERIOR WALL SYSTOLIC: 1.2 CM
ECHO LV RELATIVE WALL THICKNESS RATIO: 0.36
ECHO LVOT AV VTI INDEX: 0.32
ECHO LVOT MEAN GRADIENT: 1 MMHG
ECHO LVOT PEAK GRADIENT: 2 MMHG
ECHO LVOT PEAK GRADIENT: 3 MMHG
ECHO LVOT PEAK VELOCITY: 0.6 M/S
ECHO LVOT PEAK VELOCITY: 0.8 M/S
ECHO LVOT VTI: 12.7 CM
ECHO MV AREA PHT: 3.3 CM2
ECHO MV LVOT VTI INDEX: 4.59
ECHO MV MAX VELOCITY: 2.7 M/S
ECHO MV MEAN GRADIENT: 12 MMHG
ECHO MV MEAN VELOCITY: 1.6 M/S
ECHO MV PEAK GRADIENT: 30 MMHG
ECHO MV PRESSURE HALF TIME (PHT): 66.9 MS
ECHO MV VTI: 58.3 CM
ECHO PV MAX VELOCITY: 1.1 M/S
ECHO PV MEAN GRADIENT: 2 MMHG
ECHO PV MEAN VELOCITY: 0.7 M/S
ECHO PV PEAK GRADIENT: 5 MMHG
ECHO PV VTI: 16 CM
ECHO PVEIN A DURATION: 159.9 MS
ECHO PVEIN A VELOCITY: 0.4 M/S
ECHO PVEIN PEAK D VELOCITY: 0.3 M/S
ECHO PVEIN PEAK S VELOCITY: 0.5 M/S
ECHO PVEIN S/D RATIO: 1.7
ECHO RIGHT VENTRICULAR SYSTOLIC PRESSURE (RVSP): 47 MMHG
ECHO RV INTERNAL DIMENSION: 2.4 CM
ECHO RVOT MEAN GRADIENT: 1 MMHG
ECHO RVOT PEAK GRADIENT: 2 MMHG
ECHO RVOT PEAK VELOCITY: 0.7 M/S
ECHO RVOT VTI: 12.5 CM
ECHO TV REGURGITANT MAX VELOCITY: 3.32 M/S
ECHO TV REGURGITANT PEAK GRADIENT: 44 MMHG
EOSINOPHIL # BLD: 0.36 K/UL (ref 0.05–0.5)
EOSINOPHILS RELATIVE PERCENT: 6 % (ref 0–6)
ERYTHROCYTE [DISTWIDTH] IN BLOOD BY AUTOMATED COUNT: 15 % (ref 11.5–15)
GFR, ESTIMATED: 73 ML/MIN/1.73M2
GLUCOSE SERPL-MCNC: 89 MG/DL (ref 74–99)
HCT VFR BLD AUTO: 35.9 % (ref 34–48)
HGB BLD-MCNC: 11 G/DL (ref 11.5–15.5)
IMM GRANULOCYTES # BLD AUTO: <0.03 K/UL (ref 0–0.58)
IMM GRANULOCYTES NFR BLD: 0 % (ref 0–5)
INR PPP: 2.2
LYMPHOCYTES NFR BLD: 1.41 K/UL (ref 1.5–4)
LYMPHOCYTES RELATIVE PERCENT: 24 % (ref 20–42)
MAGNESIUM SERPL-MCNC: 2.2 MG/DL (ref 1.6–2.6)
MCH RBC QN AUTO: 26.6 PG (ref 26–35)
MCHC RBC AUTO-ENTMCNC: 30.6 G/DL (ref 32–34.5)
MCV RBC AUTO: 86.9 FL (ref 80–99.9)
MONOCYTES NFR BLD: 0.69 K/UL (ref 0.1–0.95)
MONOCYTES NFR BLD: 12 % (ref 2–12)
NEUTROPHILS NFR BLD: 56 % (ref 43–80)
NEUTS SEG NFR BLD: 3.23 K/UL (ref 1.8–7.3)
PHOSPHATE SERPL-MCNC: 3 MG/DL (ref 2.5–4.5)
PLATELET # BLD AUTO: 361 K/UL (ref 130–450)
PMV BLD AUTO: 9.3 FL (ref 7–12)
POTASSIUM SERPL-SCNC: 4 MMOL/L (ref 3.5–5)
PROTHROMBIN TIME: 23.5 SEC (ref 9.3–12.4)
RBC # BLD AUTO: 4.13 M/UL (ref 3.5–5.5)
SODIUM SERPL-SCNC: 138 MMOL/L (ref 132–146)
WBC OTHER # BLD: 5.8 K/UL (ref 4.5–11.5)

## 2025-04-17 PROCEDURE — 6370000000 HC RX 637 (ALT 250 FOR IP): Performed by: INTERNAL MEDICINE

## 2025-04-17 PROCEDURE — 85025 COMPLETE CBC W/AUTO DIFF WBC: CPT

## 2025-04-17 PROCEDURE — 99233 SBSQ HOSP IP/OBS HIGH 50: CPT | Performed by: INTERNAL MEDICINE

## 2025-04-17 PROCEDURE — 76937 US GUIDE VASCULAR ACCESS: CPT

## 2025-04-17 PROCEDURE — 80048 BASIC METABOLIC PNL TOTAL CA: CPT

## 2025-04-17 PROCEDURE — 36415 COLL VENOUS BLD VENIPUNCTURE: CPT

## 2025-04-17 PROCEDURE — 97165 OT EVAL LOW COMPLEX 30 MIN: CPT

## 2025-04-17 PROCEDURE — 2500000003 HC RX 250 WO HCPCS: Performed by: INTERNAL MEDICINE

## 2025-04-17 PROCEDURE — 97535 SELF CARE MNGMENT TRAINING: CPT

## 2025-04-17 PROCEDURE — 85610 PROTHROMBIN TIME: CPT

## 2025-04-17 PROCEDURE — 2000000000 HC ICU R&B

## 2025-04-17 PROCEDURE — 6360000002 HC RX W HCPCS: Performed by: INTERNAL MEDICINE

## 2025-04-17 PROCEDURE — 84100 ASSAY OF PHOSPHORUS: CPT

## 2025-04-17 PROCEDURE — 83735 ASSAY OF MAGNESIUM: CPT

## 2025-04-17 RX ORDER — DIGOXIN 0.25 MG/ML
250 INJECTION INTRAMUSCULAR; INTRAVENOUS EVERY 4 HOURS
Status: COMPLETED | OUTPATIENT
Start: 2025-04-17 | End: 2025-04-17

## 2025-04-17 RX ORDER — WARFARIN SODIUM 6 MG/1
3 TABLET ORAL
Status: COMPLETED | OUTPATIENT
Start: 2025-04-17 | End: 2025-04-17

## 2025-04-17 RX ORDER — METOPROLOL TARTRATE 25 MG/1
25 TABLET, FILM COATED ORAL 2 TIMES DAILY
Status: DISCONTINUED | OUTPATIENT
Start: 2025-04-17 | End: 2025-04-18

## 2025-04-17 RX ADMIN — ZONISAMIDE 200 MG: 100 CAPSULE ORAL at 20:54

## 2025-04-17 RX ADMIN — Medication 10 ML: at 07:52

## 2025-04-17 RX ADMIN — DONEPEZIL HYDROCHLORIDE 5 MG: 5 TABLET ORAL at 20:54

## 2025-04-17 RX ADMIN — Medication 10 ML: at 20:55

## 2025-04-17 RX ADMIN — WARFARIN SODIUM 3 MG: 6 TABLET ORAL at 18:20

## 2025-04-17 RX ADMIN — DIGOXIN 250 MCG: 0.25 INJECTION INTRAMUSCULAR; INTRAVENOUS at 15:27

## 2025-04-17 RX ADMIN — OXCARBAZEPINE 600 MG: 300 TABLET, FILM COATED ORAL at 20:54

## 2025-04-17 RX ADMIN — ASPIRIN 81 MG: 81 TABLET, COATED ORAL at 07:52

## 2025-04-17 RX ADMIN — Medication 100 MG: at 07:52

## 2025-04-17 RX ADMIN — DIGOXIN 250 MCG: 0.25 INJECTION INTRAMUSCULAR; INTRAVENOUS at 11:27

## 2025-04-17 RX ADMIN — ANASTROZOLE 1 MG: 1 TABLET ORAL at 07:55

## 2025-04-17 RX ADMIN — SERTRALINE 100 MG: 50 TABLET, FILM COATED ORAL at 07:52

## 2025-04-17 RX ADMIN — PANTOPRAZOLE SODIUM 40 MG: 40 TABLET, DELAYED RELEASE ORAL at 07:02

## 2025-04-17 RX ADMIN — ZONISAMIDE 200 MG: 100 CAPSULE ORAL at 07:56

## 2025-04-17 RX ADMIN — DIGOXIN 250 MCG: 0.25 INJECTION INTRAMUSCULAR; INTRAVENOUS at 18:20

## 2025-04-17 RX ADMIN — MIDODRINE HYDROCHLORIDE 15 MG: 10 TABLET ORAL at 11:27

## 2025-04-17 RX ADMIN — ATORVASTATIN CALCIUM 10 MG: 10 TABLET, FILM COATED ORAL at 20:54

## 2025-04-17 RX ADMIN — AMIODARONE HYDROCHLORIDE 200 MG: 200 TABLET ORAL at 07:52

## 2025-04-17 RX ADMIN — OXCARBAZEPINE 600 MG: 300 TABLET, FILM COATED ORAL at 07:55

## 2025-04-17 ASSESSMENT — PAIN SCALES - GENERAL
PAINLEVEL_OUTOF10: 0

## 2025-04-17 NOTE — CONSULTS
Assessment:  Hypovolemic Shock  Hypokalemia  ? Colostomy Complication- Ileus vs obstruction- CT negative  Dementia  Atrial Fibrillation  Hx of Normal pressure Hydrocephalus with  shunt  Hx of recent perf Diverticulum with Ostomy per Dr Bunn 3/16/25  2 mm non obstructing calculus superior pole of Right kidney  Sm pericardial effusion  Hx of Breast cancer    PLAN:  General surgery was consulted appreciate recommendations  Fluid resuscitation received 2.6L/NSS in ED  Levophed wean for MAP > 65 mm HG  Protonix IV for GI prophylaxis   Lovenox sq for DVT prophylaxis  Received a dose of Zosyn and Vanc in ED no clear picture of sepsis a this point  MRSA nares, COVID PCR, sputum culture, blood culture x2, procal, UA, UC,  Daily  BMP, Mg, Phos, CBC  Lactic and tropon q6  Electrolyte Replacement  Midodrine 10 mg PO TID  DISPOSITION:   ICU  CODE STATUS:    Full CODE    History of Present Illness: Patient is a 70 y.o. female with the following medical Problems: Dementia, atrial fibrillation with RVR, perforated diverticulitis presented to the ED from home with chief complaint of no output from her colostomy bag since Friday.Per chart review, the patient saw Dr. Bunn from general surgery for perforated viscus from diverticulitis with perforation and abscess.  She underwent an ex lap and sigmoid resection with ostomy 3/16/25.  She was discharged on March 24, 2025.     ED workup:  K 3.2, Mag 1.8, Lactic 1.3, Trop 17>13, UA- negative  CT of Abdomen- There is a colostomy in the left lower quadrant of the abdomen. There are no signs of associated stranding or abnormal gas or fluid collections. 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 There is a moderate right and small left pleural effusion with adjacent atelectasis.  There is a small pericardial effusion.  There is a tiny volume of fluid within the cul-de-sac that could be  physiological. There is a 2 mm nonobstructing calculus superior pole of the right kidney.    In the 
Comprehensive Nutrition Assessment    Type and Reason for Visit:  Initial, Consult, Wound    Nutrition Recommendations/Plan:   Continue Current Diet (Regular)  Begin Godwin BID for wounds  Begin ONS (high corey/high protein) BID  Consult RD as needed      Malnutrition Assessment:  Malnutrition Status:  At risk for malnutrition (04/17/25 1155)    Context:  Chronic Illness     Findings of the 6 clinical characteristics of malnutrition:  Energy Intake:  Mild decrease in energy intake  Weight Loss:  No weight loss     Body Fat Loss:  No body fat loss     Muscle Mass Loss:  No muscle mass loss    Fluid Accumulation:  Mild Extremities   Strength:  Not Performed    Nutrition Assessment:    Pt admit w/ Septic Shock, A-fib, no ostomy output. Pt admit two weeks ago w/ Perforated Diverticulitis w/ abscess w/ bleeding, pt s/p Exploratory laparotomy with sigmoid colon resection, open intra-abdominal abscess drainage and end descending colostomy placement and drain placement (03/24/25). PMHx: A-fib, Perforated Diverticulitits, Dementia, Depression, Hydrocephalus w/  Shunt, Breast Cancer, HLD. Pt on regular diet and tolerating w/ po intake % of meals. Will add ONS BID, Godwin BID for wounds,continue inpatient monitoring, f/up per policy.    Nutrition Related Findings:    A/O x3, I/O +4864 since admit, abd wdl, +BS x4, Colostomy LLQ, RLE trace edema Wound Type: Surgical Incision (Abdomen)       Current Nutrition Intake & Therapies:    Average Meal Intake: %  Average Supplements Intake: None Ordered  ADULT DIET; Regular  ADULT ORAL NUTRITION SUPPLEMENT; Dinner, Breakfast; Wound Healing Oral Supplement  ADULT ORAL NUTRITION SUPPLEMENT; Breakfast, Dinner; Standard High Calorie/High Protein Oral Supplement    Anthropometric Measures:  Height: 165.1 cm (5' 5\")  Ideal Body Weight (IBW): 125 lbs (57 kg)    Admission Body Weight: 76.8 kg (169 lb 5 oz) (04/1725 bed scale)  Current Body Weight: 76.8 kg (169 lb 5 oz), 135.5 % 
General Surgery Consult    Patient's Name/Date of Birth: Susanna Arriola / 1955    Date: April 15, 2025     Consulting Surgeon: Levi Bunn M.D.    PCP: Lay Hunter MD     Chief Complaint: no output from ostomy    HPI:   Susanna Arriola is a 70 y.o. female who presents for  evaluation of no output from ostomy and had ex lap sigmoid resection with colostomy done 3/16 and had done well post op but had no outpt for several days and came to ED and was found to have hypovolemia and no stool outpt in bag.       Past Medical History:   Diagnosis Date    Atrial fibrillation (HCC)     Dementia (HCC)     History of cardioversion 10/2021       Past Surgical History:   Procedure Laterality Date    COLECTOMY N/A 3/16/2025    OPEN SIGMOID COLON RESECTION; COLOSTOMY performed by Levi Bunn MD at [facility] OR       Current Facility-Administered Medications   Medication Dose Route Frequency Provider Last Rate Last Admin    pantoprazole (PROTONIX) 40 mg in sodium chloride (PF) 0.9 % 10 mL injection  40 mg IntraVENous Daily More Baugh APRN - CNP        midodrine (PROAMATINE) tablet 10 mg  10 mg Oral BID WC More Baugh APRN - CNP   10 mg at 04/15/25 0250    enoxaparin (LOVENOX) injection 90 mg  1 mg/kg SubCUTAneous BID More Baugh APRN - CNP   90 mg at 04/15/25 0250    sodium chloride flush 0.9 % injection 5-40 mL  5-40 mL IntraVENous 2 times per day Laquita, Ismail U, DO        sodium chloride flush 0.9 % injection 5-40 mL  5-40 mL IntraVENous PRN Laquita, Ismail U, DO        0.9 % sodium chloride infusion   IntraVENous PRN Laquita, Ismail U, DO        potassium chloride (KLOR-CON M) extended release tablet 40 mEq  40 mEq Oral PRN Laquita, Ismail U, DO        Or    potassium bicarb-citric acid (EFFER-K) effervescent tablet 40 mEq  40 mEq Oral PRN Laquita, Ismail U, DO        Or    potassium chloride 10 mEq/100 mL IVPB (Peripheral Line)  10 mEq IntraVENous PRN Laquita, Ismail U, DO        magnesium sulfate 
Reactions    Betadine [Povidone Iodine] Rash       Social History     Socioeconomic History    Marital status:      Spouse name: Not on file    Number of children: Not on file    Years of education: Not on file    Highest education level: Not on file   Occupational History    Not on file   Tobacco Use    Smoking status: Never    Smokeless tobacco: Not on file   Substance and Sexual Activity    Alcohol use: Never    Drug use: Never    Sexual activity: Not on file   Other Topics Concern    Not on file   Social History Narrative    Not on file     Social Drivers of Health     Financial Resource Strain: Not on file   Food Insecurity: No Food Insecurity (4/15/2025)    Hunger Vital Sign     Worried About Running Out of Food in the Last Year: Never true     Ran Out of Food in the Last Year: Never true   Transportation Needs: No Transportation Needs (4/15/2025)    PRAPARE - Transportation     Lack of Transportation (Medical): No     Lack of Transportation (Non-Medical): No   Physical Activity: Not on file   Stress: Not on file   Social Connections: Not on file   Intimate Partner Violence: Not on file   Housing Stability: Low Risk  (4/15/2025)    Housing Stability Vital Sign     Unable to Pay for Housing in the Last Year: No     Number of Times Moved in the Last Year: 0     Homeless in the Last Year: No       History reviewed. No pertinent family history.    Review Of Systems:     I could not get review of system from the patient due to her mental status    Physical Exam:    General Appearance:  cooperative, no distress, appears stated age  Head:  Normocephalic, without obvious abnormality, atraumatic  HEENT: Fairly unremarkable findings.    Neck: Supple, no JVD  Lungs: Mildly diminished breath sounds in the bases, no wheezing  Chest Wall: No tenderness or deformity  Heart:  Irregular S1 and, 1/6 systolic murmur heard best at the left sternal border, no rubs.  Abdomen:  Soft, non-tender.  Extremities:  no cyanosis or

## 2025-04-17 NOTE — CARE COORDINATION
4/17/2025 ICU, Room air, bp's up and down 154/139, 74/44. Referral to [facility], following/accepting. NEEDS Precert, JANA, HENS if SNF at discharge. Pt active w/[facility] (PT/OT/RN) requested to follow [facility]. Will need MANUEL if d/c home. John has been bringing ostomy supplies had only been home a week before this hospital stay. AMS continues,  Therapy recommending 24/7 assist at home. CM following. Electronically signed by Elsie Frankel RN-BC on 4/17/2025 at 9:54 AM

## 2025-04-18 LAB
ANION GAP SERPL CALCULATED.3IONS-SCNC: 11 MMOL/L (ref 7–16)
BASOPHILS # BLD: 0.04 K/UL (ref 0–0.2)
BASOPHILS NFR BLD: 1 % (ref 0–2)
BUN SERPL-MCNC: 9 MG/DL (ref 6–23)
CALCIUM SERPL-MCNC: 8.8 MG/DL (ref 8.6–10.2)
CHLORIDE SERPL-SCNC: 110 MMOL/L (ref 98–107)
CO2 SERPL-SCNC: 20 MMOL/L (ref 22–29)
CREAT SERPL-MCNC: 0.9 MG/DL (ref 0.5–1)
EOSINOPHIL # BLD: 0.16 K/UL (ref 0.05–0.5)
EOSINOPHILS RELATIVE PERCENT: 3 % (ref 0–6)
ERYTHROCYTE [DISTWIDTH] IN BLOOD BY AUTOMATED COUNT: 15 % (ref 11.5–15)
GFR, ESTIMATED: 65 ML/MIN/1.73M2
GLUCOSE SERPL-MCNC: 89 MG/DL (ref 74–99)
HCT VFR BLD AUTO: 31.9 % (ref 34–48)
HGB BLD-MCNC: 9.8 G/DL (ref 11.5–15.5)
IMM GRANULOCYTES # BLD AUTO: <0.03 K/UL (ref 0–0.58)
IMM GRANULOCYTES NFR BLD: 0 % (ref 0–5)
INR PPP: 2.1
LYMPHOCYTES NFR BLD: 1.47 K/UL (ref 1.5–4)
LYMPHOCYTES RELATIVE PERCENT: 27 % (ref 20–42)
MAGNESIUM SERPL-MCNC: 2.2 MG/DL (ref 1.6–2.6)
MCH RBC QN AUTO: 26.5 PG (ref 26–35)
MCHC RBC AUTO-ENTMCNC: 30.7 G/DL (ref 32–34.5)
MCV RBC AUTO: 86.2 FL (ref 80–99.9)
MICROORGANISM SPEC CULT: ABNORMAL
MICROORGANISM/AGENT SPEC: ABNORMAL
MONOCYTES NFR BLD: 0.59 K/UL (ref 0.1–0.95)
MONOCYTES NFR BLD: 11 % (ref 2–12)
NEUTROPHILS NFR BLD: 59 % (ref 43–80)
NEUTS SEG NFR BLD: 3.2 K/UL (ref 1.8–7.3)
PHOSPHATE SERPL-MCNC: 3.2 MG/DL (ref 2.5–4.5)
PLATELET # BLD AUTO: 342 K/UL (ref 130–450)
PMV BLD AUTO: 9.1 FL (ref 7–12)
POTASSIUM SERPL-SCNC: 4.1 MMOL/L (ref 3.5–5)
PROTHROMBIN TIME: 22.4 SEC (ref 9.3–12.4)
RBC # BLD AUTO: 3.7 M/UL (ref 3.5–5.5)
SODIUM SERPL-SCNC: 141 MMOL/L (ref 132–146)
SPECIMEN DESCRIPTION: ABNORMAL
WBC OTHER # BLD: 5.5 K/UL (ref 4.5–11.5)

## 2025-04-18 PROCEDURE — 97530 THERAPEUTIC ACTIVITIES: CPT

## 2025-04-18 PROCEDURE — 85025 COMPLETE CBC W/AUTO DIFF WBC: CPT

## 2025-04-18 PROCEDURE — 2500000003 HC RX 250 WO HCPCS: Performed by: INTERNAL MEDICINE

## 2025-04-18 PROCEDURE — 6370000000 HC RX 637 (ALT 250 FOR IP): Performed by: INTERNAL MEDICINE

## 2025-04-18 PROCEDURE — 83735 ASSAY OF MAGNESIUM: CPT

## 2025-04-18 PROCEDURE — 99233 SBSQ HOSP IP/OBS HIGH 50: CPT | Performed by: INTERNAL MEDICINE

## 2025-04-18 PROCEDURE — 2060000000 HC ICU INTERMEDIATE R&B

## 2025-04-18 PROCEDURE — 97110 THERAPEUTIC EXERCISES: CPT

## 2025-04-18 PROCEDURE — 80048 BASIC METABOLIC PNL TOTAL CA: CPT

## 2025-04-18 PROCEDURE — 85610 PROTHROMBIN TIME: CPT

## 2025-04-18 PROCEDURE — 84100 ASSAY OF PHOSPHORUS: CPT

## 2025-04-18 RX ORDER — WARFARIN SODIUM 3 MG/1
3 TABLET ORAL
Status: COMPLETED | OUTPATIENT
Start: 2025-04-18 | End: 2025-04-18

## 2025-04-18 RX ORDER — METOPROLOL TARTRATE 25 MG/1
12.5 TABLET, FILM COATED ORAL 2 TIMES DAILY
Status: DISCONTINUED | OUTPATIENT
Start: 2025-04-18 | End: 2025-04-21 | Stop reason: HOSPADM

## 2025-04-18 RX ADMIN — SERTRALINE 100 MG: 50 TABLET, FILM COATED ORAL at 08:30

## 2025-04-18 RX ADMIN — Medication 100 MG: at 08:30

## 2025-04-18 RX ADMIN — PANTOPRAZOLE SODIUM 40 MG: 40 TABLET, DELAYED RELEASE ORAL at 06:42

## 2025-04-18 RX ADMIN — OXCARBAZEPINE 600 MG: 300 TABLET, FILM COATED ORAL at 08:31

## 2025-04-18 RX ADMIN — MIDODRINE HYDROCHLORIDE 15 MG: 10 TABLET ORAL at 08:29

## 2025-04-18 RX ADMIN — Medication 10 ML: at 09:26

## 2025-04-18 RX ADMIN — METOPROLOL TARTRATE 12.5 MG: 25 TABLET, FILM COATED ORAL at 22:24

## 2025-04-18 RX ADMIN — ATORVASTATIN CALCIUM 10 MG: 10 TABLET, FILM COATED ORAL at 22:36

## 2025-04-18 RX ADMIN — Medication 10 ML: at 22:37

## 2025-04-18 RX ADMIN — METOPROLOL TARTRATE 12.5 MG: 25 TABLET, FILM COATED ORAL at 10:27

## 2025-04-18 RX ADMIN — OXCARBAZEPINE 600 MG: 300 TABLET, FILM COATED ORAL at 22:24

## 2025-04-18 RX ADMIN — ANASTROZOLE 1 MG: 1 TABLET ORAL at 08:31

## 2025-04-18 RX ADMIN — ASPIRIN 81 MG: 81 TABLET, COATED ORAL at 08:30

## 2025-04-18 RX ADMIN — ZONISAMIDE 200 MG: 100 CAPSULE ORAL at 22:25

## 2025-04-18 RX ADMIN — DONEPEZIL HYDROCHLORIDE 5 MG: 5 TABLET ORAL at 22:24

## 2025-04-18 RX ADMIN — AMIODARONE HYDROCHLORIDE 200 MG: 200 TABLET ORAL at 08:30

## 2025-04-18 RX ADMIN — WARFARIN SODIUM 3 MG: 3 TABLET ORAL at 18:07

## 2025-04-18 RX ADMIN — ZONISAMIDE 200 MG: 100 CAPSULE ORAL at 08:30

## 2025-04-18 ASSESSMENT — PAIN SCALES - GENERAL
PAINLEVEL_OUTOF10: 0

## 2025-04-18 NOTE — CARE COORDINATION
[place]18[place]2025 [place] [facility] [place] versus [place]. AMS continues- person and place,  explains likely won't get better this has been ongoing. He will provide 24/7 care at home and requested to resume HHC at discharge. Pt active w/[facility] (PT/OT/RN) orders in epic. [facility] accepted for weekend discharge. Mandan has been bringing ostomy supplies had only been home a week before this hospital stay. Therapy recommending 24/7 assist at home.  agrees for consideration of cardioversion prior to discharge. CM/SS assigned to follow. IMM completed with  Erasmo.  Electronically signed by Elsie Frankel RN-BC on 4/18/2025 at 10:15 AM

## 2025-04-19 ENCOUNTER — APPOINTMENT (OUTPATIENT)
Dept: GENERAL RADIOLOGY | Age: 70
DRG: 393 | End: 2025-04-19
Payer: MEDICARE

## 2025-04-19 LAB
ANION GAP SERPL CALCULATED.3IONS-SCNC: 9 MMOL/L (ref 7–16)
BASOPHILS # BLD: 0.09 K/UL (ref 0–0.2)
BASOPHILS NFR BLD: 2 % (ref 0–2)
BUN SERPL-MCNC: 11 MG/DL (ref 6–23)
CALCIUM SERPL-MCNC: 8.8 MG/DL (ref 8.6–10.2)
CHLORIDE SERPL-SCNC: 109 MMOL/L (ref 98–107)
CO2 SERPL-SCNC: 21 MMOL/L (ref 22–29)
CREAT SERPL-MCNC: 1 MG/DL (ref 0.5–1)
EOSINOPHIL # BLD: 0.09 K/UL (ref 0.05–0.5)
EOSINOPHILS RELATIVE PERCENT: 2 % (ref 0–6)
ERYTHROCYTE [DISTWIDTH] IN BLOOD BY AUTOMATED COUNT: 14.8 % (ref 11.5–15)
GFR, ESTIMATED: 59 ML/MIN/1.73M2
GLUCOSE SERPL-MCNC: 84 MG/DL (ref 74–99)
HCT VFR BLD AUTO: 30.1 % (ref 34–48)
HGB BLD-MCNC: 9.2 G/DL (ref 11.5–15.5)
INR PPP: 1.8
LYMPHOCYTES NFR BLD: 1.53 K/UL (ref 1.5–4)
LYMPHOCYTES RELATIVE PERCENT: 31 % (ref 20–42)
MAGNESIUM SERPL-MCNC: 2.1 MG/DL (ref 1.6–2.6)
MCH RBC QN AUTO: 26.7 PG (ref 26–35)
MCHC RBC AUTO-ENTMCNC: 30.6 G/DL (ref 32–34.5)
MCV RBC AUTO: 87.2 FL (ref 80–99.9)
MICROORGANISM SPEC CULT: NORMAL
MICROORGANISM SPEC CULT: NORMAL
MONOCYTES NFR BLD: 0.26 K/UL (ref 0.1–0.95)
MONOCYTES NFR BLD: 5 % (ref 2–12)
NEUTROPHILS NFR BLD: 60 % (ref 43–80)
NEUTS SEG NFR BLD: 2.94 K/UL (ref 1.8–7.3)
PHOSPHATE SERPL-MCNC: 3.3 MG/DL (ref 2.5–4.5)
PLATELET # BLD AUTO: 356 K/UL (ref 130–450)
PMV BLD AUTO: 9.3 FL (ref 7–12)
POTASSIUM SERPL-SCNC: 3.9 MMOL/L (ref 3.5–5)
PROTHROMBIN TIME: 19.1 SEC (ref 9.3–12.4)
RBC # BLD AUTO: 3.45 M/UL (ref 3.5–5.5)
RBC # BLD: ABNORMAL 10*6/UL
SERVICE CMNT-IMP: NORMAL
SERVICE CMNT-IMP: NORMAL
SODIUM SERPL-SCNC: 139 MMOL/L (ref 132–146)
SPECIMEN DESCRIPTION: NORMAL
SPECIMEN DESCRIPTION: NORMAL
WBC OTHER # BLD: 4.9 K/UL (ref 4.5–11.5)

## 2025-04-19 PROCEDURE — 36415 COLL VENOUS BLD VENIPUNCTURE: CPT

## 2025-04-19 PROCEDURE — 71045 X-RAY EXAM CHEST 1 VIEW: CPT

## 2025-04-19 PROCEDURE — 83735 ASSAY OF MAGNESIUM: CPT

## 2025-04-19 PROCEDURE — 80048 BASIC METABOLIC PNL TOTAL CA: CPT

## 2025-04-19 PROCEDURE — 6370000000 HC RX 637 (ALT 250 FOR IP): Performed by: INTERNAL MEDICINE

## 2025-04-19 PROCEDURE — 85610 PROTHROMBIN TIME: CPT

## 2025-04-19 PROCEDURE — 2060000000 HC ICU INTERMEDIATE R&B

## 2025-04-19 PROCEDURE — 6370000000 HC RX 637 (ALT 250 FOR IP): Performed by: SURGERY

## 2025-04-19 PROCEDURE — 2500000003 HC RX 250 WO HCPCS: Performed by: INTERNAL MEDICINE

## 2025-04-19 PROCEDURE — 99232 SBSQ HOSP IP/OBS MODERATE 35: CPT | Performed by: INTERNAL MEDICINE

## 2025-04-19 PROCEDURE — 84100 ASSAY OF PHOSPHORUS: CPT

## 2025-04-19 PROCEDURE — 85025 COMPLETE CBC W/AUTO DIFF WBC: CPT

## 2025-04-19 RX ORDER — WARFARIN SODIUM 4 MG/1
4 TABLET ORAL
Status: COMPLETED | OUTPATIENT
Start: 2025-04-19 | End: 2025-04-19

## 2025-04-19 RX ORDER — SENNOSIDES A AND B 8.6 MG/1
1 TABLET, FILM COATED ORAL NIGHTLY
Status: DISCONTINUED | OUTPATIENT
Start: 2025-04-19 | End: 2025-04-21 | Stop reason: HOSPADM

## 2025-04-19 RX ORDER — DOCUSATE SODIUM 100 MG/1
100 CAPSULE, LIQUID FILLED ORAL DAILY
Status: DISCONTINUED | OUTPATIENT
Start: 2025-04-19 | End: 2025-04-21 | Stop reason: HOSPADM

## 2025-04-19 RX ADMIN — ANASTROZOLE 1 MG: 1 TABLET ORAL at 13:07

## 2025-04-19 RX ADMIN — SERTRALINE 100 MG: 50 TABLET, FILM COATED ORAL at 09:04

## 2025-04-19 RX ADMIN — SENNOSIDES 8.6 MG: 8.6 TABLET, FILM COATED ORAL at 20:44

## 2025-04-19 RX ADMIN — OXCARBAZEPINE 600 MG: 300 TABLET, FILM COATED ORAL at 09:04

## 2025-04-19 RX ADMIN — OXCARBAZEPINE 600 MG: 300 TABLET, FILM COATED ORAL at 20:42

## 2025-04-19 RX ADMIN — ZONISAMIDE 200 MG: 100 CAPSULE ORAL at 13:07

## 2025-04-19 RX ADMIN — DONEPEZIL HYDROCHLORIDE 5 MG: 5 TABLET ORAL at 20:43

## 2025-04-19 RX ADMIN — Medication 10 ML: at 20:42

## 2025-04-19 RX ADMIN — ASPIRIN 81 MG: 81 TABLET, COATED ORAL at 09:04

## 2025-04-19 RX ADMIN — AMIODARONE HYDROCHLORIDE 200 MG: 200 TABLET ORAL at 09:05

## 2025-04-19 RX ADMIN — METOPROLOL TARTRATE 12.5 MG: 25 TABLET, FILM COATED ORAL at 20:45

## 2025-04-19 RX ADMIN — Medication 100 MG: at 09:06

## 2025-04-19 RX ADMIN — Medication 10 ML: at 09:07

## 2025-04-19 RX ADMIN — ZONISAMIDE 200 MG: 100 CAPSULE ORAL at 20:44

## 2025-04-19 RX ADMIN — WARFARIN SODIUM 4 MG: 4 TABLET ORAL at 18:04

## 2025-04-19 RX ADMIN — PANTOPRAZOLE SODIUM 40 MG: 40 TABLET, DELAYED RELEASE ORAL at 05:47

## 2025-04-19 RX ADMIN — ATORVASTATIN CALCIUM 10 MG: 10 TABLET, FILM COATED ORAL at 20:44

## 2025-04-19 RX ADMIN — DOCUSATE SODIUM 100 MG: 100 CAPSULE, LIQUID FILLED ORAL at 09:04

## 2025-04-19 RX ADMIN — METOPROLOL TARTRATE 12.5 MG: 25 TABLET, FILM COATED ORAL at 09:05

## 2025-04-19 ASSESSMENT — PAIN SCALES - GENERAL: PAINLEVEL_OUTOF10: 0

## 2025-04-20 LAB
ANION GAP SERPL CALCULATED.3IONS-SCNC: 10 MMOL/L (ref 7–16)
BASOPHILS # BLD: 0.06 K/UL (ref 0–0.2)
BASOPHILS NFR BLD: 1 % (ref 0–2)
BUN SERPL-MCNC: 11 MG/DL (ref 6–23)
CALCIUM SERPL-MCNC: 8.8 MG/DL (ref 8.6–10.2)
CHLORIDE SERPL-SCNC: 107 MMOL/L (ref 98–107)
CO2 SERPL-SCNC: 20 MMOL/L (ref 22–29)
CREAT SERPL-MCNC: 0.9 MG/DL (ref 0.5–1)
EOSINOPHIL # BLD: 0.09 K/UL (ref 0.05–0.5)
EOSINOPHILS RELATIVE PERCENT: 2 % (ref 0–6)
ERYTHROCYTE [DISTWIDTH] IN BLOOD BY AUTOMATED COUNT: 14.8 % (ref 11.5–15)
GFR, ESTIMATED: 66 ML/MIN/1.73M2
GLUCOSE SERPL-MCNC: 81 MG/DL (ref 74–99)
HCT VFR BLD AUTO: 29.2 % (ref 34–48)
HGB BLD-MCNC: 9.1 G/DL (ref 11.5–15.5)
IMM GRANULOCYTES # BLD AUTO: <0.03 K/UL (ref 0–0.58)
IMM GRANULOCYTES NFR BLD: 0 % (ref 0–5)
INR PPP: 1.8
LYMPHOCYTES NFR BLD: 1.54 K/UL (ref 1.5–4)
LYMPHOCYTES RELATIVE PERCENT: 30 % (ref 20–42)
MAGNESIUM SERPL-MCNC: 2 MG/DL (ref 1.6–2.6)
MCH RBC QN AUTO: 26.7 PG (ref 26–35)
MCHC RBC AUTO-ENTMCNC: 31.2 G/DL (ref 32–34.5)
MCV RBC AUTO: 85.6 FL (ref 80–99.9)
MICROORGANISM SPEC CULT: NORMAL
MICROORGANISM SPEC CULT: NORMAL
MONOCYTES NFR BLD: 0.6 K/UL (ref 0.1–0.95)
MONOCYTES NFR BLD: 12 % (ref 2–12)
NEUTROPHILS NFR BLD: 55 % (ref 43–80)
NEUTS SEG NFR BLD: 2.77 K/UL (ref 1.8–7.3)
PHOSPHATE SERPL-MCNC: 3.4 MG/DL (ref 2.5–4.5)
PLATELET # BLD AUTO: 348 K/UL (ref 130–450)
PMV BLD AUTO: 9.2 FL (ref 7–12)
POTASSIUM SERPL-SCNC: 3.6 MMOL/L (ref 3.5–5)
PROTHROMBIN TIME: 19.5 SEC (ref 9.3–12.4)
RBC # BLD AUTO: 3.41 M/UL (ref 3.5–5.5)
SERVICE CMNT-IMP: NORMAL
SERVICE CMNT-IMP: NORMAL
SODIUM SERPL-SCNC: 137 MMOL/L (ref 132–146)
SPECIMEN DESCRIPTION: NORMAL
SPECIMEN DESCRIPTION: NORMAL
WBC OTHER # BLD: 5.1 K/UL (ref 4.5–11.5)

## 2025-04-20 PROCEDURE — 6370000000 HC RX 637 (ALT 250 FOR IP): Performed by: INTERNAL MEDICINE

## 2025-04-20 PROCEDURE — 84100 ASSAY OF PHOSPHORUS: CPT

## 2025-04-20 PROCEDURE — 99232 SBSQ HOSP IP/OBS MODERATE 35: CPT | Performed by: INTERNAL MEDICINE

## 2025-04-20 PROCEDURE — 80048 BASIC METABOLIC PNL TOTAL CA: CPT

## 2025-04-20 PROCEDURE — 2500000003 HC RX 250 WO HCPCS: Performed by: INTERNAL MEDICINE

## 2025-04-20 PROCEDURE — 83735 ASSAY OF MAGNESIUM: CPT

## 2025-04-20 PROCEDURE — 85610 PROTHROMBIN TIME: CPT

## 2025-04-20 PROCEDURE — 2060000000 HC ICU INTERMEDIATE R&B

## 2025-04-20 PROCEDURE — 36415 COLL VENOUS BLD VENIPUNCTURE: CPT

## 2025-04-20 PROCEDURE — 6370000000 HC RX 637 (ALT 250 FOR IP): Performed by: SURGERY

## 2025-04-20 PROCEDURE — 85025 COMPLETE CBC W/AUTO DIFF WBC: CPT

## 2025-04-20 RX ORDER — WARFARIN SODIUM 4 MG/1
4 TABLET ORAL
Status: COMPLETED | OUTPATIENT
Start: 2025-04-20 | End: 2025-04-20

## 2025-04-20 RX ADMIN — METOPROLOL TARTRATE 12.5 MG: 25 TABLET, FILM COATED ORAL at 21:28

## 2025-04-20 RX ADMIN — DOCUSATE SODIUM 100 MG: 100 CAPSULE, LIQUID FILLED ORAL at 10:54

## 2025-04-20 RX ADMIN — SENNOSIDES 8.6 MG: 8.6 TABLET, FILM COATED ORAL at 21:28

## 2025-04-20 RX ADMIN — PANTOPRAZOLE SODIUM 40 MG: 40 TABLET, DELAYED RELEASE ORAL at 05:08

## 2025-04-20 RX ADMIN — Medication 10 ML: at 21:32

## 2025-04-20 RX ADMIN — ATORVASTATIN CALCIUM 10 MG: 10 TABLET, FILM COATED ORAL at 21:28

## 2025-04-20 RX ADMIN — ANASTROZOLE 1 MG: 1 TABLET ORAL at 10:56

## 2025-04-20 RX ADMIN — ZONISAMIDE 200 MG: 100 CAPSULE ORAL at 10:56

## 2025-04-20 RX ADMIN — OXCARBAZEPINE 600 MG: 300 TABLET, FILM COATED ORAL at 21:28

## 2025-04-20 RX ADMIN — DONEPEZIL HYDROCHLORIDE 5 MG: 5 TABLET ORAL at 21:28

## 2025-04-20 RX ADMIN — SERTRALINE 100 MG: 50 TABLET, FILM COATED ORAL at 10:55

## 2025-04-20 RX ADMIN — Medication 10 ML: at 10:55

## 2025-04-20 RX ADMIN — WARFARIN SODIUM 4 MG: 4 TABLET ORAL at 18:38

## 2025-04-20 RX ADMIN — ASPIRIN 81 MG: 81 TABLET, COATED ORAL at 10:54

## 2025-04-20 RX ADMIN — Medication 100 MG: at 10:55

## 2025-04-20 RX ADMIN — ZONISAMIDE 200 MG: 100 CAPSULE ORAL at 21:35

## 2025-04-20 RX ADMIN — AMIODARONE HYDROCHLORIDE 200 MG: 200 TABLET ORAL at 10:55

## 2025-04-20 RX ADMIN — METOPROLOL TARTRATE 12.5 MG: 25 TABLET, FILM COATED ORAL at 10:54

## 2025-04-20 RX ADMIN — OXCARBAZEPINE 600 MG: 300 TABLET, FILM COATED ORAL at 10:54

## 2025-04-21 ENCOUNTER — ANESTHESIA (OUTPATIENT)
Dept: POSTOP/PACU | Age: 70
DRG: 393 | End: 2025-04-21
Payer: MEDICARE

## 2025-04-21 ENCOUNTER — APPOINTMENT (OUTPATIENT)
Dept: POSTOP/PACU | Age: 70
DRG: 393 | End: 2025-04-21
Payer: MEDICARE

## 2025-04-21 ENCOUNTER — ANESTHESIA EVENT (OUTPATIENT)
Dept: POSTOP/PACU | Age: 70
DRG: 393 | End: 2025-04-21
Payer: MEDICARE

## 2025-04-21 VITALS
TEMPERATURE: 97.3 F | OXYGEN SATURATION: 94 % | BODY MASS INDEX: 29.53 KG/M2 | HEIGHT: 65 IN | WEIGHT: 177.25 LBS | SYSTOLIC BLOOD PRESSURE: 115 MMHG | HEART RATE: 56 BPM | DIASTOLIC BLOOD PRESSURE: 49 MMHG | RESPIRATION RATE: 16 BRPM

## 2025-04-21 LAB
EKG ATRIAL RATE: 55 BPM
EKG P AXIS: 72 DEGREES
EKG P-R INTERVAL: 202 MS
EKG Q-T INTERVAL: 482 MS
EKG QRS DURATION: 96 MS
EKG QTC CALCULATION (BAZETT): 461 MS
EKG R AXIS: 38 DEGREES
EKG T AXIS: 56 DEGREES
EKG VENTRICULAR RATE: 55 BPM

## 2025-04-21 PROCEDURE — 5A2204Z RESTORATION OF CARDIAC RHYTHM, SINGLE: ICD-10-PCS | Performed by: SPECIALIST

## 2025-04-21 PROCEDURE — 6370000000 HC RX 637 (ALT 250 FOR IP): Performed by: INTERNAL MEDICINE

## 2025-04-21 PROCEDURE — 2500000003 HC RX 250 WO HCPCS: Performed by: INTERNAL MEDICINE

## 2025-04-21 PROCEDURE — 93010 ELECTROCARDIOGRAM REPORT: CPT | Performed by: INTERNAL MEDICINE

## 2025-04-21 PROCEDURE — 92960 CARDIOVERSION ELECTRIC EXT: CPT

## 2025-04-21 PROCEDURE — 3700000000 HC ANESTHESIA ATTENDED CARE: Performed by: ANESTHESIOLOGY

## 2025-04-21 PROCEDURE — 6360000002 HC RX W HCPCS: Performed by: NURSE ANESTHETIST, CERTIFIED REGISTERED

## 2025-04-21 PROCEDURE — 6370000000 HC RX 637 (ALT 250 FOR IP): Performed by: SURGERY

## 2025-04-21 PROCEDURE — 2580000003 HC RX 258: Performed by: NURSE ANESTHETIST, CERTIFIED REGISTERED

## 2025-04-21 PROCEDURE — 7100000000 HC PACU RECOVERY - FIRST 15 MIN: Performed by: ANESTHESIOLOGY

## 2025-04-21 PROCEDURE — 7100000001 HC PACU RECOVERY - ADDTL 15 MIN: Performed by: ANESTHESIOLOGY

## 2025-04-21 PROCEDURE — 93005 ELECTROCARDIOGRAM TRACING: CPT | Performed by: SPECIALIST

## 2025-04-21 RX ORDER — SIMVASTATIN 20 MG
20 TABLET ORAL NIGHTLY
Qty: 30 TABLET | Refills: 3 | Status: SHIPPED | OUTPATIENT
Start: 2025-04-21

## 2025-04-21 RX ORDER — SODIUM CHLORIDE 9 MG/ML
INJECTION, SOLUTION INTRAVENOUS
Status: DISCONTINUED | OUTPATIENT
Start: 2025-04-21 | End: 2025-04-21 | Stop reason: SDUPTHER

## 2025-04-21 RX ORDER — METOPROLOL TARTRATE 25 MG/1
12.5 TABLET, FILM COATED ORAL 2 TIMES DAILY
Qty: 60 TABLET | Refills: 3 | Status: SHIPPED | OUTPATIENT
Start: 2025-04-21

## 2025-04-21 RX ORDER — ASPIRIN 81 MG/1
81 TABLET ORAL DAILY
Qty: 30 TABLET | Refills: 3 | Status: SHIPPED | OUTPATIENT
Start: 2025-04-21

## 2025-04-21 RX ORDER — FUROSEMIDE 40 MG/1
40 TABLET ORAL DAILY
Qty: 60 TABLET | Refills: 3 | Status: SHIPPED | OUTPATIENT
Start: 2025-04-21

## 2025-04-21 RX ORDER — ZONISAMIDE 100 MG/1
100 CAPSULE ORAL 4 TIMES DAILY
Qty: 30 CAPSULE | Refills: 3 | Status: SHIPPED | OUTPATIENT
Start: 2025-04-21

## 2025-04-21 RX ORDER — DONEPEZIL HYDROCHLORIDE 5 MG/1
5 TABLET, FILM COATED ORAL NIGHTLY
Qty: 30 TABLET | Refills: 3 | Status: SHIPPED | OUTPATIENT
Start: 2025-04-21

## 2025-04-21 RX ORDER — WARFARIN SODIUM 3 MG/1
3 TABLET ORAL DAILY
Qty: 30 TABLET | Refills: 3 | Status: SHIPPED | OUTPATIENT
Start: 2025-04-21

## 2025-04-21 RX ORDER — PROPOFOL 10 MG/ML
INJECTION, EMULSION INTRAVENOUS
Status: DISCONTINUED | OUTPATIENT
Start: 2025-04-21 | End: 2025-04-21 | Stop reason: SDUPTHER

## 2025-04-21 RX ORDER — WARFARIN SODIUM 4 MG/1
4 TABLET ORAL WEEKLY
Qty: 30 TABLET | Refills: 3 | Status: SHIPPED | OUTPATIENT
Start: 2025-04-23

## 2025-04-21 RX ORDER — ANASTROZOLE 1 MG/1
1 TABLET ORAL DAILY
Qty: 30 TABLET | Refills: 3 | Status: SHIPPED | OUTPATIENT
Start: 2025-04-21

## 2025-04-21 RX ORDER — OXCARBAZEPINE 600 MG/1
600 TABLET, FILM COATED ORAL 2 TIMES DAILY
Qty: 60 TABLET | Refills: 3 | Status: SHIPPED | OUTPATIENT
Start: 2025-04-21

## 2025-04-21 RX ORDER — PANTOPRAZOLE SODIUM 40 MG/1
40 TABLET, DELAYED RELEASE ORAL
Qty: 30 TABLET | Refills: 3 | Status: SHIPPED | OUTPATIENT
Start: 2025-04-22

## 2025-04-21 RX ORDER — SERTRALINE HYDROCHLORIDE 100 MG/1
100 TABLET, FILM COATED ORAL DAILY
Qty: 30 TABLET | Refills: 0 | Status: SHIPPED | OUTPATIENT
Start: 2025-04-21

## 2025-04-21 RX ORDER — WARFARIN SODIUM 3 MG/1
3 TABLET ORAL
Status: DISCONTINUED | OUTPATIENT
Start: 2025-04-21 | End: 2025-04-21 | Stop reason: HOSPADM

## 2025-04-21 RX ORDER — AMIODARONE HYDROCHLORIDE 200 MG/1
200 TABLET ORAL DAILY
Qty: 30 TABLET | Refills: 0 | Status: SHIPPED | OUTPATIENT
Start: 2025-04-22 | End: 2025-05-22

## 2025-04-21 RX ADMIN — PANTOPRAZOLE SODIUM 40 MG: 40 TABLET, DELAYED RELEASE ORAL at 05:51

## 2025-04-21 RX ADMIN — ZONISAMIDE 200 MG: 100 CAPSULE ORAL at 10:24

## 2025-04-21 RX ADMIN — DOCUSATE SODIUM 100 MG: 100 CAPSULE, LIQUID FILLED ORAL at 10:20

## 2025-04-21 RX ADMIN — SERTRALINE 100 MG: 50 TABLET, FILM COATED ORAL at 10:20

## 2025-04-21 RX ADMIN — SODIUM CHLORIDE: 9 INJECTION, SOLUTION INTRAVENOUS at 08:24

## 2025-04-21 RX ADMIN — ANASTROZOLE 1 MG: 1 TABLET ORAL at 10:24

## 2025-04-21 RX ADMIN — Medication 100 MG: at 10:21

## 2025-04-21 RX ADMIN — ASPIRIN 81 MG: 81 TABLET, COATED ORAL at 10:21

## 2025-04-21 RX ADMIN — OXCARBAZEPINE 600 MG: 300 TABLET, FILM COATED ORAL at 10:21

## 2025-04-21 RX ADMIN — AMIODARONE HYDROCHLORIDE 200 MG: 200 TABLET ORAL at 10:20

## 2025-04-21 RX ADMIN — Medication 10 ML: at 10:21

## 2025-04-21 RX ADMIN — METOPROLOL TARTRATE 12.5 MG: 25 TABLET, FILM COATED ORAL at 10:21

## 2025-04-21 RX ADMIN — PROPOFOL INJECTABLE EMULSION 100 MG: 10 INJECTION, EMULSION INTRAVENOUS at 08:24

## 2025-04-21 ASSESSMENT — PAIN - FUNCTIONAL ASSESSMENT
PAIN_FUNCTIONAL_ASSESSMENT: ADULT NONVERBAL PAIN SCALE (NPVS)
PAIN_FUNCTIONAL_ASSESSMENT: NONE - DENIES PAIN

## 2025-04-21 ASSESSMENT — PAIN SCALES - GENERAL: PAINLEVEL_OUTOF10: 0

## 2025-04-21 NOTE — PROGRESS NOTES
Physical Therapy    Room #:   0618/0618-02    Date: 2025       Patient Name: Susanna Arriola  : 1955      MRN: [id number]     Patient unavailable for physical therapy treatment due to off floor at testing. Per nursing patient getting a cardio version today. Will attempt PT treatment tomorrow. Thank you.      Saray Echeverria, PTA    
  Progress Note          Inpatient Cardiology Progress Note     Date of Service: 4/19/2025    Reason for Follow-up:atrial fib    HISTORY OF PRESENT ILLNESS:     paroxysmal atrial fibrillation.  She underwent cardioversion about 6 months ago at [facility].  She was kept on warfarin and amiodarone     Her INR is therapeutic     Metoprolol was held on admission due to hypotension.     Her blood pressure is more stable now and so patient was restarted on metoprolol at small dose for heart rate control     Echocardiogram showing cardiomyopathy with mildly to moderately reduced left ventricular systolic function with ejection fraction around 40 to 45%.           REVIEW OF SYSTEMS:     Constitutional: Denies fevers, chills, night sweats, and fatigue  HEENT: Denies headaches, nose bleeds, and blurred vision,oral pain, abscess or lesion.  Musculoskeletal: Denies falls, pain to BLE with ambulation and edema to BLE.  Neurological: Denies dizziness and lightheadedness, numbness and tingling  Cardiovascular: Denies chest pain, palpitations, and feelings of heart racing.   Respiratory: Denies orthopnea and PND  Gastrointestinal: Denies heartburn, nausea/vomiting, diarrhea and constipation, black/bloody, and tarry stools.   Genitourinary: Denies dysuria and hematuria  Hematologic: Denies excessive bruising or bleeding      PHYSICAL EXAM:   BP (!) 138/55   Pulse 81   Temp 98.6 °F (37 °C)   Resp 18   Ht 1.651 m (5' 5\")   Wt 80.4 kg (177 lb 4 oz)   SpO2 96%   BMI 29.50 kg/m²   CONST:  Well developed, well nourished who appears stated age. Awake, alert, cooperative, no apparent distress  HEENT:   Head- Normocephalic, atraumatic   Eyes- Conjunctivae pink, anicteric  Throat- Oral mucosa pink and moist  Neck-  No stridor, trachea midline, no jugular venous distention. No adenopathy   CHEST: Chest symmetrical and non-tender to palpation. No accessory muscle use or intercostal retractions  RESPIRATORY: Lung sounds - clear 
  Progress Note          Inpatient Cardiology Progress Note   A FIB  Date of Service: 4/20/2025    Reason for Follow-up:    HISTORY OF PRESENT ILLNESS:     paroxysmal atrial fibrillation.  She underwent cardioversion about 6 months ago at [facility].  She was kept on warfarin and amiodarone     Her INR is therapeutic     Metoprolol was held on admission due to hypotension.     Her blood pressure is more stable now and so patient was restarted on metoprolol at small dose for heart rate control     Echocardiogram showing cardiomyopathy with mildly to moderately reduced left ventricular systolic function with ejection fraction around 40 to 45%.                 REVIEW OF SYSTEMS:     Constitutional: Denies fevers, chills, night sweats, and fatigue  HEENT: Denies headaches, nose bleeds, and blurred vision,oral pain, abscess or lesion.  Musculoskeletal: Denies falls, pain to BLE with ambulation and edema to BLE.  Neurological: Denies dizziness and lightheadedness, numbness and tingling  Cardiovascular: Denies chest pain, palpitations, and feelings of heart racing.   Respiratory: Denies orthopnea and PND  Gastrointestinal: Denies heartburn, nausea/vomiting, diarrhea and constipation, black/bloody, and tarry stools.   Genitourinary: Denies dysuria and hematuria  Hematologic: Denies excessive bruising or bleeding      PHYSICAL EXAM:   /71   Pulse (!) 107   Temp 98.4 °F (36.9 °C)   Resp 18   Ht 1.651 m (5' 5\")   Wt 80.4 kg (177 lb 4 oz)   SpO2 98%   BMI 29.50 kg/m²   CONST:  Well developed, well nourished who appears stated age. Awake, alert, cooperative, no apparent distress  HEENT:   Head- Normocephalic, atraumatic   Eyes- Conjunctivae pink, anicteric  Throat- Oral mucosa pink and moist  Neck-  No stridor, trachea midline, no jugular venous distention. No adenopathy   CHEST: Chest symmetrical and non-tender to palpation. No accessory muscle use or intercostal retractions  RESPIRATORY: Lung sounds - clear 
4 Eyes Skin Assessment     NAME:  Susanna Arriola  YOB: 1955  MEDICAL RECORD NUMBER:  [id number]    The patient is being assessed for  Admission    Midline abdominal incision site 13x5x3 with retention suture, right thigh bruise, bilateral heels pink/boggy    I agree that at least one RN has performed a thorough Head to Toe Skin Assessment on the patient. ALL assessment sites listed below have been assessed.      Areas assessed by both nurses:    Head, Face, Ears, Shoulders, Back, Chest, Arms, Elbows, Hands, Sacrum. Buttock, Coccyx, Ischium, and Legs. Feet and Heels        Does the Patient have a Wound? Yes wound(s) were present on assessment. LDA wound assessment was Initiated and completed by RN    -Midline incision  -bruise right thigh  -boggy heels       Deshaun Prevention initiated by RN: Yes  Wound Care Orders initiated by RN: Yes    Pressure Injury (Stage 3,4, Unstageable, DTI, NWPT, and Complex wounds) if present, place Wound referral order by RN under : Yes    New Ostomies, if present place, Ostomy referral order under : Yes     Nurse 1 eSignature: Electronically signed by Jessy Membreno RN on 4/15/25 at 2:19 PM EDT    **SHARE this note so that the co-signing nurse can place an eSignature**    Nurse 2 eSignature: Electronically signed by Shu Lawrence RN on 4/15/25 at 2:19 PM EDT    
4 Eyes Skin Assessment     NAME:  Susanna Arriola  YOB: 1955  MEDICAL RECORD NUMBER:  [id number]    The patient is being assessed for  Transfer to New Unit    I agree that at least one RN has performed a thorough Head to Toe Skin Assessment on the patient. ALL assessment sites listed below have been assessed.      Areas assessed by both nurses:    Head, Face, Ears, Shoulders, Back, Chest, Arms, Elbows, Hands, Sacrum. Buttock, Coccyx, Ischium, Legs. Feet and Heels, and Under Medical Devices         Does the Patient have a Wound? Yes wound(s) were present on assessment. LDA wound assessment was Initiated and completed by RN Abdominal incision, ostomy LUQ.  Ecchymosis to right and left thighs, right arm.       Deshaun Prevention initiated by RN: No  Wound Care Orders initiated by RN: No    Pressure Injury (Stage 3,4, Unstageable, DTI, NWPT, and Complex wounds) if present, place Wound referral order by RN under : No    New Ostomies, if present place, Ostomy referral order under : Yes     Nurse 1 eSignature: Electronically signed by Alex Moraes RN on 4/18/25 at 10:21 AM EDT    **SHARE this note so that the co-signing nurse can place an eSignature**    Nurse 2 eSignature: Electronically signed by Alecia Reis RN on 4/18/25 at 1:15 PM EDT    
750 to pacu for cardioversion by dr peace. Connected to all monitors and O2 applied at 3 liters nasal canula with endtidal monitoring  820 dr patel and anesthesia present  823 time out performed and premedicated per anesthesia  825 shocked with 200 joules in sync mode by dr peace converted to sinus dina with occasional PAC's  832 12 lead EKG done arousable denies pain  
AFIB    This patient is on medication that requires renal, weight, and/or indication dose adjustment.      Date Body Weight IBW  Adjusted BW SCr  CrCl Dialysis status   4/15/2025 87.1 kg (192 lb) Ideal body weight: 57 kg (125 lb 10.6 oz)  Adjusted ideal body weight: 69 kg (152 lb 3.2 oz) Serum creatinine: 1 mg/dL 04/14/25 1554  Estimated creatinine clearance: 55 mL/min N/a       Pharmacy has dose-adjusted the following medication(s):    Date Previous Order Adjusted Order   4/15/2025 Lovenox 40mg daily AFIB patient  1mg/kg twice daily       These changes were made per protocol according to the [facility]   Automatic Renal Dose Adjustment Policy.     *Please note this dose may need readjusted if patient's condition changes.    Please contact pharmacy with any questions regarding these changes.    Jayce Zhang RPH  4/15/2025  12:18 AM    
Assessment:  Hypovolemic Shock  Hypokalemia  ? Colostomy Complication- Ileus vs obstruction- CT negative  Dementia  Atrial Fibrillation with RVR  Hx of Normal pressure Hydrocephalus with  shunt  Hx of recent perf Diverticulum with Ostomy per Dr Bunn 3/16/25  2 mm non obstructing calculus superior pole of Right kidney  Sm pericardial effusion  Hx of Breast cancer    PLAN:  General surgery was consulted appreciate recommendations  Fluid resuscitation received 2.6L/NSS in ED  Levophed wean for MAP > 65 mm HG  Protonix IV for GI prophylaxis   Patient is full anticoagulated with Lovenox which covers for DVT prophylaxis.  Received a dose of Zosyn and Vanc in ED no clear picture of sepsis a this point  MRSA nares, COVID PCR, sputum culture, blood culture x2, procal, UA, UC,  Daily  BMP, Mg, Phos, CBC  Patient is currently on broad-spectrum antibiotics  Electrolyte Replacement  Midodrine 15 mg PO TID  DISPOSITION:   ICU  CODE STATUS:    Full CODE    History of Present Illness: Patient is a 70 y.o. female with the following medical Problems: Dementia, atrial fibrillation with RVR, perforated diverticulitis presented to the ED from home with chief complaint of no output from her colostomy bag since Friday.Per chart review, the patient saw Dr. Bunn from general surgery for perforated viscus from diverticulitis with perforation and abscess.  She underwent an ex lap and sigmoid resection with ostomy 3/16/25.  She was discharged on March 24, 2025.     ED workup:  K 3.2, Mag 1.8, Lactic 1.3, Trop 17>13, UA- negative  CT of Abdomen- There is a colostomy in the left lower quadrant of the abdomen. There are no signs of associated stranding or abnormal gas or fluid collections. 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 There is a moderate right and small left pleural effusion with adjacent atelectasis.  There is a small pericardial effusion.  There is a tiny volume of fluid within the cul-de-sac that could be  physiological. 
Assessment:  Hypovolemic Shock  Hypokalemia  ? Colostomy Complication- Ileus vs obstruction- CT negative  Dementia  Atrial Fibrillation with RVR  Hx of Normal pressure Hydrocephalus with  shunt  Hx of recent perf Diverticulum with Ostomy per Dr Bunn 3/16/25  2 mm non obstructing calculus superior pole of Right kidney  Sm pericardial effusion  Hx of Breast cancer  History of intracerebral hemorrhage in 2007  Seizure disorder    PLAN:  General surgery was consulted appreciate recommendations  Fluid resuscitation received 2.6L/NSS in ED  Levophed wean for MAP > 65 mm HG  Protonix IV for GI prophylaxis   Pharmacy to dose Coumadin  INR today to decide on bridging  Cardiology consult to manage anticoagulation and heart failure with A-fib  Discontinue antibiotics since there is no clear source of infection.  Procalcitonin was 0.04  Electrolyte Replacement  Midodrine 15 mg PO TID  DISPOSITION:   ICU  CODE STATUS:    Full CODE    History of Present Illness: Patient is a 70 y.o. female with the following medical Problems: Dementia, atrial fibrillation with RVR, perforated diverticulitis presented to the ED from home with chief complaint of no output from her colostomy bag since Friday.Per chart review, the patient saw Dr. Bunn from general surgery for perforated viscus from diverticulitis with perforation and abscess.  She underwent an ex lap and sigmoid resection with ostomy 3/16/25.  She was discharged on March 24, 2025.     ED workup:  K 3.2, Mag 1.8, Lactic 1.3, Trop 17>13, UA- negative  CT of Abdomen- There is a colostomy in the left lower quadrant of the abdomen. There are no signs of associated stranding or abnormal gas or fluid collections. 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 There is a moderate right and small left pleural effusion with adjacent atelectasis.  There is a small pericardial effusion.  There is a tiny volume of fluid within the cul-de-sac that could be  physiological. There is a 2 mm 
Assessment:  Hypovolemic Shock  Hypokalemia  ? Colostomy Complication- Ileus vs obstruction- CT negative  Dementia  Atrial Fibrillation with RVR  Hx of Normal pressure Hydrocephalus with  shunt  Hx of recent perf Diverticulum with Ostomy per Dr Bunn 3/16/25  2 mm non obstructing calculus superior pole of Right kidney  Sm pericardial effusion  Hx of Breast cancer  History of intracerebral hemorrhage in 2007  Seizure disorder    PLAN:  General surgery was consulted appreciate recommendations  Fluid resuscitation received 2.6L/NSS in ED  Protonix for GI prophylaxis   Pharmacy to dose Coumadin  Hold metoprolol  Patient's INR is therapeutic which covers for DVT prophylaxis  Discontinue antibiotics since there is no clear source of infection.  Procalcitonin was 0.04  Electrolyte Replacement  Midodrine 15 mg PO TID  Hold beta-blocker due to hypotension.  DISPOSITION:   PICU  CODE STATUS:    Full CODE    History of Present Illness: Patient is a 70 y.o. female with the following medical Problems: Dementia, atrial fibrillation with RVR, perforated diverticulitis presented to the ED from home with chief complaint of no output from her colostomy bag since Friday.Per chart review, the patient saw Dr. Bunn from general surgery for perforated viscus from diverticulitis with perforation and abscess.  She underwent an ex lap and sigmoid resection with ostomy 3/16/25.  She was discharged on March 24, 2025.     ED workup:  K 3.2, Mag 1.8, Lactic 1.3, Trop 17>13, UA- negative  CT of Abdomen- There is a colostomy in the left lower quadrant of the abdomen. There are no signs of associated stranding or abnormal gas or fluid collections. 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 There is a moderate right and small left pleural effusion with adjacent atelectasis.  There is a small pericardial effusion.  There is a tiny volume of fluid within the cul-de-sac that could be  physiological. There is a 2 mm nonobstructing calculus 
Assessment:  Hypovolemic Shock  Hypokalemia  ? Colostomy Complication- Ileus vs obstruction- CT negative  Dementia  Atrial Fibrillation with RVR  Hx of Normal pressure Hydrocephalus with  shunt  Hx of recent perf Diverticulum with Ostomy per Dr Bunn 3/16/25  2 mm non obstructing calculus superior pole of Right kidney  Sm pericardial effusion  Hx of Breast cancer  History of intracerebral hemorrhage in 2007  Seizure disorder    PLAN:  General surgery was consulted appreciate recommendations  Protonix for GI prophylaxis   Pharmacy to dose Coumadin  Metoprolol 12.5 twice daily  Patient's INR is borderline therapeutic which covers for DVT prophylaxis  Discontinue antibiotics since there is no clear source of infection.  Procalcitonin was 0.04  Electrolyte Replacement  We will sign off.  Please call with questions  DISPOSITION:   [facility]  CODE STATUS:    Full CODE    History of Present Illness: Patient is a 70 y.o. female with the following medical Problems: Dementia, atrial fibrillation with RVR, perforated diverticulitis presented to the ED from home with chief complaint of no output from her colostomy bag since Friday.Per chart review, the patient saw Dr. Bunn from general surgery for perforated viscus from diverticulitis with perforation and abscess.  She underwent an ex lap and sigmoid resection with ostomy 3/16/25.  She was discharged on March 24, 2025.     ED workup:  K 3.2, Mag 1.8, Lactic 1.3, Trop 17>13, UA- negative  CT of Abdomen- There is a colostomy in the left lower quadrant of the abdomen. There are no signs of associated stranding or abnormal gas or fluid collections. 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 There is a moderate right and small left pleural effusion with adjacent atelectasis.  There is a small pericardial effusion.  There is a tiny volume of fluid within the cul-de-sac that could be  physiological. There is a 2 mm nonobstructing calculus superior pole of the right kidney.    In the 
Assessment:  Hypovolemic Shock  Hypokalemia  ? Colostomy Complication- Ileus vs obstruction- CT negative  Dementia  Atrial Fibrillation with RVR  Hx of Normal pressure Hydrocephalus with  shunt  Hx of recent perf Diverticulum with Ostomy per Dr Bunn 3/16/25  2 mm non obstructing calculus superior pole of Right kidney  Sm pericardial effusion  Hx of Breast cancer  History of intracerebral hemorrhage in 2007  Seizure disorder    PLAN:  General surgery was consulted appreciate recommendations  Protonix for GI prophylaxis   Pharmacy to dose Coumadin  Metoprolol 12.5 twice daily  Patient's INR is therapeutic which covers for DVT prophylaxis  Discontinue antibiotics since there is no clear source of infection.  Procalcitonin was 0.04  Electrolyte Replacement  Discontinue midodrine  Hold beta-blocker due to hypotension.  DISPOSITION:   PICU  CODE STATUS:    Full CODE    History of Present Illness: Patient is a 70 y.o. female with the following medical Problems: Dementia, atrial fibrillation with RVR, perforated diverticulitis presented to the ED from home with chief complaint of no output from her colostomy bag since Friday.Per chart review, the patient saw Dr. Bunn from general surgery for perforated viscus from diverticulitis with perforation and abscess.  She underwent an ex lap and sigmoid resection with ostomy 3/16/25.  She was discharged on March 24, 2025.     ED workup:  K 3.2, Mag 1.8, Lactic 1.3, Trop 17>13, UA- negative  CT of Abdomen- There is a colostomy in the left lower quadrant of the abdomen. There are no signs of associated stranding or abnormal gas or fluid collections. 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 There is a moderate right and small left pleural effusion with adjacent atelectasis.  There is a small pericardial effusion.  There is a tiny volume of fluid within the cul-de-sac that could be  physiological. There is a 2 mm nonobstructing calculus superior pole of the right kidney.    In 
Assessment:  Hypovolemic Shock  Hypokalemia  ? Colostomy Complication- Ileus vs obstruction- CT negative  Dementia  Atrial Fibrillation with RVR  Hx of Normal pressure Hydrocephalus with  shunt  Hx of recent perf Diverticulum with Ostomy per Dr Bunn 3/16/25  2 mm non obstructing calculus superior pole of Right kidney  Sm pericardial effusion  Hx of Breast cancer  History of intracerebral hemorrhage in 2007  Seizure disorder    PLAN:  General surgery was consulted appreciate recommendations  Protonix for GI prophylaxis   Pharmacy to dose Coumadin  Metoprolol 12.5 twice daily  Patient's INR is therapeutic which covers for DVT prophylaxis  Discontinue antibiotics since there is no clear source of infection.  Procalcitonin was 0.04  Electrolyte Replacement  Discontinue midodrine  Hold beta-blocker due to hypotension.  DISPOSITION:   PICU  CODE STATUS:    Full CODE    History of Present Illness: Patient is a 70 y.o. female with the following medical Problems: Dementia, atrial fibrillation with RVR, perforated diverticulitis presented to the ED from home with chief complaint of no output from her colostomy bag since Friday.Per chart review, the patient saw Dr. Bunn from general surgery for perforated viscus from diverticulitis with perforation and abscess.  She underwent an ex lap and sigmoid resection with ostomy 3/16/25.  She was discharged on March 24, 2025.     ED workup:  K 3.2, Mag 1.8, Lactic 1.3, Trop 17>13, UA- negative  CT of Abdomen- There is a colostomy in the left lower quadrant of the abdomen. There are no signs of associated stranding or abnormal gas or fluid collections. 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 There is a moderate right and small left pleural effusion with adjacent atelectasis.  There is a small pericardial effusion.  There is a tiny volume of fluid within the cul-de-sac that could be  physiological. There is a 2 mm nonobstructing calculus superior pole of the right kidney.    In 
Cardiology  Progress Note      SUBJECTIVE: Patient is pleasantly confused.  No acute distress    Current Inpatient Medications  Current Facility-Administered Medications: [Held by provider] metoprolol tartrate (LOPRESSOR) tablet 25 mg, 25 mg, Oral, BID  amiodarone (CORDARONE) tablet 200 mg, 200 mg, Oral, Daily  pantoprazole (PROTONIX) tablet 40 mg, 40 mg, Oral, QAM AC  warfarin placeholder: dosing by pharmacy, , Oral, RX Placeholder  midodrine (PROAMATINE) tablet 15 mg, 15 mg, Oral, BID WC  thiamine mononitrate tablet 100 mg, 100 mg, Oral, Daily  sodium chloride flush 0.9 % injection 5-40 mL, 5-40 mL, IntraVENous, 2 times per day  sodium chloride flush 0.9 % injection 5-40 mL, 5-40 mL, IntraVENous, PRN  0.9 % sodium chloride infusion, , IntraVENous, PRN  potassium chloride (KLOR-CON M) extended release tablet 40 mEq, 40 mEq, Oral, PRN **OR** potassium bicarb-citric acid (EFFER-K) effervescent tablet 40 mEq, 40 mEq, Oral, PRN **OR** potassium chloride 10 mEq/100 mL IVPB (Peripheral Line), 10 mEq, IntraVENous, PRN  polyethylene glycol (GLYCOLAX) packet 17 g, 17 g, Oral, Daily PRN  acetaminophen (TYLENOL) tablet 650 mg, 650 mg, Oral, Q6H PRN **OR** acetaminophen (TYLENOL) suppository 650 mg, 650 mg, Rectal, Q6H PRN  prochlorperazine (COMPAZINE) injection 10 mg, 10 mg, IntraVENous, Q6H PRN **OR** prochlorperazine (COMPAZINE) tablet 5 mg, 5 mg, Oral, Q6H PRN  donepezil (ARICEPT) tablet 5 mg, 5 mg, Oral, Nightly  aspirin EC tablet 81 mg, 81 mg, Oral, Daily  anastrozole (ARIMIDEX) tablet 1 mg, 1 mg, Oral, Daily  OXcarbazepine (TRILEPTAL) tablet 600 mg, 600 mg, Oral, BID  sertraline (ZOLOFT) tablet 100 mg, 100 mg, Oral, Daily  zonisamide (ZONEGRAN) capsule 200 mg, 200 mg, Oral, BID  atorvastatin (LIPITOR) tablet 10 mg, 10 mg, Oral, Nightly      Physical  VITALS:  /62   Pulse 74   Temp 98.4 °F (36.9 °C) (Axillary)   Resp 17   Ht 1.651 m (5' 5\")   Wt 76.8 kg (169 lb 5 oz)   SpO2 95%   BMI 28.18 kg/m²   CURRENT 
Cardiology  Progress Note      SUBJECTIVE: Patient was sleeping flat in bed when I came to see her.  No acute respiratory distress.    Current Inpatient Medications  Current Facility-Administered Medications: amiodarone (CORDARONE) tablet 200 mg, 200 mg, Oral, Daily  pantoprazole (PROTONIX) tablet 40 mg, 40 mg, Oral, QAM AC  warfarin placeholder: dosing by pharmacy, , Oral, RX Placeholder  midodrine (PROAMATINE) tablet 15 mg, 15 mg, Oral, BID WC  thiamine mononitrate tablet 100 mg, 100 mg, Oral, Daily  sodium chloride flush 0.9 % injection 5-40 mL, 5-40 mL, IntraVENous, 2 times per day  sodium chloride flush 0.9 % injection 5-40 mL, 5-40 mL, IntraVENous, PRN  0.9 % sodium chloride infusion, , IntraVENous, PRN  potassium chloride (KLOR-CON M) extended release tablet 40 mEq, 40 mEq, Oral, PRN **OR** potassium bicarb-citric acid (EFFER-K) effervescent tablet 40 mEq, 40 mEq, Oral, PRN **OR** potassium chloride 10 mEq/100 mL IVPB (Peripheral Line), 10 mEq, IntraVENous, PRN  polyethylene glycol (GLYCOLAX) packet 17 g, 17 g, Oral, Daily PRN  acetaminophen (TYLENOL) tablet 650 mg, 650 mg, Oral, Q6H PRN **OR** acetaminophen (TYLENOL) suppository 650 mg, 650 mg, Rectal, Q6H PRN  prochlorperazine (COMPAZINE) injection 10 mg, 10 mg, IntraVENous, Q6H PRN **OR** prochlorperazine (COMPAZINE) tablet 5 mg, 5 mg, Oral, Q6H PRN  donepezil (ARICEPT) tablet 5 mg, 5 mg, Oral, Nightly  aspirin EC tablet 81 mg, 81 mg, Oral, Daily  anastrozole (ARIMIDEX) tablet 1 mg, 1 mg, Oral, Daily  metoprolol tartrate (LOPRESSOR) tablet 75 mg, 75 mg, Oral, BID  OXcarbazepine (TRILEPTAL) tablet 600 mg, 600 mg, Oral, BID  sertraline (ZOLOFT) tablet 100 mg, 100 mg, Oral, Daily  zonisamide (ZONEGRAN) capsule 200 mg, 200 mg, Oral, BID  atorvastatin (LIPITOR) tablet 10 mg, 10 mg, Oral, Nightly      Physical  VITALS:  BP (!) 74/44   Pulse 88   Temp 98.2 °F (36.8 °C) (Oral)   Resp 16   Ht 1.651 m (5' 5\")   Wt 76.8 kg (169 lb 5 oz)   SpO2 97%   BMI 28.18 
Internal Medicine Progress Note     [facility]     Romeo Drake D.O., AJOLisaI.                         Shen Solo D.O., AJOLisaILisa Jara D.O.     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 female who was seen and examined today,4/20/2025, at the bedside.  Susanna seems to be resting at her chronically debilitated state at this point.  Plans will be for cardioversion tomorrow.  Her  was not present during my examination.  She is tolerating her current diet.  Multiple subspecialists are following.    Review of System:   Limited in the setting of advanced dementia  Constitutional:   Does not disclose fever or chills, weight loss or gain, fatigue or malaise.  Tired.  HEENT:   Does not disclose ear pain, sore throat, sinus or eye problems.  Cardiovascular:   Does not disclose any chest pain, irregular heartbeats, or palpitations.   Respiratory:   Does not disclose shortness of breath, coughing, sputum production, hemoptysis, or wheezing.  Gastrointestinal:   Postoperative pain as to be expected.  Tolerating a diet without difficulty.  Some decreased ostomy output noted with more aggressive bowel regimen ordered.  Genitourinary:    Does not disclose any urgency, frequency, hematuria. Voiding  without difficulty.  Extremities:   Does not disclose lower extremity swelling, edema or cyanosis.   Neurology:    Does not disclose 
Internal Medicine Progress Note     [facility]     Romeo Drake D.O., AJOLisaI.                         Shen Solo D.O., AJOLisaILisa Jara D.O.     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 female who was seen and examined today,4/19/2025, at the bedside.  She has been transferred out of the intensive care unit and is doing well.  Has had some slowed ostomy output and surgery has initiated bowel regimen.  Postoperative symptoms are otherwise reasonably well-controlled.  She is tolerating a regular diet during our examination.  She voices no new symptoms or concerns otherwise.No family member present.    Review of System: Limited in the setting of advanced dementia  Constitutional:   Does not disclose fever or chills, weight loss or gain, fatigue or malaise.  HEENT:   Does not disclose ear pain, sore throat, sinus or eye problems.  Cardiovascular:   Does not disclose any chest pain, irregular heartbeats, or palpitations.   Respiratory:   Does not disclose shortness of breath, coughing, sputum production, hemoptysis, or wheezing.  Gastrointestinal:   Postoperative pain as to be expected.  Tolerating a diet without difficulty.  Some decreased ostomy output noted with more aggressive bowel regimen ordered.  Genitourinary:    Does not disclose any urgency, frequency, hematuria. Voiding  without difficulty.  Extremities:   Does not 
Internal Medicine Progress Note     [facility]     Romeo Drake D.O., ALICIAI.                         Shen Solo D.O., EDITH Jara D.O.     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 female who was seen and examined today,4/16/2025, at the bedside.  Patient resting comfortably at the present time.  Patient being maintained on IV fluid and antibiotics.  Receiving calcium supplementation.  Patient seem more engaged in conversation today    No family member present.    ROS: 12 point review of symptoms was unable to be reliably obtained from the patient      Physical Exam:  I/O this shift:  In: 480 [P.O.:480]  Out: 400 [Stool:400]    Intake/Output Summary (Last 24 hours) at 4/16/2025 1402  Last data filed at 4/16/2025 1300  Gross per 24 hour   Intake 2429.11 ml   Output 800 ml   Net 1629.11 ml   I/O last 3 completed shifts:  In: 5464.7 [I.V.:1242; IV Piggyback:4222.7]  Out: 400 [Urine:400]  Patient Vitals for the past 96 hrs (Last 3 readings):   Weight   04/16/25 1031 83 kg (183 lb)   04/16/25 0000 83.1 kg (183 lb 3.2 oz)   04/14/25 1531 87.1 kg (192 lb)     Vital Signs:   Blood pressure (!) 158/110, pulse 93, temperature 98.2 °F (36.8 °C), temperature source Oral, resp. rate 21, height 1.651 m (5' 5\"), weight 83 kg (183 lb), SpO2 99%.    General appearance:  Spontaneously awake and alert, pleasant and cooperative.  Acute 
Internal Medicine Progress Note     [facility]     Romeo Drake D.O., ALICIAI.                         Shen Solo D.O., EDITH Jara D.O.     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 female who was seen and examined today,4/17/2025, at the bedside.  Patient resting comfortably at the present time.  Patient seen clinically improved today.  Had an uneventful night.  Currently denies any fever or chills heart rate seem improved.  Continue antibiotics.  Possible transfer out of ICU    No family member present.    ROS: 12 point review of symptoms was unable to be reliably obtained from the patient      Physical Exam:  I/O this shift:  In: 300 [P.O.:300]  Out: 200 [Stool:200]    Intake/Output Summary (Last 24 hours) at 4/17/2025 1328  Last data filed at 4/17/2025 1100  Gross per 24 hour   Intake 420 ml   Output 750 ml   Net -330 ml   I/O last 3 completed shifts:  In: 2549.1 [P.O.:600; I.V.:1228.8; IV Piggyback:720.3]  Out: 1400 [Urine:1000; Stool:400]  Patient Vitals for the past 96 hrs (Last 3 readings):   Weight   04/17/25 0500 76.8 kg (169 lb 5 oz)   04/16/25 1031 83 kg (183 lb)   04/16/25 0000 83.1 kg (183 lb 3.2 oz)     Vital Signs:   Blood pressure (!) 141/116, pulse 97, temperature 98.2 °F (36.8 °C), temperature source Oral, resp. rate 26, height 1.651 m (5' 5\"), weight 76.8 kg (169 lb 5 oz), SpO2 95%.    General 
Internal Medicine Progress Note     [facility]     Romeo Drake D.O., ALICIAILisa Solo D.O., EDITH Jara D.O.     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 female who was seen and examined today,4/18/2025, at the bedside.  Patient resting company at the present time.  Patient currently lying flat without distress.  Rhythm still appeared to be atrial fibrillation at time.  Patient otherwise hemodynamically stable    No family member present.    ROS: 12 point review of symptoms was unable to be reliably obtained from the patient      Physical Exam:  No intake/output data recorded.    Intake/Output Summary (Last 24 hours) at 4/18/2025 1415  Last data filed at 4/18/2025 0700  Gross per 24 hour   Intake 240 ml   Output 350 ml   Net -110 ml   I/O last 3 completed shifts:  In: 690 [P.O.:690]  Out: 1200 [Urine:900; Stool:300]  Patient Vitals for the past 96 hrs (Last 3 readings):   Weight   04/18/25 0600 80.4 kg (177 lb 4 oz)   04/17/25 0500 76.8 kg (169 lb 5 oz)   04/16/25 1031 83 kg (183 lb)     Vital Signs:   Blood pressure 123/62, pulse 63, temperature 98.8 °F (37.1 °C), temperature source Oral, resp. rate 18, height 1.651 m (5' 5\"), weight 80.4 kg (177 lb 4 oz), SpO2 96%.    General appearance:  Spontaneously awake and alert, pleasant and cooperative.  Acute on chronic ill appearance.  
Internal Medicine Progress Note     [facility]     Romeo Drake D.O., EDITH Solo D.O., EDITH Jara D.O.     Ximena Bernardo, MSN, APRN, NP-C  Jack Moscoso, MSN, APRN-CNP  Rishi Ruiz, MSN, APRN, NP-C  Fernanda Tay, MSN, APRN-CNP  Conchis Bradford, MSN, APRN, NP-C     Primary Care Physician: Lay Hunter MD   Admitting Physician:  Romeo Drake DO  Admission date and time: 4/14/2025  3:28 PM    Room:  06/06  Admitting diagnosis: Shock (HCC) [R57.9]    Patient Name: Susanna Arriola  MRN: [id number]    Date of Service: 4/15/2025     Subjective:  Susanna is a 70 y.o. female who was seen and examined today,4/15/2025, at the bedside.  She has remained boarded in the emergency department since admission.  She is awake and alert, confused with underlying dementia.  She responds to each question asked with \"I do not know\" which is consistent with her recent baseline.  EMR review was undertaken including the history and physical dictated last evening and ICU pending consultation.  Plan remains for admission to the intensive care unit.  General surgery consultation has also been ordered.  No family present during my examination.    ROS: 12 point review of symptoms was unable to be reliably obtained from the patient      Physical Exam:  I/O this shift:  In: 3276.1 [I.V.:13.1; IV Piggyback:3263]  Out: -     Intake/Output Summary (Last 24 hours) at 4/15/2025 0629  Last data filed at 4/15/2025 0331  Gross per 24 hour   Intake 3276.12 ml   Output --   Net 3276.12 ml   No intake/output data recorded.  Patient Vitals for the past 96 hrs (Last 3 readings):   Weight   04/14/25 1531 87.1 kg (192 lb)     Vital Signs:   Blood pressure 121/87, pulse (!) 112, temperature 97.9 °F (36.6 °C), temperature source Oral, resp. rate 23, height 1.651 m (5' 5\"), weight 87.1 kg (192 lb), SpO2 91%.    General appearance:  Spontaneously awake and alert, 
Nurse to nurse report called to Alex on IMC. Pt notified of transfer and new room assignment 618-2.   Erasmo called and updated transfer and new bed assignment   
OCCUPATIONAL THERAPY INITIAL EVALUATION    [facility]  [address] [place]        Date:2025                                                  Patient Name: Susanna Arriola    MRN: [id number]    : 1955    Room: [place] [address]      Evaluating OT: Moisés Wei OTR/L; #[id number]     Referring Provider and Specific Provider Orders/Date:      04/15/25 0630  OT eval and treat  Start:  04/15/25 0630,   End:  04/15/25 0630,   ONE TIME,   Standing Count:  1 Occurrences,   R         Jack Moscoso, APRN - CNP        Placement Recommendation: Subacute Rehab vs home with  Assistance      Diagnosis:   1. Shock (HCC)    2. Colostomy complication (HCC)    3. Hypokalemia    4. Longstanding persistent atrial fibrillation (HCC)         Surgery: None      Pertinent Medical History:       Past Medical History:   Diagnosis Date    Atrial fibrillation (HCC)     Dementia (HCC)     History of cardioversion 10/2021         Past Surgical History:   Procedure Laterality Date    COLECTOMY N/A 3/16/2025    OPEN SIGMOID COLON RESECTION; COLOSTOMY performed by Levi Bunn MD at [facility]        Precautions:  Fall Risk, Hx of Dementia, Colostomy      Assessment of current deficits:     [x] Functional mobility  [x]ADLs  [x] Strength               [x]Cognition    [x] Functional transfers   [x] IADLs         [x] Safety Awareness   [x]Endurance    [] Fine Coordination              [x] Balance      [] Vision/perception   []Sensation     []Gross Motor Coordination  [] ROM  [] Delirium                   [] Motor Control     OT PLAN OF CARE   OT POC based on physician orders, patient diagnosis and results of clinical assessment    Frequency/Duration 1-3 days/wk for 2 weeks PRN     Specific OT Treatment Interventions to include:   * Instruction/training on adapted ADL techniques and AE recommendations to increase functional independence within precautions       * Training on energy 
Patients blood pressure running low, spoke with NP and new orders obtained for 500 ml bolus.   
Pharmacy Consultation Note  (Warfarin Dosing and Monitoring)    Initial consult date: 4/16/2025  Consulting Provider: Dr. Shayna Arriola is a 70 y.o. female for whom pharmacy has been asked to manage warfarin therapy.     Weight:   Wt Readings from Last 1 Encounters:   04/16/25 83 kg (183 lb)       TSH:    Lab Results   Component Value Date/Time    TSH 1.10 03/13/2025 04:46 PM       Hepatic Function Panel:                            Lab Results   Component Value Date/Time    ALKPHOS 99 04/14/2025 03:54 PM    ALT 12 04/14/2025 03:54 PM    AST 19 04/14/2025 03:54 PM    BILITOT 0.3 04/14/2025 03:54 PM       Current significant warfarin drug-drug interactions include: amiodarone (incr INR) - home medication    Recent Labs     04/14/25  1554 04/16/25  0414   HGB 11.5 9.4*    299     Date Warfarin Dose INR Heparin or LMWH Comment   4/16 4 mg 2.7 Enoxaparin discontinued                                  Assessment:  Patient is a 70 y.o. female on warfarin for Atrial Fibrillation.  Patient's home warfarin dosing regimen is 3 mg daily except 4 mg on Wednesday.   Goal INR 2 - 3  INR 2.7 today    Plan:  Warfarin 4 mg tonight  Daily PT/INR until the INR is stable within the therapeutic range  Pharmacist will follow and monitor/adjust dosing as necessary    Thank you for this consult,    Dorinda Aviles, AlmazD, BCPS 4/16/2025 12:26 PM   Ext: [phone]    VICKY: [phone]  SEY: [phone]  SJW: [phone]     
Pharmacy Consultation Note  (Warfarin Dosing and Monitoring)    Initial consult date: 4/16/2025  Consulting Provider: Dr. Shayna Arriola is a 70 y.o. female for whom pharmacy has been asked to manage warfarin therapy.     Weight:   Wt Readings from Last 1 Encounters:   04/17/25 76.8 kg (169 lb 5 oz)       TSH:    Lab Results   Component Value Date/Time    TSH 1.10 03/13/2025 04:46 PM       Hepatic Function Panel:                            Lab Results   Component Value Date/Time    ALKPHOS 99 04/14/2025 03:54 PM    ALT 12 04/14/2025 03:54 PM    AST 19 04/14/2025 03:54 PM    BILITOT 0.3 04/14/2025 03:54 PM       Current significant warfarin drug-drug interactions include: amiodarone (incr INR) - home medication    Recent Labs     04/14/25  1554 04/16/25  0414 04/17/25  0448   HGB 11.5 9.4* 11.0*    299 361     Date Warfarin Dose INR Heparin or LMWH Comment   4/16 4 mg 2.7 Enoxaparin discontinued    4/17 <3 mg> 2.2 --                           Assessment:  Patient is a 70 y.o. female on warfarin for Atrial Fibrillation.  Patient's home warfarin dosing regimen is 3 mg daily except 4 mg on Wednesday.   Goal INR 2 - 3  INR 2.2 today    Plan:  Warfarin 3 mg tonight  Daily PT/INR until the INR is stable within the therapeutic range  Pharmacist will follow and monitor/adjust dosing as necessary    Thank you for this consult,    Dorinda Aviles, AlmazD, BCPS 4/17/2025 8:43 AM   Ext: [phone]    W: [phone]     
Pharmacy Consultation Note  (Warfarin Dosing and Monitoring)    Initial consult date: 4/16/2025  Consulting Provider: Dr. Shayna Arriola is a 70 y.o. female for whom pharmacy has been asked to manage warfarin therapy.     Weight:   Wt Readings from Last 1 Encounters:   04/18/25 80.4 kg (177 lb 4 oz)       TSH:    Lab Results   Component Value Date/Time    TSH 1.10 03/13/2025 04:46 PM       Hepatic Function Panel:                            Lab Results   Component Value Date/Time    ALKPHOS 99 04/14/2025 03:54 PM    ALT 12 04/14/2025 03:54 PM    AST 19 04/14/2025 03:54 PM    BILITOT 0.3 04/14/2025 03:54 PM       Current significant warfarin drug-drug interactions include: amiodarone (incr INR) - home medication    Recent Labs     04/16/25  0414 04/17/25  0448 04/18/25  0430   HGB 9.4* 11.0* 9.8*    361 342     Date Warfarin Dose INR Heparin or LMWH Comment   4/16 4 mg 2.7 Enoxaparin discontinued    4/17 3 mg 2.2 --    4/18 <3 mg> 2.1 --                    Assessment:  Patient is a 70 y.o. female on warfarin for Atrial Fibrillation.  Patient's home warfarin dosing regimen is 3 mg daily except 4 mg on Wednesday.   Goal INR 2 - 3  INR 2.1 today    Plan:  Warfarin 3 mg tonight  Daily PT/INR until the INR is stable within the therapeutic range  Pharmacist will follow and monitor/adjust dosing as necessary    Thank you for this consult,    Dorinda Aviles, PharmD, BCPS 4/18/2025 9:32 AM   Ext: [phone]    [facility]: [phone]     
Pharmacy Consultation Note  (Warfarin Dosing and Monitoring)    Initial consult date: 4/16/2025  Consulting Provider: Dr. Shayna Arriola is a 70 y.o. female for whom pharmacy has been asked to manage warfarin therapy.     Weight:   Wt Readings from Last 1 Encounters:   04/18/25 80.4 kg (177 lb 4 oz)       TSH:    Lab Results   Component Value Date/Time    TSH 1.10 03/13/2025 04:46 PM       Hepatic Function Panel:                            Lab Results   Component Value Date/Time    ALKPHOS 99 04/14/2025 03:54 PM    ALT 12 04/14/2025 03:54 PM    AST 19 04/14/2025 03:54 PM    BILITOT 0.3 04/14/2025 03:54 PM       Current significant warfarin drug-drug interactions include: amiodarone (incr INR) - home medication    Recent Labs     04/18/25  0430 04/19/25  0456 04/20/25  0623   HGB 9.8* 9.2* 9.1*    356 348     Date Warfarin Dose INR Heparin or LMWH Comment   4/16 4 mg 2.7 Enoxaparin discontinued    4/17 3 mg 2.2 --    4/18 3 mg 2.1 --    4/19 4 mg 1.8 --    4/20 4 mg 1.8 --- For cardioversion 4/21                          Assessment:  Patient is a 70 y.o. female on warfarin for Atrial Fibrillation.  Patient's home warfarin dosing regimen is 3 mg daily except 4 mg on Wednesday.   Goal INR 2 - 3  INR 1.8 today    Plan:  Warfarin 4 mg tonight  Daily PT/INR until the INR is stable within the therapeutic range  Pharmacist will follow and monitor/adjust dosing as necessary    Thank you for this consult,    Felicitas Ayon, PharmD  PGY1 Pharmacy Practice Resident   4/20/2025 11:27 AM     REYNA: [phone]     
Pharmacy Consultation Note  (Warfarin Dosing and Monitoring)    Initial consult date: 4/16/2025  Consulting Provider: Dr. Shayna Arriola is a 70 y.o. female for whom pharmacy has been asked to manage warfarin therapy.     Weight:   Wt Readings from Last 1 Encounters:   04/18/25 80.4 kg (177 lb 4 oz)       TSH:    Lab Results   Component Value Date/Time    TSH 1.10 03/13/2025 04:46 PM       Hepatic Function Panel:                            Lab Results   Component Value Date/Time    ALKPHOS 99 04/14/2025 03:54 PM    ALT 12 04/14/2025 03:54 PM    AST 19 04/14/2025 03:54 PM    BILITOT 0.3 04/14/2025 03:54 PM       Current significant warfarin drug-drug interactions include: amiodarone (incr INR) - home medication    Recent Labs     04/19/25  0456 04/20/25  0623   HGB 9.2* 9.1*    348     Date Warfarin Dose INR Heparin or LMWH Comment   4/16 4 mg 2.7 Enoxaparin discontinued    4/17 3 mg 2.2 --    4/18 3 mg 2.1 --    4/19 4 mg 1.8 --    4/20 4 mg 1.8 -- For cardioversion 4/21 4/21 <3 mg> 1.8 --                    Assessment:  Patient is a 70 y.o. female on warfarin for Atrial Fibrillation.  Patient's home warfarin dosing regimen is 3 mg daily except 4 mg on Wednesday.   Goal INR 2 - 3  INR 1.8 today    Plan:  Warfarin 3 mg tonight  Daily PT/INR until the INR is stable within the therapeutic range  Pharmacist will follow and monitor/adjust dosing as necessary    Thank you for this consult,    Dorinda Aviles, PharmD, BCPS 4/21/2025 12:32 PM   Ext: [phone]    [facility]: [phone]     
Pharmacy Consultation Note  (Warfarin Dosing and Monitoring)    Initial consult date: 4/16/2025  Consulting Provider: Dr. Shyana Arriola is a 70 y.o. female for whom pharmacy has been asked to manage warfarin therapy.     Weight:   Wt Readings from Last 1 Encounters:   04/18/25 80.4 kg (177 lb 4 oz)       TSH:    Lab Results   Component Value Date/Time    TSH 1.10 03/13/2025 04:46 PM       Hepatic Function Panel:                            Lab Results   Component Value Date/Time    ALKPHOS 99 04/14/2025 03:54 PM    ALT 12 04/14/2025 03:54 PM    AST 19 04/14/2025 03:54 PM    BILITOT 0.3 04/14/2025 03:54 PM       Current significant warfarin drug-drug interactions include: amiodarone (incr INR) - home medication    Recent Labs     04/17/25  0448 04/18/25  0430 04/19/25  0456   HGB 11.0* 9.8* 9.2*    342 356     Date Warfarin Dose INR Heparin or LMWH Comment   4/16 4 mg 2.7 Enoxaparin discontinued    4/17 3 mg 2.2 --    4/18 3 mg 2.1 --    4/19 <4 mg> 1.8 --             Assessment:  Patient is a 70 y.o. female on warfarin for Atrial Fibrillation.  Patient's home warfarin dosing regimen is 3 mg daily except 4 mg on Wednesday.   Goal INR 2 - 3  INR 1.8 today    Plan:  Warfarin 4 mg tonight  Daily PT/INR until the INR is stable within the therapeutic range  Pharmacist will follow and monitor/adjust dosing as necessary    Thank you for this consult,    Erika Minaya, PharmD, BCPS 4/19/2025 12:58 PM   [phone]     [facility]: [phone]     
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]  Longstanding persistent atrial fibrillation (HCC) [I48.11]    Admitted with    issue with colostomy, tachycardic, hypotension   hx dementia  Surgery: none  Visit Diagnoses         Codes      Colostomy complication (HCC)     K94.00      Hypokalemia     E87.6      Longstanding persistent atrial fibrillation (HCC)     I48.11            Patient Active Problem List   Diagnosis    Atrial fibrillation with RVR (HCC)    Perforated diverticulitis    Shock (HCC)        ASSESSMENT of Current Deficits Patient exhibits decreased strength, balance, endurance, and coordination impairing functional mobility, transfers, gait , gait distance, and tolerance to activity are barriers to d/c and require skilled intervention to address concerns listed above to increase safety and independence at discharge.   Decreased strength, balance and endurance  increases patient's risk for fall. Patient requires minimal assist due to A/Ox 1 and cues needed for direction and task at hand.       PHYSICAL THERAPY  PLAN OF CARE       Physical therapy plan of care is established based on physician order,  patient diagnosis and clinical assessment    Current Treatment Recommendations:    -Bed Mobility: Lower and upper extremity exercises, and trunk control activities  -Sitting Balance: Incorporate reaching 
Pt having difficulty remembering to keep medical equipment on. SpO2 96% on RA. SpO2 monitor off at this time.   
Spiritual Health History and Assessment/Progress Note  [facility]     Encounter, Rituals, Rites and Sacraments,  ,  ,      Name: Susanna Arriola MRN: [id number]    Age: 70 y.o.     Sex: female   Language: English   Baptism: Congregational   Shock (HCC)     Date: 4/16/2025                           Spiritual Assessment began in [facility] 2 ICU        Referral/Consult From: Rounding   Encounter Overview/Reason:  Encounter, Rituals, Rites and Sacraments  Service Provided For: Patient    Tala, Belief, Meaning:   Patient is connected with a tala tradition or spiritual practice  Family/Friends No family/friends present      Importance and Influence:  Patient has no beliefs influential to healthcare decision-making identified during this visit  Family/Friends No family/friends present    Community:  Patient feels well-supported. Support system includes: Spouse/Partner and Children  Family/Friends No family/friends present    Assessment and Plan of Care:     Patient Interventions include: Facilitated expression of thoughts and feelings, Affirmed coping skills/support systems, and Provided sacramental/Orthodox ritual  Family/Friends Interventions include: No family/friends present    Patient Plan of Care: Spiritual Care available upon further referral  Family/Friends Plan of Care: Spiritual Care available upon further referral    Electronically signed by Chaplain Nadeem on 4/16/2025 at 4:09 PM   
Spoke with Dr. Bunn regarding patient vomiting after attempting to drink oral contrast. VORB to discontinue CT with oral contrast.   
Surgery Progress Note            Chief complaint:   Chief Complaint   Patient presents with    OTHER     Pt comes to the ED from home with c/o no output from her colostomy bag since Friday. Pt denies any complaints- has hx of dementia per EMS.       Patient Active Problem List   Diagnosis    Atrial fibrillation with RVR (HCC)    Perforated diverticulitis    Shock (HCC)       S: doing well     O:   Vitals:    04/16/25 0718   BP:    Pulse:    Resp:    Temp: 98.2 °F (36.8 °C)   SpO2:        Intake/Output Summary (Last 24 hours) at 4/16/2025 0930  Last data filed at 4/16/2025 0656  Gross per 24 hour   Intake 2188.57 ml   Output 400 ml   Net 1788.57 ml           Labs:  Lab Results   Component Value Date/Time    WBC 4.7 04/16/2025 04:14 AM    WBC 5.8 04/14/2025 03:54 PM    WBC 9.1 03/22/2025 04:23 AM    HGB 9.4 04/16/2025 04:14 AM    HGB 11.5 04/14/2025 03:54 PM    HGB 8.1 03/22/2025 04:23 AM    HCT 29.6 04/16/2025 04:14 AM    HCT 36.6 04/14/2025 03:54 PM    HCT 25.8 03/22/2025 04:23 AM     Lab Results   Component Value Date    CREATININE 1.0 04/16/2025    BUN 9 04/16/2025     04/16/2025    K 3.5 04/16/2025     (H) 04/16/2025    CO2 22 04/16/2025     No results found for: \"LIPASE\", \"AMYLASE\"      Physical exam:   BP 98/69   Pulse (!) 112   Temp 98.2 °F (36.8 °C) (Oral)   Resp (!) 33   Ht 1.651 m (5' 5\")   Wt 83.1 kg (183 lb 3.2 oz)   SpO2 90%   BMI 30.49 kg/m²   General appearance: NAD  Head: NCAT  Neck: supple, no masses  Lungs: equal chest rise bilateral  Heart: S1S2 present  Abdomen: soft, minimally tender, non distended,  Incisions c/d and bandaged, ostomy pink and patent with stool outpt  Skin; no lesions  Gu: no cva tenderness  Extremities: extremities normal, atraumatic, no cyanosis or edema    A:  constipation resolved. Hypovolemic shock    P: regular diet and cont daily dressing changes to midline incision    Levi Bunn MD  4/16/2025    
Surgery Progress Note            Chief complaint:   Chief Complaint   Patient presents with    OTHER     Pt comes to the ED from home with c/o no output from her colostomy bag since Friday. Pt denies any complaints- has hx of dementia per EMS.       Patient Active Problem List   Diagnosis    Atrial fibrillation with RVR (HCC)    Perforated diverticulitis    Shock (HCC)       S: doing well     O:   Vitals:    04/19/25 0745   BP: (!) 157/87   Pulse: 72   Resp: 16   Temp: 98 °F (36.7 °C)   SpO2: 96%       Intake/Output Summary (Last 24 hours) at 4/19/2025 0813  Last data filed at 4/19/2025 0622  Gross per 24 hour   Intake --   Output 500 ml   Net -500 ml           Labs:  Lab Results   Component Value Date/Time    WBC 4.9 04/19/2025 04:56 AM    WBC 5.5 04/18/2025 04:30 AM    WBC 5.8 04/17/2025 04:48 AM    HGB 9.2 04/19/2025 04:56 AM    HGB 9.8 04/18/2025 04:30 AM    HGB 11.0 04/17/2025 04:48 AM    HCT 30.1 04/19/2025 04:56 AM    HCT 31.9 04/18/2025 04:30 AM    HCT 35.9 04/17/2025 04:48 AM     Lab Results   Component Value Date    CREATININE 1.0 04/19/2025    BUN 11 04/19/2025     04/19/2025    K 3.9 04/19/2025     (H) 04/19/2025    CO2 21 (L) 04/19/2025     No results found for: \"LIPASE\", \"AMYLASE\"      Physical exam:   BP (!) 157/87   Pulse 72   Temp 98 °F (36.7 °C) (Oral)   Resp 16   Ht 1.651 m (5' 5\")   Wt 80.4 kg (177 lb 4 oz)   SpO2 96%   BMI 29.50 kg/m²   General appearance: NAD  Head: NCAT  Neck: supple, no masses  Lungs: equal chest rise bilateral  Heart: S1S2 present  Abdomen: soft, minimally tender, non distended,  Incisions c/d and bandaged, ostomy pink and patent with stool outpt  Skin; no lesions  Gu: no cva tenderness  Extremities: extremities normal, atraumatic, no cyanosis or edema    A:  constipation resolved.     P: regular diet and cont daily dressing changes to midline incision, add bowel regimen as output has slowed some    Levi Bunn MD  4/19/2025    
Surgery Progress Note            Chief complaint:   Chief Complaint   Patient presents with    OTHER     Pt comes to the ED from home with c/o no output from her colostomy bag since Friday. Pt denies any complaints- has hx of dementia per EMS.       Patient Active Problem List   Diagnosis    Atrial fibrillation with RVR (HCC)    Perforated diverticulitis    Shock (HCC)       S: doing well     O:   Vitals:    04/20/25 0752   BP: (!) 144/84   Pulse: 57   Resp: 16   Temp: 97.4 °F (36.3 °C)   SpO2: 94%       Intake/Output Summary (Last 24 hours) at 4/20/2025 0857  Last data filed at 4/19/2025 1758  Gross per 24 hour   Intake --   Output 1020 ml   Net -1020 ml           Labs:  Lab Results   Component Value Date/Time    WBC 5.1 04/20/2025 06:23 AM    WBC 4.9 04/19/2025 04:56 AM    WBC 5.5 04/18/2025 04:30 AM    HGB 9.1 04/20/2025 06:23 AM    HGB 9.2 04/19/2025 04:56 AM    HGB 9.8 04/18/2025 04:30 AM    HCT 29.2 04/20/2025 06:23 AM    HCT 30.1 04/19/2025 04:56 AM    HCT 31.9 04/18/2025 04:30 AM     Lab Results   Component Value Date    CREATININE 0.9 04/20/2025    BUN 11 04/20/2025     04/20/2025    K 3.6 04/20/2025     04/20/2025    CO2 20 (L) 04/20/2025     No results found for: \"LIPASE\", \"AMYLASE\"      Physical exam:   BP (!) 144/84   Pulse 57   Temp 97.4 °F (36.3 °C) (Infrared)   Resp 16   Ht 1.651 m (5' 5\")   Wt 80.4 kg (177 lb 4 oz)   SpO2 94%   BMI 29.50 kg/m²   General appearance: NAD  Head: NCAT  Neck: supple, no masses  Lungs: equal chest rise bilateral  Heart: S1S2 present  Abdomen: soft, minimally tender, non distended,  Incisions c/d and bandaged, ostomy pink and patent with stool outpt  Skin; no lesions  Gu: no cva tenderness  Extremities: extremities normal, atraumatic, no cyanosis or edema    A:  constipation resolved.     P: regular diet and cont daily dressing changes to midline incision,    Levi Bunn MD  4/20/2025    
moving from lying on your back to sitting on the side of a flat bed without using bedrails?: A Little  How much help is needed moving to and from a bed to a chair?: A Little  How much help is needed standing up from a chair using your arms?: A Little  How much help is needed walking in hospital room?: A Little  How much help is needed climbing 3-5 steps with a railing?: A Little  AM-PAC Inpatient Mobility Raw Score : 18  AM-PAC Inpatient T-Scale Score : 43.63  Mobility Inpatient CMS 0-100% Score: 46.58  Mobility Inpatient CMS G-Code Modifier : CK    Nursing cleared patient for PT evaluation. The admitting diagnosis and active problem list as listed above have been reviewed prior to the initiation of this evaluation.    OBJECTIVE:   Initial Evaluation  Date: 4/16/2025 Treatment Date:     Short Term/ Long Term   Goals   Was pt agreeable to Eval/treatment? Yes  To be met in 3 days   Pain level   0/10        Bed Mobility  Using rails and head of bed elevated:     Rolling: Minimal assist of 1    Supine to sit: Minimal assist of 1    Sit to supine: Not assessed patient in chair    Scooting: Minimal assist of 1    Rolling: Supervision     Supine to sit: Supervision     Sit to supine: Supervision     Scooting: Supervision      Transfers Sit to stand: Moderate progressing to minimal assist  from bed and chair x 3 reps   Sit to stand: Supervision      Ambulation     3x50 feet using  wheeled walker with Minimal assist of 1   for balance, multiplane instability, and safety  po2 trending down however sensor on finger     100 feet using  wheeled walker with Supervision     ROM Within functional limits        Strength BUE:   3+/5  RLE:  3+/5  LLE:  3+/5  Increase strength in affected mm groups by 1/3 grade   Balance Sitting EOB:  fair    Dynamic Standing:  fair with wheeled walker   Sitting EOB:  good    Dynamic Standing: good       Patient is pleasantly confused and repitious in responses, Alert & Oriented x person and follows

## 2025-04-21 NOTE — PLAN OF CARE
Problem: Confusion  Goal: Confusion, delirium, dementia, or psychosis is improved or at baseline  Description: INTERVENTIONS:1. Assess for possible contributors to thought disturbance, including medications, impaired vision or hearing, underlying metabolic abnormalities, dehydration, psychiatric diagnoses, and notify attending LIP2. [place] high risk fall precautions, as indicated3. Provide frequent short contacts to provide reality reorientation, refocusing and direction4. Decrease environmental stimuli, including noise as appropriate5. Monitor and intervene to maintain adequate nutrition, hydration, elimination, sleep and activity6. If unable to ensure safety without constant attention obtain sitter and review sitter guidelines with assigned personnel7. Initiate Psychosocial CNS and Spiritual Care consult, as indicated  4/18/2025 0853 by Fernanda Pillai, RN  Outcome: Progressing  4/18/2025 0850 by Fernanda Pillai, RN  Outcome: Not Progressing  4/18/2025 0055 by Annette Urena, RN  Outcome: Not Progressing     
  Problem: Confusion  Goal: Confusion, delirium, dementia, or psychosis is improved or at baseline  Description: INTERVENTIONS:1. Assess for possible contributors to thought disturbance, including medications, impaired vision or hearing, underlying metabolic abnormalities, dehydration, psychiatric diagnoses, and notify attending LIP2. [place] high risk fall precautions, as indicated3. Provide frequent short contacts to provide reality reorientation, refocusing and direction4. Decrease environmental stimuli, including noise as appropriate5. Monitor and intervene to maintain adequate nutrition, hydration, elimination, sleep and activity6. If unable to ensure safety without constant attention obtain sitter and review sitter guidelines with assigned personnel7. Initiate Psychosocial CNS and Spiritual Care consult, as indicated  4/18/2025 1319 by Alex Moraes, RN  Outcome: Progressing  Flowsheets (Taken 4/18/2025 0948)  Effect of thought disturbance (confusion, delirium, dementia, or psychosis) are managed with adequate functional status: [place] high risk fall precautions, as indicated  4/18/2025 0854 by Fernanda Pillai, RN  Outcome: Progressing  4/18/2025 0853 by Fernanda Pillai, RN  Outcome: Progressing  4/18/2025 0850 by Fernanda Pillai, RN  Outcome: Not Progressing  4/18/2025 0055 by Annette Urena, RN  Outcome: Not Progressing     
  Problem: Safety - Adult  Goal: Free from fall injury  4/16/2025 1048 by John Lainez, RN  Outcome: Progressing     Problem: Gastrointestinal - Adult  Goal: Minimal or absence of nausea and vomiting  Outcome: Progressing     Problem: Gastrointestinal - Adult  Goal: Maintains or returns to baseline bowel function  Outcome: Progressing     Problem: Gastrointestinal - Adult  Goal: Maintains adequate nutritional intake  Outcome: Progressing     Problem: Gastrointestinal - Adult  Goal: Establish and maintain optimal ostomy function  Outcome: Progressing     Problem: Metabolic/Fluid and Electrolytes - Adult  Goal: Electrolytes maintained within normal limits  Outcome: Progressing     Problem: Metabolic/Fluid and Electrolytes - Adult  Goal: Hemodynamic stability and optimal renal function maintained  Outcome: Progressing     Problem: Metabolic/Fluid and Electrolytes - Adult  Goal: Glucose maintained within prescribed range  Outcome: Progressing     
  Problem: Safety - Adult  Goal: Free from fall injury  4/17/2025 0009 by Tamela Avila, RN  Outcome: Progressing  4/16/2025 1048 by John Lainez, RN  Outcome: Progressing     Problem: Discharge Planning  Goal: Discharge to home or other facility with appropriate resources  Outcome: Progressing     Problem: Pain  Goal: Verbalizes/displays adequate comfort level or baseline comfort level  Outcome: Progressing     Problem: ABCDS Injury Assessment  Goal: Absence of physical injury  Outcome: Progressing     Problem: Anxiety  Goal: Will report anxiety at manageable levels  Description: INTERVENTIONS:1. Administer medication as ordered2. Teach and rehearse alternative coping skills3. Provide emotional support with 1:1 interaction with staff  Outcome: Progressing     Problem: Coping  Goal: Pt/Family able to verbalize concerns and demonstrate effective coping strategies  Description: INTERVENTIONS:1. Assist patient/family to identify coping skills, available support systems and cultural and spiritual values2. Provide emotional support, including active listening and acknowledgement of concerns of patient and caregivers3. Reduce environmental stimuli, as able4. Instruct patient/family in relaxation techniques, as appropriate5. Assess for spiritual pain/suffering and initiate Spiritual Care, Psychosocial Clinical Specialist consults as needed  Outcome: Progressing     Problem: Confusion  Goal: Confusion, delirium, dementia, or psychosis is improved or at baseline  Description: INTERVENTIONS:1. Assess for possible contributors to thought disturbance, including medications, impaired vision or hearing, underlying metabolic abnormalities, dehydration, psychiatric diagnoses, and notify attending LIP2. [place] high risk fall precautions, as indicated3. Provide frequent short contacts to provide reality reorientation, refocusing and direction4. Decrease environmental stimuli, including noise as appropriate5. Monitor and 
  Problem: Safety - Adult  Goal: Free from fall injury  4/19/2025 0113 by Grace Pugh RN  Outcome: Progressing  4/18/2025 1319 by Alex Moraes, RN  Outcome: Progressing     Problem: Discharge Planning  Goal: Discharge to home or other facility with appropriate resources  4/19/2025 0113 by Grace Pugh RN  Outcome: Progressing  Flowsheets (Taken 4/18/2025 2000)  Discharge to home or other facility with appropriate resources:   Identify barriers to discharge with patient and caregiver   Arrange for needed discharge resources and transportation as appropriate   Identify discharge learning needs (meds, wound care, etc)   Refer to discharge planning if patient needs post-hospital services based on physician order or complex needs related to functional status, cognitive ability or social support system  4/18/2025 1319 by Alex Moraes, RN  Outcome: Progressing  Flowsheets (Taken 4/18/2025 0948)  Discharge to home or other facility with appropriate resources:   Identify barriers to discharge with patient and caregiver   Arrange for needed discharge resources and transportation as appropriate   Identify discharge learning needs (meds, wound care, etc)     Problem: Pain  Goal: Verbalizes/displays adequate comfort level or baseline comfort level  4/19/2025 0113 by Grace Pugh RN  Outcome: Progressing  4/18/2025 1319 by Alex Moraes, RN  Outcome: Progressing     Problem: ABCDS Injury Assessment  Goal: Absence of physical injury  4/19/2025 0113 by Grace Pugh RN  Outcome: Progressing  4/18/2025 1319 by Alex Moraes, RN  Outcome: Progressing     Problem: Anxiety  Goal: Will report anxiety at manageable levels  Description: INTERVENTIONS:1. Administer medication as ordered2. Teach and rehearse alternative coping skills3. Provide emotional support with 1:1 interaction with staff  4/19/2025 0113 by Grace Pugh RN  Outcome: Progressing  4/18/2025 1319 by Alex Moraes, RN  Outcome: 
  Problem: Safety - Adult  Goal: Free from fall injury  4/19/2025 1408 by Alex Moraes, RN  Outcome: Progressing  4/19/2025 0113 by Grace Pugh RN  Outcome: Progressing     Problem: Discharge Planning  Goal: Discharge to home or other facility with appropriate resources  4/19/2025 1408 by Alex Moraes, RN  Outcome: Progressing  Flowsheets (Taken 4/19/2025 0750)  Discharge to home or other facility with appropriate resources:   Identify barriers to discharge with patient and caregiver   Arrange for needed discharge resources and transportation as appropriate   Identify discharge learning needs (meds, wound care, etc)  4/19/2025 0113 by Grace Pugh RN  Outcome: Progressing  Flowsheets (Taken 4/18/2025 2000)  Discharge to home or other facility with appropriate resources:   Identify barriers to discharge with patient and caregiver   Arrange for needed discharge resources and transportation as appropriate   Identify discharge learning needs (meds, wound care, etc)   Refer to discharge planning if patient needs post-hospital services based on physician order or complex needs related to functional status, cognitive ability or social support system     Problem: Pain  Goal: Verbalizes/displays adequate comfort level or baseline comfort level  4/19/2025 1408 by Alex Moraes RN  Outcome: Progressing  Flowsheets (Taken 4/19/2025 0745)  Verbalizes/displays adequate comfort level or baseline comfort level: Assess pain using appropriate pain scale  4/19/2025 0113 by Grace Pugh RN  Outcome: Progressing     Problem: ABCDS Injury Assessment  Goal: Absence of physical injury  4/19/2025 1408 by Alex Moraes, RN  Outcome: Progressing  4/19/2025 0113 by Grace Pugh RN  Outcome: Progressing     Problem: Anxiety  Goal: Will report anxiety at manageable levels  Description: INTERVENTIONS:1. Administer medication as ordered2. Teach and rehearse alternative coping skills3. Provide emotional 
  Problem: Safety - Adult  Goal: Free from fall injury  4/20/2025 2247 by Grace Pugh RN  Outcome: Progressing  4/20/2025 1517 by Ana Chinchilla RN  Outcome: Progressing  Flowsheets (Taken 4/20/2025 1517)  Free From Fall Injury: Instruct family/caregiver on patient safety     Problem: Discharge Planning  Goal: Discharge to home or other facility with appropriate resources  4/20/2025 2247 by Grace Pugh RN  Outcome: Progressing  Flowsheets (Taken 4/20/2025 2000)  Discharge to home or other facility with appropriate resources:   Identify barriers to discharge with patient and caregiver   Arrange for needed discharge resources and transportation as appropriate   Identify discharge learning needs (meds, wound care, etc)   Refer to discharge planning if patient needs post-hospital services based on physician order or complex needs related to functional status, cognitive ability or social support system  4/20/2025 1517 by Ana Chinchilla RN  Outcome: Progressing  Flowsheets  Taken 4/20/2025 1517  Discharge to home or other facility with appropriate resources: Identify barriers to discharge with patient and caregiver  Taken 4/20/2025 0800  Discharge to home or other facility with appropriate resources: Identify barriers to discharge with patient and caregiver     Problem: Pain  Goal: Verbalizes/displays adequate comfort level or baseline comfort level  4/20/2025 2247 by Grace Pugh RN  Outcome: Progressing  4/20/2025 1517 by Ana Chinchilla RN  Outcome: Progressing  Flowsheets (Taken 4/20/2025 1517)  Verbalizes/displays adequate comfort level or baseline comfort level: Encourage patient to monitor pain and request assistance     Problem: ABCDS Injury Assessment  Goal: Absence of physical injury  4/20/2025 2247 by Grace Pugh RN  Outcome: Progressing  4/20/2025 1517 by Ana Chinchilla RN  Outcome: Progressing  Flowsheets (Taken 4/17/2025 0016 by Tamela Avila RN)  Absence of Physical Injury: Implement 
  Problem: Safety - Adult  Goal: Free from fall injury  Outcome: Progressing     Problem: Discharge Planning  Goal: Discharge to home or other facility with appropriate resources  Outcome: Progressing     Problem: Pain  Goal: Verbalizes/displays adequate comfort level or baseline comfort level  Outcome: Progressing     Problem: ABCDS Injury Assessment  Goal: Absence of physical injury  Outcome: Progressing     Problem: Anxiety  Goal: Will report anxiety at manageable levels  Description: INTERVENTIONS:1. Administer medication as ordered2. Teach and rehearse alternative coping skills3. Provide emotional support with 1:1 interaction with staff  Outcome: Progressing     Problem: Coping  Goal: Pt/Family able to verbalize concerns and demonstrate effective coping strategies  Description: INTERVENTIONS:1. Assist patient/family to identify coping skills, available support systems and cultural and spiritual values2. Provide emotional support, including active listening and acknowledgement of concerns of patient and caregivers3. Reduce environmental stimuli, as able4. Instruct patient/family in relaxation techniques, as appropriate5. Assess for spiritual pain/suffering and initiate Spiritual Care, Psychosocial Clinical Specialist consults as needed  Outcome: Progressing     Problem: Death & Dying  Goal: Pt/Family communicate acceptance of impending death and feel psychological comfort and peace  Description: INTERVENTIONS:1. Assess patient/family anxiety and grief process related to end of life issues2. Provide emotional and spiritual support3. Provide information about the patient's health status with consideration of family and cultural values4. Communicate willingness to discuss death and facilitate grief process  with patient/family as appropriate5. Emphasize sustaining relationships within family system and community, or neil/spiritual traditions6. Initiate Spiritual Care, Psychosocial Clinical Specialist, consult as 
  Problem: Safety - Adult  Goal: Free from fall injury  Outcome: Progressing  Flowsheets (Taken 4/20/2025 1517)  Free From Fall Injury: Instruct family/caregiver on patient safety     Problem: Discharge Planning  Goal: Discharge to home or other facility with appropriate resources  Outcome: Progressing  Flowsheets  Taken 4/20/2025 1517  Discharge to home or other facility with appropriate resources: Identify barriers to discharge with patient and caregiver  Taken 4/20/2025 0800  Discharge to home or other facility with appropriate resources: Identify barriers to discharge with patient and caregiver     Problem: Pain  Goal: Verbalizes/displays adequate comfort level or baseline comfort level  Outcome: Progressing  Flowsheets (Taken 4/20/2025 1517)  Verbalizes/displays adequate comfort level or baseline comfort level: Encourage patient to monitor pain and request assistance     Problem: ABCDS Injury Assessment  Goal: Absence of physical injury  Outcome: Progressing  Flowsheets (Taken 4/17/2025 0016 by Tamela Avila RN)  Absence of Physical Injury: Implement safety measures based on patient assessment     Problem: Change in Body Image  Goal: Pt/Family communicate acceptance of loss or change in body image and feel psychological comfort and peace  Description: INTERVENTIONS:1. Assess patient/family anxiety and grief process related to change in body image, loss of functional status, loss of sense of self, and forgiveness2. Provide emotional and spiritual support3. Provide information about the patient's health status with consideration of family and cultural values4. Communicate willingness to discuss loss and facilitate grief process with patient/family as appropriate5. Emphasize sustaining relationships within family system and community, or neil/spiritual traditions6. Initiate Spiritual Care, Psychosocial Clinical Specialist consult as needed  Recent Flowsheet Documentation  Taken 4/20/2025 0800 by Amor 
  Problem: Skin/Tissue Integrity - Adult  Goal: Skin integrity remains intact  4/16/2025 0009 by Lou Lloyd RN  Outcome: Progressing  Problem: Respiratory - Adult  Goal: Achieves optimal ventilation and oxygenation  4/16/2025 0009 by Lou Lloyd RN  Outcome: Progressing  Problem: Skin/Tissue Integrity - Adult  Goal: Skin integrity remains intact  4/16/2025 0009 by Lou Lloyd RN  Outcome: Progressing          
continuous positive airway pressure, respiratory therapy assess nares and determine need for appliance change or resting period  4/17/2025 0737 by Jessy Membreno RN  Outcome: Progressing    Goal: Incisions, wounds, or drain sites healing without S/S of infection  4/17/2025 0737 by Jessy Membreno RN  Outcome: Progressing    Goal: Return ADL status to a safe level of function  4/17/2025 0737 by Jessy Membreno RN  Outcome: Progressing      Problem: Metabolic/Fluid and Electrolytes - Adult  Goal: Electrolytes maintained within normal limits  4/17/2025 0737 by Jessy Membreno RN  Outcome: Progressing    Goal: Glucose maintained within prescribed range  4/17/2025 0737 by Jessy Membreno RN  Outcome: Progressing     Problem: Hematologic - Adult  Goal: Maintains hematologic stability  Outcome: Progressing     Problem: Skin/Tissue Integrity  Goal: Skin integrity remains intact  Description: 1.  Monitor for areas of redness and/or skin breakdown2.  Assess vascular access sites hourly3.  Every 4-6 hours minimum:  Change oxygen saturation probe site4.  Every 4-6 hours:  If on nasal continuous positive airway pressure, respiratory therapy assess nares and determine need for appliance change or resting period  4/17/2025 0737 by Jessy Membreno RN  Outcome: Progressing     Problem: Confusion  Goal: Confusion, delirium, dementia, or psychosis is improved or at baseline  Description: INTERVENTIONS:1. Assess for possible contributors to thought disturbance, including medications, impaired vision or hearing, underlying metabolic abnormalities, dehydration, psychiatric diagnoses, and notify attending LIP2. [place] high risk fall precautions, as indicated3. Provide frequent short contacts to provide reality reorientation, refocusing and direction4. Decrease environmental stimuli, including noise as appropriate5. Monitor and intervene to maintain adequate nutrition, hydration, elimination, 
indicated3. Provide frequent short contacts to provide reality reorientation, refocusing and direction4. Decrease environmental stimuli, including noise as appropriate5. Monitor and intervene to maintain adequate nutrition, hydration, elimination, sleep and activity6. If unable to ensure safety without constant attention obtain sitter and review sitter guidelines with assigned personnel7. Initiate Psychosocial CNS and Spiritual Care consult, as indicated  Outcome: Progressing     Problem: Behavior  Goal: Pt/Family maintain appropriate behavior and adhere to behavioral management agreement, if implemented  Description: INTERVENTIONS:1. Assess patient/family's coping skills and  non-compliant behavior (including use of illegal substances)2. Notify security of behavior or suspected illegal substances which indicate the need for search of the family and/or belongings3. Encourage verbalization of thoughts and concerns in a socially appropriate manner4. Utilize positive, consistent limit setting strategies supporting safety of patient, staff and others5. Encourage participation in the decision making process about the behavioral management agreement6. If a visitor's behavior poses a threat to safety call refer to organization policy.7. Initiate consult with , Psychosocial CNS, Spiritual Care as appropriate  Outcome: Progressing     Problem: Depression/Self Harm  Goal: Effect of psychiatric condition will be minimized and patient will be protected from self harm  Description: INTERVENTIONS:1. Assess impact of patient's symptoms on level of functioning, self care needs and offer support as indicated2. Assess patient/family knowledge of depression, impact on illness and need for teaching3. Provide emotional support, presence and reassurance4. Assess for possible suicidal thoughts or ideation. If patient expresses suicidal thoughts or statements do not leave alone, initiate Suicide Precautions, move to a room 
Progressing  Flowsheets (Taken 4/19/2025 0750)  Achieves stable or improved neurological status: Assess for and report changes in neurological status  Goal: Absence of seizures  4/19/2025 2009 by Grace Pugh RN  Outcome: Progressing  4/19/2025 1408 by Alex Moraes RN  Outcome: Progressing  Goal: Remains free of injury related to seizures activity  4/19/2025 2009 by Grace Pugh RN  Outcome: Progressing  4/19/2025 1408 by Alex Moraes RN  Outcome: Progressing  Goal: Achieves maximal functionality and self care  4/19/2025 2009 by Grace Pugh RN  Outcome: Progressing  4/19/2025 1408 by Alex Moraes RN  Outcome: Progressing     Problem: Respiratory - Adult  Goal: Achieves optimal ventilation and oxygenation  4/19/2025 2009 by Grace Pugh RN  Outcome: Progressing  4/19/2025 1408 by Alex Moraes RN  Outcome: Progressing  Flowsheets (Taken 4/19/2025 0750)  Achieves optimal ventilation and oxygenation: Assess for changes in respiratory status     Problem: Cardiovascular - Adult  Goal: Maintains optimal cardiac output and hemodynamic stability  4/19/2025 2009 by Grace Pugh RN  Outcome: Progressing  4/19/2025 1408 by Alex Moraes RN  Outcome: Progressing  Flowsheets (Taken 4/19/2025 0750)  Maintains optimal cardiac output and hemodynamic stability: Monitor blood pressure and heart rate  Goal: Absence of cardiac dysrhythmias or at baseline  4/19/2025 2009 by Grace Pugh RN  Outcome: Progressing  4/19/2025 1408 by Alex Moraes RN  Outcome: Progressing  Flowsheets (Taken 4/19/2025 0750)  Absence of cardiac dysrhythmias or at baseline: Monitor cardiac rate and rhythm     Problem: Skin/Tissue Integrity - Adult  Goal: Skin integrity remains intact  Description: 1.  Monitor for areas of redness and/or skin breakdown2.  Assess vascular access sites hourly3.  Every 4-6 hours minimum:  Change oxygen saturation probe site4.  Every 4-6 hours:  If on nasal continuous 
work, etc).2. De-myth guilt and help patient/family identify possible irrational spiritual/cultural beliefs and values.3. Explore possibilities of making amends & reconciliation with self, others, and/or a greater power.4. Guide patient/family in identifying painful feelings of guilt.5. Help patient/famiy explore and identify spiritual beliefs, cultural understandings or values that may help or hinder letting go of issue.6. Help patient/family explore feelings of anger, bitterness, resentment.7. Help patient/family identify and examine the situation in which these feelings are experienced.8. Help patient/family identify destructive displacement of feelings onto other individuals.9. Invite use of sacraments/rituals/ceremonies as appropriate (e.g. - confession, anointing, smudging).10. Refer patient/family to formal counseling and/or to neil community for further support work.  4/18/2025 1319 by Alex Moraes, RN  Outcome: Progressing  4/18/2025 0854 by Fernanda Pillai RN  Outcome: Progressing  4/18/2025 0850 by Fernanda Pillai RN  Outcome: Progressing  4/18/2025 0055 by Annette Urena RN  Outcome: Progressing     Problem: Involuntary Admit  Goal: Will cooperate with staff recommendations and doctor's orders and will demonstrate appropriate behavior  Description: INTERVENTIONS:1. Treat underlying conditions and offer medication as ordered2. Educate regarding involuntary admission procedures and rules3. Contain excessive/inappropriate behavior per unit and hospital policies  4/18/2025 1319 by Alex Moraes, RN  Outcome: Progressing  4/18/2025 0854 by Fernanda Pillai RN  Outcome: Progressing  4/18/2025 0850 by Fernanda Pillai RN  Outcome: Progressing  4/18/2025 0055 by Annette Urena RN  Outcome: Progressing     Problem: Neurosensory - Adult  Goal: Achieves stable or improved neurological status  4/18/2025 1319 by Alex Moraes, RN  Outcome: Progressing  Flowsheets (Taken 4/18/2025

## 2025-04-21 NOTE — CARE COORDINATION
4/21/25 Discharge order in. Followed up with pt & son @ bedside. Confirmed plan for pt to return home with spouse & family with 24/7 care provided by family & support from [facility] (PT/OT/SN). Johann/John confirmed that services will resume; orders already in. John provides ostomy supplies Family to provide transport home. Electronically signed by PANCHO Alcocer on 4/21/2025 at 1:34 PM

## 2025-04-21 NOTE — ANESTHESIA PRE PROCEDURE
Department of Anesthesiology  Preprocedure Note       Name:  Susanna Arriola   Age:  70 y.o.  :  1955                                          MRN:  [id number]         Date:  2025      Surgeon: * No surgeons listed *    Procedure: * No procedures listed *    Medications prior to admission:   Prior to Admission medications    Medication Sig Start Date End Date Taking? Authorizing Provider   furosemide (LASIX) 40 MG tablet Take 1 tablet by mouth daily    Kelvin Maldonado MD   amiodarone (CORDARONE) 200 MG tablet Take 1 tablet by mouth 2 times daily    Kelvin Maldonado MD   warfarin (COUMADIN) 4 MG tablet Take 1 tablet by mouth  only    Kelvin Maldonado MD   warfarin (COUMADIN) 3 MG tablet Take 1 tablet by mouth Every day except for Wednesday    Kelvin Maldonado MD   metoclopramide (REGLAN) 5 MG tablet Take 1 tablet by mouth 3 times daily (before meals) for 5 days 3/22/25 3/27/25  Shen Solo DO   ondansetron (ZOFRAN-ODT) 4 MG disintegrating tablet Take 1 tablet by mouth every 8 hours as needed for Nausea or Vomiting  Patient not taking: Reported on 4/15/2025 3/22/25   Shen Solo DO   sertraline (ZOLOFT) 100 MG tablet Take 1 tablet by mouth daily 24   Jack Moscoso APRN - CNP   metoprolol tartrate 75 MG TABS Take 75 mg by mouth 2 times daily  Patient not taking: Reported on 4/15/2025 7/6/24   Jack Moscoso APRN - CNP   anastrozole (ARIMIDEX) 1 MG tablet Take 1 tablet by mouth daily    Kelvin Maldonado MD   simvastatin (ZOCOR) 20 MG tablet Take 1 tablet by mouth nightly    Kelvin Maldonado MD   zonisamide (ZONEGRAN) 100 MG capsule Take 1 capsule by mouth 4 times daily    Kelvin Maldonado MD   donepezil (ARICEPT) 5 MG tablet Take 1 tablet by mouth nightly    Kelvin Maldonado MD   OXcarbazepine (TRILEPTAL) 600 MG tablet Take 1 tablet by mouth 2 times daily    Kelvin Maldonado MD   aspirin 81 MG EC tablet Take 1 tablet by mouth daily

## 2025-04-21 NOTE — DISCHARGE SUMMARY
Internal Medicine Progress Note     JOSE=[facility]     Romeo Drake D.O., AJOLisaILisa Solo D.O., MAGUI Carbone, MSN, APRN, NP-C  Jack Moscoso, MSN, APRN-CNP  Rishi Ruiz, MSN, APRN, NP-C  Fernanda Tay, MSN, APRN-CNP  Conchis Bradford, MSN, APRN, NP-C       Internal Medicine  Discharge Summary    NAME: Susanna Arriola  :  1955  MRN:  [id number]  PCP:Lay Hunter MD  ADMITTED: 2025      DISCHARGED: 25    ADMITTING PHYSICIAN: Romeo Drake DO    CONSULTANT(S):   IP CONSULT TO CRITICAL CARE  IP CONSULT TO GENERAL SURGERY  IP CONSULT TO SOCIAL WORK  PHARMACY TO DOSE WARFARIN  IP CONSULT TO CARDIOLOGY  IP CONSULT TO DIETITIAN  IP CONSULT TO VASCULAR ACCESS TEAM  IP CONSULT TO HOME CARE NEEDS     ADMITTING DIAGNOSIS:   Hypokalemia [E87.6]  Shock (HCC) [R57.9]  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]  Longstanding persistent atrial fibrillation (HCC) [I48.11]     DISCHARGE DIAGNOSES:   Hypovolemic shock with resolution  Postoperative ileus with resolution  Recent hospitalization for perforated sigmoid diverticulitis status post exploratory laparotomy with sigmoid colon resection, open intra-abdominal abscess drainage and end descending colostomy creation  Paroxysmal atrial fibrillation with rapid ventricular response status post cardioversion 2025 with resumption of Coumadin therapy  Dementia with current hospital-acquired delirium   History of normal pressure hydrocephalus with  shunt  History of breast cancer  Essential hypertension   Hyperlipidemia  Depression    BRIEF HISTORY OF PRESENT ILLNESS:    Patient is 70-year-old female presents to the ED due to no output from ostomy.  Patient had sigmoid resection ostomy after perforated viscus.  She was sent home on .  However since Friday she has not had any output from the ostomy site.  She has no acute complaints on exam.  Denies

## 2025-04-21 NOTE — ANESTHESIA POSTPROCEDURE EVALUATION
Department of Anesthesiology  Postprocedure Note    Patient: Susanna Arriola  MRN: [id number]  YOB: 1955  Date of evaluation: 4/21/2025    Procedure Summary       Date: 04/21/25 Room / Location: [facility] PACU    Anesthesia Start: 0822 Anesthesia Stop: 0832    Procedure: MHY CARDIOVERSION Diagnosis:     Scheduled Providers:  Responsible Provider: Stewart Stuart DO    Anesthesia Type: MAC ASA Status: 4            Anesthesia Type: No value filed.    Aliyah Phase I: Aliyah Score: 8    Aliyah Phase II:      Anesthesia Post Evaluation    Patient location during evaluation: PACU  Patient participation: complete - patient participated  Level of consciousness: awake and alert  Pain score: 0  Airway patency: patent  Nausea & Vomiting: no nausea and no vomiting  Cardiovascular status: blood pressure returned to baseline and hemodynamically stable  Respiratory status: acceptable  Hydration status: stable  Pain management: adequate    No notable events documented.

## 2025-04-22 NOTE — PROCEDURES
[address] [address]                             PROCEDURE NOTE      PATIENT NAME: FEDERICO HARDEN              : 1955  MED REC NO: [id number]                        ROOM: 0618  ACCOUNT NO: [id number]                       ADMIT DATE: 2025  PROVIDER: Gaviota Ramirez MD      DATE OF PROCEDURE:  2025    SURGEON:  Gaviota Ramirez MD    PROCEDURE:  Elective cardioversion.    INDICATION:  Symptomatic persistent atrial fibrillation.    DESCRIPTION OF PROCEDURE:  The patient received propofol IV by the CRNA.    Blood pressure, heart rate, and oxygen saturations were monitored throughout the procedure.    The patient was then successfully cardioverted to sinus rhythm from the first attempt using 200 joules.  She was in sinus rhythm with heart rate around 60 a minute after that.    The patient tolerated the procedure well.  No complications encountered.          GAVIOTA RAMIREZ MD      D:  2025 17:25:15     T:  2025 19:56:25     MM/AQS  Job #:  [id number]     Doc#:  [id number]

## 2025-05-04 ENCOUNTER — APPOINTMENT (OUTPATIENT)
Dept: CT IMAGING | Age: 70
DRG: 872 | End: 2025-05-04
Payer: MEDICARE

## 2025-05-04 ENCOUNTER — HOSPITAL ENCOUNTER (INPATIENT)
Age: 70
LOS: 3 days | Discharge: HOME HEALTH | DRG: 872 | End: 2025-05-08
Attending: EMERGENCY MEDICINE | Admitting: INTERNAL MEDICINE
Payer: MEDICARE

## 2025-05-04 DIAGNOSIS — R79.1 SUPRATHERAPEUTIC INR: ICD-10-CM

## 2025-05-04 DIAGNOSIS — A41.9 SEPSIS, DUE TO UNSPECIFIED ORGANISM, UNSPECIFIED WHETHER ACUTE ORGAN DYSFUNCTION PRESENT (HCC): Primary | ICD-10-CM

## 2025-05-04 DIAGNOSIS — R11.2 NAUSEA AND VOMITING, UNSPECIFIED VOMITING TYPE: ICD-10-CM

## 2025-05-04 DIAGNOSIS — N39.0 URINARY TRACT INFECTION WITHOUT HEMATURIA, SITE UNSPECIFIED: ICD-10-CM

## 2025-05-04 DIAGNOSIS — K80.10 CALCULUS OF GALLBLADDER WITH CHOLECYSTITIS WITHOUT BILIARY OBSTRUCTION, UNSPECIFIED CHOLECYSTITIS ACUITY: ICD-10-CM

## 2025-05-04 DIAGNOSIS — E87.6 HYPOKALEMIA: ICD-10-CM

## 2025-05-04 LAB
ALBUMIN SERPL-MCNC: 3.5 G/DL (ref 3.5–5.2)
ALP SERPL-CCNC: 105 U/L (ref 35–104)
ALT SERPL-CCNC: 11 U/L (ref 0–32)
ANION GAP SERPL CALCULATED.3IONS-SCNC: 20 MMOL/L (ref 7–16)
AST SERPL-CCNC: 15 U/L (ref 0–31)
BASOPHILS # BLD: 0.04 K/UL (ref 0–0.2)
BASOPHILS NFR BLD: 0 % (ref 0–2)
BILIRUB SERPL-MCNC: 0.2 MG/DL (ref 0–1.2)
BILIRUB UR QL STRIP: NEGATIVE
BUN SERPL-MCNC: 25 MG/DL (ref 6–23)
CALCIUM SERPL-MCNC: 9 MG/DL (ref 8.6–10.2)
CHLORIDE SERPL-SCNC: 98 MMOL/L (ref 98–107)
CLARITY UR: CLEAR
CO2 SERPL-SCNC: 21 MMOL/L (ref 22–29)
COLOR UR: YELLOW
CREAT SERPL-MCNC: 1.3 MG/DL (ref 0.5–1)
EOSINOPHIL # BLD: 0.01 K/UL (ref 0.05–0.5)
EOSINOPHILS RELATIVE PERCENT: 0 % (ref 0–6)
EPI CELLS #/AREA URNS HPF: ABNORMAL /HPF
ERYTHROCYTE [DISTWIDTH] IN BLOOD BY AUTOMATED COUNT: 14.4 % (ref 11.5–15)
GFR, ESTIMATED: 45 ML/MIN/1.73M2
GLUCOSE SERPL-MCNC: 147 MG/DL (ref 74–99)
GLUCOSE UR STRIP-MCNC: NEGATIVE MG/DL
HCT VFR BLD AUTO: 36.7 % (ref 34–48)
HGB BLD-MCNC: 11.9 G/DL (ref 11.5–15.5)
HGB UR QL STRIP.AUTO: ABNORMAL
IMM GRANULOCYTES # BLD AUTO: 0.13 K/UL (ref 0–0.58)
IMM GRANULOCYTES NFR BLD: 1 % (ref 0–5)
INR PPP: 8.7
KETONES UR STRIP-MCNC: NEGATIVE MG/DL
LACTATE BLDV-SCNC: 3 MMOL/L (ref 0.5–2.2)
LEUKOCYTE ESTERASE UR QL STRIP: ABNORMAL
LIPASE SERPL-CCNC: 27 U/L (ref 13–60)
LYMPHOCYTES NFR BLD: 1.58 K/UL (ref 1.5–4)
LYMPHOCYTES RELATIVE PERCENT: 10 % (ref 20–42)
MCH RBC QN AUTO: 25.6 PG (ref 26–35)
MCHC RBC AUTO-ENTMCNC: 32.4 G/DL (ref 32–34.5)
MCV RBC AUTO: 79.1 FL (ref 80–99.9)
MONOCYTES NFR BLD: 0.92 K/UL (ref 0.1–0.95)
MONOCYTES NFR BLD: 6 % (ref 2–12)
NEUTROPHILS NFR BLD: 83 % (ref 43–80)
NEUTS SEG NFR BLD: 13.3 K/UL (ref 1.8–7.3)
NITRITE UR QL STRIP: POSITIVE
PH UR STRIP: 6 [PH] (ref 5–8)
PLATELET # BLD AUTO: 381 K/UL (ref 130–450)
PMV BLD AUTO: 9.7 FL (ref 7–12)
POTASSIUM SERPL-SCNC: 3.3 MMOL/L (ref 3.5–5)
PROT SERPL-MCNC: 7.2 G/DL (ref 6.4–8.3)
PROT UR STRIP-MCNC: NEGATIVE MG/DL
PROTHROMBIN TIME: 96.9 SEC (ref 9.3–12.4)
RBC # BLD AUTO: 4.64 M/UL (ref 3.5–5.5)
RBC #/AREA URNS HPF: ABNORMAL /HPF
SODIUM SERPL-SCNC: 139 MMOL/L (ref 132–146)
SP GR UR STRIP: <1.005 (ref 1–1.03)
TROPONIN I SERPL HS-MCNC: 14 NG/L (ref 0–14)
TROPONIN I SERPL HS-MCNC: 15 NG/L (ref 0–14)
UROBILINOGEN UR STRIP-ACNC: 0.2 EU/DL (ref 0–1)
WBC #/AREA URNS HPF: ABNORMAL /HPF
WBC OTHER # BLD: 16 K/UL (ref 4.5–11.5)

## 2025-05-04 PROCEDURE — 87086 URINE CULTURE/COLONY COUNT: CPT

## 2025-05-04 PROCEDURE — 87449 NOS EACH ORGANISM AG IA: CPT

## 2025-05-04 PROCEDURE — 81001 URINALYSIS AUTO W/SCOPE: CPT

## 2025-05-04 PROCEDURE — 84484 ASSAY OF TROPONIN QUANT: CPT

## 2025-05-04 PROCEDURE — 99285 EMERGENCY DEPT VISIT HI MDM: CPT

## 2025-05-04 PROCEDURE — 82570 ASSAY OF URINE CREATININE: CPT

## 2025-05-04 PROCEDURE — 93005 ELECTROCARDIOGRAM TRACING: CPT | Performed by: EMERGENCY MEDICINE

## 2025-05-04 PROCEDURE — 83605 ASSAY OF LACTIC ACID: CPT

## 2025-05-04 PROCEDURE — 87077 CULTURE AEROBIC IDENTIFY: CPT

## 2025-05-04 PROCEDURE — 85610 PROTHROMBIN TIME: CPT

## 2025-05-04 PROCEDURE — 70450 CT HEAD/BRAIN W/O DYE: CPT

## 2025-05-04 PROCEDURE — 87899 AGENT NOS ASSAY W/OPTIC: CPT

## 2025-05-04 PROCEDURE — 87205 SMEAR GRAM STAIN: CPT

## 2025-05-04 PROCEDURE — 96374 THER/PROPH/DIAG INJ IV PUSH: CPT

## 2025-05-04 PROCEDURE — 82436 ASSAY OF URINE CHLORIDE: CPT

## 2025-05-04 PROCEDURE — 80053 COMPREHEN METABOLIC PANEL: CPT

## 2025-05-04 PROCEDURE — 84300 ASSAY OF URINE SODIUM: CPT

## 2025-05-04 PROCEDURE — 6360000004 HC RX CONTRAST MEDICATION: Performed by: RADIOLOGY

## 2025-05-04 PROCEDURE — 85025 COMPLETE CBC W/AUTO DIFF WBC: CPT

## 2025-05-04 PROCEDURE — 2580000003 HC RX 258: Performed by: EMERGENCY MEDICINE

## 2025-05-04 PROCEDURE — 74177 CT ABD & PELVIS W/CONTRAST: CPT

## 2025-05-04 PROCEDURE — 6360000002 HC RX W HCPCS: Performed by: EMERGENCY MEDICINE

## 2025-05-04 PROCEDURE — 83690 ASSAY OF LIPASE: CPT

## 2025-05-04 PROCEDURE — 83935 ASSAY OF URINE OSMOLALITY: CPT

## 2025-05-04 PROCEDURE — 84540 ASSAY OF URINE/UREA-N: CPT

## 2025-05-04 RX ORDER — IOPAMIDOL 755 MG/ML
75 INJECTION, SOLUTION INTRAVASCULAR
Status: COMPLETED | OUTPATIENT
Start: 2025-05-04 | End: 2025-05-04

## 2025-05-04 RX ORDER — SODIUM CHLORIDE 9 MG/ML
INJECTION, SOLUTION INTRAVENOUS ONCE
Status: COMPLETED | OUTPATIENT
Start: 2025-05-04 | End: 2025-05-05

## 2025-05-04 RX ORDER — ONDANSETRON 2 MG/ML
4 INJECTION INTRAMUSCULAR; INTRAVENOUS ONCE
Status: COMPLETED | OUTPATIENT
Start: 2025-05-04 | End: 2025-05-04

## 2025-05-04 RX ADMIN — SODIUM CHLORIDE: 0.9 INJECTION, SOLUTION INTRAVENOUS at 21:49

## 2025-05-04 RX ADMIN — IOPAMIDOL 75 ML: 755 INJECTION, SOLUTION INTRAVENOUS at 22:42

## 2025-05-04 RX ADMIN — ONDANSETRON 4 MG: 2 INJECTION, SOLUTION INTRAMUSCULAR; INTRAVENOUS at 21:48

## 2025-05-05 ENCOUNTER — APPOINTMENT (OUTPATIENT)
Dept: ULTRASOUND IMAGING | Age: 70
DRG: 872 | End: 2025-05-05
Payer: MEDICARE

## 2025-05-05 PROBLEM — N39.0 SEPSIS DUE TO URINARY TRACT INFECTION (HCC): Status: ACTIVE | Noted: 2025-05-05

## 2025-05-05 PROBLEM — A41.9 SEPSIS DUE TO URINARY TRACT INFECTION (HCC): Status: ACTIVE | Noted: 2025-05-05

## 2025-05-05 PROBLEM — A41.9 SEPSIS (HCC): Status: ACTIVE | Noted: 2025-05-05

## 2025-05-05 LAB
ALBUMIN SERPL-MCNC: 3.1 G/DL (ref 3.5–5.2)
ALP SERPL-CCNC: 100 U/L (ref 35–104)
ALT SERPL-CCNC: 9 U/L (ref 0–32)
ANION GAP SERPL CALCULATED.3IONS-SCNC: 12 MMOL/L (ref 7–16)
AST SERPL-CCNC: 12 U/L (ref 0–31)
B PARAP IS1001 DNA NPH QL NAA+NON-PROBE: NOT DETECTED
B PERT DNA SPEC QL NAA+PROBE: NOT DETECTED
BASOPHILS # BLD: 0.04 K/UL (ref 0–0.2)
BASOPHILS NFR BLD: 0 % (ref 0–2)
BILIRUB DIRECT SERPL-MCNC: <0.2 MG/DL (ref 0–0.3)
BILIRUB INDIRECT SERPL-MCNC: ABNORMAL MG/DL (ref 0–1)
BILIRUB SERPL-MCNC: 0.2 MG/DL (ref 0–1.2)
BUN SERPL-MCNC: 24 MG/DL (ref 6–23)
C PNEUM DNA NPH QL NAA+NON-PROBE: NOT DETECTED
CALCIUM SERPL-MCNC: 8.6 MG/DL (ref 8.6–10.2)
CHLORIDE SERPL-SCNC: 104 MMOL/L (ref 98–107)
CHLORIDE UR-SCNC: 21 MMOL/L
CO2 SERPL-SCNC: 24 MMOL/L (ref 22–29)
CREAT SERPL-MCNC: 1.2 MG/DL (ref 0.5–1)
CREAT UR-MCNC: 163.5 MG/DL (ref 29–226)
EOSINOPHIL # BLD: 0.03 K/UL (ref 0.05–0.5)
EOSINOPHILS PERCENT, URINE: 0 % (ref 0–1)
EOSINOPHILS RELATIVE PERCENT: 0 % (ref 0–6)
ERYTHROCYTE [DISTWIDTH] IN BLOOD BY AUTOMATED COUNT: 14.4 % (ref 11.5–15)
FLUAV RNA NPH QL NAA+NON-PROBE: NOT DETECTED
FLUBV RNA NPH QL NAA+NON-PROBE: NOT DETECTED
GFR, ESTIMATED: 47 ML/MIN/1.73M2
GLUCOSE SERPL-MCNC: 105 MG/DL (ref 74–99)
HADV DNA NPH QL NAA+NON-PROBE: NOT DETECTED
HCOV 229E RNA NPH QL NAA+NON-PROBE: NOT DETECTED
HCOV HKU1 RNA NPH QL NAA+NON-PROBE: NOT DETECTED
HCOV NL63 RNA NPH QL NAA+NON-PROBE: NOT DETECTED
HCOV OC43 RNA NPH QL NAA+NON-PROBE: NOT DETECTED
HCT VFR BLD AUTO: 31.2 % (ref 34–48)
HGB BLD-MCNC: 10.1 G/DL (ref 11.5–15.5)
HMPV RNA NPH QL NAA+NON-PROBE: NOT DETECTED
HPIV1 RNA NPH QL NAA+NON-PROBE: NOT DETECTED
HPIV2 RNA NPH QL NAA+NON-PROBE: NOT DETECTED
HPIV3 RNA NPH QL NAA+NON-PROBE: NOT DETECTED
HPIV4 RNA NPH QL NAA+NON-PROBE: NOT DETECTED
IMM GRANULOCYTES # BLD AUTO: 0.23 K/UL (ref 0–0.58)
IMM GRANULOCYTES NFR BLD: 2 % (ref 0–5)
INR PPP: >10
L PNEUMO1 AG UR QL IA.RAPID: NEGATIVE
LACTATE BLDV-SCNC: 1.2 MMOL/L (ref 0.5–2.2)
LYMPHOCYTES NFR BLD: 1.53 K/UL (ref 1.5–4)
LYMPHOCYTES RELATIVE PERCENT: 10 % (ref 20–42)
M PNEUMO DNA NPH QL NAA+NON-PROBE: NOT DETECTED
MAGNESIUM SERPL-MCNC: 1.9 MG/DL (ref 1.6–2.6)
MCH RBC QN AUTO: 25.4 PG (ref 26–35)
MCHC RBC AUTO-ENTMCNC: 32.4 G/DL (ref 32–34.5)
MCV RBC AUTO: 78.6 FL (ref 80–99.9)
MONOCYTES NFR BLD: 1.1 K/UL (ref 0.1–0.95)
MONOCYTES NFR BLD: 7 % (ref 2–12)
NEUTROPHILS NFR BLD: 81 % (ref 43–80)
NEUTS SEG NFR BLD: 12.19 K/UL (ref 1.8–7.3)
OSMOLALITY SERPL: 299 MOSM/KG (ref 285–310)
OSMOLALITY UR: 554 MOSM/KG (ref 300–900)
PHOSPHATE SERPL-MCNC: 2.2 MG/DL (ref 2.5–4.5)
PLATELET # BLD AUTO: 347 K/UL (ref 130–450)
PMV BLD AUTO: 9.7 FL (ref 7–12)
POTASSIUM SERPL-SCNC: 3.1 MMOL/L (ref 3.5–5)
PROCALCITONIN SERPL-MCNC: 0.11 NG/ML (ref 0–0.08)
PROT SERPL-MCNC: 6.2 G/DL (ref 6.4–8.3)
PROTHROMBIN TIME: >120 SEC (ref 9.3–12.4)
RBC # BLD AUTO: 3.97 M/UL (ref 3.5–5.5)
RSV RNA NPH QL NAA+NON-PROBE: NOT DETECTED
RV+EV RNA NPH QL NAA+NON-PROBE: DETECTED
S PNEUM AG SPEC QL: NEGATIVE
SARS-COV-2 RNA NPH QL NAA+NON-PROBE: NOT DETECTED
SODIUM SERPL-SCNC: 140 MMOL/L (ref 132–146)
SODIUM UR-SCNC: <20 MMOL/L
SPECIMEN DESCRIPTION: ABNORMAL
SPECIMEN SOURCE: NORMAL
UUN UR-MCNC: 834 MG/DL (ref 800–1666)
WBC OTHER # BLD: 15.1 K/UL (ref 4.5–11.5)

## 2025-05-05 PROCEDURE — 6360000002 HC RX W HCPCS: Performed by: EMERGENCY MEDICINE

## 2025-05-05 PROCEDURE — 6370000000 HC RX 637 (ALT 250 FOR IP): Performed by: EMERGENCY MEDICINE

## 2025-05-05 PROCEDURE — 76705 ECHO EXAM OF ABDOMEN: CPT

## 2025-05-05 PROCEDURE — 2500000003 HC RX 250 WO HCPCS: Performed by: NURSE PRACTITIONER

## 2025-05-05 PROCEDURE — 83605 ASSAY OF LACTIC ACID: CPT

## 2025-05-05 PROCEDURE — 83735 ASSAY OF MAGNESIUM: CPT

## 2025-05-05 PROCEDURE — 80053 COMPREHEN METABOLIC PANEL: CPT

## 2025-05-05 PROCEDURE — 85025 COMPLETE CBC W/AUTO DIFF WBC: CPT

## 2025-05-05 PROCEDURE — 6360000002 HC RX W HCPCS: Performed by: NURSE PRACTITIONER

## 2025-05-05 PROCEDURE — 85610 PROTHROMBIN TIME: CPT

## 2025-05-05 PROCEDURE — 0202U NFCT DS 22 TRGT SARS-COV-2: CPT

## 2025-05-05 PROCEDURE — 2500000003 HC RX 250 WO HCPCS: Performed by: EMERGENCY MEDICINE

## 2025-05-05 PROCEDURE — 96375 TX/PRO/DX INJ NEW DRUG ADDON: CPT

## 2025-05-05 PROCEDURE — 83930 ASSAY OF BLOOD OSMOLALITY: CPT

## 2025-05-05 PROCEDURE — 6370000000 HC RX 637 (ALT 250 FOR IP): Performed by: NURSE PRACTITIONER

## 2025-05-05 PROCEDURE — 87040 BLOOD CULTURE FOR BACTERIA: CPT

## 2025-05-05 PROCEDURE — 84145 PROCALCITONIN (PCT): CPT

## 2025-05-05 PROCEDURE — 82248 BILIRUBIN DIRECT: CPT

## 2025-05-05 PROCEDURE — 1200000000 HC SEMI PRIVATE

## 2025-05-05 PROCEDURE — 84100 ASSAY OF PHOSPHORUS: CPT

## 2025-05-05 RX ORDER — POTASSIUM CHLORIDE 1500 MG/1
40 TABLET, EXTENDED RELEASE ORAL PRN
Status: DISCONTINUED | OUTPATIENT
Start: 2025-05-05 | End: 2025-05-08 | Stop reason: HOSPADM

## 2025-05-05 RX ORDER — SODIUM CHLORIDE 0.9 % (FLUSH) 0.9 %
5-40 SYRINGE (ML) INJECTION PRN
Status: DISCONTINUED | OUTPATIENT
Start: 2025-05-05 | End: 2025-05-08 | Stop reason: HOSPADM

## 2025-05-05 RX ORDER — ATORVASTATIN CALCIUM 20 MG/1
20 TABLET, FILM COATED ORAL DAILY
Status: DISCONTINUED | OUTPATIENT
Start: 2025-05-05 | End: 2025-05-08 | Stop reason: HOSPADM

## 2025-05-05 RX ORDER — ASPIRIN 81 MG/1
81 TABLET ORAL DAILY
Status: DISCONTINUED | OUTPATIENT
Start: 2025-05-05 | End: 2025-05-08 | Stop reason: HOSPADM

## 2025-05-05 RX ORDER — FUROSEMIDE 40 MG/1
40 TABLET ORAL DAILY
Status: DISCONTINUED | OUTPATIENT
Start: 2025-05-05 | End: 2025-05-08 | Stop reason: HOSPADM

## 2025-05-05 RX ORDER — AMIODARONE HYDROCHLORIDE 200 MG/1
200 TABLET ORAL DAILY
Status: DISCONTINUED | OUTPATIENT
Start: 2025-05-05 | End: 2025-05-08 | Stop reason: HOSPADM

## 2025-05-05 RX ORDER — SODIUM CHLORIDE 0.9 % (FLUSH) 0.9 %
5-40 SYRINGE (ML) INJECTION EVERY 12 HOURS SCHEDULED
Status: DISCONTINUED | OUTPATIENT
Start: 2025-05-05 | End: 2025-05-08 | Stop reason: HOSPADM

## 2025-05-05 RX ORDER — ANASTROZOLE 1 MG/1
1 TABLET ORAL DAILY
Status: DISCONTINUED | OUTPATIENT
Start: 2025-05-05 | End: 2025-05-08 | Stop reason: HOSPADM

## 2025-05-05 RX ORDER — METOPROLOL TARTRATE 25 MG/1
12.5 TABLET, FILM COATED ORAL 2 TIMES DAILY
Status: DISCONTINUED | OUTPATIENT
Start: 2025-05-05 | End: 2025-05-08 | Stop reason: HOSPADM

## 2025-05-05 RX ORDER — OXCARBAZEPINE 150 MG/1
600 TABLET, FILM COATED ORAL 2 TIMES DAILY
Status: DISCONTINUED | OUTPATIENT
Start: 2025-05-05 | End: 2025-05-08 | Stop reason: HOSPADM

## 2025-05-05 RX ORDER — ACETAMINOPHEN 325 MG/1
650 TABLET ORAL EVERY 6 HOURS PRN
Status: DISCONTINUED | OUTPATIENT
Start: 2025-05-05 | End: 2025-05-08 | Stop reason: HOSPADM

## 2025-05-05 RX ORDER — MAGNESIUM SULFATE IN WATER 40 MG/ML
2000 INJECTION, SOLUTION INTRAVENOUS PRN
Status: DISCONTINUED | OUTPATIENT
Start: 2025-05-05 | End: 2025-05-08 | Stop reason: HOSPADM

## 2025-05-05 RX ORDER — ONDANSETRON 4 MG/1
4 TABLET, ORALLY DISINTEGRATING ORAL EVERY 8 HOURS PRN
Status: DISCONTINUED | OUTPATIENT
Start: 2025-05-05 | End: 2025-05-07

## 2025-05-05 RX ORDER — SODIUM CHLORIDE AND POTASSIUM CHLORIDE 300; 900 MG/100ML; MG/100ML
INJECTION, SOLUTION INTRAVENOUS CONTINUOUS
Status: DISCONTINUED | OUTPATIENT
Start: 2025-05-05 | End: 2025-05-06

## 2025-05-05 RX ORDER — POLYETHYLENE GLYCOL 3350 17 G/17G
17 POWDER, FOR SOLUTION ORAL DAILY PRN
Status: DISCONTINUED | OUTPATIENT
Start: 2025-05-05 | End: 2025-05-08 | Stop reason: HOSPADM

## 2025-05-05 RX ORDER — POTASSIUM CHLORIDE 7.45 MG/ML
10 INJECTION INTRAVENOUS PRN
Status: DISCONTINUED | OUTPATIENT
Start: 2025-05-05 | End: 2025-05-08 | Stop reason: HOSPADM

## 2025-05-05 RX ORDER — DONEPEZIL HYDROCHLORIDE 5 MG/1
5 TABLET, FILM COATED ORAL NIGHTLY
Status: DISCONTINUED | OUTPATIENT
Start: 2025-05-05 | End: 2025-05-08 | Stop reason: HOSPADM

## 2025-05-05 RX ORDER — ONDANSETRON 2 MG/ML
4 INJECTION INTRAMUSCULAR; INTRAVENOUS EVERY 6 HOURS PRN
Status: DISCONTINUED | OUTPATIENT
Start: 2025-05-05 | End: 2025-05-07

## 2025-05-05 RX ORDER — ACETAMINOPHEN 650 MG/1
650 SUPPOSITORY RECTAL EVERY 6 HOURS PRN
Status: DISCONTINUED | OUTPATIENT
Start: 2025-05-05 | End: 2025-05-08 | Stop reason: HOSPADM

## 2025-05-05 RX ORDER — ALBUTEROL SULFATE 0.83 MG/ML
2.5 SOLUTION RESPIRATORY (INHALATION) EVERY 4 HOURS PRN
Status: DISCONTINUED | OUTPATIENT
Start: 2025-05-05 | End: 2025-05-08 | Stop reason: HOSPADM

## 2025-05-05 RX ORDER — METRONIDAZOLE 500 MG/100ML
500 INJECTION, SOLUTION INTRAVENOUS EVERY 8 HOURS
Status: DISCONTINUED | OUTPATIENT
Start: 2025-05-05 | End: 2025-05-07

## 2025-05-05 RX ORDER — SODIUM CHLORIDE 9 MG/ML
INJECTION, SOLUTION INTRAVENOUS PRN
Status: DISCONTINUED | OUTPATIENT
Start: 2025-05-05 | End: 2025-05-08 | Stop reason: HOSPADM

## 2025-05-05 RX ORDER — ZONISAMIDE 100 MG/1
100 CAPSULE ORAL 4 TIMES DAILY
Status: DISCONTINUED | OUTPATIENT
Start: 2025-05-05 | End: 2025-05-08 | Stop reason: HOSPADM

## 2025-05-05 RX ADMIN — Medication 10 ML: at 20:30

## 2025-05-05 RX ADMIN — OXCARBAZEPINE 600 MG: 150 TABLET, FILM COATED ORAL at 20:30

## 2025-05-05 RX ADMIN — METRONIDAZOLE 500 MG: 5 INJECTION, SOLUTION INTRAVENOUS at 06:42

## 2025-05-05 RX ADMIN — CEFTRIAXONE SODIUM 2000 MG: 2 INJECTION, POWDER, FOR SOLUTION INTRAMUSCULAR; INTRAVENOUS at 01:46

## 2025-05-05 RX ADMIN — DONEPEZIL HYDROCHLORIDE 5 MG: 5 TABLET ORAL at 20:30

## 2025-05-05 RX ADMIN — METRONIDAZOLE 500 MG: 5 INJECTION, SOLUTION INTRAVENOUS at 14:44

## 2025-05-05 RX ADMIN — ACETAMINOPHEN 650 MG: 325 TABLET ORAL at 20:32

## 2025-05-05 RX ADMIN — POTASSIUM BICARBONATE 40 MEQ: 782 TABLET, EFFERVESCENT ORAL at 01:47

## 2025-05-05 RX ADMIN — ONDANSETRON 4 MG: 2 INJECTION, SOLUTION INTRAMUSCULAR; INTRAVENOUS at 13:39

## 2025-05-05 RX ADMIN — METRONIDAZOLE 500 MG: 5 INJECTION, SOLUTION INTRAVENOUS at 20:37

## 2025-05-05 RX ADMIN — POTASSIUM CHLORIDE AND SODIUM CHLORIDE: 900; 300 INJECTION, SOLUTION INTRAVENOUS at 06:15

## 2025-05-05 RX ADMIN — METOPROLOL TARTRATE 12.5 MG: 25 TABLET, FILM COATED ORAL at 20:30

## 2025-05-05 RX ADMIN — POTASSIUM CHLORIDE AND SODIUM CHLORIDE: 900; 300 INJECTION, SOLUTION INTRAVENOUS at 22:02

## 2025-05-05 RX ADMIN — ZONISAMIDE 100 MG: 100 CAPSULE ORAL at 22:03

## 2025-05-05 ASSESSMENT — PAIN DESCRIPTION - LOCATION
LOCATION: GENERALIZED;ABDOMEN
LOCATION: ABDOMEN

## 2025-05-05 ASSESSMENT — PAIN SCALES - GENERAL
PAINLEVEL_OUTOF10: 2
PAINLEVEL_OUTOF10: 5
PAINLEVEL_OUTOF10: 5

## 2025-05-05 ASSESSMENT — PAIN DESCRIPTION - DESCRIPTORS
DESCRIPTORS: ACHING;BURNING
DESCRIPTORS: ACHING;DULL;DISCOMFORT

## 2025-05-05 ASSESSMENT — PAIN DESCRIPTION - ORIENTATION: ORIENTATION: MID

## 2025-05-05 NOTE — H&P
Department of Internal Medicine  History and Physical Examination     Primary Care Physician: Lay Hunter MD   Admitting Physician:  Shen Solo DO  Admission date and time: 5/4/2025  9:03 PM    Room:  04/04  Admitting diagnosis: Sepsis due to urinary tract infection (HCC) [A41.9, N39.0]    Patient Name: Susanna Arriola  MRN: [id number]    Date of Service: 5/5/2025     Chief Complaint: Nausea and vomiting    HISTORY OF PRESENT ILLNESS:    Susanna Arriola is a 70-year-old female patient who presented to [facility] with 3 days worth of nausea and vomiting.  History was obtained from EMS by the ER team as the patient has significant dementia with very limited communication.  Vital signs on arrival were overly normal.  CBC showed leukocytosis with 16,000 white count and 83% neutrophilic predominance with no anemia but microcytic hypochromic indices.  Lactic was elevated at 3.0 and trended downward to 1.2 following volume resuscitation.  Metabolic panel showed acute renal failure with hypokalemia and mild anion gap acidosis.  INR was obtained as the patient is chronically on warfarin and was quite elevated at 8.7.  Lipase was not elevated however.  Liver enzymes and bilirubin were not elevated.  Troponins were assessed x 2 and were not significantly elevated and with flat trajectory.  EKG was interpreted as nonischemic by the ER team.  Urinalysis suggestive of infection.  CT head was unremarkable.  CT abdomen pelvis was performed and showed status post left colostomy with no evidence of obstruction, mild colonic wall thickening with associated stranding which could be secondary to colitis, layering sludge and stones within the gallbladder with trace pericholecystic stranding and punctate nonobstructive right renal calculus.  Ultrasound of the abdomen was obtained as well which showed cholelithiasis with sludge within the gallbladder wall, no pericholecystic fluid, negative sonographic Brady sign, multiple  palpation.  No grimace elicited.  No guarding elicited.  Extremities:    Peripheral pulses present.  No significant pitting peripheral edema.   Neurologic:    Spontaneously awake and alert, pleasant and cooperative.  Limited in the setting of dementia and difficulty answering questions and following commands likely in setting of some superimposed delirium.  Nonfocal otherwise.  Psych:   Behavior is confused with underlying dementia but not agitated.  Musculoskeletal:   No unilateral joint edema, erythema, or warmth. Gait not assessed.  Integumentary:  No rashes  Skin normal color and texture.  Genitalia/Breast:  Deferred    LABORATORY DATA:  CBC with Differential:    Lab Results   Component Value Date/Time    WBC 16.0 05/04/2025 09:39 PM    RBC 4.64 05/04/2025 09:39 PM    HGB 11.9 05/04/2025 09:39 PM    HCT 36.7 05/04/2025 09:39 PM     05/04/2025 09:39 PM    MCV 79.1 05/04/2025 09:39 PM    MCH 25.6 05/04/2025 09:39 PM    MCHC 32.4 05/04/2025 09:39 PM    RDW 14.4 05/04/2025 09:39 PM    LYMPHOPCT 10 05/04/2025 09:39 PM    MONOPCT 6 05/04/2025 09:39 PM    EOSPCT 0 05/04/2025 09:39 PM    BASOPCT 0 05/04/2025 09:39 PM    MONOSABS 0.92 05/04/2025 09:39 PM    LYMPHSABS 1.58 05/04/2025 09:39 PM    EOSABS 0.01 05/04/2025 09:39 PM    BASOSABS 0.04 05/04/2025 09:39 PM     BMP:    Lab Results   Component Value Date/Time     05/04/2025 09:39 PM    K 3.3 05/04/2025 09:39 PM    K 3.9 11/08/2021 03:03 PM    CL 98 05/04/2025 09:39 PM    CO2 21 05/04/2025 09:39 PM    BUN 25 05/04/2025 09:39 PM    CREATININE 1.3 05/04/2025 09:39 PM    CALCIUM 9.0 05/04/2025 09:39 PM    GFRAA 49 11/08/2021 03:03 PM    LABGLOM 45 05/04/2025 09:39 PM    GLUCOSE 147 05/04/2025 09:39 PM     U/A:    Lab Results   Component Value Date/Time    COLORU Yellow 05/04/2025 10:39 PM    PROTEINU NEGATIVE 05/04/2025 10:39 PM    PHUR 6.0 05/04/2025 10:39 PM    PHUR 6.5 11/08/2021 04:32 PM    WBCUA 6 TO 9 05/04/2025 10:39 PM    RBCUA 3 to 5 05/04/2025

## 2025-05-05 NOTE — CARE COORDINATION
Case Management Assessment  Initial Evaluation    Date/Time of Evaluation: 5/5/2025 9:04 AM  Assessment Completed by: PANCHO Russo    If patient is discharged prior to next notation, then this note serves as note for discharge by case management.    Patient Name: Susanna Arriola                   YOB: 1955  Diagnosis: Sepsis due to urinary tract infection (HCC) [A41.9, N39.0]                   Date / Time: 5/4/2025  9:03 PM    Patient Admission Status: Inpatient   Readmission Risk (Low < 19, Mod (19-27), High > 27): Readmission Risk Score: 21.2    Current PCP: Lay Hunter MD  PCP verified by CM? Yes    Chart Reviewed: Yes      History Provided by: Significant Other  Patient Orientation: Alert and Oriented, Self    Patient Cognition: Dementia / Early Alzheimer's    Hospitalization in the last 30 days (Readmission):  Yes    Readmission Assessment  Number of Days since last admission?: 8-30 days  Previous Disposition: Home with Home Health  Who is being Interviewed: Caregiver  What was the patient's/caregiver's perception as to why they think they needed to return back to the hospital?: Other (Comment) (n/v & weakness)  Did you visit your Primary Care Physician after you left the hospital, before you returned this time?: No  Why weren't you able to visit your PCP?: Other (Comment) (pcp had to reschedule appt.)  Did you see a specialist, such as Cardiac, Pulmonary, Orthopedic Physician, etc. after you left the hospital?: No  Who advised the patient to return to the hospital?: Self-referral  Does the patient report anything that got in the way of taking their medications?: No  In our efforts to provide the best possible care to you and others like you, can you think of anything that we could have done to help you after you left the hospital the first time, so that you might not have needed to return so soon?: Other (Comment) ( states he is not sure if anything could have

## 2025-05-05 NOTE — ED NOTES
Nurse to nurse called to Telemetry. Accepting RN updated on pt plan and condition. Pt to be transported to accepting unit. This RN will continue to closely monitor pt.

## 2025-05-05 NOTE — PROGRESS NOTES
Pharmacy Consultation Note  (Warfarin Dosing and Monitoring)    Initial consult date: 5/5/25  Consulting Provider: HARSHIL Alfonso    Susanna Arriola is a 70 y.o. female for whom pharmacy has been asked to manage warfarin therapy.     Weight:   Wt Readings from Last 1 Encounters:   05/05/25 80.4 kg (177 lb 4 oz)       TSH:    Lab Results   Component Value Date/Time    TSH 1.10 03/13/2025 04:46 PM       Hepatic Function Panel:                            Lab Results   Component Value Date/Time    ALKPHOS 100 05/05/2025 07:00 AM    ALT 9 05/05/2025 07:00 AM    AST 12 05/05/2025 07:00 AM    BILITOT 0.2 05/05/2025 07:00 AM    BILIDIR <0.2 05/05/2025 07:00 AM    IBILI Can not be calculated 05/05/2025 07:00 AM       Current significant warfarin drug-drug interactions include: metronidazole (incr INR)    Recent Labs     05/04/25  2139 05/05/25  0700   HGB 11.9 10.1*    347     Date Warfarin Dose INR Heparin or LMWH Comment   5/5 HOLD >10 --                                  Assessment:  Patient is a 70 y.o. female on warfarin for Atrial Fibrillation.  Patient's home warfarin dosing regimen is warfarin 3 mg daily except for Wednesday, 4 mg on Wednesday.   Goal INR 2 - 3  INR >10 today    Plan:  Holding warfarin  Per consult note, will initiate Lovenox when INR < 2  Will clarify plan for Lovenox dosing and resuming oral anticoagulation  Daily PT/INR until the INR is stable within the therapeutic range  Pharmacist will follow and monitor/adjust dosing as necessary    Thank you for this consult,    Dorinda Aviles, AlmazD, BCPS 5/5/2025 12:48 PM   Ext: [phone]    [facility]: [phone]

## 2025-05-05 NOTE — CONSULTS
Today's Date: 5/5/2025  Patient Name: Susanna Arriola  Date of admission: 5/4/2025  9:03 PM  Patient's age: 70 y.o., 1955female    Admission Dx: Sepsis due to urinary tract infection (HCC) [A41.9, N39.0]    Requesting Physician: Shen Solo DO    Chief Complaint   Patient presents with    Nausea     Pt complains of nausea and vomiting for 3 days. Pt A&Ox2 at baseline       HISTORY OF PRESENT ILLNESS:      That has history of chronic atrial fibrillation, cardiomyopathy, mild to moderate LV dysfunction, and other problems as below, presented to the hospital secondary to nausea and vomiting for the past 3 days.  CT of the abdomen is abnormal  She has elevated white count of 16,000  She is denying any cardiac symptoms  Her atrial fibrillation is controlled    REVIEW OF SYSTEMS:    A comprehensive review of systems is negative except for what is reported above          Past Medical History:   has a past medical history of Atrial fibrillation (HCC), Dementia (HCC), and History of cardioversion.    Past Surgical History:   has a past surgical history that includes colectomy (N/A, 3/16/2025).     Social History:   reports that she has never smoked. She does not have any smokeless tobacco history on file. She reports that she does not drink alcohol and does not use drugs.     Family History: No for premature CAD .    family history is not on file..     Allergies:  Betadine [povidone iodine]    MEDICATIONS:   amiodarone  200 mg Oral Daily    anastrozole  1 mg Oral Daily    [Held by provider] aspirin  81 mg Oral Daily    donepezil  5 mg Oral Nightly    [Held by provider] furosemide  40 mg Oral Daily    metoprolol tartrate  12.5 mg Oral BID    OXcarbazepine  600 mg Oral BID    sertraline  100 mg Oral Daily    atorvastatin  20 mg Oral Daily    warfarin placeholder: dosing by pharmacy   Oral RX Placeholder    zonisamide  100 mg Oral 4x Daily    [START ON 5/6/2025] cefTRIAXone (ROCEPHIN) IV  2,000 mg IntraVENous Q24H     04/14/2025 HISTORY: ORDERING SYSTEM PROVIDED HISTORY: SOB TECHNOLOGIST PROVIDED HISTORY: Reason for exam:->SOB FINDINGS: Single frontal view of the chest demonstrate satisfactory expansion lungs which are clear.  The cardiac silhouette appears unremarkable.  There is no evidence of a pneumothorax.     No acute cardiopulmonary disease.     Echo (TTE) limited (PRN contrast/bubble/strain/3D)  Result Date: 4/17/2025    Left Ventricle: Mildly reduced left ventricular systolic function with a visually estimated EF of 40 - 45%. Left ventricle size is normal. Mildly increased wall thickness.   Right Ventricle: Right ventricle size is normal.   Aortic Valve: Mild stenosis of the aortic valve. AV mean gradient is 8 mmHg.   Mitral Valve: Moderate regurgitation.   Tricuspid Valve: Moderate regurgitation.   Left Atrium: Left atrium is mildly dilated.   Pulmonary Arteries: Moderate pulmonary hypertension present.   Image quality is suboptimal.     CT ABDOMEN PELVIS W IV CONTRAST Additional Contrast? None  Result Date: 4/14/2025  EXAMINATION: CT OF THE ABDOMEN AND PELVIS WITH CONTRAST 4/14/2025 6:15 pm TECHNIQUE: CT of the abdomen and pelvis was performed with the administration of intravenous contrast. Multiplanar reformatted images are provided for review. Automated exposure control, iterative reconstruction, and/or weight based adjustment of the mA/kV was utilized to reduce the radiation dose to as low as reasonably achievable. COMPARISON: 03/29/2025, 03/13/2025 HISTORY: ORDERING SYSTEM PROVIDED HISTORY: open surgical wound and no ostomy TECHNOLOGIST PROVIDED HISTORY: Reason for exam:->open surgical wound and no ostomy Additional Contrast?->None Decision Support Exception - unselect if not a suspected or confirmed emergency medical condition->Emergency Medical Condition (MA) FINDINGS: Lower Chest: There is a moderate right and small left pleural effusion present with adjacent atelectasis. The heart size is felt to be at the

## 2025-05-05 NOTE — ED PROVIDER NOTES
Patient is a 69 y/o female who presents to the ED via EMS with nausea and vomiting. EMS reported the patient has had nausea and vomiting for the past three days. Patient has a history of dementia and provides no additional history. She recurrently states that she is thirsty.         Review of Systems   Unable to perform ROS: Dementia        Physical Exam  Vitals and nursing note reviewed.   Constitutional:       General: She is not in acute distress.  HENT:      Head: Normocephalic and atraumatic.      Right Ear: External ear normal.      Left Ear: External ear normal.      Nose: Nose normal.      Mouth/Throat:      Mouth: Mucous membranes are moist.   Eyes:      Conjunctiva/sclera: Conjunctivae normal.      Pupils: Pupils are equal, round, and reactive to light.   Cardiovascular:      Rate and Rhythm: Tachycardia present. Rhythm irregularly irregular.      Heart sounds: No murmur heard.  Pulmonary:      Effort: Pulmonary effort is normal. No respiratory distress.      Breath sounds: Normal breath sounds. No stridor. No wheezing, rhonchi or rales.   Abdominal:      General: Bowel sounds are normal. There is no distension.      Palpations: Abdomen is soft.      Tenderness: There is no abdominal tenderness. There is no guarding.   Musculoskeletal:         General: Normal range of motion.      Cervical back: Normal range of motion and neck supple.   Skin:     General: Skin is warm and dry.      Findings: No rash.   Neurological:      Mental Status: She is alert. She is disoriented.          Procedures     MDM     History from : Patient and EMS    Limitations to history : Dementia    Chronic Conditions: Atrial fibrillation, dementia    CONSULTS: (Who and What was discussed)  None    Discussion with Other Profesionals : Admitting Team 0501. Spoke with Dr. Solo. Discussed the case. They will admit the patient.    Social Determinants : None    Records Reviewed : None    CC/HPI Summary, DDx, ED Course, and Reassessment:

## 2025-05-05 NOTE — PROGRESS NOTES
4 Eyes Skin Assessment     NAME:  Susanna Arriola  YOB: 1955  MEDICAL RECORD NUMBER:  [id number]    The patient is being assessed for  Admission    I agree that at least one RN has performed a thorough Head to Toe Skin Assessment on the patient. ALL assessment sites listed below have been assessed.      Areas assessed by both nurses:    Head, Face, Ears, Shoulders, Back, Chest, Arms, Elbows, Hands, Sacrum. Buttock, Coccyx, Ischium, and Legs. Feet and Heels        Does the Patient have a Wound? Yes wound(s) were present on assessment. LDA wound assessment was Initiated and completed by RN     Excoriation to sacrum, coccyx, midline healing incision  Deshanu Prevention initiated by RN: Yes  Wound Care Orders initiated by RN: Yes    Pressure Injury (Stage 3,4, Unstageable, DTI, NWPT, and Complex wounds) if present, place Wound referral order by RN under : No    New Ostomies, if present place, Ostomy referral order under : No     Nurse 1 eSignature: Electronically signed by Jimena Bowman RN on 5/5/25 at 6:55 PM EDT    **SHARE this note so that the co-signing nurse can place an eSignature**    Nurse 2 eSignature: Electronically signed by Jyoti Moore RN on 5/5/25 at 6:55 PM EDT

## 2025-05-05 NOTE — CONSULTS
General Surgery Consult    Patient's Name/Date of Birth: Susanna Arriola / 1955    Date: May 5, 2025     Consulting Surgeon: Levi Bunn M.D.    PCP: Lay Hunter MD     Chief Complaint: n/v and abnormal CT abd     HPI:   Susanna Arriola is a 70 y.o. female who presents for  evaluation of n/v for 3 days and abnormal CT abd showing gb stones and possible thickening of the wall.  She denies any abdominal pain but is a poor historian she would worked up in the ED and was found to have a urinary tract infection and some changes in her gallbladder on the CT scan further evaluation with ultrasound showed stones and a possible thickening of the gallbladder wall she complains of no right upper quadrant pain on interview      Past Medical History:   Diagnosis Date    Atrial fibrillation (HCC)     Dementia (HCC)     History of cardioversion 10/2021       Past Surgical History:   Procedure Laterality Date    COLECTOMY N/A 3/16/2025    OPEN SIGMOID COLON RESECTION; COLOSTOMY performed by Levi Bunn MD at [facility] OR       Current Facility-Administered Medications   Medication Dose Route Frequency Provider Last Rate Last Admin    amiodarone (CORDARONE) tablet 200 mg  200 mg Oral Daily Jack Moscoso APRN - CNP        anastrozole (ARIMIDEX) tablet 1 mg  1 mg Oral Daily Jack Moscoso APRN - CNP        [Held by provider] aspirin EC tablet 81 mg  81 mg Oral Daily Jack Moscoso APRN - CNP        donepezil (ARICEPT) tablet 5 mg  5 mg Oral Nightly Jack Moscoso APRN - CNP        [Held by provider] furosemide (LASIX) tablet 40 mg  40 mg Oral Daily Jack Moscoso APRN - CNP        metoprolol tartrate (LOPRESSOR) tablet 12.5 mg  12.5 mg Oral BID Jack Moscoso APRN - CNP        OXcarbazepine (TRILEPTAL) tablet 600 mg  600 mg Oral BID Jack Moscoso APRN - CNP        sertraline (ZOLOFT) tablet 100 mg  100 mg Oral Daily Jack Moscoso APRN - CNP        atorvastatin (LIPITOR) tablet 20 mg  20 mg Oral Daily  Drug use: Never    Sexual activity: Not on file   Other Topics Concern    Not on file   Social History Narrative    Not on file     Social Drivers of Health     Financial Resource Strain: Not on file   Food Insecurity: No Food Insecurity (4/15/2025)    Hunger Vital Sign     Worried About Running Out of Food in the Last Year: Never true     Ran Out of Food in the Last Year: Never true   Transportation Needs: No Transportation Needs (4/15/2025)    PRAPARE - Transportation     Lack of Transportation (Medical): No     Lack of Transportation (Non-Medical): No   Physical Activity: Not on file   Stress: Not on file   Social Connections: Not on file   Intimate Partner Violence: Not on file   Housing Stability: Low Risk  (4/15/2025)    Housing Stability Vital Sign     Unable to Pay for Housing in the Last Year: No     Number of Times Moved in the Last Year: 0     Homeless in the Last Year: No           Review of Systems  Not able to be obtained due to poor historian and baseline dementia    Physical exam:   BP (!) 120/95   Pulse 99   Temp 98.4 °F (36.9 °C)   Resp 14   Wt 80.4 kg (177 lb 4 oz)   SpO2 98%   BMI 29.50 kg/m²   General appearance:  NAD  Head: NCAT, PERRLA, EOMI, red conjunctiva  Neck: supple, no masses  Lungs: CTAB, equal chest rise bilateral  Heart: Reg rate  Abdomen: soft, nontender, nondistended, midline incision healing well with gauze packing ostomy pink and patent with stool output  Skin; no lesions  Gu: no cva tenderness  Extremities: extremities normal, atraumatic, no cyanosis or edema      Radiology:  CT abdomen/pelvis:   1. Status post left colostomy. No evidence of bowel obstruction.  2. Mild colonic wall thickening with associated stranding, which could be  secondary to colitis.  3. Layering sludge/stones within the gallbladder with trace pericholecystic  stranding. If there is clinical concern for acute cholecystitis, consider  right upper quadrant ultrasound.  4. Punctate nonobstructing right

## 2025-05-06 LAB
ALBUMIN SERPL-MCNC: 2.6 G/DL (ref 3.5–5.2)
ALP SERPL-CCNC: 88 U/L (ref 35–104)
ALT SERPL-CCNC: 9 U/L (ref 0–32)
ANION GAP SERPL CALCULATED.3IONS-SCNC: 11 MMOL/L (ref 7–16)
AST SERPL-CCNC: 13 U/L (ref 0–31)
BASOPHILS # BLD: 0.06 K/UL (ref 0–0.2)
BASOPHILS NFR BLD: 0 % (ref 0–2)
BILIRUB DIRECT SERPL-MCNC: <0.2 MG/DL (ref 0–0.3)
BILIRUB INDIRECT SERPL-MCNC: ABNORMAL MG/DL (ref 0–1)
BILIRUB SERPL-MCNC: <0.2 MG/DL (ref 0–1.2)
BUN SERPL-MCNC: 20 MG/DL (ref 6–23)
CALCIUM SERPL-MCNC: 7.9 MG/DL (ref 8.6–10.2)
CHLORIDE SERPL-SCNC: 109 MMOL/L (ref 98–107)
CHOLEST SERPL-MCNC: 109 MG/DL
CO2 SERPL-SCNC: 20 MMOL/L (ref 22–29)
CREAT SERPL-MCNC: 1.2 MG/DL (ref 0.5–1)
EKG ATRIAL RATE: 104 BPM
EKG Q-T INTERVAL: 432 MS
EKG QRS DURATION: 88 MS
EKG QTC CALCULATION (BAZETT): 534 MS
EKG R AXIS: 89 DEGREES
EKG T AXIS: 90 DEGREES
EKG VENTRICULAR RATE: 92 BPM
EOSINOPHIL # BLD: 0.15 K/UL (ref 0.05–0.5)
EOSINOPHILS RELATIVE PERCENT: 1 % (ref 0–6)
ERYTHROCYTE [DISTWIDTH] IN BLOOD BY AUTOMATED COUNT: 15 % (ref 11.5–15)
GFR, ESTIMATED: 47 ML/MIN/1.73M2
GLUCOSE SERPL-MCNC: 93 MG/DL (ref 74–99)
HBA1C MFR BLD: 5.1 % (ref 4–5.6)
HCT VFR BLD AUTO: 30.6 % (ref 34–48)
HDLC SERPL-MCNC: 48 MG/DL
HGB BLD-MCNC: 9.6 G/DL (ref 11.5–15.5)
IMM GRANULOCYTES # BLD AUTO: 0.17 K/UL (ref 0–0.58)
IMM GRANULOCYTES NFR BLD: 1 % (ref 0–5)
INR PPP: >10
LDLC SERPL CALC-MCNC: 49 MG/DL
LYMPHOCYTES NFR BLD: 1.39 K/UL (ref 1.5–4)
LYMPHOCYTES RELATIVE PERCENT: 10 % (ref 20–42)
MAGNESIUM SERPL-MCNC: 1.9 MG/DL (ref 1.6–2.6)
MCH RBC QN AUTO: 26 PG (ref 26–35)
MCHC RBC AUTO-ENTMCNC: 31.4 G/DL (ref 32–34.5)
MCV RBC AUTO: 82.9 FL (ref 80–99.9)
MONOCYTES NFR BLD: 1.08 K/UL (ref 0.1–0.95)
MONOCYTES NFR BLD: 8 % (ref 2–12)
NEUTROPHILS NFR BLD: 80 % (ref 43–80)
NEUTS SEG NFR BLD: 11.26 K/UL (ref 1.8–7.3)
PHOSPHATE SERPL-MCNC: 3.5 MG/DL (ref 2.5–4.5)
PLATELET # BLD AUTO: 323 K/UL (ref 130–450)
PMV BLD AUTO: 10.1 FL (ref 7–12)
POTASSIUM SERPL-SCNC: 3.3 MMOL/L (ref 3.5–5)
PROT SERPL-MCNC: 5.2 G/DL (ref 6.4–8.3)
PROTHROMBIN TIME: >120 SEC (ref 9.3–12.4)
RBC # BLD AUTO: 3.69 M/UL (ref 3.5–5.5)
SODIUM SERPL-SCNC: 140 MMOL/L (ref 132–146)
T4 FREE SERPL-MCNC: 1 NG/DL (ref 0.9–1.7)
TRIGL SERPL-MCNC: 61 MG/DL
TSH SERPL DL<=0.05 MIU/L-ACNC: 1.96 UIU/ML (ref 0.27–4.2)
VLDLC SERPL CALC-MCNC: 12 MG/DL
WBC OTHER # BLD: 14.1 K/UL (ref 4.5–11.5)

## 2025-05-06 PROCEDURE — P9047 ALBUMIN (HUMAN), 25%, 50ML: HCPCS | Performed by: NURSE PRACTITIONER

## 2025-05-06 PROCEDURE — 83036 HEMOGLOBIN GLYCOSYLATED A1C: CPT

## 2025-05-06 PROCEDURE — 2580000003 HC RX 258: Performed by: NURSE PRACTITIONER

## 2025-05-06 PROCEDURE — 85610 PROTHROMBIN TIME: CPT

## 2025-05-06 PROCEDURE — 36415 COLL VENOUS BLD VENIPUNCTURE: CPT

## 2025-05-06 PROCEDURE — 6370000000 HC RX 637 (ALT 250 FOR IP): Performed by: NURSE PRACTITIONER

## 2025-05-06 PROCEDURE — 97165 OT EVAL LOW COMPLEX 30 MIN: CPT

## 2025-05-06 PROCEDURE — 6360000002 HC RX W HCPCS: Performed by: SPECIALIST

## 2025-05-06 PROCEDURE — 83735 ASSAY OF MAGNESIUM: CPT

## 2025-05-06 PROCEDURE — 85025 COMPLETE CBC W/AUTO DIFF WBC: CPT

## 2025-05-06 PROCEDURE — 80061 LIPID PANEL: CPT

## 2025-05-06 PROCEDURE — 97530 THERAPEUTIC ACTIVITIES: CPT | Performed by: PHYSICAL THERAPIST

## 2025-05-06 PROCEDURE — 84100 ASSAY OF PHOSPHORUS: CPT

## 2025-05-06 PROCEDURE — 97535 SELF CARE MNGMENT TRAINING: CPT

## 2025-05-06 PROCEDURE — 1200000000 HC SEMI PRIVATE

## 2025-05-06 PROCEDURE — 93010 ELECTROCARDIOGRAM REPORT: CPT | Performed by: INTERNAL MEDICINE

## 2025-05-06 PROCEDURE — 6360000002 HC RX W HCPCS: Performed by: NURSE PRACTITIONER

## 2025-05-06 PROCEDURE — 84443 ASSAY THYROID STIM HORMONE: CPT

## 2025-05-06 PROCEDURE — 2580000003 HC RX 258: Performed by: SPECIALIST

## 2025-05-06 PROCEDURE — 97161 PT EVAL LOW COMPLEX 20 MIN: CPT | Performed by: PHYSICAL THERAPIST

## 2025-05-06 PROCEDURE — 80053 COMPREHEN METABOLIC PANEL: CPT

## 2025-05-06 PROCEDURE — 2500000003 HC RX 250 WO HCPCS: Performed by: NURSE PRACTITIONER

## 2025-05-06 PROCEDURE — 84439 ASSAY OF FREE THYROXINE: CPT

## 2025-05-06 PROCEDURE — 82248 BILIRUBIN DIRECT: CPT

## 2025-05-06 RX ORDER — MECOBALAMIN 5000 MCG
5 TABLET,DISINTEGRATING ORAL NIGHTLY PRN
Status: DISCONTINUED | OUTPATIENT
Start: 2025-05-06 | End: 2025-05-08 | Stop reason: HOSPADM

## 2025-05-06 RX ORDER — SODIUM CHLORIDE AND POTASSIUM CHLORIDE 300; 900 MG/100ML; MG/100ML
INJECTION, SOLUTION INTRAVENOUS CONTINUOUS
Status: DISCONTINUED | OUTPATIENT
Start: 2025-05-06 | End: 2025-05-06 | Stop reason: CLARIF

## 2025-05-06 RX ORDER — ALBUMIN (HUMAN) 12.5 G/50ML
50 SOLUTION INTRAVENOUS ONCE
Status: COMPLETED | OUTPATIENT
Start: 2025-05-06 | End: 2025-05-06

## 2025-05-06 RX ADMIN — ALBUMIN (HUMAN) 50 G: 0.25 INJECTION, SOLUTION INTRAVENOUS at 10:57

## 2025-05-06 RX ADMIN — ACETAMINOPHEN 650 MG: 325 TABLET ORAL at 04:57

## 2025-05-06 RX ADMIN — ZONISAMIDE 100 MG: 100 CAPSULE ORAL at 21:07

## 2025-05-06 RX ADMIN — ACETAMINOPHEN 650 MG: 325 TABLET ORAL at 14:07

## 2025-05-06 RX ADMIN — ACETAMINOPHEN 650 MG: 325 TABLET ORAL at 21:06

## 2025-05-06 RX ADMIN — POTASSIUM CHLORIDE: 2 INJECTION, SOLUTION, CONCENTRATE INTRAVENOUS at 21:03

## 2025-05-06 RX ADMIN — ZONISAMIDE 100 MG: 100 CAPSULE ORAL at 14:07

## 2025-05-06 RX ADMIN — SERTRALINE 100 MG: 50 TABLET, FILM COATED ORAL at 08:24

## 2025-05-06 RX ADMIN — ANASTROZOLE 1 MG: 1 TABLET ORAL at 08:43

## 2025-05-06 RX ADMIN — METRONIDAZOLE 500 MG: 5 INJECTION, SOLUTION INTRAVENOUS at 14:18

## 2025-05-06 RX ADMIN — ZONISAMIDE 100 MG: 100 CAPSULE ORAL at 08:17

## 2025-05-06 RX ADMIN — ONDANSETRON 4 MG: 4 TABLET, ORALLY DISINTEGRATING ORAL at 14:07

## 2025-05-06 RX ADMIN — ZONISAMIDE 100 MG: 100 CAPSULE ORAL at 17:56

## 2025-05-06 RX ADMIN — METOPROLOL TARTRATE 12.5 MG: 25 TABLET, FILM COATED ORAL at 21:07

## 2025-05-06 RX ADMIN — WATER 2000 MG: 1 INJECTION INTRAMUSCULAR; INTRAVENOUS; SUBCUTANEOUS at 01:02

## 2025-05-06 RX ADMIN — OXCARBAZEPINE 600 MG: 150 TABLET, FILM COATED ORAL at 08:23

## 2025-05-06 RX ADMIN — ATORVASTATIN CALCIUM 20 MG: 20 TABLET, FILM COATED ORAL at 08:21

## 2025-05-06 RX ADMIN — METRONIDAZOLE 500 MG: 5 INJECTION, SOLUTION INTRAVENOUS at 21:12

## 2025-05-06 RX ADMIN — OXCARBAZEPINE 600 MG: 150 TABLET, FILM COATED ORAL at 21:05

## 2025-05-06 RX ADMIN — POTASSIUM CHLORIDE: 2 INJECTION, SOLUTION, CONCENTRATE INTRAVENOUS at 09:30

## 2025-05-06 RX ADMIN — METOPROLOL TARTRATE 12.5 MG: 25 TABLET, FILM COATED ORAL at 08:24

## 2025-05-06 RX ADMIN — DONEPEZIL HYDROCHLORIDE 5 MG: 5 TABLET ORAL at 21:08

## 2025-05-06 RX ADMIN — AMIODARONE HYDROCHLORIDE 200 MG: 200 TABLET ORAL at 08:22

## 2025-05-06 RX ADMIN — METRONIDAZOLE 500 MG: 5 INJECTION, SOLUTION INTRAVENOUS at 05:03

## 2025-05-06 RX ADMIN — PHYTONADIONE 1 MG: 10 INJECTION, EMULSION INTRAMUSCULAR; INTRAVENOUS; SUBCUTANEOUS at 08:15

## 2025-05-06 RX ADMIN — Medication 10 ML: at 08:26

## 2025-05-06 ASSESSMENT — PAIN DESCRIPTION - ORIENTATION
ORIENTATION: LEFT;RIGHT;ANTERIOR
ORIENTATION: LEFT

## 2025-05-06 ASSESSMENT — PAIN SCALES - GENERAL
PAINLEVEL_OUTOF10: 10
PAINLEVEL_OUTOF10: 1
PAINLEVEL_OUTOF10: 3
PAINLEVEL_OUTOF10: 2

## 2025-05-06 ASSESSMENT — PAIN DESCRIPTION - DESCRIPTORS
DESCRIPTORS: ACHING
DESCRIPTORS: ACHING;BURNING;DISCOMFORT
DESCRIPTORS: ACHING

## 2025-05-06 ASSESSMENT — PAIN DESCRIPTION - LOCATION
LOCATION: OTHER (COMMENT)
LOCATION: SHOULDER
LOCATION: ABDOMEN

## 2025-05-06 NOTE — PROGRESS NOTES
Pharmacy Consultation Note  (Warfarin Dosing and Monitoring)    Initial consult date: 5/5/25  Consulting Provider: MARLYN Alfonso-RIVER    Susanna Arriola is a 70 y.o. female for whom pharmacy has been asked to manage warfarin therapy.     Weight:   Wt Readings from Last 1 Encounters:   05/05/25 82.6 kg (182 lb)       TSH:    Lab Results   Component Value Date/Time    TSH 1.96 05/06/2025 07:35 AM       Hepatic Function Panel:                            Lab Results   Component Value Date/Time    ALKPHOS 88 05/06/2025 07:35 AM    ALT 9 05/06/2025 07:35 AM    AST 13 05/06/2025 07:35 AM    BILITOT <0.2 05/06/2025 07:35 AM    BILIDIR <0.2 05/06/2025 07:35 AM    IBILI Can not be calculated 05/06/2025 07:35 AM       Current significant warfarin drug-drug interactions include: metronidazole (incr INR)    Recent Labs     05/04/25  2139 05/05/25  0700 05/06/25  0735   HGB 11.9 10.1* 9.6*    347 323     Date Warfarin Dose INR Heparin or LMWH Comment   5/5 HOLD >10 --    5/6 HOLD >10 -- Vitamin K 1 mg IVPB                          Assessment:  Patient is a 70 y.o. female on warfarin for Atrial Fibrillation.  Patient's home warfarin dosing regimen is warfarin 3 mg daily except for Wednesday, 4 mg on Wednesday.   Goal INR 2 - 3  INR >10 today  Patient given vitamin K 1 mg IVPB on 5/6    Plan:  Holding warfarin  Per consult note, will initiate Lovenox when INR < 2  Will clarify plan for Lovenox dosing and resuming oral anticoagulation  Daily PT/INR until the INR is stable within the therapeutic range  Pharmacist will follow and monitor/adjust dosing as necessary    Thank you for this consult,    Dorinda Aviles, AlmazD, BCPS 5/6/2025 10:50 AM   Ext: [phone]    SJW: [phone]

## 2025-05-06 NOTE — PROGRESS NOTES
Spiritual Health History and Assessment/Progress Note  [facility]    (P) Follow-up,  ,  ,      Name: Susanna Arriola MRN: [id number]    Age: 70 y.o.     Sex: female   Language: English   Lutheran: Muslim   Sepsis (HCC)     Date: 5/6/2025                           Spiritual Assessment began in [facility] MED SURG TELE        Referral/Consult From: (P) Rounding   Encounter Overview/Reason: (P) Follow-up  Service Provided For: (P) Patient    Tala, Belief, Meaning:   Patient identifies as spiritual  Family/Friends No family/friends present      Importance and Influence:  Patient has spiritual/personal beliefs that influence decisions regarding their health  Family/Friends No family/friends present    Community:  Patient feels well-supported. Support system includes: Children  Family/Friends No family/friends present    Assessment and Plan of Care:     Patient Interventions include: Facilitated expression of thoughts and feelings  Family/Friends Interventions include: No family/friends present    Patient Plan of Care: Spiritual Care available upon further referral  Family/Friends Plan of Care: No family/friends present    Electronically signed by Chaplain Chilango on 5/6/2025 at 12:20 PM

## 2025-05-06 NOTE — PROGRESS NOTES
Cardiology  Progress Note      SUBJECTIVE:   No acute distress. Lethargic.     Current Inpatient Medications  Current Facility-Administered Medications: amiodarone (CORDARONE) tablet 200 mg, 200 mg, Oral, Daily  anastrozole (ARIMIDEX) tablet 1 mg, 1 mg, Oral, Daily  [Held by provider] aspirin EC tablet 81 mg, 81 mg, Oral, Daily  donepezil (ARICEPT) tablet 5 mg, 5 mg, Oral, Nightly  [Held by provider] furosemide (LASIX) tablet 40 mg, 40 mg, Oral, Daily  metoprolol tartrate (LOPRESSOR) tablet 12.5 mg, 12.5 mg, Oral, BID  OXcarbazepine (TRILEPTAL) tablet 600 mg, 600 mg, Oral, BID  sertraline (ZOLOFT) tablet 100 mg, 100 mg, Oral, Daily  atorvastatin (LIPITOR) tablet 20 mg, 20 mg, Oral, Daily  warfarin placeholder: dosing by pharmacy, , Oral, RX Placeholder  zonisamide (ZONEGRAN) capsule 100 mg, 100 mg, Oral, 4x Daily  cefTRIAXone (ROCEPHIN) 2,000 mg in sterile water 20 mL IV syringe, 2,000 mg, IntraVENous, Q24H  metroNIDAZOLE (FLAGYL) 500 mg in 0.9% NaCl 100 mL IVPB premix, 500 mg, IntraVENous, Q8H  0.9% NaCl with KCl 40 mEq infusion, , IntraVENous, Continuous  albuterol (PROVENTIL) (2.5 MG/3ML) 0.083% nebulizer solution 2.5 mg, 2.5 mg, Nebulization, Q4H PRN  sodium chloride flush 0.9 % injection 5-40 mL, 5-40 mL, IntraVENous, 2 times per day  sodium chloride flush 0.9 % injection 5-40 mL, 5-40 mL, IntraVENous, PRN  0.9 % sodium chloride infusion, , IntraVENous, PRN  potassium chloride (KLOR-CON M) extended release tablet 40 mEq, 40 mEq, Oral, PRN **OR** potassium bicarb-citric acid (EFFER-K) effervescent tablet 40 mEq, 40 mEq, Oral, PRN **OR** potassium chloride 10 mEq/100 mL IVPB (Peripheral Line), 10 mEq, IntraVENous, PRN  magnesium sulfate 2000 mg in 50 mL IVPB premix, 2,000 mg, IntraVENous, PRN  ondansetron (ZOFRAN-ODT) disintegrating tablet 4 mg, 4 mg, Oral, Q8H PRN **OR** ondansetron (ZOFRAN) injection 4 mg, 4 mg, IntraVENous, Q6H PRN  acetaminophen (TYLENOL) tablet 650 mg, 650 mg, Oral, Q6H PRN **OR**

## 2025-05-06 NOTE — PROGRESS NOTES
OCCUPATIONAL THERAPY INITIAL EVALUATION    [facility]  [address] [facility] [address] [place]        Date:2025                                                  Patient Name: Susanna rAriola    MRN: [id number]    : 1955    Room: [address] [place] [address]      Evaluating OT: Jesusita Sheppard OTR/L;  [id number]     Referring Provider and Specific Provider Orders/Date:      25  OT eval and treat  Start:  25,   End:  25,   ONE TIME,   Standing Count:  1 Occurrences,   R         Jack Moscoso, APRN - CNP      Placement Recommendation: subacute rehab versus home with physical support from family at this current level of assist       Diagnosis:   1. Sepsis, due to unspecified organism, unspecified whether acute organ dysfunction present (HCC)    2. Nausea and vomiting, unspecified vomiting type    3. Calculus of gallbladder with cholecystitis without biliary obstruction, unspecified cholecystitis acuity    4. Urinary tract infection without hematuria, site unspecified    5. Hypokalemia    6. Supratherapeutic INR         Surgery: none      Pertinent Medical History:       Past Medical History:   Diagnosis Date    Atrial fibrillation (HCC)     Dementia (HCC)     History of cardioversion 10/2021         Past Surgical History:   Procedure Laterality Date    COLECTOMY N/A 3/16/2025    OPEN SIGMOID COLON RESECTION; COLOSTOMY performed by Levi Bunn MD at [facility] OR      Precautions:  Fall Risk, droplet precautions, NO BP cuff/IV on R arm, dementia, purewick, ostomy     Assessment of current deficits:     [x] Functional mobility  [x]ADLs  [x] Strength               [x]Cognition    [x] Functional transfers   [x] IADLs         [] Safety Awareness   [x]Endurance    [] Fine Coordination              [x] Balance      [] Vision/perception   []Sensation     []Gross Motor Coordination  [] ROM  [] Delirium                   [] Motor Control     OT PLAN OF CARE   OT

## 2025-05-06 NOTE — FLOWSHEET NOTE
Inpatient Wound Care    Admit Date: 5/4/2025  9:03 PM    Reason for consult:  sacral wound/ostomy    Significant history:  Per H&P:  Susanna Arriola is a 70-year-old female patient who presented to [facility] with 3 days worth of nausea and vomiting.  History was obtained from EMS by the ER team as the patient has significant dementia with very limited communication.  Vital signs on arrival were overly normal.  CBC showed leukocytosis with 16,000 white count and 83% neutrophilic predominance with no anemia but microcytic hypochromic indices.  Lactic was elevated at 3.0 and trended downward to 1.2 following volume resuscitation.  Metabolic panel showed acute renal failure with hypokalemia and mild anion gap acidosis.  INR was obtained as the patient is chronically on warfarin and was quite elevated at 8.7.  Lipase was not elevated however.  Liver enzymes and bilirubin were not elevated.  Troponins were assessed x 2 and were not significantly elevated and with flat trajectory.  EKG was interpreted as nonischemic by the ER team.  Urinalysis suggestive of infection.  CT head was unremarkable.  CT abdomen pelvis was performed and showed status post left colostomy with no evidence of obstruction, mild colonic wall thickening with associated stranding which could be secondary to colitis, layering sludge and stones within the gallbladder with trace pericholecystic stranding and punctate nonobstructive right renal calculus.  Ultrasound of the abdomen was obtained as well which showed cholelithiasis with sludge within the gallbladder wall, no pericholecystic fluid, negative sonographic Brady sign, multiple simple liver cyst.  Case was discussed with ER team.  Plan is for admission, further care and management under the service of Dr. Solo.     Patient is seen and examined at bedside.  Mentation is consistent with most recent baseline examination of the patient from last hospitalization at the end of last month.  When  asked any questions she answers \"I do not know\".  History cannot be obtained from the patient secondary to her advanced dementia.  No family are present to augment history.  Findings:     05/06/25 1614   Skin Integrity Site 2   Skin Integrity Location 2   (blanchable redness)   Location 2 sacrum   Preventative Dressing Yes   Dressing Site Sacrum   Wound Abdomen Mid   No Date First Assessed or Time First Assessed found.   Present on Original Admission: Yes  Primary Wound Type: Surgical  Location: Abdomen  Wound Location Orientation: Mid   Wound Image    Wound Etiology Surgical   Dressing Status New dressing applied   Wound Cleansed Cleansed with saline   Dressing/Treatment Alginate;Foam   Wound Length (cm) 10.5 cm   Wound Width (cm) 2.8 cm   Wound Depth (cm) 0.2 cm   Wound Surface Area (cm^2) 29.4 cm^2   Change in Wound Size % (l*w) 50.84   Wound Volume (cm^3) 5.88 cm^3   Wound Healing % 93   Wound Assessment Pink/red   Drainage Amount Small (< 25%)   Drainage Description Sanguinous   Odor None   Anupama-wound Assessment Intact       **Informed Consent**    The patient has given verbal consent to have photos taken of wound and inserted into their chart as part of their permanent medical record for purposes of documentation, treatment management and/or medical review.   All Images taken on 5/6/25 of patient name: Susanna Arriola were transmitted and stored on secured Epic  Site located within Media Folder Tab by a registered Epic-Ha[employer] Mobile Application Device.        Impression:  Surgical full thickness wound. Retention suture still in place. Dr. Bunn made aware    Plan:   Opticell and foam  TAPS  Float heels  Preventive foam dressing to sacrum  Zinc clear ointment  Will need continued preventive care    Wanda Villatoro RN 5/6/2025 4:20 PM

## 2025-05-06 NOTE — CARE COORDINATION
Ss note: 5/6/20253:40 PM Pt presents from home with spouse son and granddtr. Pt is ACTIVE with [facility] for SN and PT. Therapy has worked with pt, pt was able to ambulate.Pt has a hx of [place] [facility]. SW left vm message for spouse requesting return call for discharge plan as previous notes reflect pt functions better at home and spouse would ideally like to bring her home however will need to await return call from spouse for final plan, SNF vs return home with continued HHC. Anticipate oral abx at discharge. PANCHO Juan

## 2025-05-06 NOTE — FLOWSHEET NOTE
ET Nurse  Admit Date: 5/4/2025  9:03 PM    Reason for consult:  s/p ostomy    Stoma assessment:     05/06/25 1623   Colostomy LLQ   Placement Date/Time: 03/16/25 1740   Present on Admission/Arrival: No  Location: LLQ   Stomal Appliance 1 piece   Stoma  Assessment Flush  (slough present, no red tissue noted)   Peristomal Assessment Denuded   Mucocutaneous Junction Intact   Treatment Barrier ring   Stool Appearance Loose   Stool Color Brown;Green   Stool Amount Small         Plan:  Stoma has slough, mushy to touch, no visible red tissue. Dr. Bunn was made aware- to assess tomorrow.   Supplies placed at bedside.     Wanda Villatoro RN 5/6/2025 4:25 PM

## 2025-05-06 NOTE — PROGRESS NOTES
Surgery Progress Note            Chief complaint:   Chief Complaint   Patient presents with    Nausea     Pt complains of nausea and vomiting for 3 days. Pt A&Ox2 at baseline      Patient Active Problem List   Diagnosis    Atrial fibrillation with RVR (HCC)    Perforated diverticulitis    Shock (HCC)    Sepsis secondary to UTI (HCC)    Sepsis due to urinary tract infection (HCC)       S: Tolerated liquid diet having ostomy function no pain    O:   Vitals:    05/06/25 0716   BP: (!) 80/52   Pulse: (!) 106   Resp: 18   Temp: 99.6 °F (37.6 °C)   SpO2: 93%       Intake/Output Summary (Last 24 hours) at 5/6/2025 0743  Last data filed at 5/6/2025 0600  Gross per 24 hour   Intake --   Output 700 ml   Net -700 ml           Labs:  Lab Results   Component Value Date/Time    WBC 15.1 05/05/2025 07:00 AM    WBC 16.0 05/04/2025 09:39 PM    WBC 5.1 04/20/2025 06:23 AM    HGB 10.1 05/05/2025 07:00 AM    HGB 11.9 05/04/2025 09:39 PM    HGB 9.1 04/20/2025 06:23 AM    HCT 31.2 05/05/2025 07:00 AM    HCT 36.7 05/04/2025 09:39 PM    HCT 29.2 04/20/2025 06:23 AM     Lab Results   Component Value Date    CREATININE 1.2 (H) 05/05/2025    BUN 24 (H) 05/05/2025     05/05/2025    K 3.1 (L) 05/05/2025     05/05/2025    CO2 24 05/05/2025     Lab Results   Component Value Date/Time    LIPASE 27 05/04/2025 09:39 PM         Physical exam:   BP (!) 80/52   Pulse (!) 106   Temp 99.6 °F (37.6 °C) (Axillary)   Resp 18   Ht 1.651 m (5' 5\")   Wt 82.6 kg (182 lb)   SpO2 93%   BMI 30.29 kg/m²   General appearance: NAD  Head: NCAT  Neck: supple, no masses  Lungs: equal chest rise bilateral  Heart: S1S2 present  Abdomen: soft, non-tender, non-distended,  Incisions healing well  Skin; no lesions  Gu: no cva tenderness  Extremities: extremities normal, atraumatic, no cyanosis or edema    A: Ileus with urinary tract infection improving and tolerating diet    P: Advance to regular diet monitor exam continue local wound care and ostomy

## 2025-05-06 NOTE — PROGRESS NOTES
Internal Medicine Progress Note     [facility]     Romeo Drake D.O., ALICIAILisa Solo D.O., JOSÉ.  Barrington Jara D.O.     Jack Moscoso, MSN, APRN-CNP  Rishi Ruiz, MSN, APRN, NP-C  Fernanda Tay, MSN, APRN-CNP  Conchis Bradford, MSN, APRN, NP-C     Primary Care Physician: Lay Hunter MD   Admitting Physician:  Shen Solo DO  Admission date and time: 5/4/2025  9:03 PM    Room:  [address] [place] [address]  Admitting diagnosis: Hypokalemia [E87.6]  Supratherapeutic INR [R79.1]  Sepsis due to urinary tract infection (HCC) [A41.9, N39.0]  Urinary tract infection without hematuria, site unspecified [N39.0]  Calculus of gallbladder with cholecystitis without biliary obstruction, unspecified cholecystitis acuity [K80.10]  Sepsis, due to unspecified organism, unspecified whether acute organ dysfunction present (HCC) [A41.9]  Nausea and vomiting, unspecified vomiting type [R11.2]    Patient Name: Susanna Arriola  MRN: [id number]    Date of Service: 5/6/2025     Subjective:  Susanna is a 70 y.o. female who was seen and examined today,5/6/2025, at the bedside.  She is much more awake and alert today and closer to her baseline mental status.  She describes feeling better overall.  Per nursing, she has had significant relief of constipation and ileus.  Subspecialty recommendations have been noted.  No new symptoms or concerns otherwise.  There are no family present during my examination.    Review of System: Limited in the setting of advanced dementia  Constitutional:   Does not disclose fever or chills, weight loss or gain, fatigue or malaise.  HEENT:   Does not disclose ear pain, sore throat, sinus or eye problems.  Cardiovascular:   Does not disclose any chest pain, irregular heartbeats, or palpitations.   Respiratory:   Does not disclose shortness of breath, coughing, sputum production, hemoptysis, or wheezing.  Gastrointestinal:   Does not disclose abdominal  pain, positive for increased ostomy output.  No further reported nausea or vomiting.  Genitourinary:    Does not disclose any urgency, frequency, hematuria. Voiding  without difficulty.  Extremities:   Does not disclose lower extremity swelling, edema or cyanosis.   Neurology:    Does not disclose any headache or focal neurological deficits, + generalized weakness and acute on chronic memory difficulty with history of dementia   Psch:   Does not disclose being anxious or depressed. + Baseline confusion without agitation.  Musculoskeletal:    Does not disclose myalgias, joint complaints or back pain.   Integumentary:   Does not disclose any rashes, ulcers, or excoriations.  Denies bruising.  Hematologic/Lymphatic:  Does not disclose bruising or bleeding.       Physical Exam:  I/O this shift:  In: 1724.7 [I.V.:1325.2; IV Piggyback:399.5]  Out: -     Intake/Output Summary (Last 24 hours) at 5/6/2025 0853  Last data filed at 5/6/2025 0746  Gross per 24 hour   Intake 1724.67 ml   Output 700 ml   Net 1024.67 ml   I/O last 3 completed shifts:  In: -   Out: 700 [Urine:200; Stool:500]  Patient Vitals for the past 96 hrs (Last 3 readings):   Weight   05/05/25 2017 82.6 kg (182 lb)   05/05/25 0515 80.4 kg (177 lb 4 oz)     Vital Signs:   Blood pressure (!) 80/52, pulse (!) 106, temperature 99.6 °F (37.6 °C), temperature source Axillary, resp. rate 18, height 1.651 m (5' 5\"), weight 82.6 kg (182 lb), SpO2 93%.    General appearance:  Spontaneously awake and alert, pleasant and cooperative.  Less ill and more comfortable in appearance, no apparent distress.  Head:  Normocephalic. No masses, lesions or tenderness.  Eyes:  PERRLA.  EOMI.  Sclera clear.    ENT:  Ears normal. Mucosa slightly dry.  Neck:    Supple. Trachea midline. No thyromegaly. No JVD. No bruits.  Heart:    Regular rate and rhythm, S1 and S2.  Lungs:    Symmetrical.  Mildly diminished bibasilar air exchange, otherwise lungs are clear to auscultation bilaterally.

## 2025-05-06 NOTE — PROGRESS NOTES
Spiritual Health History and Assessment/Progress Note  [facility]    Initial Encounter, Spiritual/Emotional Needs, Rituals, Rites and Sacraments,  Encounter (Fr. Briseno),  ,  ,      Name: Susanna Arriola MRN: [id number]    Age: 70 y.o.     Sex: female   Language: English   Pentecostalism: Anglican   Sepsis (HCC)     Date: 5/6/2025                           Spiritual Assessment began in [address] SURG TELE        Referral/Consult From: Rounding   Encounter Overview/Reason: Initial Encounter, Spiritual/Emotional Needs, Rituals, Rites and Sacraments,  Encounter (Fr. Briseno)  Service Provided For: Patient    Tala, Belief, Meaning:   Patient unable to assess at this time  Family/Friends No family/friends present      Importance and Influence:  Patient unable to assess at this time  Family/Friends No family/friends present    Community:  Patient Other: unknown  Family/Friends No family/friends present    Assessment and Plan of Care:     Patient Interventions include: Facilitated expression of thoughts and feelings and Provided sacramental/Episcopalian ritual  Family/Friends Interventions include: No family/friends present    Patient Plan of Care: No spiritual needs identified for follow-up and Spiritual Care available upon further referral  Family/Friends Plan of Care: No family/friends present    Electronically signed by Chaplain Lynne on 5/6/2025 at 12:06 PM

## 2025-05-06 NOTE — PROGRESS NOTES
Imaging results: US ABDOMEN LIMITED  Result Date: 5/5/2025  EXAMINATION: RIGHT UPPER QUADRANT ULTRASOUND 5/5/2025 12:04 am      1. Cholelithiasis and sludge with gallbladder wall thickening. No pericholecystic fluid. Negative sonographic Brady's sign. 2. Multiple simple liver cysts.     CT ABDOMEN PELVIS W IV CONTRAST Additional Contrast? None  Result Date: 5/4/2025  EXAMINATION: CT OF THE ABDOMEN AND PELVIS WITH CONTRAST 5/4/2025 10:42 pm      1. Status post left colostomy. No evidence of bowel obstruction. 2. Mild colonic wall thickening with associated stranding, which could be secondary to colitis. 3. Layering sludge/stones within the gallbladder with trace pericholecystic stranding. If there is clinical concern for acute cholecystitis, consider right upper quadrant ultrasound. 4. Punctate nonobstructing right renal calculus.     CT HEAD WO CONTRAST  Result Date: 5/4/2025  EXAMINATION: CT OF THE HEAD WITHOUT CONTRAST  5/4/2025 10:42 pm      No acute intracranial abnormality.     XR CHEST PORTABLE  Result Date: 4/19/2025  EXAMINATION: ONE XRAY VIEW OF THE CHEST 4/19/2025 7:40 am      No acute cardiopulmonary disease.     Echo (TTE) limited (PRN contrast/bubble/strain/3D)  Result Date: 4/17/2025    Left Ventricle: Mildly reduced left ventricular systolic function with a visually estimated EF of 40 - 45%.         Social history: Patient lives with spouse son and granddaughter in a ranch home  with Ramp  to enter home.    Limited historian      Equipment owned: Cane and Wheeled Walker,       AM-PAC Basic Mobility        AM-PAC Basic Mobility - Inpatient   How much help is needed turning from your back to your side while in a flat bed without using bedrails?: A Little  How much help is needed moving from lying on your back to sitting on the side of a flat bed without using bedrails?: A Little  How much help is needed moving to and from a bed to a chair?: A Little  How much help is needed standing up from a chair    Edema:  no   Endurance: fair  +    Vitals: room air   Blood Pressure at rest  Blood Pressure during session    Heart Rate at rest 99 Heart Rate during session 103   SPO2 at rest 99%  SPO2 during session 98%     Patient education  Patient educated on role of Physical Therapy, risks of immobility, safety and plan of care,  importance of mobility while in hospital , ankle pumps, quad set and glut set for edema control, blood clot prevention, safety , and positioning for skin integrity and comfort     Patient response to education:   Pt verbalized understanding Pt demonstrated skill Pt requires further education in this area   Yes Partial Yes      Treatment:  Patient practiced and was instructed/facilitated in the following treatment: Patient assisted to edge of bed,   Sat edge of bed 5 minutes with Supervision  to increase dynamic sitting balance and activity tolerance. ambulated to/from bathroom, assisted on/off commode. Returned to bed.      Therapeutic Exercises:  ankle pumps  x 20 reps.       At end of session, patient in bed with alarm call light and phone within reach,  all lines and tubes intact, nursing notified.      Patient would benefit from continued skilled Physical Therapy to improve functional independence and quality of life.         Patient's/ family goals   home    Time in  1457  Time out  1527    Total Treatment Time  10 minutes    Evaluation time includes thorough review of current medical information, gathering information on past medical history/social history and prior level of function, completion of standardized testing/informal observation of tasks, assessment of data, and development of Plan of care and goals.     CPT codes:  Low Complexity PT evaluation (29472)  Therapeutic activities (97232)   10 minutes  1 unit(s)    Maryann Valadez, PT      1.55

## 2025-05-06 NOTE — PLAN OF CARE
Problem: Safety - Adult  Goal: Free from fall injury  Outcome: Progressing     Problem: Pain  Goal: Verbalizes/displays adequate comfort level or baseline comfort level  Outcome: Progressing     Problem: Skin/Tissue Integrity  Goal: Skin integrity remains intact  Description: 1.  Monitor for areas of redness and/or skin breakdown2.  Assess vascular access sites hourly3.  Every 4-6 hours minimum:  Change oxygen saturation probe site4.  Every 4-6 hours:  If on nasal continuous positive airway pressure, respiratory therapy assess nares and determine need for appliance change or resting period  Outcome: Progressing     Problem: ABCDS Injury Assessment  Goal: Absence of physical injury  Outcome: Progressing

## 2025-05-07 LAB
ALBUMIN SERPL-MCNC: 2.9 G/DL (ref 3.5–5.2)
ALP SERPL-CCNC: 66 U/L (ref 35–104)
ALT SERPL-CCNC: 7 U/L (ref 0–32)
ANION GAP SERPL CALCULATED.3IONS-SCNC: 12 MMOL/L (ref 7–16)
AST SERPL-CCNC: 12 U/L (ref 0–31)
BASOPHILS # BLD: 0.06 K/UL (ref 0–0.2)
BASOPHILS NFR BLD: 1 % (ref 0–2)
BILIRUB DIRECT SERPL-MCNC: <0.2 MG/DL (ref 0–0.3)
BILIRUB INDIRECT SERPL-MCNC: ABNORMAL MG/DL (ref 0–1)
BILIRUB SERPL-MCNC: 0.2 MG/DL (ref 0–1.2)
BUN SERPL-MCNC: 14 MG/DL (ref 6–23)
CALCIUM SERPL-MCNC: 7.9 MG/DL (ref 8.6–10.2)
CHLORIDE SERPL-SCNC: 110 MMOL/L (ref 98–107)
CO2 SERPL-SCNC: 20 MMOL/L (ref 22–29)
CREAT SERPL-MCNC: 1 MG/DL (ref 0.5–1)
EOSINOPHIL # BLD: 0.19 K/UL (ref 0.05–0.5)
EOSINOPHILS RELATIVE PERCENT: 2 % (ref 0–6)
ERYTHROCYTE [DISTWIDTH] IN BLOOD BY AUTOMATED COUNT: 15.2 % (ref 11.5–15)
GFR, ESTIMATED: 61 ML/MIN/1.73M2
GLUCOSE SERPL-MCNC: 78 MG/DL (ref 74–99)
HCT VFR BLD AUTO: 29.4 % (ref 34–48)
HGB BLD-MCNC: 9.2 G/DL (ref 11.5–15.5)
IMM GRANULOCYTES # BLD AUTO: 0.19 K/UL (ref 0–0.58)
IMM GRANULOCYTES NFR BLD: 2 % (ref 0–5)
INR PPP: 5.4
LYMPHOCYTES NFR BLD: 1.49 K/UL (ref 1.5–4)
LYMPHOCYTES RELATIVE PERCENT: 15 % (ref 20–42)
MAGNESIUM SERPL-MCNC: 1.9 MG/DL (ref 1.6–2.6)
MCH RBC QN AUTO: 26.1 PG (ref 26–35)
MCHC RBC AUTO-ENTMCNC: 31.3 G/DL (ref 32–34.5)
MCV RBC AUTO: 83.3 FL (ref 80–99.9)
MICROORGANISM SPEC CULT: ABNORMAL
MONOCYTES NFR BLD: 0.85 K/UL (ref 0.1–0.95)
MONOCYTES NFR BLD: 8 % (ref 2–12)
NEUTROPHILS NFR BLD: 73 % (ref 43–80)
NEUTS SEG NFR BLD: 7.45 K/UL (ref 1.8–7.3)
PHOSPHATE SERPL-MCNC: 2.7 MG/DL (ref 2.5–4.5)
PLATELET # BLD AUTO: 304 K/UL (ref 130–450)
PMV BLD AUTO: 10.2 FL (ref 7–12)
POTASSIUM SERPL-SCNC: 3.4 MMOL/L (ref 3.5–5)
PROCALCITONIN SERPL-MCNC: 0.55 NG/ML (ref 0–0.08)
PROT SERPL-MCNC: 5.2 G/DL (ref 6.4–8.3)
PROTHROMBIN TIME: 59.6 SEC (ref 9.3–12.4)
RBC # BLD AUTO: 3.53 M/UL (ref 3.5–5.5)
SERVICE CMNT-IMP: ABNORMAL
SODIUM SERPL-SCNC: 142 MMOL/L (ref 132–146)
SPECIMEN DESCRIPTION: ABNORMAL
WBC OTHER # BLD: 10.2 K/UL (ref 4.5–11.5)

## 2025-05-07 PROCEDURE — 6370000000 HC RX 637 (ALT 250 FOR IP): Performed by: INTERNAL MEDICINE

## 2025-05-07 PROCEDURE — 85025 COMPLETE CBC W/AUTO DIFF WBC: CPT

## 2025-05-07 PROCEDURE — 83735 ASSAY OF MAGNESIUM: CPT

## 2025-05-07 PROCEDURE — 97530 THERAPEUTIC ACTIVITIES: CPT

## 2025-05-07 PROCEDURE — 36415 COLL VENOUS BLD VENIPUNCTURE: CPT

## 2025-05-07 PROCEDURE — 6370000000 HC RX 637 (ALT 250 FOR IP): Performed by: NURSE PRACTITIONER

## 2025-05-07 PROCEDURE — 84100 ASSAY OF PHOSPHORUS: CPT

## 2025-05-07 PROCEDURE — 2500000003 HC RX 250 WO HCPCS: Performed by: NURSE PRACTITIONER

## 2025-05-07 PROCEDURE — 80053 COMPREHEN METABOLIC PANEL: CPT

## 2025-05-07 PROCEDURE — 6360000002 HC RX W HCPCS: Performed by: NURSE PRACTITIONER

## 2025-05-07 PROCEDURE — 1200000000 HC SEMI PRIVATE

## 2025-05-07 PROCEDURE — 2580000003 HC RX 258: Performed by: NURSE PRACTITIONER

## 2025-05-07 PROCEDURE — 82248 BILIRUBIN DIRECT: CPT

## 2025-05-07 PROCEDURE — 97110 THERAPEUTIC EXERCISES: CPT

## 2025-05-07 PROCEDURE — 84145 PROCALCITONIN (PCT): CPT

## 2025-05-07 PROCEDURE — 85610 PROTHROMBIN TIME: CPT

## 2025-05-07 RX ORDER — LEVOFLOXACIN 750 MG/1
750 TABLET, FILM COATED ORAL EVERY 24 HOURS
Status: DISCONTINUED | OUTPATIENT
Start: 2025-05-07 | End: 2025-05-07

## 2025-05-07 RX ORDER — MIDODRINE HYDROCHLORIDE 5 MG/1
5 TABLET ORAL
Status: DISCONTINUED | OUTPATIENT
Start: 2025-05-07 | End: 2025-05-08 | Stop reason: HOSPADM

## 2025-05-07 RX ORDER — LEVOFLOXACIN 500 MG/1
500 TABLET, FILM COATED ORAL EVERY 24 HOURS
Status: DISCONTINUED | OUTPATIENT
Start: 2025-05-07 | End: 2025-05-08 | Stop reason: HOSPADM

## 2025-05-07 RX ADMIN — METRONIDAZOLE 500 MG: 5 INJECTION, SOLUTION INTRAVENOUS at 05:12

## 2025-05-07 RX ADMIN — ATORVASTATIN CALCIUM 20 MG: 20 TABLET, FILM COATED ORAL at 08:57

## 2025-05-07 RX ADMIN — SERTRALINE 100 MG: 50 TABLET, FILM COATED ORAL at 08:57

## 2025-05-07 RX ADMIN — ONDANSETRON 4 MG: 2 INJECTION, SOLUTION INTRAMUSCULAR; INTRAVENOUS at 05:16

## 2025-05-07 RX ADMIN — DONEPEZIL HYDROCHLORIDE 5 MG: 5 TABLET ORAL at 21:41

## 2025-05-07 RX ADMIN — WATER 2000 MG: 1 INJECTION INTRAMUSCULAR; INTRAVENOUS; SUBCUTANEOUS at 02:18

## 2025-05-07 RX ADMIN — METOPROLOL TARTRATE 12.5 MG: 25 TABLET, FILM COATED ORAL at 08:58

## 2025-05-07 RX ADMIN — AMIODARONE HYDROCHLORIDE 200 MG: 200 TABLET ORAL at 08:57

## 2025-05-07 RX ADMIN — ZONISAMIDE 100 MG: 100 CAPSULE ORAL at 08:58

## 2025-05-07 RX ADMIN — Medication 5 MG: at 23:26

## 2025-05-07 RX ADMIN — MIDODRINE HYDROCHLORIDE 5 MG: 5 TABLET ORAL at 10:18

## 2025-05-07 RX ADMIN — OXCARBAZEPINE 600 MG: 150 TABLET, FILM COATED ORAL at 21:41

## 2025-05-07 RX ADMIN — ANASTROZOLE 1 MG: 1 TABLET ORAL at 08:57

## 2025-05-07 RX ADMIN — ZONISAMIDE 100 MG: 100 CAPSULE ORAL at 21:40

## 2025-05-07 RX ADMIN — Medication 5 MG: at 00:07

## 2025-05-07 RX ADMIN — POTASSIUM CHLORIDE: 2 INJECTION, SOLUTION, CONCENTRATE INTRAVENOUS at 14:40

## 2025-05-07 RX ADMIN — PHYTONADIONE 1 MG: 10 INJECTION, EMULSION INTRAMUSCULAR; INTRAVENOUS; SUBCUTANEOUS at 10:18

## 2025-05-07 RX ADMIN — MIDODRINE HYDROCHLORIDE 5 MG: 5 TABLET ORAL at 16:58

## 2025-05-07 RX ADMIN — METOPROLOL TARTRATE 12.5 MG: 25 TABLET, FILM COATED ORAL at 21:40

## 2025-05-07 RX ADMIN — ZONISAMIDE 100 MG: 100 CAPSULE ORAL at 14:07

## 2025-05-07 RX ADMIN — OXCARBAZEPINE 600 MG: 150 TABLET, FILM COATED ORAL at 08:57

## 2025-05-07 RX ADMIN — LEVOFLOXACIN 500 MG: 500 TABLET, FILM COATED ORAL at 10:18

## 2025-05-07 RX ADMIN — ZONISAMIDE 100 MG: 100 CAPSULE ORAL at 16:58

## 2025-05-07 NOTE — PLAN OF CARE
Problem: Safety - Adult  Goal: Free from fall injury  5/7/2025 0942 by Marely Moscoso RN  Outcome: Progressing  5/7/2025 0052 by Renae Wolff RN  Outcome: Progressing     Problem: Pain  Goal: Verbalizes/displays adequate comfort level or baseline comfort level  5/7/2025 0942 by Marely Moscoso RN  Outcome: Progressing  5/7/2025 0052 by Renae Wolff RN  Outcome: Progressing     Problem: Skin/Tissue Integrity  Goal: Skin integrity remains intact  Description: 1.  Monitor for areas of redness and/or skin breakdown2.  Assess vascular access sites hourly3.  Every 4-6 hours minimum:  Change oxygen saturation probe site4.  Every 4-6 hours:  If on nasal continuous positive airway pressure, respiratory therapy assess nares and determine need for appliance change or resting period  5/7/2025 0942 by Marely Moscoso RN  Outcome: Progressing  5/7/2025 0052 by Renae Wolff RN  Outcome: Progressing     Problem: ABCDS Injury Assessment  Goal: Absence of physical injury  5/7/2025 0942 by Marely Moscoso RN  Outcome: Progressing  5/7/2025 0052 by Renae Wolff RN  Outcome: Progressing

## 2025-05-07 NOTE — PROGRESS NOTES
I came in to evaluate the ostomy if there was some concern of nonviability upon exploring it and removing some of the slough there was good pink healthy tissue underneath without any sign of obstruction or dehiscence appears okay at this time no intervention planned  Levi Bunn MD

## 2025-05-07 NOTE — PROGRESS NOTES
Physical Therapy Treatment Note/Plan of Care    Room #:  0435/0435-01  Patient Name: Susanna Arriola  YOB: 1955  MRN: [id number]    Date of Service: 5/7/2025     Tentative placement recommendation: Home with Home Health Physical Therapy and 24/7 supervision/assist versus subacute if goals not met  Equipment recommendation: To be determined      Evaluating Physical Therapist: Maryann Valadez, PT #[id number]      Specific Provider Orders/Date/Referring Provider :  05/06/25 1500    PT eval and treat  Start:  05/06/25 1500,   End:  05/06/25 1500,   ONE TIME,   Standing Count:  1 Occurrences,   R       Shen Solo DO    Admitting Diagnosis:   Hypokalemia [E87.6]  Supratherapeutic INR [R79.1]  Sepsis due to urinary tract infection (HCC) [A41.9, N39.0]  Urinary tract infection without hematuria, site unspecified [N39.0]  Calculus of gallbladder with cholecystitis without biliary obstruction, unspecified cholecystitis acuity [K80.10]  Sepsis, due to unspecified organism, unspecified whether acute organ dysfunction present (HCC) [A41.9]  Nausea and vomiting, unspecified vomiting type [R11.2]       Surgery: none  Visit Diagnoses         Codes      Nausea and vomiting, unspecified vomiting type     R11.2      Calculus of gallbladder with cholecystitis without biliary obstruction, unspecified cholecystitis acuity     K80.10      Urinary tract infection without hematuria, site unspecified     N39.0      Hypokalemia     E87.6      Supratherapeutic INR     R79.1            Patient Active Problem List   Diagnosis    Atrial fibrillation with RVR (HCC)    Perforated diverticulitis    Shock (HCC)    Sepsis secondary to UTI (HCC)    Sepsis due to urinary tract infection (HCC)        ASSESSMENT of Current Deficits Patient exhibits decreased strength, balance, and endurance impairing functional mobility, transfers, gait , gait distance, and tolerance to activity. Patient oriented x 1, confused throughout tx session, able to

## 2025-05-07 NOTE — PROGRESS NOTES
OCCUPATIONAL THERAPY TREATMENT NOTE   [facility]  [address] [place] [place]        Date:2025                                                  Patient Name: Susanna Arriola    MRN: [id number]    : 1955    Room: [address] [place] [address]      Evaluating OT: Jesusita Sheppard OTR/L;  [id number]     Referring Provider and Specific Provider Orders/Date:      25  OT eval and treat  Start:  25,   End:  25,   ONE TIME,   Standing Count:  1 Occurrences,   R         Jack Moscoso, APRN - CNP      Placement Recommendation: subacute rehab versus home with physical support from family at this current level of assist       Diagnosis:   1. Sepsis, due to unspecified organism, unspecified whether acute organ dysfunction present (HCC)    2. Nausea and vomiting, unspecified vomiting type    3. Calculus of gallbladder with cholecystitis without biliary obstruction, unspecified cholecystitis acuity    4. Urinary tract infection without hematuria, site unspecified    5. Hypokalemia    6. Supratherapeutic INR         Surgery: none      Pertinent Medical History:       Past Medical History:   Diagnosis Date    Atrial fibrillation (HCC)     Dementia (HCC)     History of cardioversion 10/2021         Past Surgical History:   Procedure Laterality Date    COLECTOMY N/A 3/16/2025    OPEN SIGMOID COLON RESECTION; COLOSTOMY performed by Levi Bunn MD at [facility] OR      Precautions:  Fall Risk, droplet precautions, NO BP cuff/IV on R arm, dementia, purewick, ostomy     Assessment of current deficits:     [x] Functional mobility  [x]ADLs  [x] Strength               [x]Cognition    [x] Functional transfers   [x] IADLs         [] Safety Awareness   [x]Endurance    [] Fine Coordination              [x] Balance      [] Vision/perception   []Sensation     []Gross Motor Coordination  [] ROM  [] Delirium                   [] Motor Control     OT PLAN OF CARE   OT POC  standing at sink S  Bed level Independent   UB Dressing Minimal assist Min A Independent   LB Dressing Moderate assist  For undergarment management over hips in standing LB dressing task to don/doff socks with total A  Independent   Bathing Moderate assist Mod a  Independent   Toileting Moderate assist Use of bed pan with staff   Rolling side to side to assist  Independent   Bed Mobility  Supine to sit: Minimal assist  Sit to supine: Minimal assist  Rolling: N/T  Bed mobility tasks with education and tactile cues for UE placement /tech /rolling side to side/scooting up in bed with min A Supine to sit: Supervision  Sit to supine: Supervision  Rolling: Supervision    Functional Transfers Minimal Assist from edge of bed  Moderate assist from toilet  Transfer training with verbal cues for hand placement throughout session to improve safety.  NT    Supervision   Functional Mobility Minimal assist with wheeled walker to bathroom and back to improve balance, verbal cues for walker sequence and safety.  NT  Supervision   Balance Sitting:     Static: fair     Dynamic: fair   Standing: fair  with wheeled walker Sit   Fair  Standing   Fair  Sitting:     Static: good     Dynamic: good   Standing: good  with wheeled walker   Activity Tolerance fair  fair good    Visual/  Perceptual Glasses: yes    WFL            Hand Dominance: Right     AROM (PROM) Strength Additional Info:  Goal:   RUE  WFL 4/5 good  and wfl FMC/dexterity noted during ADL tasks   Improve overall RUE strength  for participation in functional tasks   LUE WFL 4/5 good  and wfl FMC/dexterity noted during ADL tasks   Improve overall LUE strength  for participation in functional tasks      Vitals:  SpO2: 98%  HR: 85 bpm     Hearing: WFL   Sensation:  No c/o numbness or tingling  Tone: WFL   Edema: no    Comments: Collaboration with nursing pt ok for therapy/ pt treated sup due to ^ INR /bed in low position /bed alarm on/ call light in reach    Rehab

## 2025-05-07 NOTE — CARE COORDINATION
Ss note:5/7/2025 10:06 AM Contact isolation rhinovirus. On room air. Spoke with pts spouse Wilfredo this am, reviewed PT note and that pt is walking. Spouse reports that the plan is for pt to return home with family and continue [facility], will need MANUEL. Family denies need for SNF at discharge. Anticipate oral abx and and discharge home tomorrow per physician. Spouse relays they can transport pt home, he will have help from his son. Plan is home with family and [facility]. PANCHO Juan

## 2025-05-07 NOTE — PROGRESS NOTES
Internal Medicine Progress Note     [facility]     Romeo Drake D.O., ALICIAILisa Solo D.O., EDITH Jara D.O.     Jack Moscoso, MSN, APRN-CNP  Rishi Ruiz, MSN, APRN, NP-C  Fernanda Tay, MSN, APRN-CNP  Conchis Bradford, MSN, APRN, NP-C     Primary Care Physician: Lay Hunter MD   Admitting Physician:  Shen Solo DO  Admission date and time: 5/4/2025  9:03 PM    Room:  [address] [place] [address]  Admitting diagnosis: Hypokalemia [E87.6]  Supratherapeutic INR [R79.1]  Sepsis due to urinary tract infection (HCC) [A41.9, N39.0]  Urinary tract infection without hematuria, site unspecified [N39.0]  Calculus of gallbladder with cholecystitis without biliary obstruction, unspecified cholecystitis acuity [K80.10]  Sepsis, due to unspecified organism, unspecified whether acute organ dysfunction present (HCC) [A41.9]  Nausea and vomiting, unspecified vomiting type [R11.2]    Patient Name: Susanna Arriola  MRN: [id number]    Date of Service: 5/7/2025     Subjective:  Susanna is a 70 y.o. female who was seen and examined today,5/7/2025, at the bedside.  She remains awake and alert today and appears to be at her baseline mental status.  She describes feeling better overall.  Colostomy site was reevaluated by Dr. Bunn, patient has had good output.  No abdominal complaints.  We have discussed transition to oral antibiotics.  She is requesting to go home.  No new symptoms or concerns otherwise.  There are no family present during my examination.    Review of System: Limited in the setting of advanced dementia  Constitutional:   Does not disclose fever or chills, weight loss or gain, fatigue or malaise.  HEENT:   Does not disclose ear pain, sore throat, sinus or eye problems.  Cardiovascular:   Does not disclose any chest pain, irregular heartbeats, or palpitations.   Respiratory:   Does not disclose shortness of breath, coughing, sputum production,

## 2025-05-07 NOTE — PROGRESS NOTES
Pharmacy Consultation Note  (Warfarin Dosing and Monitoring)    Initial consult date: 5/5/25  Consulting Provider: MARLYN Alfonso-RIVER    Susanna Arriola is a 70 y.o. female for whom pharmacy has been asked to manage warfarin therapy.     Weight:   Wt Readings from Last 1 Encounters:   05/05/25 82.6 kg (182 lb)       TSH:    Lab Results   Component Value Date/Time    TSH 1.96 05/06/2025 07:35 AM       Hepatic Function Panel:                            Lab Results   Component Value Date/Time    ALKPHOS 66 05/07/2025 04:50 AM    ALT 7 05/07/2025 04:50 AM    AST 12 05/07/2025 04:50 AM    BILITOT 0.2 05/07/2025 04:50 AM    BILIDIR <0.2 05/07/2025 04:50 AM    IBILI Can not be calculated 05/07/2025 04:50 AM       Current significant warfarin drug-drug interactions include: metronidazole (incr INR)    Recent Labs     05/05/25  0700 05/06/25  0735 05/07/25  0450   HGB 10.1* 9.6* 9.2*    [phone]     Date Warfarin Dose INR Heparin or LMWH Comment   5/5 HOLD >10 --    5/6 HOLD >10 -- Vitamin K 1 mg IVPB   5/7 HOLD 5.4 --                    Assessment:  Patient is a 70 y.o. female on warfarin for Atrial Fibrillation.  Patient's home warfarin dosing regimen is warfarin 3 mg daily except for Wednesday, 4 mg on Wednesday.   Goal INR 2 - 3  INR 5.4 today  Patient given vitamin K 1 mg IVPB on 5/6    Plan:  Holding warfarin  Per consult note, will initiate Lovenox when INR < 2  Will clarify plan for Lovenox dosing and resuming oral anticoagulation  Daily PT/INR until the INR is stable within the therapeutic range  Pharmacist will follow and monitor/adjust dosing as necessary    Thank you for this consult,    Dorinda Aviles, AlmazD, BCPS 5/7/2025 8:20 AM   Ext: [phone]    W: [phone]

## 2025-05-07 NOTE — PLAN OF CARE
Problem: Safety - Adult  Goal: Free from fall injury  5/7/2025 0052 by Renae Wolff RN  Outcome: Progressing  5/6/2025 1255 by Jeniffer Coronado RN  Outcome: Progressing     Problem: Pain  Goal: Verbalizes/displays adequate comfort level or baseline comfort level  5/7/2025 0052 by Renae Wolff RN  Outcome: Progressing  5/6/2025 1255 by Jeniffer Coronado RN  Outcome: Progressing     Problem: Skin/Tissue Integrity  Goal: Skin integrity remains intact  Description: 1.  Monitor for areas of redness and/or skin breakdown2.  Assess vascular access sites hourly3.  Every 4-6 hours minimum:  Change oxygen saturation probe site4.  Every 4-6 hours:  If on nasal continuous positive airway pressure, respiratory therapy assess nares and determine need for appliance change or resting period  5/7/2025 0052 by Renae Wolff RN  Outcome: Progressing  5/6/2025 1255 by Jeniffer Coronado RN  Outcome: Progressing     Problem: ABCDS Injury Assessment  Goal: Absence of physical injury  5/7/2025 0052 by Renae Wolff RN  Outcome: Progressing  5/6/2025 1255 by Jeniffer Coronado RN  Outcome: Progressing

## 2025-05-07 NOTE — PROGRESS NOTES
Cardiology  Progress Note      SUBJECTIVE:  No chest pain. No dyspnea.  No acute distress.     Current Inpatient Medications  Current Facility-Administered Medications: potassium chloride 40 mEq in sodium chloride 0.9 % 1,000 mL infusion, , IntraVENous, Continuous  melatonin disintegrating tablet 5 mg, 5 mg, Oral, Nightly PRN  amiodarone (CORDARONE) tablet 200 mg, 200 mg, Oral, Daily  anastrozole (ARIMIDEX) tablet 1 mg, 1 mg, Oral, Daily  [Held by provider] aspirin EC tablet 81 mg, 81 mg, Oral, Daily  donepezil (ARICEPT) tablet 5 mg, 5 mg, Oral, Nightly  [Held by provider] furosemide (LASIX) tablet 40 mg, 40 mg, Oral, Daily  metoprolol tartrate (LOPRESSOR) tablet 12.5 mg, 12.5 mg, Oral, BID  OXcarbazepine (TRILEPTAL) tablet 600 mg, 600 mg, Oral, BID  sertraline (ZOLOFT) tablet 100 mg, 100 mg, Oral, Daily  atorvastatin (LIPITOR) tablet 20 mg, 20 mg, Oral, Daily  warfarin placeholder: dosing by pharmacy, , Oral, RX Placeholder  zonisamide (ZONEGRAN) capsule 100 mg, 100 mg, Oral, 4x Daily  cefTRIAXone (ROCEPHIN) 2,000 mg in sterile water 20 mL IV syringe, 2,000 mg, IntraVENous, Q24H  metroNIDAZOLE (FLAGYL) 500 mg in 0.9% NaCl 100 mL IVPB premix, 500 mg, IntraVENous, Q8H  albuterol (PROVENTIL) (2.5 MG/3ML) 0.083% nebulizer solution 2.5 mg, 2.5 mg, Nebulization, Q4H PRN  sodium chloride flush 0.9 % injection 5-40 mL, 5-40 mL, IntraVENous, 2 times per day  sodium chloride flush 0.9 % injection 5-40 mL, 5-40 mL, IntraVENous, PRN  0.9 % sodium chloride infusion, , IntraVENous, PRN  potassium chloride (KLOR-CON M) extended release tablet 40 mEq, 40 mEq, Oral, PRN **OR** potassium bicarb-citric acid (EFFER-K) effervescent tablet 40 mEq, 40 mEq, Oral, PRN **OR** potassium chloride 10 mEq/100 mL IVPB (Peripheral Line), 10 mEq, IntraVENous, PRN  magnesium sulfate 2000 mg in 50 mL IVPB premix, 2,000 mg, IntraVENous, PRN  ondansetron (ZOFRAN-ODT) disintegrating tablet 4 mg, 4 mg, Oral, Q8H PRN **OR** ondansetron (ZOFRAN) injection

## 2025-05-08 VITALS
RESPIRATION RATE: 20 BRPM | DIASTOLIC BLOOD PRESSURE: 81 MMHG | HEIGHT: 65 IN | TEMPERATURE: 98 F | OXYGEN SATURATION: 94 % | HEART RATE: 97 BPM | BODY MASS INDEX: 30.32 KG/M2 | WEIGHT: 182 LBS | SYSTOLIC BLOOD PRESSURE: 99 MMHG

## 2025-05-08 LAB
ALBUMIN SERPL-MCNC: 3 G/DL (ref 3.5–5.2)
ALP SERPL-CCNC: 76 U/L (ref 35–104)
ALT SERPL-CCNC: 8 U/L (ref 0–32)
ANION GAP SERPL CALCULATED.3IONS-SCNC: 10 MMOL/L (ref 7–16)
AST SERPL-CCNC: 12 U/L (ref 0–31)
BASOPHILS # BLD: 0.05 K/UL (ref 0–0.2)
BASOPHILS NFR BLD: 0 % (ref 0–2)
BILIRUB DIRECT SERPL-MCNC: <0.2 MG/DL (ref 0–0.3)
BILIRUB INDIRECT SERPL-MCNC: ABNORMAL MG/DL (ref 0–1)
BILIRUB SERPL-MCNC: 0.3 MG/DL (ref 0–1.2)
BUN SERPL-MCNC: 9 MG/DL (ref 6–23)
CALCIUM SERPL-MCNC: 8.2 MG/DL (ref 8.6–10.2)
CHLORIDE SERPL-SCNC: 111 MMOL/L (ref 98–107)
CO2 SERPL-SCNC: 19 MMOL/L (ref 22–29)
CREAT SERPL-MCNC: 1 MG/DL (ref 0.5–1)
EOSINOPHIL # BLD: 0.28 K/UL (ref 0.05–0.5)
EOSINOPHILS RELATIVE PERCENT: 2 % (ref 0–6)
ERYTHROCYTE [DISTWIDTH] IN BLOOD BY AUTOMATED COUNT: 15.5 % (ref 11.5–15)
GFR, ESTIMATED: 59 ML/MIN/1.73M2
GLUCOSE SERPL-MCNC: 103 MG/DL (ref 74–99)
HCT VFR BLD AUTO: 32.1 % (ref 34–48)
HGB BLD-MCNC: 9.8 G/DL (ref 11.5–15.5)
IMM GRANULOCYTES # BLD AUTO: 0.19 K/UL (ref 0–0.58)
IMM GRANULOCYTES NFR BLD: 2 % (ref 0–5)
INR PPP: 1.9
LYMPHOCYTES NFR BLD: 1.3 K/UL (ref 1.5–4)
LYMPHOCYTES RELATIVE PERCENT: 11 % (ref 20–42)
MAGNESIUM SERPL-MCNC: 1.9 MG/DL (ref 1.6–2.6)
MCH RBC QN AUTO: 25.5 PG (ref 26–35)
MCHC RBC AUTO-ENTMCNC: 30.5 G/DL (ref 32–34.5)
MCV RBC AUTO: 83.4 FL (ref 80–99.9)
MONOCYTES NFR BLD: 0.83 K/UL (ref 0.1–0.95)
MONOCYTES NFR BLD: 7 % (ref 2–12)
NEUTROPHILS NFR BLD: 78 % (ref 43–80)
NEUTS SEG NFR BLD: 9.21 K/UL (ref 1.8–7.3)
PHOSPHATE SERPL-MCNC: 2.2 MG/DL (ref 2.5–4.5)
PLATELET # BLD AUTO: 363 K/UL (ref 130–450)
PMV BLD AUTO: 9.8 FL (ref 7–12)
POTASSIUM SERPL-SCNC: 4 MMOL/L (ref 3.5–5)
PROT SERPL-MCNC: 5.5 G/DL (ref 6.4–8.3)
PROTHROMBIN TIME: 20.3 SEC (ref 9.3–12.4)
RBC # BLD AUTO: 3.85 M/UL (ref 3.5–5.5)
SODIUM SERPL-SCNC: 140 MMOL/L (ref 132–146)
WBC OTHER # BLD: 11.9 K/UL (ref 4.5–11.5)

## 2025-05-08 PROCEDURE — 84100 ASSAY OF PHOSPHORUS: CPT

## 2025-05-08 PROCEDURE — 85610 PROTHROMBIN TIME: CPT

## 2025-05-08 PROCEDURE — 83735 ASSAY OF MAGNESIUM: CPT

## 2025-05-08 PROCEDURE — 80053 COMPREHEN METABOLIC PANEL: CPT

## 2025-05-08 PROCEDURE — 6360000002 HC RX W HCPCS: Performed by: NURSE PRACTITIONER

## 2025-05-08 PROCEDURE — 6370000000 HC RX 637 (ALT 250 FOR IP): Performed by: NURSE PRACTITIONER

## 2025-05-08 PROCEDURE — 85025 COMPLETE CBC W/AUTO DIFF WBC: CPT

## 2025-05-08 PROCEDURE — 94667 MNPJ CHEST WALL 1ST: CPT

## 2025-05-08 PROCEDURE — 2500000003 HC RX 250 WO HCPCS: Performed by: NURSE PRACTITIONER

## 2025-05-08 PROCEDURE — 2580000003 HC RX 258: Performed by: NURSE PRACTITIONER

## 2025-05-08 PROCEDURE — 36415 COLL VENOUS BLD VENIPUNCTURE: CPT

## 2025-05-08 PROCEDURE — 82248 BILIRUBIN DIRECT: CPT

## 2025-05-08 RX ORDER — ZONISAMIDE 100 MG/1
200 CAPSULE ORAL 2 TIMES DAILY
COMMUNITY
Start: 2025-05-08

## 2025-05-08 RX ORDER — WARFARIN SODIUM 2 MG/1
2 TABLET ORAL DAILY
Qty: 30 TABLET | Refills: 0 | Status: SHIPPED | OUTPATIENT
Start: 2025-05-08 | End: 2025-05-08

## 2025-05-08 RX ORDER — LEVOFLOXACIN 500 MG/1
500 TABLET, FILM COATED ORAL EVERY 24 HOURS
Qty: 2 TABLET | Refills: 0 | Status: SHIPPED | OUTPATIENT
Start: 2025-05-09 | End: 2025-05-11

## 2025-05-08 RX ORDER — FUROSEMIDE 40 MG/1
40 TABLET ORAL
Status: ON HOLD | COMMUNITY
Start: 2025-05-08 | End: 2025-06-09

## 2025-05-08 RX ORDER — WARFARIN SODIUM 2 MG/1
2 TABLET ORAL DAILY
Status: ON HOLD | COMMUNITY
Start: 2025-05-08 | End: 2025-06-09 | Stop reason: HOSPADM

## 2025-05-08 RX ORDER — AMIODARONE HYDROCHLORIDE 200 MG/1
200 TABLET ORAL 2 TIMES DAILY
Status: ON HOLD | COMMUNITY
Start: 2025-05-08 | End: 2025-06-09 | Stop reason: HOSPADM

## 2025-05-08 RX ORDER — WARFARIN SODIUM 2 MG/1
2 TABLET ORAL
Status: DISCONTINUED | OUTPATIENT
Start: 2025-05-08 | End: 2025-05-08 | Stop reason: HOSPADM

## 2025-05-08 RX ORDER — MIDODRINE HYDROCHLORIDE 5 MG/1
5 TABLET ORAL
Qty: 90 TABLET | Refills: 0 | Status: SHIPPED | OUTPATIENT
Start: 2025-05-08

## 2025-05-08 RX ADMIN — ZONISAMIDE 100 MG: 100 CAPSULE ORAL at 10:04

## 2025-05-08 RX ADMIN — SERTRALINE 100 MG: 50 TABLET, FILM COATED ORAL at 10:08

## 2025-05-08 RX ADMIN — Medication 10 ML: at 10:06

## 2025-05-08 RX ADMIN — AMIODARONE HYDROCHLORIDE 200 MG: 200 TABLET ORAL at 10:05

## 2025-05-08 RX ADMIN — MIDODRINE HYDROCHLORIDE 5 MG: 5 TABLET ORAL at 12:37

## 2025-05-08 RX ADMIN — ATORVASTATIN CALCIUM 20 MG: 20 TABLET, FILM COATED ORAL at 10:05

## 2025-05-08 RX ADMIN — POTASSIUM CHLORIDE: 2 INJECTION, SOLUTION, CONCENTRATE INTRAVENOUS at 12:13

## 2025-05-08 RX ADMIN — ANASTROZOLE 1 MG: 1 TABLET ORAL at 10:05

## 2025-05-08 RX ADMIN — MIDODRINE HYDROCHLORIDE 5 MG: 5 TABLET ORAL at 10:04

## 2025-05-08 RX ADMIN — OXCARBAZEPINE 600 MG: 150 TABLET, FILM COATED ORAL at 10:05

## 2025-05-08 RX ADMIN — METOPROLOL TARTRATE 12.5 MG: 25 TABLET, FILM COATED ORAL at 10:05

## 2025-05-08 RX ADMIN — ZONISAMIDE 100 MG: 100 CAPSULE ORAL at 12:37

## 2025-05-08 RX ADMIN — LEVOFLOXACIN 500 MG: 500 TABLET, FILM COATED ORAL at 10:04

## 2025-05-08 NOTE — CARE COORDINATION
Ss note: 5/8/2025 1:20 PM Plan is for pt to return home with spouse and son. Durant C notified of resume orders, will be on oral abx. Family to transport home. PANCHO Juan

## 2025-05-08 NOTE — PROGRESS NOTES
CLINICAL PHARMACY NOTE: MEDS TO BEDS    Total # of Prescriptions Filled: 3   The following medications were delivered to the patient:  Levofloxacin 500 mg  Midodrine 5 mg  Warfarin 2 mg    Additional Documentation:  To [facility] station

## 2025-05-08 NOTE — PROGRESS NOTES
Spoke to Dr Kapadia about swiching from Coumadin to Eliquis, he is OK with that. Patient family had concerns  about Eliquis per Dr Kapadia.

## 2025-05-08 NOTE — DISCHARGE INSTRUCTIONS
Your information:  Name: Susanna Arriola  : 1955    Your instructions:    YOU ARE BEING DISCHARGED HOME WITH HOME CARE. PLEASE MAKE AND KEEP YOUR FOLLOW UP APPOINTMENTS.    What to do after you leave the hospital:    Recommended diet: regular diet    Recommended activity: activity as tolerated    IF YOU EXPERIENCE ANY OF THE FOLLOWING SYMPTOMS, CHEST PAIN, SHORTNESS OF BREATH, COUGHING UP BLOOD OR BLOODY SPUTUM, STOMACH PAIN OR CRAMPING, DARK, TARRY STOOLS, LOSS OF APPETITE, GENERAL NOT FEELING WELL, SIGNS AND SYMPTOMS OF INFECTION LIKE FEVER AND OR CHILLS, PLEASE CALL DR FISHER OR RETURN TO THE EMERGENCY ROOM.    The following personal items were collected during your admission and were returned to you:    Belongings  Dental Appliances: None  Vision - Corrective Lenses: Eyeglasses  Hearing Aid: None  Clothing: Pajamas  Jewelry: None  Electronic Devices: None  Weapons (Notify Protective Services/Security): None  Home Medications: None  Valuables Given To: Patient  Provide Name(s) of Who Valuable(s) Were Given To: n/a  Patient approves for provider to speak to responsible person post operatively: Yes    Information obtained by:  By signing below, I understand that if any problems occur once I leave the hospital I am to contact Dr Fisher or go to emergency room.  I understand and acknowledge receipt of the instructions indicated above.

## 2025-05-08 NOTE — PROGRESS NOTES
Cardiology  Progress Note      SUBJECTIVE:  No chest pain. No dyspnea.  No acute distress.  Component of dementia    Current Inpatient Medications  Current Facility-Administered Medications: levoFLOXacin (LEVAQUIN) tablet 500 mg, 500 mg, Oral, Q24H  midodrine (PROAMATINE) tablet 5 mg, 5 mg, Oral, TID WC  potassium chloride 40 mEq in sodium chloride 0.9 % 1,000 mL infusion, , IntraVENous, Continuous  melatonin disintegrating tablet 5 mg, 5 mg, Oral, Nightly PRN  amiodarone (CORDARONE) tablet 200 mg, 200 mg, Oral, Daily  anastrozole (ARIMIDEX) tablet 1 mg, 1 mg, Oral, Daily  [Held by provider] aspirin EC tablet 81 mg, 81 mg, Oral, Daily  donepezil (ARICEPT) tablet 5 mg, 5 mg, Oral, Nightly  [Held by provider] furosemide (LASIX) tablet 40 mg, 40 mg, Oral, Daily  metoprolol tartrate (LOPRESSOR) tablet 12.5 mg, 12.5 mg, Oral, BID  OXcarbazepine (TRILEPTAL) tablet 600 mg, 600 mg, Oral, BID  sertraline (ZOLOFT) tablet 100 mg, 100 mg, Oral, Daily  atorvastatin (LIPITOR) tablet 20 mg, 20 mg, Oral, Daily  warfarin placeholder: dosing by pharmacy, , Oral, RX Placeholder  zonisamide (ZONEGRAN) capsule 100 mg, 100 mg, Oral, 4x Daily  albuterol (PROVENTIL) (2.5 MG/3ML) 0.083% nebulizer solution 2.5 mg, 2.5 mg, Nebulization, Q4H PRN  sodium chloride flush 0.9 % injection 5-40 mL, 5-40 mL, IntraVENous, 2 times per day  sodium chloride flush 0.9 % injection 5-40 mL, 5-40 mL, IntraVENous, PRN  0.9 % sodium chloride infusion, , IntraVENous, PRN  potassium chloride (KLOR-CON M) extended release tablet 40 mEq, 40 mEq, Oral, PRN **OR** potassium bicarb-citric acid (EFFER-K) effervescent tablet 40 mEq, 40 mEq, Oral, PRN **OR** potassium chloride 10 mEq/100 mL IVPB (Peripheral Line), 10 mEq, IntraVENous, PRN  magnesium sulfate 2000 mg in 50 mL IVPB premix, 2,000 mg, IntraVENous, PRN  acetaminophen (TYLENOL) tablet 650 mg, 650 mg, Oral, Q6H PRN **OR** acetaminophen (TYLENOL) suppository 650 mg, 650 mg, Rectal, Q6H PRN  polyethylene glycol

## 2025-05-08 NOTE — PROGRESS NOTES
Pharmacy Consultation Note  (Warfarin Dosing and Monitoring)    Initial consult date: 5/5/25  Consulting Provider: MARLYN Alfonso-RIVER    Susanna Arriola is a 70 y.o. female for whom pharmacy has been asked to manage warfarin therapy.     Weight:   Wt Readings from Last 1 Encounters:   05/05/25 82.6 kg (182 lb)       TSH:    Lab Results   Component Value Date/Time    TSH 1.96 05/06/2025 07:35 AM       Hepatic Function Panel:                            Lab Results   Component Value Date/Time    ALKPHOS 76 05/08/2025 09:58 AM    ALT 8 05/08/2025 09:58 AM    AST 12 05/08/2025 09:58 AM    BILITOT 0.3 05/08/2025 09:58 AM    BILIDIR <0.2 05/08/2025 09:58 AM    IBILI Can not be calculated 05/08/2025 09:58 AM       Current significant warfarin drug-drug interactions include: metronidazole (incr INR)    Recent Labs     05/06/25  0735 05/07/25  0450 05/08/25  0958    304 363   INR >10.0* 5.4* 1.9   HGB 9.6* 9.2* 9.8*         Date Warfarin Dose INR Heparin or LMWH Comment   5/5 HOLD >10 --    5/6 HOLD >10 -- Vitamin K 1 mg IVPB   5/7 HOLD 5.4 --    5/8 <2 mg> 1.9 --             Assessment:  Patient is a 70 y.o. female on warfarin for Atrial Fibrillation.  Patient's home warfarin dosing regimen is warfarin 3 mg daily except for Wednesday, 4 mg on Wednesday.   Goal INR 2 - 3  INR 1.9 today    Plan:  Give warfarin 2 mg once tonight  Due to cost unable to switch to DOAC; will resume warfarin tonight per primary medical providers  Daily PT/INR until the INR is stable within the therapeutic range  Pharmacist will follow and monitor/adjust dosing as necessary    Thank you for this consult,    Maria C Hudson, PharmD.  5/8/2025 11:46 AM    REYNA: [phone]

## 2025-05-08 NOTE — DISCHARGE SUMMARY
[place] [id number]                            DISCHARGE SUMMARY      PATIENT NAME: FEDERICO HARDEN              : 1955  MED REC NO: [id number]                        ROOM: [id number]  ACCOUNT NO: [id number]                       ADMIT DATE: 2025  PROVIDER: Romeo Drake DO      PRIMARY CARE PHYSICIAN:  Dr. Hunter    ADMITTING DIAGNOSES:  Severe sepsis secondary to Klebsiella urinary tract infection.  Abnormal appearance of gallbladder with stones and sludge, subacute versus chronic calculus cholecystitis.    SECONDARY DISCHARGE DIAGNOSES:  Recent hospitalization for perforated sigmoid diverticulitis, status post exploratory laparotomy with sigmoid colon resection, open intraabdominal abscess drainage and descending colostomy creation; paroxysmal atrial fibrillation with controlled ventricular response, on chronic warfarin therapy with supratherapeutic INR 8.7, recent cardioversion 2025; dementia with current related hospital-acquired delirium; history of normal-pressure hydrocephalus with ventriculoperitoneal shunt; history of breast cancer; essential hypertension; hyperlipidemia.    COMPLICATIONS:  There was no complication.    OPERATIONS:  There was no operation.    CONSULTATIONS OBTAINED:  With Dr. Venice Smith.  No OMT was given.    ADDITIONAL ADMITTING PHYSICIAN:  Dr. Drake.    CHIEF COMPLAINT AND HISTORY OF CHIEF COMPLAINT:  70-year-old  female who was admitted to [facility]. The patient presented to the hospital here on May 4, 2025.  The patient presented to hospital here under the service of Dr. Hunter.  The patient presented to the hospital with 3 days' worth of nausea and vomiting.  The patient was obtained through EMS by the ER team, at which time, she had significant dementia with very limited communication.  The patient presented to the hospital here with  patient was discharged on Levaquin 500 daily for 2 days; midodrine 5 mg 3 times a day; Cordarone 300 mg daily for 15 doses, then 200 daily; Lasix 40 daily; warfarin 2 mg daily.  The patient will continue Arimidex 1 mg daily; Aricept 5 mg at bedtime; metoprolol tartrate 25 daily; Trileptal 600 mg b.i.d.; Protonix 40 daily; Zoloft 100 daily; simvastatin 20 mg daily along with zonisamide 100 mg daily.  The patient will follow up with her primary care doctor, Dr. Hunter, in 1 week.  INR will be checked accordingly.  She will follow up with Dr. Bunn in 1 to 2 weeks along with Dr. Kapadia in 1 to 2 weeks.  More than 40 minutes was spent on the day of discharge with more than 50% of that time spent face-to-face with the patient discussing plan of care and treatment.  The patient should have the INR monitored very closely while she is on Cordarone with interaction with Coumadin.  We did suggest going on Eliquis, but this was not financially feasible.          KENTON TOTH DO      D:  05/08/2025 15:12:44     T:  05/08/2025 16:13:11     [facility]/KEVIN  Job #:  [id number]     Doc#:  [id number]

## 2025-05-10 LAB
MICROORGANISM SPEC CULT: NORMAL
MICROORGANISM SPEC CULT: NORMAL
SERVICE CMNT-IMP: NORMAL
SERVICE CMNT-IMP: NORMAL
SPECIMEN DESCRIPTION: NORMAL
SPECIMEN DESCRIPTION: NORMAL

## 2025-06-03 ENCOUNTER — APPOINTMENT (OUTPATIENT)
Dept: CT IMAGING | Age: 70
DRG: 871 | End: 2025-06-03
Payer: MEDICARE

## 2025-06-03 ENCOUNTER — HOSPITAL ENCOUNTER (INPATIENT)
Age: 70
LOS: 6 days | Discharge: NURSING, REHAB OR LONG-TERM CARE | DRG: 871 | End: 2025-06-09
Attending: EMERGENCY MEDICINE | Admitting: INTERNAL MEDICINE
Payer: MEDICARE

## 2025-06-03 ENCOUNTER — APPOINTMENT (OUTPATIENT)
Dept: GENERAL RADIOLOGY | Age: 70
DRG: 871 | End: 2025-06-03
Payer: MEDICARE

## 2025-06-03 DIAGNOSIS — K51.00 PANCOLITIS (HCC): ICD-10-CM

## 2025-06-03 DIAGNOSIS — N17.9 AKI (ACUTE KIDNEY INJURY): ICD-10-CM

## 2025-06-03 DIAGNOSIS — I31.39 PERICARDIAL EFFUSION: ICD-10-CM

## 2025-06-03 DIAGNOSIS — A41.9 SEPTIC SHOCK (HCC): Primary | ICD-10-CM

## 2025-06-03 DIAGNOSIS — R65.21 SEPTIC SHOCK (HCC): Primary | ICD-10-CM

## 2025-06-03 DIAGNOSIS — I48.91 ATRIAL FIBRILLATION WITH RVR (HCC): ICD-10-CM

## 2025-06-03 DIAGNOSIS — N20.0 KIDNEY STONE: ICD-10-CM

## 2025-06-03 PROBLEM — I48.19 PERSISTENT ATRIAL FIBRILLATION (HCC): Status: ACTIVE | Noted: 2025-06-03

## 2025-06-03 LAB
ALBUMIN SERPL-MCNC: 3.1 G/DL (ref 3.5–5.2)
ALP SERPL-CCNC: 131 U/L (ref 35–104)
ALT SERPL-CCNC: 16 U/L (ref 0–32)
ANION GAP SERPL CALCULATED.3IONS-SCNC: 20 MMOL/L (ref 7–16)
AST SERPL-CCNC: 23 U/L (ref 0–31)
BASOPHILS # BLD: 0 K/UL (ref 0–0.2)
BASOPHILS NFR BLD: 0 % (ref 0–2)
BILIRUB DIRECT SERPL-MCNC: <0.2 MG/DL (ref 0–0.3)
BILIRUB INDIRECT SERPL-MCNC: ABNORMAL MG/DL (ref 0–1)
BILIRUB SERPL-MCNC: 0.3 MG/DL (ref 0–1.2)
BNP SERPL-MCNC: 3391 PG/ML (ref 0–450)
BUN SERPL-MCNC: 33 MG/DL (ref 6–23)
CALCIUM SERPL-MCNC: 8.9 MG/DL (ref 8.6–10.2)
CHLORIDE SERPL-SCNC: 96 MMOL/L (ref 98–107)
CO2 SERPL-SCNC: 21 MMOL/L (ref 22–29)
CREAT SERPL-MCNC: 1.6 MG/DL (ref 0.5–1)
EOSINOPHIL # BLD: 0 K/UL (ref 0.05–0.5)
EOSINOPHILS RELATIVE PERCENT: 0 % (ref 0–6)
ERYTHROCYTE [DISTWIDTH] IN BLOOD BY AUTOMATED COUNT: 17.5 % (ref 11.5–15)
GFR, ESTIMATED: 34 ML/MIN/1.73M2
GLUCOSE BLD-MCNC: 198 MG/DL (ref 74–99)
GLUCOSE SERPL-MCNC: 194 MG/DL (ref 74–99)
HCT VFR BLD AUTO: 46.2 % (ref 34–48)
HGB BLD-MCNC: 14.2 G/DL (ref 11.5–15.5)
INR PPP: 6.6
LACTATE BLDV-SCNC: 4.6 MMOL/L (ref 0.5–2.2)
LIPASE SERPL-CCNC: 12 U/L (ref 13–60)
LYMPHOCYTES NFR BLD: 0.54 K/UL (ref 1.5–4)
LYMPHOCYTES RELATIVE PERCENT: 2 % (ref 20–42)
MAGNESIUM SERPL-MCNC: 2.3 MG/DL (ref 1.6–2.6)
MCH RBC QN AUTO: 24.1 PG (ref 26–35)
MCHC RBC AUTO-ENTMCNC: 30.7 G/DL (ref 32–34.5)
MCV RBC AUTO: 78.3 FL (ref 80–99.9)
MONOCYTES NFR BLD: 0.81 K/UL (ref 0.1–0.95)
MONOCYTES NFR BLD: 2 % (ref 2–12)
MYELOCYTES ABSOLUTE COUNT: 0.27 K/UL
MYELOCYTES: 1 %
NEUTROPHILS NFR BLD: 95 % (ref 43–80)
NEUTS SEG NFR BLD: 32.29 K/UL (ref 1.8–7.3)
PLATELET # BLD AUTO: 455 K/UL (ref 130–450)
PMV BLD AUTO: 11 FL (ref 7–12)
POTASSIUM SERPL-SCNC: 3.6 MMOL/L (ref 3.5–5)
PROT SERPL-MCNC: 6.4 G/DL (ref 6.4–8.3)
PROTHROMBIN TIME: 73.3 SEC (ref 9.3–12.4)
RBC # BLD AUTO: 5.9 M/UL (ref 3.5–5.5)
RBC # BLD: ABNORMAL 10*6/UL
SODIUM SERPL-SCNC: 137 MMOL/L (ref 132–146)
TROPONIN I SERPL HS-MCNC: 16 NG/L (ref 0–14)
TROPONIN I SERPL HS-MCNC: 17 NG/L (ref 0–14)
WBC OTHER # BLD: 33.9 K/UL (ref 4.5–11.5)

## 2025-06-03 PROCEDURE — 80053 COMPREHEN METABOLIC PANEL: CPT

## 2025-06-03 PROCEDURE — 2000000000 HC ICU R&B

## 2025-06-03 PROCEDURE — 6360000004 HC RX CONTRAST MEDICATION: Performed by: RADIOLOGY

## 2025-06-03 PROCEDURE — 6360000002 HC RX W HCPCS

## 2025-06-03 PROCEDURE — 6370000000 HC RX 637 (ALT 250 FOR IP): Performed by: INTERNAL MEDICINE

## 2025-06-03 PROCEDURE — 99285 EMERGENCY DEPT VISIT HI MDM: CPT

## 2025-06-03 PROCEDURE — 96365 THER/PROPH/DIAG IV INF INIT: CPT

## 2025-06-03 PROCEDURE — 6360000002 HC RX W HCPCS: Performed by: EMERGENCY MEDICINE

## 2025-06-03 PROCEDURE — 83880 ASSAY OF NATRIURETIC PEPTIDE: CPT

## 2025-06-03 PROCEDURE — 2580000003 HC RX 258: Performed by: INTERNAL MEDICINE

## 2025-06-03 PROCEDURE — 2500000003 HC RX 250 WO HCPCS

## 2025-06-03 PROCEDURE — 84484 ASSAY OF TROPONIN QUANT: CPT

## 2025-06-03 PROCEDURE — 85610 PROTHROMBIN TIME: CPT

## 2025-06-03 PROCEDURE — 83605 ASSAY OF LACTIC ACID: CPT

## 2025-06-03 PROCEDURE — 71045 X-RAY EXAM CHEST 1 VIEW: CPT

## 2025-06-03 PROCEDURE — 70450 CT HEAD/BRAIN W/O DYE: CPT

## 2025-06-03 PROCEDURE — 74177 CT ABD & PELVIS W/CONTRAST: CPT

## 2025-06-03 PROCEDURE — 96375 TX/PRO/DX INJ NEW DRUG ADDON: CPT

## 2025-06-03 PROCEDURE — 83735 ASSAY OF MAGNESIUM: CPT

## 2025-06-03 PROCEDURE — 87040 BLOOD CULTURE FOR BACTERIA: CPT

## 2025-06-03 PROCEDURE — 82962 GLUCOSE BLOOD TEST: CPT

## 2025-06-03 PROCEDURE — 93005 ELECTROCARDIOGRAM TRACING: CPT

## 2025-06-03 PROCEDURE — 82248 BILIRUBIN DIRECT: CPT

## 2025-06-03 PROCEDURE — 85025 COMPLETE CBC W/AUTO DIFF WBC: CPT

## 2025-06-03 PROCEDURE — 96366 THER/PROPH/DIAG IV INF ADDON: CPT

## 2025-06-03 PROCEDURE — 83690 ASSAY OF LIPASE: CPT

## 2025-06-03 PROCEDURE — 6370000000 HC RX 637 (ALT 250 FOR IP): Performed by: EMERGENCY MEDICINE

## 2025-06-03 PROCEDURE — 99291 CRITICAL CARE FIRST HOUR: CPT | Performed by: INTERNAL MEDICINE

## 2025-06-03 PROCEDURE — 96368 THER/DIAG CONCURRENT INF: CPT

## 2025-06-03 PROCEDURE — 2580000003 HC RX 258

## 2025-06-03 RX ORDER — MIDODRINE HYDROCHLORIDE 10 MG/1
10 TABLET ORAL ONCE
Status: COMPLETED | OUTPATIENT
Start: 2025-06-03 | End: 2025-06-03

## 2025-06-03 RX ORDER — AMIODARONE HYDROCHLORIDE 200 MG/1
200 TABLET ORAL 2 TIMES DAILY
Status: DISCONTINUED | OUTPATIENT
Start: 2025-06-03 | End: 2025-06-05

## 2025-06-03 RX ORDER — 0.9 % SODIUM CHLORIDE 0.9 %
500 INTRAVENOUS SOLUTION INTRAVENOUS ONCE
Status: DISCONTINUED | OUTPATIENT
Start: 2025-06-03 | End: 2025-06-06

## 2025-06-03 RX ORDER — PROCHLORPERAZINE EDISYLATE 5 MG/ML
10 INJECTION INTRAMUSCULAR; INTRAVENOUS EVERY 6 HOURS PRN
Status: DISCONTINUED | OUTPATIENT
Start: 2025-06-03 | End: 2025-06-09 | Stop reason: HOSPADM

## 2025-06-03 RX ORDER — POTASSIUM CHLORIDE 7.45 MG/ML
10 INJECTION INTRAVENOUS PRN
Status: DISCONTINUED | OUTPATIENT
Start: 2025-06-03 | End: 2025-06-09 | Stop reason: HOSPADM

## 2025-06-03 RX ORDER — WARFARIN SODIUM 2 MG/1
2 TABLET ORAL DAILY
Status: DISCONTINUED | OUTPATIENT
Start: 2025-06-03 | End: 2025-06-03 | Stop reason: DRUGHIGH

## 2025-06-03 RX ORDER — SODIUM CHLORIDE 0.9 % (FLUSH) 0.9 %
5-40 SYRINGE (ML) INJECTION PRN
Status: DISCONTINUED | OUTPATIENT
Start: 2025-06-03 | End: 2025-06-09 | Stop reason: HOSPADM

## 2025-06-03 RX ORDER — DIGOXIN 0.25 MG/ML
500 INJECTION INTRAMUSCULAR; INTRAVENOUS DAILY
Status: DISCONTINUED | OUTPATIENT
Start: 2025-06-03 | End: 2025-06-03

## 2025-06-03 RX ORDER — ACETAMINOPHEN 325 MG/1
650 TABLET ORAL EVERY 6 HOURS PRN
Status: DISCONTINUED | OUTPATIENT
Start: 2025-06-03 | End: 2025-06-09 | Stop reason: HOSPADM

## 2025-06-03 RX ORDER — DILTIAZEM HYDROCHLORIDE 5 MG/ML
10 INJECTION INTRAVENOUS ONCE
Status: DISCONTINUED | OUTPATIENT
Start: 2025-06-03 | End: 2025-06-03

## 2025-06-03 RX ORDER — METOPROLOL TARTRATE 25 MG/1
25 TABLET, FILM COATED ORAL 2 TIMES DAILY
Status: DISCONTINUED | OUTPATIENT
Start: 2025-06-03 | End: 2025-06-07

## 2025-06-03 RX ORDER — PROCHLORPERAZINE MALEATE 5 MG/1
10 TABLET ORAL EVERY 8 HOURS PRN
Status: DISCONTINUED | OUTPATIENT
Start: 2025-06-03 | End: 2025-06-09 | Stop reason: HOSPADM

## 2025-06-03 RX ORDER — SODIUM CHLORIDE 9 MG/ML
INJECTION, SOLUTION INTRAVENOUS PRN
Status: DISCONTINUED | OUTPATIENT
Start: 2025-06-03 | End: 2025-06-09 | Stop reason: HOSPADM

## 2025-06-03 RX ORDER — SODIUM CHLORIDE 9 MG/ML
INJECTION, SOLUTION INTRAVENOUS CONTINUOUS
Status: DISCONTINUED | OUTPATIENT
Start: 2025-06-03 | End: 2025-06-09 | Stop reason: HOSPADM

## 2025-06-03 RX ORDER — IOPAMIDOL 755 MG/ML
75 INJECTION, SOLUTION INTRAVASCULAR
Status: COMPLETED | OUTPATIENT
Start: 2025-06-03 | End: 2025-06-03

## 2025-06-03 RX ORDER — 0.9 % SODIUM CHLORIDE 0.9 %
1000 INTRAVENOUS SOLUTION INTRAVENOUS ONCE
Status: COMPLETED | OUTPATIENT
Start: 2025-06-03 | End: 2025-06-04

## 2025-06-03 RX ORDER — DEXTROSE MONOHYDRATE 100 MG/ML
INJECTION, SOLUTION INTRAVENOUS CONTINUOUS PRN
Status: DISCONTINUED | OUTPATIENT
Start: 2025-06-03 | End: 2025-06-09 | Stop reason: HOSPADM

## 2025-06-03 RX ORDER — DIGOXIN 0.25 MG/ML
250 INJECTION INTRAMUSCULAR; INTRAVENOUS DAILY
Status: DISCONTINUED | OUTPATIENT
Start: 2025-06-03 | End: 2025-06-03

## 2025-06-03 RX ORDER — POTASSIUM CHLORIDE 1500 MG/1
40 TABLET, EXTENDED RELEASE ORAL PRN
Status: DISCONTINUED | OUTPATIENT
Start: 2025-06-03 | End: 2025-06-09 | Stop reason: HOSPADM

## 2025-06-03 RX ORDER — MAGNESIUM SULFATE IN WATER 40 MG/ML
2000 INJECTION, SOLUTION INTRAVENOUS PRN
Status: DISCONTINUED | OUTPATIENT
Start: 2025-06-03 | End: 2025-06-09 | Stop reason: HOSPADM

## 2025-06-03 RX ORDER — OXCARBAZEPINE 150 MG/1
600 TABLET, FILM COATED ORAL 2 TIMES DAILY
Status: DISCONTINUED | OUTPATIENT
Start: 2025-06-03 | End: 2025-06-09 | Stop reason: HOSPADM

## 2025-06-03 RX ORDER — POLYETHYLENE GLYCOL 3350 17 G/17G
17 POWDER, FOR SOLUTION ORAL DAILY PRN
Status: DISCONTINUED | OUTPATIENT
Start: 2025-06-03 | End: 2025-06-09 | Stop reason: HOSPADM

## 2025-06-03 RX ORDER — SODIUM CHLORIDE 0.9 % (FLUSH) 0.9 %
5-40 SYRINGE (ML) INJECTION EVERY 12 HOURS SCHEDULED
Status: DISCONTINUED | OUTPATIENT
Start: 2025-06-03 | End: 2025-06-09 | Stop reason: HOSPADM

## 2025-06-03 RX ORDER — PANTOPRAZOLE SODIUM 40 MG/1
40 TABLET, DELAYED RELEASE ORAL
Status: DISCONTINUED | OUTPATIENT
Start: 2025-06-04 | End: 2025-06-04

## 2025-06-03 RX ORDER — DONEPEZIL HYDROCHLORIDE 5 MG/1
5 TABLET, FILM COATED ORAL NIGHTLY
Status: DISCONTINUED | OUTPATIENT
Start: 2025-06-03 | End: 2025-06-09 | Stop reason: HOSPADM

## 2025-06-03 RX ORDER — MIDODRINE HYDROCHLORIDE 5 MG/1
5 TABLET ORAL
Status: DISCONTINUED | OUTPATIENT
Start: 2025-06-04 | End: 2025-06-03

## 2025-06-03 RX ORDER — ONDANSETRON 4 MG/1
4 TABLET, ORALLY DISINTEGRATING ORAL EVERY 8 HOURS PRN
Status: DISCONTINUED | OUTPATIENT
Start: 2025-06-03 | End: 2025-06-03

## 2025-06-03 RX ORDER — ANASTROZOLE 1 MG/1
1 TABLET ORAL DAILY
Status: DISCONTINUED | OUTPATIENT
Start: 2025-06-04 | End: 2025-06-09 | Stop reason: HOSPADM

## 2025-06-03 RX ORDER — METRONIDAZOLE 500 MG/100ML
500 INJECTION, SOLUTION INTRAVENOUS ONCE
Status: COMPLETED | OUTPATIENT
Start: 2025-06-03 | End: 2025-06-03

## 2025-06-03 RX ORDER — ONDANSETRON 2 MG/ML
4 INJECTION INTRAMUSCULAR; INTRAVENOUS ONCE
Status: COMPLETED | OUTPATIENT
Start: 2025-06-03 | End: 2025-06-03

## 2025-06-03 RX ORDER — ACETAMINOPHEN 650 MG/1
650 SUPPOSITORY RECTAL EVERY 6 HOURS PRN
Status: DISCONTINUED | OUTPATIENT
Start: 2025-06-03 | End: 2025-06-09 | Stop reason: HOSPADM

## 2025-06-03 RX ORDER — ZONISAMIDE 100 MG/1
200 CAPSULE ORAL 2 TIMES DAILY
Status: DISCONTINUED | OUTPATIENT
Start: 2025-06-03 | End: 2025-06-09 | Stop reason: HOSPADM

## 2025-06-03 RX ORDER — ONDANSETRON 2 MG/ML
4 INJECTION INTRAMUSCULAR; INTRAVENOUS EVERY 6 HOURS PRN
Status: DISCONTINUED | OUTPATIENT
Start: 2025-06-03 | End: 2025-06-03

## 2025-06-03 RX ORDER — GLUCAGON 1 MG/ML
1 KIT INJECTION PRN
Status: DISCONTINUED | OUTPATIENT
Start: 2025-06-03 | End: 2025-06-09 | Stop reason: HOSPADM

## 2025-06-03 RX ADMIN — CEFEPIME 2000 MG: 2 INJECTION, POWDER, FOR SOLUTION INTRAVENOUS at 19:23

## 2025-06-03 RX ADMIN — METRONIDAZOLE 500 MG: 500 INJECTION, SOLUTION INTRAVENOUS at 20:42

## 2025-06-03 RX ADMIN — Medication 250 MG: at 22:46

## 2025-06-03 RX ADMIN — MIDODRINE HYDROCHLORIDE 10 MG: 10 TABLET ORAL at 16:56

## 2025-06-03 RX ADMIN — ZONISAMIDE 200 MG: 100 CAPSULE ORAL at 22:47

## 2025-06-03 RX ADMIN — DILTIAZEM HYDROCHLORIDE 25 MG: 5 INJECTION, SOLUTION INTRAVENOUS at 16:02

## 2025-06-03 RX ADMIN — OXCARBAZEPINE 600 MG: 300 TABLET, FILM COATED ORAL at 22:47

## 2025-06-03 RX ADMIN — DIGOXIN 500 MCG: 0.25 INJECTION INTRAMUSCULAR; INTRAVENOUS at 16:25

## 2025-06-03 RX ADMIN — DONEPEZIL HYDROCHLORIDE 5 MG: 5 TABLET ORAL at 22:47

## 2025-06-03 RX ADMIN — ONDANSETRON 4 MG: 2 INJECTION INTRAMUSCULAR; INTRAVENOUS at 20:37

## 2025-06-03 RX ADMIN — SODIUM CHLORIDE 1000 ML: 0.9 INJECTION, SOLUTION INTRAVENOUS at 20:40

## 2025-06-03 RX ADMIN — IOPAMIDOL 75 ML: 755 INJECTION, SOLUTION INTRAVENOUS at 18:16

## 2025-06-03 NOTE — ED PROVIDER NOTES
[facility] EMERGENCY DEPARTMENT  EMERGENCY DEPARTMENT ENCOUNTER        Pt Name: Susanna Arriola  MRN: [id number]  Birthdate 1955  Date of evaluation: 6/3/2025  Provider: Sonu Galvan DO  PCP: Lay Hunter MD  Note Started: 3:26 PM EDT 6/3/25    CHIEF COMPLAINT       No chief complaint on file.      HISTORY OF PRESENT ILLNESS: 1 or more Elements   History From: Patient, history limited due to patient condition, granddaughter    Susanna Arriola is a 70 y.o. female with a PMHx of A-fib, on Coumadin, dementia, ANO x 1 at baseline, seizures, hyperlipidemia, perforated diverticulitis, sepsis, who presents via EMS for generalized weakness and hypotension.  Patient was recently discharged from hospital approximately a month ago for sepsis secondary to UTI.  History and review of systems limited due to patient condition.  Patient granddaughter at bedside, states that the patient has a history of dementia and is alert and oriented x 1 at baseline.  Patient only reports fatigue and weakness, she denies chest pain, shortness of breath, abdominal pain, nausea, vomiting, diarrhea, patient has colostomy bag, dysuria, hematuria, hematochezia, melena.  Patient deemed poor historian of her medical history.  Patient is a never smoker, denies alcohol use, denies illicit drug use.      Nursing Notes were all reviewed and agreed with or any disagreements were addressed in the HPI.      REVIEW OF EXTERNAL NOTES :       PDMP Monitoring:    Last PDMP Sonu as Reviewed:  Review User Review Instant Review Result            Urine Drug Screenings (1 yr)    No resulted procedures found.       Medication Contract and Consent for Opioid Use Documents Filed        No documents found                      REVIEW OF SYSTEMS :      Positives and Pertinent negatives as per HPI.     SURGICAL HISTORY     Past Surgical History:   Procedure Laterality Date    COLECTOMY N/A 3/16/2025    OPEN SIGMOID COLON

## 2025-06-03 NOTE — CONSULTS
Assessment and Plan  Patient is a 70 y.o. female with the following medical Problems:   Severe sepsis with septic shock due to pancolitis with recent history of Klebsiella UTI.  Urinary tract infection  Paroxysmal atrial fibrillation with RVR.  Supratherapeutic INR.  Acute kidney injury  Recent hospitalization for perforated sigmoid diverticulitis (S/P exploratory laparotomy with sigmoid colon resection, open intra-abdominal abscess drainage, and end descending colostomy creation May 2025)  Recent history of rhinovirus upper respiratory tract infection  Metabolic encephalopathy  Chronic heart failure with midrange ejection fraction (EF 40 to 45% April 16, 2025)  Moderate secondary pulmonary hypertension  Valvular heart disease with tricuspid regurgitation, mitral regurgitation.  History of brain aneurysm.  Dementia  Hospital-acquired delirium  Normal pressure hydrocephalus with  shunt  Prior history of breast cancer  Hypertension  Dyslipidemia  Chronic calculus cholecystitis  Depression    Plan of care:  Patient was started on cefepime and was given 1 dose of vancomycin.  Will continue to monitor for C. difficile colitis.  Patient was given empirically 1 dose of oral vancomycin.  Du-Synephrine to keep up above 65.  Maintenance IV fluid.  Patient's INR is supratherapeutic which covers for DVT prophylaxis.  GI prophylaxis.  Serial lactate  Goals of care discussion  Protonix for GI prophylaxis.    History of Present Illness:   Patient is 70-year-old woman with above-mentioned medical problem who presented to the emergency room with weakness and fatigue.  Patient presented with her family and was confused and unable to provide detailed history.  Patient denies increased output from ostomy.  He has been nauseous lately.    Past Medical History:  Past Medical History:   Diagnosis Date    Atrial fibrillation (HCC)     Dementia (HCC)     History of cardioversion 10/2021      Past Surgical History:   Procedure Laterality  the patient to this visit.  They had the opportunity to ask questions about the proposed management plans and to have those questions answered.    This patient has a high probability of sudden, clinically significant deterioration, which requires the highest level of physician preparedness to intervene urgently.  I managed/supervised life or organ supporting interventions that required frequent physician assessment.  I devoted my full attention to the direct care of this patient for the amount of time indicated below.  Time I spent with family or surrogate(s) is included only if the patient was incapable of providing the necessary information or participating in medical decision-making.  Time devoted to teaching and to any procedures I billed separately is not included.  Critical Care Time: 32 minutes      Electronically signed by Una Corral MD on 6/3/2025 at 4:28 PM

## 2025-06-04 PROBLEM — Z51.5 PALLIATIVE CARE ENCOUNTER: Status: ACTIVE | Noted: 2025-06-04

## 2025-06-04 LAB
ALBUMIN SERPL-MCNC: 2.6 G/DL (ref 3.5–5.2)
ALP SERPL-CCNC: 121 U/L (ref 35–104)
ALT SERPL-CCNC: 16 U/L (ref 0–32)
ANION GAP SERPL CALCULATED.3IONS-SCNC: 14 MMOL/L (ref 7–16)
ANION GAP SERPL CALCULATED.3IONS-SCNC: 14 MMOL/L (ref 7–16)
ASO AB SERPL-ACNC: 43 IU/ML (ref 0–200)
AST SERPL-CCNC: 25 U/L (ref 0–31)
BACTERIA URNS QL MICRO: ABNORMAL
BASOPHILS # BLD: 0 K/UL (ref 0–0.2)
BASOPHILS NFR BLD: 0 % (ref 0–2)
BILIRUB SERPL-MCNC: 0.3 MG/DL (ref 0–1.2)
BILIRUB UR QL STRIP: NEGATIVE
BILIRUB UR QL STRIP: NEGATIVE
BUN SERPL-MCNC: 36 MG/DL (ref 6–23)
BUN SERPL-MCNC: 41 MG/DL (ref 6–23)
CALCIUM SERPL-MCNC: 8 MG/DL (ref 8.6–10.2)
CALCIUM SERPL-MCNC: 8.2 MG/DL (ref 8.6–10.2)
CASTS #/AREA URNS LPF: ABNORMAL /LPF
CHLORIDE SERPL-SCNC: 103 MMOL/L (ref 98–107)
CHLORIDE SERPL-SCNC: 108 MMOL/L (ref 98–107)
CLARITY UR: CLEAR
CLARITY UR: CLEAR
CO2 SERPL-SCNC: 19 MMOL/L (ref 22–29)
CO2 SERPL-SCNC: 20 MMOL/L (ref 22–29)
COLOR UR: YELLOW
COLOR UR: YELLOW
CREAT SERPL-MCNC: 1.4 MG/DL (ref 0.5–1)
CREAT SERPL-MCNC: 1.7 MG/DL (ref 0.5–1)
CRP SERPL HS-MCNC: 377 MG/L (ref 0–5)
EKG ATRIAL RATE: 214 BPM
EKG Q-T INTERVAL: 252 MS
EKG QRS DURATION: 98 MS
EKG QTC CALCULATION (BAZETT): 407 MS
EKG R AXIS: 58 DEGREES
EKG T AXIS: 246 DEGREES
EKG VENTRICULAR RATE: 157 BPM
EOSINOPHIL # BLD: 0 K/UL (ref 0.05–0.5)
EOSINOPHILS RELATIVE PERCENT: 0 % (ref 0–6)
EPI CELLS #/AREA URNS HPF: ABNORMAL /HPF
ERYTHROCYTE [DISTWIDTH] IN BLOOD BY AUTOMATED COUNT: 16.5 % (ref 11.5–15)
ERYTHROCYTE [SEDIMENTATION RATE] IN BLOOD BY WESTERGREN METHOD: 8 MM/HR (ref 0–20)
GFR, ESTIMATED: 33 ML/MIN/1.73M2
GFR, ESTIMATED: 39 ML/MIN/1.73M2
GLUCOSE SERPL-MCNC: 160 MG/DL (ref 74–99)
GLUCOSE SERPL-MCNC: 161 MG/DL (ref 74–99)
GLUCOSE UR STRIP-MCNC: NEGATIVE MG/DL
GLUCOSE UR STRIP-MCNC: NEGATIVE MG/DL
HCT VFR BLD AUTO: 38.6 % (ref 34–48)
HGB BLD-MCNC: 12.1 G/DL (ref 11.5–15.5)
HGB UR QL STRIP.AUTO: NEGATIVE
HGB UR QL STRIP.AUTO: NEGATIVE
INR PPP: 9.4
INR PPP: >10
KETONES UR STRIP-MCNC: NEGATIVE MG/DL
KETONES UR STRIP-MCNC: NEGATIVE MG/DL
LACTATE BLDV-SCNC: 1.9 MMOL/L (ref 0.5–2.2)
LACTATE BLDV-SCNC: 2 MMOL/L (ref 0.5–2.2)
LACTATE BLDV-SCNC: 2.1 MMOL/L (ref 0.5–2.2)
LACTATE BLDV-SCNC: 2.2 MMOL/L (ref 0.5–2.2)
LACTATE BLDV-SCNC: 2.4 MMOL/L (ref 0.5–2.2)
LEUKOCYTE ESTERASE UR QL STRIP: ABNORMAL
LEUKOCYTE ESTERASE UR QL STRIP: NEGATIVE
LYMPHOCYTES NFR BLD: 0.37 K/UL (ref 1.5–4)
LYMPHOCYTES RELATIVE PERCENT: 1 % (ref 20–42)
MAGNESIUM SERPL-MCNC: 1.9 MG/DL (ref 1.6–2.6)
MCH RBC QN AUTO: 24.2 PG (ref 26–35)
MCHC RBC AUTO-ENTMCNC: 31.3 G/DL (ref 32–34.5)
MCV RBC AUTO: 77.4 FL (ref 80–99.9)
MONOCYTES NFR BLD: 1.5 K/UL (ref 0.1–0.95)
MONOCYTES NFR BLD: 4 % (ref 2–12)
NEUTROPHILS NFR BLD: 96 % (ref 43–80)
NEUTS SEG NFR BLD: 41.23 K/UL (ref 1.8–7.3)
NITRITE UR QL STRIP: POSITIVE
NITRITE UR QL STRIP: POSITIVE
PH UR STRIP: 5.5 [PH] (ref 5–8)
PH UR STRIP: 5.5 [PH] (ref 5–8)
PHOSPHATE SERPL-MCNC: 3.3 MG/DL (ref 2.5–4.5)
PLATELET # BLD AUTO: 391 K/UL (ref 130–450)
PMV BLD AUTO: 10 FL (ref 7–12)
POTASSIUM SERPL-SCNC: 3.2 MMOL/L (ref 3.5–5)
POTASSIUM SERPL-SCNC: 4.6 MMOL/L (ref 3.5–5)
PROCALCITONIN SERPL-MCNC: 0.32 NG/ML (ref 0–0.08)
PROCALCITONIN SERPL-MCNC: 0.6 NG/ML (ref 0–0.08)
PROT SERPL-MCNC: 5.5 G/DL (ref 6.4–8.3)
PROT UR STRIP-MCNC: NEGATIVE MG/DL
PROT UR STRIP-MCNC: NEGATIVE MG/DL
PROTHROMBIN TIME: 105.2 SEC (ref 9.3–12.4)
PROTHROMBIN TIME: >120 SEC (ref 9.3–12.4)
RBC # BLD AUTO: 4.99 M/UL (ref 3.5–5.5)
RBC # BLD: ABNORMAL 10*6/UL
RBC #/AREA URNS HPF: ABNORMAL /HPF
RBC #/AREA URNS HPF: NORMAL /HPF
SODIUM SERPL-SCNC: 136 MMOL/L (ref 132–146)
SODIUM SERPL-SCNC: 142 MMOL/L (ref 132–146)
SP GR UR STRIP: 1.01 (ref 1–1.03)
SP GR UR STRIP: 1.01 (ref 1–1.03)
T4 FREE SERPL-MCNC: 0.7 NG/DL (ref 0.9–1.7)
TSH SERPL DL<=0.05 MIU/L-ACNC: 1.66 UIU/ML (ref 0.27–4.2)
UROBILINOGEN UR STRIP-ACNC: 0.2 EU/DL (ref 0–1)
UROBILINOGEN UR STRIP-ACNC: 0.2 EU/DL (ref 0–1)
WBC #/AREA URNS HPF: ABNORMAL /HPF
WBC #/AREA URNS HPF: NORMAL /HPF
WBC OTHER # BLD: 43.1 K/UL (ref 4.5–11.5)

## 2025-06-04 PROCEDURE — 86140 C-REACTIVE PROTEIN: CPT

## 2025-06-04 PROCEDURE — 87449 NOS EACH ORGANISM AG IA: CPT

## 2025-06-04 PROCEDURE — 2580000003 HC RX 258

## 2025-06-04 PROCEDURE — 93010 ELECTROCARDIOGRAM REPORT: CPT | Performed by: INTERNAL MEDICINE

## 2025-06-04 PROCEDURE — 2580000003 HC RX 258: Performed by: INTERNAL MEDICINE

## 2025-06-04 PROCEDURE — 84443 ASSAY THYROID STIM HORMONE: CPT

## 2025-06-04 PROCEDURE — 85652 RBC SED RATE AUTOMATED: CPT

## 2025-06-04 PROCEDURE — 2580000003 HC RX 258: Performed by: SPECIALIST

## 2025-06-04 PROCEDURE — 6360000002 HC RX W HCPCS: Performed by: INTERNAL MEDICINE

## 2025-06-04 PROCEDURE — 2500000003 HC RX 250 WO HCPCS: Performed by: INTERNAL MEDICINE

## 2025-06-04 PROCEDURE — 2000000000 HC ICU R&B

## 2025-06-04 PROCEDURE — 6370000000 HC RX 637 (ALT 250 FOR IP): Performed by: SPECIALIST

## 2025-06-04 PROCEDURE — 84100 ASSAY OF PHOSPHORUS: CPT

## 2025-06-04 PROCEDURE — 6360000002 HC RX W HCPCS: Performed by: NURSE PRACTITIONER

## 2025-06-04 PROCEDURE — 6370000000 HC RX 637 (ALT 250 FOR IP): Performed by: INTERNAL MEDICINE

## 2025-06-04 PROCEDURE — 86063 ANTISTREPTOLYSIN O SCREEN: CPT

## 2025-06-04 PROCEDURE — 99222 1ST HOSP IP/OBS MODERATE 55: CPT | Performed by: NURSE PRACTITIONER

## 2025-06-04 PROCEDURE — 84439 ASSAY OF FREE THYROXINE: CPT

## 2025-06-04 PROCEDURE — 80053 COMPREHEN METABOLIC PANEL: CPT

## 2025-06-04 PROCEDURE — 87081 CULTURE SCREEN ONLY: CPT

## 2025-06-04 PROCEDURE — 6360000002 HC RX W HCPCS

## 2025-06-04 PROCEDURE — 81001 URINALYSIS AUTO W/SCOPE: CPT

## 2025-06-04 PROCEDURE — 83605 ASSAY OF LACTIC ACID: CPT

## 2025-06-04 PROCEDURE — 85025 COMPLETE CBC W/AUTO DIFF WBC: CPT

## 2025-06-04 PROCEDURE — 83735 ASSAY OF MAGNESIUM: CPT

## 2025-06-04 PROCEDURE — 87324 CLOSTRIDIUM AG IA: CPT

## 2025-06-04 PROCEDURE — 84145 PROCALCITONIN (PCT): CPT

## 2025-06-04 PROCEDURE — 6360000002 HC RX W HCPCS: Performed by: SPECIALIST

## 2025-06-04 PROCEDURE — 87086 URINE CULTURE/COLONY COUNT: CPT

## 2025-06-04 PROCEDURE — 80048 BASIC METABOLIC PNL TOTAL CA: CPT

## 2025-06-04 PROCEDURE — 99291 CRITICAL CARE FIRST HOUR: CPT | Performed by: INTERNAL MEDICINE

## 2025-06-04 PROCEDURE — 85610 PROTHROMBIN TIME: CPT

## 2025-06-04 RX ORDER — METRONIDAZOLE 500 MG/100ML
500 INJECTION, SOLUTION INTRAVENOUS EVERY 8 HOURS
Status: DISCONTINUED | OUTPATIENT
Start: 2025-06-04 | End: 2025-06-09 | Stop reason: HOSPADM

## 2025-06-04 RX ORDER — DIGOXIN 0.25 MG/ML
62.5 INJECTION INTRAMUSCULAR; INTRAVENOUS ONCE
Status: DISCONTINUED | OUTPATIENT
Start: 2025-06-04 | End: 2025-06-04

## 2025-06-04 RX ORDER — METOPROLOL TARTRATE 25 MG/1
25 TABLET, FILM COATED ORAL ONCE
Status: COMPLETED | OUTPATIENT
Start: 2025-06-04 | End: 2025-06-04

## 2025-06-04 RX ORDER — DIGOXIN 0.25 MG/ML
250 INJECTION INTRAMUSCULAR; INTRAVENOUS ONCE
Status: COMPLETED | OUTPATIENT
Start: 2025-06-04 | End: 2025-06-04

## 2025-06-04 RX ORDER — PANTOPRAZOLE SODIUM 40 MG/10ML
40 INJECTION, POWDER, LYOPHILIZED, FOR SOLUTION INTRAVENOUS 2 TIMES DAILY
Status: DISCONTINUED | OUTPATIENT
Start: 2025-06-04 | End: 2025-06-09 | Stop reason: HOSPADM

## 2025-06-04 RX ORDER — 0.9 % SODIUM CHLORIDE 0.9 %
1000 INTRAVENOUS SOLUTION INTRAVENOUS ONCE
Status: COMPLETED | OUTPATIENT
Start: 2025-06-04 | End: 2025-06-04

## 2025-06-04 RX ADMIN — PHYTONADIONE 1 MG: 10 INJECTION, EMULSION INTRAMUSCULAR; INTRAVENOUS; SUBCUTANEOUS at 09:28

## 2025-06-04 RX ADMIN — SODIUM CHLORIDE 1000 ML: 0.9 INJECTION, SOLUTION INTRAVENOUS at 05:46

## 2025-06-04 RX ADMIN — PANTOPRAZOLE SODIUM 40 MG: 40 INJECTION, POWDER, FOR SOLUTION INTRAVENOUS at 20:36

## 2025-06-04 RX ADMIN — OXCARBAZEPINE 600 MG: 300 TABLET, FILM COATED ORAL at 08:50

## 2025-06-04 RX ADMIN — CEFEPIME 2000 MG: 2 INJECTION, POWDER, FOR SOLUTION INTRAVENOUS at 19:29

## 2025-06-04 RX ADMIN — METRONIDAZOLE 500 MG: 5 INJECTION, SOLUTION INTRAVENOUS at 13:18

## 2025-06-04 RX ADMIN — Medication 10 ML: at 23:09

## 2025-06-04 RX ADMIN — ANASTROZOLE 1 MG: 1 TABLET ORAL at 08:50

## 2025-06-04 RX ADMIN — PROCHLORPERAZINE EDISYLATE 10 MG: 5 INJECTION INTRAMUSCULAR; INTRAVENOUS at 08:55

## 2025-06-04 RX ADMIN — POTASSIUM CHLORIDE 40 MEQ: 1500 TABLET, EXTENDED RELEASE ORAL at 07:57

## 2025-06-04 RX ADMIN — CEFEPIME 2000 MG: 2 INJECTION, POWDER, FOR SOLUTION INTRAVENOUS at 06:50

## 2025-06-04 RX ADMIN — DIGOXIN 250 MCG: 0.25 INJECTION INTRAMUSCULAR; INTRAVENOUS at 08:01

## 2025-06-04 RX ADMIN — ZONISAMIDE 200 MG: 100 CAPSULE ORAL at 08:49

## 2025-06-04 RX ADMIN — MIDODRINE HYDROCHLORIDE 15 MG: 10 TABLET ORAL at 08:48

## 2025-06-04 RX ADMIN — PANTOPRAZOLE SODIUM 40 MG: 40 INJECTION, POWDER, FOR SOLUTION INTRAVENOUS at 08:51

## 2025-06-04 RX ADMIN — MIDODRINE HYDROCHLORIDE 15 MG: 10 TABLET ORAL at 16:37

## 2025-06-04 RX ADMIN — PROCHLORPERAZINE EDISYLATE 10 MG: 5 INJECTION INTRAMUSCULAR; INTRAVENOUS at 16:43

## 2025-06-04 RX ADMIN — DONEPEZIL HYDROCHLORIDE 5 MG: 5 TABLET ORAL at 20:27

## 2025-06-04 RX ADMIN — METRONIDAZOLE 500 MG: 5 INJECTION, SOLUTION INTRAVENOUS at 20:33

## 2025-06-04 RX ADMIN — SODIUM CHLORIDE: 0.9 INJECTION, SOLUTION INTRAVENOUS at 00:44

## 2025-06-04 RX ADMIN — AMIODARONE HYDROCHLORIDE 150 MG: 50 INJECTION, SOLUTION INTRAVENOUS at 08:37

## 2025-06-04 RX ADMIN — VANCOMYCIN HYDROCHLORIDE 500 MG: 10 INJECTION, POWDER, LYOPHILIZED, FOR SOLUTION INTRAVENOUS at 08:58

## 2025-06-04 RX ADMIN — PHENYLEPHRINE HYDROCHLORIDE 15 MCG/MIN: 50 INJECTION INTRAVENOUS at 00:46

## 2025-06-04 RX ADMIN — METRONIDAZOLE 500 MG: 5 INJECTION, SOLUTION INTRAVENOUS at 05:42

## 2025-06-04 RX ADMIN — PHENYLEPHRINE HYDROCHLORIDE 15 MCG/MIN: 50 INJECTION INTRAVENOUS at 06:56

## 2025-06-04 RX ADMIN — OXCARBAZEPINE 600 MG: 300 TABLET, FILM COATED ORAL at 21:32

## 2025-06-04 RX ADMIN — ZONISAMIDE 200 MG: 100 CAPSULE ORAL at 21:32

## 2025-06-04 RX ADMIN — Medication 10 ML: at 08:54

## 2025-06-04 RX ADMIN — METOPROLOL TARTRATE 25 MG: 25 TABLET, FILM COATED ORAL at 09:24

## 2025-06-04 ASSESSMENT — PAIN SCALES - PAIN ASSESSMENT IN ADVANCED DEMENTIA (PAINAD)
CONSOLABILITY: NO NEED TO CONSOLE
CONSOLABILITY: NO NEED TO CONSOLE
BREATHING: NORMAL
BODYLANGUAGE: RELAXED
TOTALSCORE: 0
FACIALEXPRESSION: SMILING OR INEXPRESSIVE
FACIALEXPRESSION: SMILING OR INEXPRESSIVE
CONSOLABILITY: NO NEED TO CONSOLE
BODYLANGUAGE: RELAXED
BODYLANGUAGE: RELAXED
TOTALSCORE: 0
FACIALEXPRESSION: SMILING OR INEXPRESSIVE
BREATHING: NORMAL
FACIALEXPRESSION: SMILING OR INEXPRESSIVE
TOTALSCORE: 0
BREATHING: NORMAL
BODYLANGUAGE: RELAXED
CONSOLABILITY: NO NEED TO CONSOLE
TOTALSCORE: 0
BREATHING: NORMAL

## 2025-06-04 ASSESSMENT — PAIN SCALES - GENERAL: PAINLEVEL_OUTOF10: 2

## 2025-06-04 NOTE — PROGRESS NOTES
Intensive Care Daily Quality Rounding Checklist      ICU Team Members:     ICU Day #: NUMBER: 1    SOFA Score:3    Intubation Date: N/A    Ventilator Day #:     Central Line Insertion Date: Imelda 4        Day #: NUMBER: 1        Indication: CVCIndication: Administration of vasopressors or vesicant medications     Arterial Line Insertion Date: N/A      Day #:     Temporary Hemodialysis Catheter Insertion Date: N/A      Day #     DVT Prophylaxis:SCDs, coumadin    GI Prophylaxis:Protonix    Jimenes Catheter Insertion Date: Imelda 4       Day #: 1      Indications: Need for fluid management of the critically ill patient in a critical care setting      Continued need (if yes, reason documented and discussed with physician): yes,     Skin Issues/ Wounds and ordered treatment discussed on rounds: Y    Goals/ Plans for the Day: comfort, stable hemoglobin     Reviewed plan and goals for day with patient and/or representative: Yes    **SHARE this note so that the rounding nurse may edit**

## 2025-06-04 NOTE — PROGRESS NOTES
Perfect Serve message to Dr. Bunn - per family known pt to you - new consult from Dr. Solo for worsening colitis.    Read @ 1546 PM    Kita Lloyd RN  6/4/2025

## 2025-06-04 NOTE — PROGRESS NOTES
Spoke with Dr. Kapadia r/t consult for cardiology. Updated pt remains tachy 130s/140s. New order received for Digoxin .25 mg IV once. Also, Spoke with Dr. Corral at bedside and we are waiting on ICU bed.

## 2025-06-04 NOTE — CARE COORDINATION
6/4//25 SS Note: Chart review only- pt taken to room before SW able to speak to her. Pt w/Septic shock, Permanent A-Fib w/RVR- on Cardizem & Amiodarone drip & PTA on chronic warfarin. Pt failed cardioversion in April. Pt is readmission- last here 5/4 - 5/8 Severe sepsis secondary to Klebsiella UTI. Pt also had recent perforated sigmoid diverticulitis s/p exploratory laparotomy w/sigmoid colon resection, open intra-abdominal abscess drainage and end descending colostomy creation. PT/OT to eval- HHC vs SNF. Palliative Care consulted. Pt lives w/spouse, son, and granddaughter in a ranch home. She uses a walker or cane for mobility. No other DME. Pt w/hx [facility] (unsure if acyive) & hx w/[facility]. PCP- Dr. Ruiz and pharmacy of choice, [facility] in [place].   Electronically signed by PANCHO Reza on 6/4/2025 at 1:15 PM

## 2025-06-04 NOTE — PROGRESS NOTES
release tablet 40 mEq, 40 mEq, Oral, PRN, 40 mEq at 06/04/25 0757 **OR** potassium bicarb-citric acid (EFFER-K) effervescent tablet 40 mEq, 40 mEq, Oral, PRN **OR** potassium chloride 10 mEq/100 mL IVPB (Peripheral Line), 10 mEq, IntraVENous, PRN, Laquita, Ismail U, DO    magnesium sulfate 2000 mg in 50 mL IVPB premix, 2,000 mg, IntraVENous, PRN, Laquita, Ismail U, DO    polyethylene glycol (GLYCOLAX) packet 17 g, 17 g, Oral, Daily PRN, Laquita, Ismail U, DO    acetaminophen (TYLENOL) tablet 650 mg, 650 mg, Oral, Q6H PRN **OR** acetaminophen (TYLENOL) suppository 650 mg, 650 mg, Rectal, Q6H PRN, Laquita, Ismail U, DO    amiodarone (CORDARONE) tablet 200 mg, 200 mg, Oral, BID, Laquita, Ismail U, DO    anastrozole (ARIMIDEX) tablet 1 mg, 1 mg, Oral, Daily, Laquita, Ismail U, DO    donepezil (ARICEPT) tablet 5 mg, 5 mg, Oral, Nightly, Laquita, Ismail U, DO, 5 mg at 06/03/25 2247    [Held by provider] metoprolol tartrate (LOPRESSOR) tablet 25 mg, 25 mg, Oral, BID, Laquita, Ismail U, DO    OXcarbazepine (TRILEPTAL) tablet 600 mg, 600 mg, Oral, BID, Laquita, Ismail U, DO, 600 mg at 06/03/25 2247    sertraline (ZOLOFT) tablet 100 mg, 100 mg, Oral, Daily, Laquita, Ismail U, DO    zonisamide (ZONEGRAN) capsule 200 mg, 200 mg, Oral, BID, Laquita, Ismail U, DO, 200 mg at 06/03/25 2247    prochlorperazine (COMPAZINE) tablet 10 mg, 10 mg, Oral, Q8H PRN **OR** prochlorperazine (COMPAZINE) injection 10 mg, 10 mg, IntraVENous, Q6H PRN, Laquita, Ismail U, DO    warfarin placeholder: dosing by pharmacy, , Oral, RX Placeholder, Gonsalo Coelho U, DO    0.9 % sodium chloride infusion, , IntraVENous, Continuous, Jack Moscoso APRN - CNP, Last Rate: 100 mL/hr at 06/04/25 0656, Rate Verify at 06/04/25 0656    midodrine (PROAMATINE) tablet 15 mg, 15 mg, Oral, TID WC, Una Corral MD    Current Outpatient Medications:     amiodarone (CORDARONE) 200 MG tablet, Take 1 tablet by mouth 2 times daily, Disp: , Rfl:     zonisamide (ZONEGRAN)  100 MG capsule, Take 2 capsules by mouth in the morning and at bedtime, Disp: , Rfl:     midodrine (PROAMATINE) 5 MG tablet, Take 1 tablet by mouth 3 times daily (with meals), Disp: 90 tablet, Rfl: 0    furosemide (LASIX) 40 MG tablet, Take 1 tablet by mouth every 3 days, Disp: , Rfl:     warfarin (COUMADIN) 2 MG tablet, Take 1 tablet by mouth daily, Disp: , Rfl:     anastrozole (ARIMIDEX) 1 MG tablet, Take 1 tablet by mouth daily, Disp: 30 tablet, Rfl: 3    donepezil (ARICEPT) 5 MG tablet, Take 1 tablet by mouth nightly, Disp: 30 tablet, Rfl: 3    metoprolol tartrate (LOPRESSOR) 25 MG tablet, Take 0.5 tablets by mouth 2 times daily (Patient taking differently: Take 1 tablet by mouth 2 times daily), Disp: 60 tablet, Rfl: 3    OXcarbazepine (TRILEPTAL) 600 MG tablet, Take 1 tablet by mouth 2 times daily, Disp: 60 tablet, Rfl: 3    pantoprazole (PROTONIX) 40 MG tablet, Take 1 tablet by mouth every morning (before breakfast), Disp: 30 tablet, Rfl: 3    sertraline (ZOLOFT) 100 MG tablet, Take 1 tablet by mouth daily, Disp: 30 tablet, Rfl: 0    simvastatin (ZOCOR) 20 MG tablet, Take 1 tablet by mouth nightly, Disp: 30 tablet, Rfl: 3    Review of Systems:   General: denies weight gain, denies loss of appetite, fever, chills, night sweats.  HEENT: denies headaches, dizziness, head trauma, visual changes, eye pain, tinnitus, nosebleeds, hoarseness or throat pain    Respiratory: denies chest pain, dyspnea, cough and hemoptysis  Cardiovascular: denies orthopnea, paroxysmal nocturnal dyspnea, leg swelling, and previous heart attack.    Gastrointestinal: denies pain, nausea vomiting, diarrhea, constipation, melena or bleeding.  Genitourinary: denies hematuria, frequency, urgency or dysuria  Neurology: denies syncope, seizures, paralysis, paraesthesia   Endocrine: denies polyuria, polydipsia, skin or hair changes, and heat or cold intolerance  Musculoskeletal: denies joint pain, swelling, arthritis or myalgia  Hematologic:

## 2025-06-04 NOTE — PROGRESS NOTES
4 Eyes Skin Assessment     NAME:  Susanna Arriola  YOB: 1955  MEDICAL RECORD NUMBER:  [id number]    The patient is being assessed for  Admission    I agree that at least one RN has performed a thorough Head to Toe Skin Assessment on the patient. ALL assessment sites listed below have been assessed.      Areas assessed by both nurses:    Head, Face, Ears, Shoulders, Back, Chest, Arms, Elbows, Hands, Sacrum. Buttock, Coccyx, Ischium, Legs. Feet and Heels, and Under Medical Devices         Does the Patient have a Wound? Yes wound(s) were present on assessment. LDA wound assessment was Initiated and completed by RN    Coccyx is pink and blanchable  Midline incision healing with small open area  Ostomy and surrounding skin red, painful and with small scattered areas that are bleeding, Please refer to photo.       Media Information      Document Information    Wound Care Image: Wound   Ostomy   06/04/2025 13:31   Attached To:   Hospital Encounter on 6/3/25   Source Information    Zeinab Miller, RN  Sjwz 2 Icu   Document History           Deshaun Prevention initiated by RN: Yes  Wound Care Orders initiated by RN: Yes    Pressure Injury (Stage 3,4, Unstageable, DTI, NWPT, and Complex wounds) if present, place Wound referral order by RN under : No    New Ostomies, if present place, Ostomy referral order under : Yes     Nurse 1 eSignature: Electronically signed by Radha Mckeon RN on 6/4/25 at 2:15 PM EDT    **SHARE this note so that the co-signing nurse can place an eSignature**    Nurse 2 eSignature: Electronically signed by Zeinab Miller RN on 6/4/25 at 3:18 PM EDT

## 2025-06-04 NOTE — CONSULTS
CONSULTATION NOTE :ID     Patient - Susanna Arriola,  Age - 70 y.o.    - 1955      Room Number -    MRN -  [id number]   [id number]  Date of Admission -  6/3/2025  3:04 PM  Patient's PCP: Lay Hunter MD     Requesting Physician: Romeo Drake DO    HISTORY OF PRESENT ILLNESS   This is a 70 y.o. female who was admitted on 6/3/2025   to the hospital with a chief complaints of No chief complaint on file.    ID was consulted on 25 for antibiotic management   UNKnonw to ID   Daughter present  Pt from home with change of MS weakness fatigue dec appetite     Last admission  Severe sepsis secondary to Klebsiella urinary tract infection  Abnormal appearance of the gallbladder with stones and sludge, chronic calculus cholecystitis  Recent hospitalization for perforated sigmoid diverticulitis status post exploratory laparotomy with sigmoid colon resection, open intra-abdominal abscess drainage and end descending colostomy creation  Pt was on ATBX PTA    Tmax99.3 afebrile on RA  bp stable wbc33.9->43.1 cr1.6->1.4   UA nitrite LE wbc bacteria     CT ABDOMEN PELVIS W IV CONTRAST Additional Contrast? None  Result Date: 6/3/2025  1. Worsening pancolitis, now severe, extending from the cecum through the left lower quadrant colostomy. 2. New mild ascites, probably resulting from the severe colitis described above. 3. Stable changes of partial left sigmoid resection with a left lower quadrant colostomy. 4. Stable changes of polycystic liver disease. 5. Stable nonobstructive 3 mm calculus in the upper pole of the right kidney. 6. Stable small amount of cortical scarring of the lateral lower pole of the right kidney with mild marginal calcification. 7. New mild pericardial effusion.     CT HEAD WO CONTRAST  Result Date: 6/3/2025  No acute intracranial abnormality. Chronic postoperative and postprocedural findings as above.     XR CHEST PORTABLE  Result Date: 6/3/2025  No  cecum through the left lower quadrant colostomy. 2. New mild ascites, probably resulting from the severe colitis described above. 3. Stable changes of partial left sigmoid resection with a left lower quadrant colostomy. 4. Stable changes of polycystic liver disease. 5. Stable nonobstructive 3 mm calculus in the upper pole of the right kidney. 6. Stable small amount of cortical scarring of the lateral lower pole of the right kidney with mild marginal calcification. 7. New mild pericardial effusion.     CT HEAD WO CONTRAST  Result Date: 6/3/2025  No acute intracranial abnormality. Chronic postoperative and postprocedural findings as above.     XR CHEST PORTABLE  Result Date: 6/3/2025  No acute process.        Micro:    Results       Procedure Component Value Units Date/Time    Clostridium Difficile Toxin/Antigen [[id number]]     Order Status: Sent Specimen: Stool     Culture, MRSA, Screening [[id number]]     Order Status: Sent Specimen: Nares     Culture, Urine [[id number]] Collected: 06/04/25 0045    Order Status: Sent Specimen: Urine, clean catch Updated: 06/04/25 0054    C. difficile toxin Molecular [[id number]]     Order Status: Sent Specimen: Stool     Blood Culture 1 [[id number]] Collected: 06/03/25 1608    Order Status: Sent Specimen: Blood Updated: 06/03/25 1658    Culture, Blood 2 [[id number]] Collected: 06/03/25 1608    Order Status: Sent Specimen: Blood Updated: 06/03/25 1658            Problem list of patient      Patient Active Problem List   Diagnosis Code    Atrial fibrillation with RVR (HCC) I48.91    Perforated diverticulitis K57.92    Shock (HCC) R57.9    Sepsis secondary to UTI (HCC) A41.9    Sepsis due to urinary tract infection (HCC) A41.9, N39.0    Persistent atrial fibrillation (HCC) I48.19    Septic shock (HCC) A41.9, R65.21         ASSESSMENT AND PLAN   Leukocytosis  Pancolits prob cdiff   Recent hospitalization for perforated sigmoid diverticulitis, status post exploratory laparotomy with

## 2025-06-04 NOTE — H&P
Department of Internal Medicine  History and Physical    PCP: Lay Hunter MD  Admitting Physician: Dr. Drake/Edil  Consultants:   Date of Service: 6/3/2025    CHIEF COMPLAINT:  weakness/fatigue    HISTORY OF PRESENT ILLNESS:    Patient is 70-year-old female presented to the ED due to increased weakness and fatigue.  Patient lives with family currently and uses a rollator for ambulatory assistance.  Patient had been more fatigued lately.  Patient has also been experiencing diminished appetite.  As such patient presented to the ED for further evaluation and management.  Denies any increased output from ostomy.  Denies any abdominal discomfort.  She has had episodes of emesis.  Otherwise on exam patient is lethargic and unable to provide appropriate history.  Son was at bedside and provided most of the history.      Incomplete       Pt  Lives with family  Brain anuerysm  Emesis  Very fatigue  Diminished appetite  Rollator                 PAST MEDICAL Hx:  Past Medical History:   Diagnosis Date    Atrial fibrillation (HCC)     Dementia (HCC)     History of cardioversion 10/2021       PAST SURGICAL Hx:   Past Surgical History:   Procedure Laterality Date    COLECTOMY N/A 3/16/2025    OPEN SIGMOID COLON RESECTION; COLOSTOMY performed by Levi Bunn MD at [facility] OR       FAMILY Hx:  No family history on file.    HOME MEDICATIONS:  Prior to Admission medications    Medication Sig Start Date End Date Taking? Authorizing Provider   amiodarone (CORDARONE) 200 MG tablet Take 1 tablet by mouth 2 times daily 5/8/25   Rishi Ruiz APRN - CNP   zonisamide (ZONEGRAN) 100 MG capsule Take 2 capsules by mouth in the morning and at bedtime 5/8/25   Rishi Ruiz APRN - CNP   midodrine (PROAMATINE) 5 MG tablet Take 1 tablet by mouth 3 times daily (with meals) 5/8/25   Rishi Ruiz APRN - CNP   furosemide (LASIX) 40 MG tablet Take 1 tablet by mouth every 3 days 5/8/25   Rishi Ruiz APRN - CNP   warfarin (COUMADIN) 2 MG

## 2025-06-04 NOTE — PLAN OF CARE
Problem: Safety - Adult  Goal: Free from fall injury  Outcome: Progressing     Problem: Discharge Planning  Goal: Discharge to home or other facility with appropriate resources  Outcome: Progressing     Problem: Pain  Goal: Verbalizes/displays adequate comfort level or baseline comfort level  Outcome: Progressing     Problem: Skin/Tissue Integrity  Goal: Skin integrity remains intact  Description: 1.  Monitor for areas of redness and/or skin breakdown2.  Assess vascular access sites hourly3.  Every 4-6 hours minimum:  Change oxygen saturation probe site4.  Every 4-6 hours:  If on nasal continuous positive airway pressure, respiratory therapy assess nares and determine need for appliance change or resting period  Outcome: Progressing     Problem: ABCDS Injury Assessment  Goal: Absence of physical injury  Outcome: Progressing  Flowsheets (Taken 6/4/2025 6777)  Absence of Physical Injury: Implement safety measures based on patient assessment     Problem: Confusion  Goal: Confusion, delirium, dementia, or psychosis is improved or at baseline  Description: INTERVENTIONS:1. Assess for possible contributors to thought disturbance, including medications, impaired vision or hearing, underlying metabolic abnormalities, dehydration, psychiatric diagnoses, and notify attending LIP2. [place] high risk fall precautions, as indicated3. Provide frequent short contacts to provide reality reorientation, refocusing and direction4. Decrease environmental stimuli, including noise as appropriate5. Monitor and intervene to maintain adequate nutrition, hydration, elimination, sleep and activity6. If unable to ensure safety without constant attention obtain sitter and review sitter guidelines with assigned personnel7. Initiate Psychosocial CNS and Spiritual Care consult, as indicated  INTERVENTIONS:1. Assess for possible contributors to thought disturbance, including medications, impaired vision or hearing, underlying metabolic

## 2025-06-04 NOTE — CONSULTS
Continuous  pantoprazole (PROTONIX) injection 40 mg, BID  vancomycin (VANCOCIN) 50 mg/mL oral solution 500 mg, 4 times per day  amiodarone (CORDARONE) 450 mg in dextrose 5 % 250 mL infusion, Continuous  metoprolol tartrate (LOPRESSOR) tablet 25 mg, Once  phytonadione (ADULT) (VITAMIN K) 1 mg in sodium chloride 0.9 % 100 mL IVPB, Once  sodium chloride 0.9 % bolus 500 mL, Once  dilTIAZem 125 mg in sodium chloride 0.9 % 125 mL infusion, Continuous  sodium chloride 0.9 % bolus 500 mL, Once  glucose chewable tablet 16 g, PRN  dextrose bolus 10% 125 mL, PRN   Or  dextrose bolus 10% 250 mL, PRN  glucagon injection 1 mg, PRN  dextrose 10 % infusion, Continuous PRN  sodium chloride flush 0.9 % injection 5-40 mL, 2 times per day  sodium chloride flush 0.9 % injection 5-40 mL, PRN  0.9 % sodium chloride infusion, PRN  potassium chloride (KLOR-CON M) extended release tablet 40 mEq, PRN   Or  potassium bicarb-citric acid (EFFER-K) effervescent tablet 40 mEq, PRN   Or  potassium chloride 10 mEq/100 mL IVPB (Peripheral Line), PRN  magnesium sulfate 2000 mg in 50 mL IVPB premix, PRN  polyethylene glycol (GLYCOLAX) packet 17 g, Daily PRN  acetaminophen (TYLENOL) tablet 650 mg, Q6H PRN   Or  acetaminophen (TYLENOL) suppository 650 mg, Q6H PRN  amiodarone (CORDARONE) tablet 200 mg, BID  anastrozole (ARIMIDEX) tablet 1 mg, Daily  donepezil (ARICEPT) tablet 5 mg, Nightly  [Held by provider] metoprolol tartrate (LOPRESSOR) tablet 25 mg, BID  OXcarbazepine (TRILEPTAL) tablet 600 mg, BID  sertraline (ZOLOFT) tablet 100 mg, Daily  zonisamide (ZONEGRAN) capsule 200 mg, BID  prochlorperazine (COMPAZINE) tablet 10 mg, Q8H PRN   Or  prochlorperazine (COMPAZINE) injection 10 mg, Q6H PRN  warfarin placeholder: dosing by pharmacy, RX Placeholder  0.9 % sodium chloride infusion, Continuous  midodrine (PROAMATINE) tablet 15 mg, TID WC        Allergies   Betadine [povidone iodine]    Social History     Social History       Tobacco History

## 2025-06-04 NOTE — CONSULTS
Palliative Care Department  [phone]  Palliative Care Initial Consult  Provider MARLYN Morrison CNP    Susanna Arriola  [id number]  Hospital Day: 2  Date of Initial Consult: 6/4/2025  Referring Provider: HARSHIL Alfonso  Palliative Medicine was consulted for assistance with: Goals of care and code status    HPI:   Susanna Arriola is a 70 y.o. with a past medical history of atrial fibrillation on warfarin, dementia, perforated diverticulitis s/p colostomy placement, valvular heart disease, CHF, hypertension, hyperlipidemia, depression, breast cancer who was admitted on 6/3/2025 from home with a CHIEF COMPLAINT of weakness and hypotension. Patient was hospitalized in March for perforated diverticulitis and underwent exploratory laparotomy with sigmoid colon resection, open intraabdominal abscess drainage, and end descending colostomy placement on 3/16/2025. Patient was just hospitalized 5/4/2025 - 5/8/2025 for sepsis due to UTI.   ER labs significant for BUN 33, creatinine 1.6, lactic acid 4.6, troponin 17-16, proBNP 3,391, alk phos, WBC 33.9, PT 73.3, INR 6.6. Chest x-ray negative. CT of the abdomen pelvis showed worsening pancolitis now severe extending from the cecum through the left lower quadrant colostomy, new mild ascites, stable chances of polycystic liver disease, new mild pericardial effusion. She became more hypotensive in the ED and required vasopressor support. She developed atrial fibrillation with RVR. She is being admitted to the ICU. Infectious diease and general surgery have been consulted. Palliative care was consulted for goals of care and code status discussion.    ASSESSMENT/PLAN:     Pertinent Hospital Diagnoses     Septic shock   Severe pancolitis   Perforated diverticulitis s/p exploratory laparotomy with sigmoid colon resection and end descending colostomy placement on 3/16/2025  Supratherapeutic INR on warfarin      Palliative Care Encounter / Counseling Regarding Goals of       Mouth/Throat:      Lips: Pink.      Mouth: Mucous membranes are dry.   Cardiovascular:      Rate and Rhythm: Normal rate. Rhythm irregular.      Pulses: Normal pulses.   Pulmonary:      Effort: Pulmonary effort is normal. No accessory muscle usage or respiratory distress.   Skin:     General: Skin is warm and dry.      Coloration: Skin is pale.   Neurological:      Mental Status: She is lethargic.   Psychiatric:         Speech: Speech is delayed.         Cognition and Memory: Cognition is impaired.         Objective data reviewed: labs, images, records, medication use, vitals, and chart    Discussed patient and the plan of care with the other IDT members: Palliative Medicine IDT Team    Time/Communication  Greater than 50% of time spent, total 50 minutes in counseling and coordination of care at the bedside regarding goals of care, diagnosis and prognosis, and see above.    Thank you for allowing Palliative Medicine to participate in the care of Susanna Arriola.    Note: This report was completed using computerHappiest Minds voiced recognition software.  Every effort has been made to ensure accuracy; however, inadvertent computerized transcription errors may be present.

## 2025-06-04 NOTE — PROGRESS NOTES
Pharmacy Consultation Note  (Warfarin Dosing and Monitoring)    Initial consult date: 6/3/2025  Consulting Provider: Dr Laquita Arriola is a 70 y.o. female for whom pharmacy has been asked to manage warfarin therapy.     Weight:   Wt Readings from Last 1 Encounters:   06/04/25 82.6 kg (182 lb)       TSH:    Lab Results   Component Value Date/Time    TSH 1.66 06/04/2025 05:31 AM       Hepatic Function Panel:                            Lab Results   Component Value Date/Time    ALKPHOS 121 06/04/2025 05:31 AM    ALT 16 06/04/2025 05:31 AM    AST 25 06/04/2025 05:31 AM    BILITOT 0.3 06/04/2025 05:31 AM    BILIDIR <0.2 06/03/2025 04:08 PM    IBILI Can not be calculated 06/03/2025 04:08 PM       Current significant warfarin drug-drug interactions include: amiodarone (incr INR) and metronidazole (incr INR)    Vitamin K or Blood product  Administration Date    Vitamin K 1 mg IV @ 0915  6/4         Recent Labs     06/03/25  1608 06/04/25  0531 06/04/25  1031   * 391  --    INR 6.6* 9.4* >10.0*   HGB 14.2 12.1  --        Date Warfarin Dose INR Heparin or LMWH Comment   6/3 HOLD 6.6 --    6/4 HOLD > 10.0 -- *Amiodarone PTA home med; but started on Metronidazole IV yesterday.                           Assessment:  Patient is a 70 y.o. female on warfarin for Atrial Fibrillation.  Patient's home warfarin dosing regimen is 2 mg daily.   Goal INR 2 - 3  INR >10.0 today    Plan:  Hold warfarin tonight  Daily PT/INR until the INR is stable within the therapeutic range  Pharmacist will follow and monitor/adjust dosing as necessary    Thank you for this consult,    Maria C Hudson, PharmD.  6/4/2025 11:53 AM    REYNA: [phone]

## 2025-06-04 NOTE — PROGRESS NOTES
Pharmacy Consultation Note  (Warfarin Dosing and Monitoring)    Initial consult date: 6/3/2025  Consulting Provider: Dr Coelho      Vitamin K or Blood product  Administration Date                 Hepatic Function Panel:                          Lab Results   Component Value Date/Time    ALKPHOS 131 06/03/2025 04:08 PM    ALT 16 06/03/2025 04:08 PM    AST 23 06/03/2025 04:08 PM    BILITOT 0.3 06/03/2025 04:08 PM    BILIDIR <0.2 06/03/2025 04:08 PM    IBILI Can not be calculated 06/03/2025 04:08 PM       Recent Labs     06/03/25  1608   HGB 14.2   *       Date Warfarin Dose INR Heparin or LMWH Comment   6/3 HOLD 6.6 --                                  Assessment:  Patient is a 70 y.o. female on warfarin for Atrial Fibrillation.  Patient's home warfarin dosing regimen is 2 mg daily.   Goal INR 2 - 3  INR 6.6 today    Plan:  Hold warfarin tonight  Daily PT/INR until the INR is stable within the therapeutic range  Pharmacist will follow and monitor/adjust dosing as necessary    Thank you for this consult,      Escobar Ambrose, PharmD, BCEMP 6/3/2025 9:28 PM   [phone]

## 2025-06-04 NOTE — PROGRESS NOTES
Internal Medicine Progress Note     [facility]     Romeo Drake D.O., ALICIAI.                         Shen Solo D.O., ALICIAILisa Jara D.O.     Jack Moscoso, MSN, APRN-CNP  Rishi Ruiz, MSN, APRN, NP-C  Fernanda Tay, MSN, APRN-CNP  Conchis Bradford, MSN, APRN, NP-C     Primary Care Physician: Lay Hunter MD   Admitting Physician:  Romeo Drake DO  Admission date and time: 6/3/2025  3:04 PM    Room:  04/04  Admitting diagnosis: Septic shock (HCC) [A41.9, R65.21]    Patient Name: Susanna Arriola  MRN: [id number]    Date of Service: 6/4/2025     Subjective:  Susanna is a 70 y.o. female who was seen and examined today,6/4/2025, at the bedside.  She remains boarded in the ER since admission.  Per nursing, the patient had poor peripheral IV access and required a central line due to the administration of vasopressors and diltiazem.  Central line was placed just prior to our examination by the ER resident.  The patient is awake but confused consistent with her recent baseline.  We discussed the patient's care with the nursing staff.  There are no family members present during my examination.  History collection and subjective data collection from the patient is limited due to her degree of dementia.    Review of System: Limited in the setting of advanced dementia  Constitutional:   Does not disclose fever or chills, weight loss or gain, positive for fatigue and malaise  HEENT:   Does not disclose ear pain, sore throat, sinus or eye problems.  Cardiovascular:   Does not disclose any chest pain, positive for palpitations in the setting of atrial fibrillation with rapid ventricular response  Respiratory:   Does not disclose shortness of breath, coughing, sputum production, hemoptysis, or wheezing.  Gastrointestinal:   Positive for abdominal pain, nausea, vomiting, decreased ostomy output.  Genitourinary:    Does not disclose any urgency, frequency, hematuria. Voiding

## 2025-06-05 LAB
ALBUMIN SERPL-MCNC: 2.4 G/DL (ref 3.5–5.2)
ALP SERPL-CCNC: 136 U/L (ref 35–104)
ALT SERPL-CCNC: 20 U/L (ref 0–32)
ANION GAP SERPL CALCULATED.3IONS-SCNC: 13 MMOL/L (ref 7–16)
ANION GAP SERPL CALCULATED.3IONS-SCNC: 17 MMOL/L (ref 7–16)
AST SERPL-CCNC: 32 U/L (ref 0–31)
BASOPHILS # BLD: 0 K/UL (ref 0–0.2)
BASOPHILS NFR BLD: 0 % (ref 0–2)
BILIRUB DIRECT SERPL-MCNC: <0.2 MG/DL (ref 0–0.3)
BILIRUB INDIRECT SERPL-MCNC: ABNORMAL MG/DL (ref 0–1)
BILIRUB SERPL-MCNC: 0.3 MG/DL (ref 0–1.2)
BUN SERPL-MCNC: 46 MG/DL (ref 6–23)
BUN SERPL-MCNC: 48 MG/DL (ref 6–23)
C DIFF GDH + TOXINS A+B STL QL IA.RAPID: POSITIVE
CALCIUM SERPL-MCNC: 7.8 MG/DL (ref 8.6–10.2)
CALCIUM SERPL-MCNC: 8.4 MG/DL (ref 8.6–10.2)
CHLORIDE SERPL-SCNC: 106 MMOL/L (ref 98–107)
CHLORIDE SERPL-SCNC: 110 MMOL/L (ref 98–107)
CO2 SERPL-SCNC: 16 MMOL/L (ref 22–29)
CO2 SERPL-SCNC: 17 MMOL/L (ref 22–29)
CREAT SERPL-MCNC: 1.7 MG/DL (ref 0.5–1)
CREAT SERPL-MCNC: 1.8 MG/DL (ref 0.5–1)
EOSINOPHIL # BLD: 0 K/UL (ref 0.05–0.5)
EOSINOPHILS RELATIVE PERCENT: 0 % (ref 0–6)
ERYTHROCYTE [DISTWIDTH] IN BLOOD BY AUTOMATED COUNT: 16.5 % (ref 11.5–15)
GFR, ESTIMATED: 30 ML/MIN/1.73M2
GFR, ESTIMATED: 33 ML/MIN/1.73M2
GLUCOSE SERPL-MCNC: 140 MG/DL (ref 74–99)
GLUCOSE SERPL-MCNC: 150 MG/DL (ref 74–99)
HCT VFR BLD AUTO: 38.4 % (ref 34–48)
HGB BLD-MCNC: 12.2 G/DL (ref 11.5–15.5)
INR PPP: 8.1
LACTATE BLDV-SCNC: 1.9 MMOL/L (ref 0.5–2.2)
LYMPHOCYTES NFR BLD: 0.92 K/UL (ref 1.5–4)
LYMPHOCYTES RELATIVE PERCENT: 2 % (ref 20–42)
MAGNESIUM SERPL-MCNC: 2.1 MG/DL (ref 1.6–2.6)
MCH RBC QN AUTO: 24.8 PG (ref 26–35)
MCHC RBC AUTO-ENTMCNC: 31.8 G/DL (ref 32–34.5)
MCV RBC AUTO: 78 FL (ref 80–99.9)
METAMYELOCYTES ABSOLUTE COUNT: 0.46 K/UL (ref 0–0.12)
METAMYELOCYTES: 1 % (ref 0–1)
MICROORGANISM SPEC CULT: ABNORMAL
MICROORGANISM SPEC CULT: ABNORMAL
MONOCYTES NFR BLD: 1.84 K/UL (ref 0.1–0.95)
MONOCYTES NFR BLD: 3 % (ref 2–12)
NEUTROPHILS NFR BLD: 94 % (ref 43–80)
NEUTS SEG NFR BLD: 53.38 K/UL (ref 1.8–7.3)
PHOSPHATE SERPL-MCNC: 4.3 MG/DL (ref 2.5–4.5)
PLATELET # BLD AUTO: 392 K/UL (ref 130–450)
PMV BLD AUTO: 10.4 FL (ref 7–12)
POTASSIUM SERPL-SCNC: 3.7 MMOL/L (ref 3.5–5)
POTASSIUM SERPL-SCNC: 3.9 MMOL/L (ref 3.5–5)
PROCALCITONIN SERPL-MCNC: 0.94 NG/ML (ref 0–0.08)
PROT SERPL-MCNC: 5.1 G/DL (ref 6.4–8.3)
PROTHROMBIN TIME: 89.9 SEC (ref 9.3–12.4)
RBC # BLD AUTO: 4.92 M/UL (ref 3.5–5.5)
RBC # BLD: ABNORMAL 10*6/UL
SERVICE CMNT-IMP: ABNORMAL
SODIUM SERPL-SCNC: 139 MMOL/L (ref 132–146)
SODIUM SERPL-SCNC: 140 MMOL/L (ref 132–146)
SPECIMEN DESCRIPTION: ABNORMAL
SPECIMEN DESCRIPTION: ABNORMAL
WBC OTHER # BLD: 56.6 K/UL (ref 4.5–11.5)

## 2025-06-05 PROCEDURE — 99232 SBSQ HOSP IP/OBS MODERATE 35: CPT | Performed by: NURSE PRACTITIONER

## 2025-06-05 PROCEDURE — 83735 ASSAY OF MAGNESIUM: CPT

## 2025-06-05 PROCEDURE — 2580000003 HC RX 258: Performed by: INTERNAL MEDICINE

## 2025-06-05 PROCEDURE — 97165 OT EVAL LOW COMPLEX 30 MIN: CPT

## 2025-06-05 PROCEDURE — 82248 BILIRUBIN DIRECT: CPT

## 2025-06-05 PROCEDURE — 85610 PROTHROMBIN TIME: CPT

## 2025-06-05 PROCEDURE — 36592 COLLECT BLOOD FROM PICC: CPT

## 2025-06-05 PROCEDURE — 6370000000 HC RX 637 (ALT 250 FOR IP): Performed by: SPECIALIST

## 2025-06-05 PROCEDURE — 6370000000 HC RX 637 (ALT 250 FOR IP): Performed by: INTERNAL MEDICINE

## 2025-06-05 PROCEDURE — 2580000003 HC RX 258: Performed by: NURSE PRACTITIONER

## 2025-06-05 PROCEDURE — 80053 COMPREHEN METABOLIC PANEL: CPT

## 2025-06-05 PROCEDURE — 2580000003 HC RX 258

## 2025-06-05 PROCEDURE — 84100 ASSAY OF PHOSPHORUS: CPT

## 2025-06-05 PROCEDURE — 97530 THERAPEUTIC ACTIVITIES: CPT | Performed by: PHYSICAL THERAPIST

## 2025-06-05 PROCEDURE — 6360000002 HC RX W HCPCS: Performed by: INTERNAL MEDICINE

## 2025-06-05 PROCEDURE — 97161 PT EVAL LOW COMPLEX 20 MIN: CPT | Performed by: PHYSICAL THERAPIST

## 2025-06-05 PROCEDURE — 99291 CRITICAL CARE FIRST HOUR: CPT | Performed by: INTERNAL MEDICINE

## 2025-06-05 PROCEDURE — 99222 1ST HOSP IP/OBS MODERATE 55: CPT | Performed by: SURGERY

## 2025-06-05 PROCEDURE — 84145 PROCALCITONIN (PCT): CPT

## 2025-06-05 PROCEDURE — 80048 BASIC METABOLIC PNL TOTAL CA: CPT

## 2025-06-05 PROCEDURE — 2500000003 HC RX 250 WO HCPCS: Performed by: INTERNAL MEDICINE

## 2025-06-05 PROCEDURE — 85025 COMPLETE CBC W/AUTO DIFF WBC: CPT

## 2025-06-05 PROCEDURE — 2000000000 HC ICU R&B

## 2025-06-05 PROCEDURE — 6360000002 HC RX W HCPCS

## 2025-06-05 PROCEDURE — 6360000002 HC RX W HCPCS: Performed by: NURSE PRACTITIONER

## 2025-06-05 RX ORDER — DIGOXIN 0.25 MG/ML
125 INJECTION INTRAMUSCULAR; INTRAVENOUS EVERY 8 HOURS
Status: DISCONTINUED | OUTPATIENT
Start: 2025-06-05 | End: 2025-06-05

## 2025-06-05 RX ORDER — LACTOBACILLUS RHAMNOSUS GG 10B CELL
1 CAPSULE ORAL
Status: DISCONTINUED | OUTPATIENT
Start: 2025-06-06 | End: 2025-06-09 | Stop reason: HOSPADM

## 2025-06-05 RX ORDER — DIGOXIN 0.25 MG/ML
125 INJECTION INTRAMUSCULAR; INTRAVENOUS EVERY 4 HOURS
Status: COMPLETED | OUTPATIENT
Start: 2025-06-05 | End: 2025-06-05

## 2025-06-05 RX ADMIN — PANTOPRAZOLE SODIUM 40 MG: 40 INJECTION, POWDER, FOR SOLUTION INTRAVENOUS at 09:01

## 2025-06-05 RX ADMIN — PANTOPRAZOLE SODIUM 40 MG: 40 INJECTION, POWDER, FOR SOLUTION INTRAVENOUS at 20:43

## 2025-06-05 RX ADMIN — METRONIDAZOLE 500 MG: 5 INJECTION, SOLUTION INTRAVENOUS at 11:57

## 2025-06-05 RX ADMIN — SERTRALINE 100 MG: 50 TABLET, FILM COATED ORAL at 09:01

## 2025-06-05 RX ADMIN — METOPROLOL TARTRATE 25 MG: 25 TABLET, FILM COATED ORAL at 09:01

## 2025-06-05 RX ADMIN — MIDODRINE HYDROCHLORIDE 15 MG: 10 TABLET ORAL at 17:07

## 2025-06-05 RX ADMIN — METOPROLOL TARTRATE 25 MG: 25 TABLET, FILM COATED ORAL at 20:55

## 2025-06-05 RX ADMIN — ANASTROZOLE 1 MG: 1 TABLET ORAL at 09:58

## 2025-06-05 RX ADMIN — OXCARBAZEPINE 600 MG: 300 TABLET, FILM COATED ORAL at 20:55

## 2025-06-05 RX ADMIN — CEFEPIME 2000 MG: 2 INJECTION, POWDER, FOR SOLUTION INTRAVENOUS at 06:50

## 2025-06-05 RX ADMIN — PHENYLEPHRINE HYDROCHLORIDE 15 MCG/MIN: 50 INJECTION INTRAVENOUS at 15:22

## 2025-06-05 RX ADMIN — ZONISAMIDE 200 MG: 100 CAPSULE ORAL at 20:55

## 2025-06-05 RX ADMIN — SODIUM CHLORIDE: 0.9 INJECTION, SOLUTION INTRAVENOUS at 18:19

## 2025-06-05 RX ADMIN — DIGOXIN 125 MCG: 0.25 INJECTION INTRAMUSCULAR; INTRAVENOUS at 12:04

## 2025-06-05 RX ADMIN — PROCHLORPERAZINE EDISYLATE 10 MG: 5 INJECTION INTRAMUSCULAR; INTRAVENOUS at 09:02

## 2025-06-05 RX ADMIN — VANCOMYCIN HYDROCHLORIDE 500 MG: 10 INJECTION, POWDER, LYOPHILIZED, FOR SOLUTION INTRAVENOUS at 06:50

## 2025-06-05 RX ADMIN — VANCOMYCIN HYDROCHLORIDE 500 MG: 10 INJECTION, POWDER, LYOPHILIZED, FOR SOLUTION INTRAVENOUS at 12:01

## 2025-06-05 RX ADMIN — OXCARBAZEPINE 600 MG: 300 TABLET, FILM COATED ORAL at 09:58

## 2025-06-05 RX ADMIN — DIGOXIN 125 MCG: 0.25 INJECTION INTRAMUSCULAR; INTRAVENOUS at 17:07

## 2025-06-05 RX ADMIN — MIDODRINE HYDROCHLORIDE 15 MG: 10 TABLET ORAL at 12:02

## 2025-06-05 RX ADMIN — Medication 10 ML: at 09:02

## 2025-06-05 RX ADMIN — Medication 10 ML: at 20:43

## 2025-06-05 RX ADMIN — ZONISAMIDE 200 MG: 100 CAPSULE ORAL at 09:58

## 2025-06-05 RX ADMIN — VANCOMYCIN HYDROCHLORIDE 500 MG: 10 INJECTION, POWDER, LYOPHILIZED, FOR SOLUTION INTRAVENOUS at 23:38

## 2025-06-05 RX ADMIN — METRONIDAZOLE 500 MG: 5 INJECTION, SOLUTION INTRAVENOUS at 04:52

## 2025-06-05 RX ADMIN — DIGOXIN 125 MCG: 0.25 INJECTION INTRAMUSCULAR; INTRAVENOUS at 20:44

## 2025-06-05 RX ADMIN — SODIUM CHLORIDE: 0.9 INJECTION, SOLUTION INTRAVENOUS at 09:17

## 2025-06-05 RX ADMIN — METRONIDAZOLE 500 MG: 5 INJECTION, SOLUTION INTRAVENOUS at 20:43

## 2025-06-05 RX ADMIN — DONEPEZIL HYDROCHLORIDE 5 MG: 5 TABLET ORAL at 20:55

## 2025-06-05 RX ADMIN — VANCOMYCIN HYDROCHLORIDE 500 MG: 10 INJECTION, POWDER, LYOPHILIZED, FOR SOLUTION INTRAVENOUS at 17:31

## 2025-06-05 RX ADMIN — MIDODRINE HYDROCHLORIDE 15 MG: 10 TABLET ORAL at 09:04

## 2025-06-05 RX ADMIN — PROCHLORPERAZINE EDISYLATE 10 MG: 5 INJECTION INTRAMUSCULAR; INTRAVENOUS at 03:06

## 2025-06-05 RX ADMIN — PHYTONADIONE 1 MG: 10 INJECTION, EMULSION INTRAMUSCULAR; INTRAVENOUS; SUBCUTANEOUS at 09:18

## 2025-06-05 ASSESSMENT — PAIN SCALES - GENERAL
PAINLEVEL_OUTOF10: 0

## 2025-06-05 ASSESSMENT — PAIN SCALES - PAIN ASSESSMENT IN ADVANCED DEMENTIA (PAINAD)
BREATHING: NORMAL
BODYLANGUAGE: RELAXED
CONSOLABILITY: NO NEED TO CONSOLE
BREATHING: NORMAL
FACIALEXPRESSION: SMILING OR INEXPRESSIVE
FACIALEXPRESSION: SMILING OR INEXPRESSIVE
BREATHING: NORMAL
FACIALEXPRESSION: SMILING OR INEXPRESSIVE
TOTALSCORE: 2
BODYLANGUAGE: RELAXED
NEGVOCALIZATION: OCCASIONAL MOAN/GROAN, LOW SPEECH, NEGATIVE/DISAPPROVING QUALITY
FACIALEXPRESSION: SMILING OR INEXPRESSIVE
FACIALEXPRESSION: SMILING OR INEXPRESSIVE
TOTALSCORE: 0
CONSOLABILITY: NO NEED TO CONSOLE
FACIALEXPRESSION: SAD, FRIGHTENED, FROWN
BODYLANGUAGE: RELAXED
FACIALEXPRESSION: SMILING OR INEXPRESSIVE
TOTALSCORE: 0
CONSOLABILITY: NO NEED TO CONSOLE
BREATHING: NORMAL
BREATHING: NORMAL
TOTALSCORE: 0
FACIALEXPRESSION: SMILING OR INEXPRESSIVE
CONSOLABILITY: NO NEED TO CONSOLE
BREATHING: NORMAL
BREATHING: NORMAL
FACIALEXPRESSION: SMILING OR INEXPRESSIVE
BREATHING: NORMAL
CONSOLABILITY: NO NEED TO CONSOLE
CONSOLABILITY: NO NEED TO CONSOLE
BODYLANGUAGE: RELAXED
BODYLANGUAGE: RELAXED
CONSOLABILITY: NO NEED TO CONSOLE
BODYLANGUAGE: RELAXED
TOTALSCORE: 0
CONSOLABILITY: NO NEED TO CONSOLE
BREATHING: NORMAL
FACIALEXPRESSION: SMILING OR INEXPRESSIVE
TOTALSCORE: 0
BODYLANGUAGE: RELAXED
TOTALSCORE: 0
CONSOLABILITY: NO NEED TO CONSOLE
CONSOLABILITY: NO NEED TO CONSOLE
TOTALSCORE: 0
TOTALSCORE: 0
BODYLANGUAGE: RELAXED
BREATHING: NORMAL
TOTALSCORE: 0

## 2025-06-05 NOTE — PLAN OF CARE
Problem: Safety - Adult  Goal: Free from fall injury  6/5/2025 1332 by Meredith Roberts RN  Outcome: Progressing  6/5/2025 1043 by Meredith Roberts RN  Outcome: Progressing  6/5/2025 0519 by Melanie Garcia RN  Outcome: Progressing     Problem: Discharge Planning  Goal: Discharge to home or other facility with appropriate resources  6/5/2025 1332 by Meredith Roberts RN  Outcome: Progressing  6/5/2025 1043 by Meredith Roberts RN  Outcome: Progressing     Problem: Pain  Goal: Verbalizes/displays adequate comfort level or baseline comfort level  6/5/2025 1332 by Meredith Roberts RN  Outcome: Progressing  6/5/2025 1043 by Meredith Roberts RN  Outcome: Progressing     Problem: Skin/Tissue Integrity  Goal: Skin integrity remains intact  Description: 1.  Monitor for areas of redness and/or skin breakdown2.  Assess vascular access sites hourly3.  Every 4-6 hours minimum:  Change oxygen saturation probe site4.  Every 4-6 hours:  If on nasal continuous positive airway pressure, respiratory therapy assess nares and determine need for appliance change or resting period  6/5/2025 1332 by Meredith Roberts RN  Outcome: Progressing  6/5/2025 1043 by Meredith Roberts RN  Outcome: Progressing     Problem: ABCDS Injury Assessment  Goal: Absence of physical injury  6/5/2025 1332 by Meredith Roberts RN  Outcome: Progressing  6/5/2025 1043 by Meredith Roberts RN  Outcome: Progressing     Problem: Confusion  Goal: Confusion, delirium, dementia, or psychosis is improved or at baseline  Description: INTERVENTIONS:1. Assess for possible contributors to thought disturbance, including medications, impaired vision or hearing, underlying metabolic abnormalities, dehydration, psychiatric diagnoses, and notify attending LIP2. [place] high risk fall precautions, as indicated3. Provide frequent short contacts to provide reality reorientation, refocusing and direction4. Decrease environmental stimuli, including noise as  DEANGELO Harper  Outcome: Progressing     Problem: Genitourinary - Adult  Goal: Absence of urinary retention  6/5/2025 1332 by Meredith Roberst RN  Outcome: Progressing  6/5/2025 1043 by Meredith Roberts RN  Outcome: Progressing  Goal: Urinary catheter remains patent  6/5/2025 1332 by Meredith Roberts RN  Outcome: Progressing  6/5/2025 1043 by Meredith Roberts RN  Outcome: Progressing     Problem: Infection - Adult  Goal: Absence of infection at discharge  6/5/2025 1332 by Meredith Roberts RN  Outcome: Progressing  6/5/2025 1043 by Meredith Roberts RN  Outcome: Progressing  Goal: Absence of infection during hospitalization  6/5/2025 1332 by Meredith Roberts RN  Outcome: Progressing  6/5/2025 1043 by Meredith Roberts RN  Outcome: Progressing  Goal: Absence of fever/infection during anticipated neutropenic period  6/5/2025 1332 by Meredith Roberts RN  Outcome: Progressing  6/5/2025 1043 by Meredith Roberts RN  Outcome: Progressing     Problem: Metabolic/Fluid and Electrolytes - Adult  Goal: Electrolytes maintained within normal limits  6/5/2025 1332 by Meredith Roberts RN  Outcome: Progressing  6/5/2025 1043 by Meredith Roberts RN  Outcome: Progressing  Goal: Hemodynamic stability and optimal renal function maintained  6/5/2025 1332 by Meredith Roberts RN  Outcome: Progressing  6/5/2025 1043 by Meredith Roberts RN  Outcome: Progressing     Problem: Hematologic - Adult  Goal: Maintains hematologic stability  6/5/2025 1332 by Meredith Roberts RN  Outcome: Progressing  6/5/2025 1043 by Meredith Roberts RN  Outcome: Progressing

## 2025-06-05 NOTE — PROGRESS NOTES
amiodarone drip.  Patient was given 1 dose of digoxin by cardiology.  Kidney function is improving.  White blood cells count has increased which may suggest C. difficile colitis.  Patient continues to be on Du-Synephrine.  Lactic acid has normalized.    June 5, 2025: Patient continues to be awake and interactive however she is weak and debilitated.  She was placed on digoxin 125 mcg 3 4 hours for 3 doses.  Lactic acid improved.  Patient has a stoma with significant output suggesting C. difficile colitis.  Patient is currently on oral vancomycin and IV Flagyl.  White blood cell count has significantly increased.  Patient's INR was elevated but improved from yesterday.  She was given an additional 1 mg of vitamin K today.    Past Medical History:  Past Medical History:   Diagnosis Date    Atrial fibrillation (HCC)     Dementia (HCC)     History of cardioversion 10/2021      Past Surgical History:   Procedure Laterality Date    COLECTOMY N/A 3/16/2025    OPEN SIGMOID COLON RESECTION; COLOSTOMY performed by Levi [facility]unn MD at [facility] OR       Family History:   No family history on file.     Allergies:         Betadine [povidone iodine]    Social history:  Social History     Socioeconomic History    Marital status:      Spouse name: Not on file    Number of children: Not on file    Years of education: Not on file    Highest education level: Not on file   Occupational History    Not on file   Tobacco Use    Smoking status: Never    Smokeless tobacco: Not on file   Substance and Sexual Activity    Alcohol use: Never    Drug use: Never    Sexual activity: Not on file   Other Topics Concern    Not on file   Social History Narrative    Not on file     Social Drivers of Health     Financial Resource Strain: Not on file   Food Insecurity: No Food Insecurity (6/4/2025)    Hunger Vital Sign     Worried About Running Out of Food in the Last Year: Never true     Ran Out of Food in the Last Year: Never true  Laquita, Ismail U, DO, 10 mL at 06/05/25 0902    sodium chloride flush 0.9 % injection 5-40 mL, 5-40 mL, IntraVENous, PRN, Laquita, Ismail U, DO    0.9 % sodium chloride infusion, , IntraVENous, PRN, Laquita, Ismail U, DO    potassium chloride (KLOR-CON M) extended release tablet 40 mEq, 40 mEq, Oral, PRN, 40 mEq at 06/04/25 0757 **OR** potassium bicarb-citric acid (EFFER-K) effervescent tablet 40 mEq, 40 mEq, Oral, PRN **OR** potassium chloride 10 mEq/100 mL IVPB (Peripheral Line), 10 mEq, IntraVENous, PRN, Laquita, Ismail U, DO    magnesium sulfate 2000 mg in 50 mL IVPB premix, 2,000 mg, IntraVENous, PRN, Laquita, Ismail U, DO    polyethylene glycol (GLYCOLAX) packet 17 g, 17 g, Oral, Daily PRN, Laquita, Ismail U, DO    acetaminophen (TYLENOL) tablet 650 mg, 650 mg, Oral, Q6H PRN **OR** acetaminophen (TYLENOL) suppository 650 mg, 650 mg, Rectal, Q6H PRN, Laquita, Ismail U, DO    anastrozole (ARIMIDEX) tablet 1 mg, 1 mg, Oral, Daily, Laquita, Ismail U, DO, 1 mg at 06/05/25 0958    donepezil (ARICEPT) tablet 5 mg, 5 mg, Oral, Nightly, Laquita, Ismail U, DO, 5 mg at 06/04/25 2027    metoprolol tartrate (LOPRESSOR) tablet 25 mg, 25 mg, Oral, BID, Katie Kapadia MD, 25 mg at 06/05/25 0901    OXcarbazepine (TRILEPTAL) tablet 600 mg, 600 mg, Oral, BID, Laquita, Ismail U, DO, 600 mg at 06/05/25 0958    sertraline (ZOLOFT) tablet 100 mg, 100 mg, Oral, Daily, Laquita, Ismail U, DO, 100 mg at 06/05/25 0901    zonisamide (ZONEGRAN) capsule 200 mg, 200 mg, Oral, BID, Laquita, Ismail U, DO, 200 mg at 06/05/25 0958    prochlorperazine (COMPAZINE) tablet 10 mg, 10 mg, Oral, Q8H PRN **OR** prochlorperazine (COMPAZINE) injection 10 mg, 10 mg, IntraVENous, Q6H PRN, Laquita, Ismail U, DO, 10 mg at 06/05/25 0902    warfarin placeholder: dosing by pharmacy, , Oral, RX Placeholder, Laquita, Ismail U, DO    0.9 % sodium chloride infusion, , IntraVENous, Continuous, Jack Moscoso, MARLYN - CNP, Last Rate: 100 mL/hr at 06/05/25 0917, New Bag at 06/05/25

## 2025-06-05 NOTE — DISCHARGE INSTR - COC
Continuity of Care Form    Patient Name: Susanna Arriola   :  1955  MRN:  [id number]    Admit date:  6/3/2025  Discharge date:  2025    Code Status Order: Full Code   Advance Directives:     Admitting Physician:  Romeo Drake DO  PCP: Lay Hunter MD    Discharging Nurse: DEANGELO Parra    Discharging Hospital Unit/Room#: IC07/IC07-01  Discharging Unit Phone Number: [phone]    Emergency Contact:   Extended Emergency Contact Information  Primary Emergency Contact: ZeinabErasmo KINJAL  Address: [address] [place] [address] [facility]  Home Phone: [phone]  Mobile Phone: [phone]  Relation: Spouse   needed? No  Secondary Emergency Contact: SHANELANNA SKY  Home Phone: [phone]  Relation: Child  Preferred language: English   needed? No    Past Surgical History:  Past Surgical History:   Procedure Laterality Date    COLECTOMY N/A 3/16/2025    OPEN SIGMOID COLON RESECTION; COLOSTOMY performed by Levi [facility]unn MD at [facility] OR       Immunization History:   Immunization History   Administered Date(s) Administered    COVID-19, MODERNA, , (age 12y+), IM, 50mcg/0.5mL 10/09/2024    COVID-19, PFIZER PURPLE top, DILUTE for use, (age 12 y+), 30mcg/0.3mL 2021, 2021, 10/12/2021       Active Problems:  Patient Active Problem List   Diagnosis Code    Atrial fibrillation with RVR (HCC) I48.91    Perforated diverticulitis K57.92    Shock (HCC) R57.9    Sepsis secondary to UTI (HCC) A41.9    Sepsis due to urinary tract infection (HCC) A41.9, N39.0    Persistent atrial fibrillation (HCC) I48.19    Septic shock (HCC) A41.9, R65.21    Palliative care encounter Z51.5       Isolation/Infection:   Isolation            C Diff Contact          Patient Infection Status        Infection Onset Added Last Indicated Last Indicated By Review Planned Expiration    C-diff (Clostridium difficile) 25  Score:  [facility] RISK OF UNPLANNED READMISSION 2.0             26.1 Total Score        Discharging to Facility/ Agency   Name: [facility]   Address: [address] [place] [address]   Phone: [phone]   Fax:    Dialysis Facility (if applicable)   Name:  Address:  Dialysis Schedule:  Phone:  Fax:    / signature: Electronically signed by Estela Parks RN on 6/5/25 at 1:10 PM EDT    PHYSICIAN SECTION    Prognosis: {Prognosis:3338347173}    Condition at Discharge: { Patient Condition:771379756}    Rehab Potential (if transferring to Rehab): {Prognosis:0279002704}    Recommended Labs or Other Treatments After Discharge: ***    Physician Certification: I certify the above information and transfer of Susanna Arriola  is necessary for the continuing treatment of the diagnosis listed and that she requires Skilled Nursing Facility for less 30 days.     Update Admission H&P: {CHP DME Changes in HandP:593591989}    PHYSICIAN SIGNATURE:  Electronically signed by MARLYN Haro CNP on 6/9/25 at 12:31 PM EDT

## 2025-06-05 NOTE — PROGRESS NOTES
Palliative Care Department  [phone]  Palliative Care Progress Note  Provider MARLYN Morrison CNP    Susanna Arriola  [id number]  Hospital Day: 3  Date of Initial Consult: 6/4/2025  Referring Provider: HARSHIL Alfonso  Palliative Medicine was consulted for assistance with: Goals of care and code status    HPI:   Susanna Arriola is a 70 y.o. with a past medical history of atrial fibrillation on warfarin, dementia, perforated diverticulitis s/p colostomy placement, valvular heart disease, CHF, hypertension, hyperlipidemia, depression, breast cancer who was admitted on 6/3/2025 from home with a CHIEF COMPLAINT of weakness and hypotension. Patient was hospitalized in March for perforated diverticulitis and underwent exploratory laparotomy with sigmoid colon resection, open intraabdominal abscess drainage, and end descending colostomy placement on 3/16/2025. Patient was just hospitalized 5/4/2025 - 5/8/2025 for sepsis due to UTI.   ER labs significant for BUN 33, creatinine 1.6, lactic acid 4.6, troponin 17-16, proBNP 3,391, alk phos, WBC 33.9, PT 73.3, INR 6.6. Chest x-ray negative. CT of the abdomen pelvis showed worsening pancolitis now severe extending from the cecum through the left lower quadrant colostomy, new mild ascites, stable chances of polycystic liver disease, new mild pericardial effusion. She became more hypotensive in the ED and required vasopressor support. She developed atrial fibrillation with RVR. She is being admitted to the ICU. Infectious diease and general surgery have been consulted. Palliative care was consulted for goals of care and code status discussion.    ASSESSMENT/PLAN:     Pertinent Hospital Diagnoses     Septic shock   Severe pancolitis   Perforated diverticulitis s/p exploratory laparotomy with sigmoid colon resection and end descending colostomy placement on 3/16/2025  Supratherapeutic INR on warfarin      Palliative Care Encounter / Counseling Regarding Goals of

## 2025-06-05 NOTE — CONSULTS
General Surgery Consult  [place] [facility]  Steve Barahona MD, MS    Patient's Name/Date of Birth: Susanna Arriola / 1955    Date: June 5, 2025     Consulting Surgeon: Steve Barahona MD    PCP: Lay Hunter MD     Chief Complaint: C. difficile colitis      Assessment:  Susanna Arriola is a 70 y.o. female with C. difficile colitis, pancolitis    Patient Active Problem List   Diagnosis    Atrial fibrillation with RVR (HCC)    Perforated diverticulitis    Shock (HCC)    Sepsis secondary to UTI (HCC)    Sepsis due to urinary tract infection (HCC)    Persistent atrial fibrillation (HCC)    Septic shock (HCC)    Palliative care encounter       Plan:  At this time her abdominal exam is not concerning for toxic megacolon or fulminant C. difficile colitis.  Per nursing she is at her baseline mentation and with a history of dementia  Vital signs have remained stable with good urine output.  Renal insufficiency seems near baseline mild acidemia with low bicarb.  Leukocytosis is elevated but is not indicative of her abdominal exam    I would recommend aggressive CODE STATUS discussion with regards to the patient's history of C. difficile and severe infection.      Okay for diet when okay with others  Antibiotics per infectious disease  No surgical plans at this time please call with any worsening in her clinical status will see as needed    HPI:   Susanna Arriola is a 70 y.o. female who presents for evaluation of abdominal pain, worsening colitis. Timing is constant, radiation to abdomen, alleviated by antibiotics and started unknown and severity is 7/10.  Patient was admitted a month ago with C. difficile colitis.  She returns with a CT examination concerning for worsening C. difficile colitis and pancolitis.  Patient has a history of dementia at baseline.      Patient Active Problem List   Diagnosis    Atrial fibrillation with RVR (HCC)    Perforated diverticulitis     (36.6 °C) (Axillary)   Resp 23   Ht 1.651 m (5' 5\")   Wt 78.8 kg (173 lb 11.6 oz)   SpO2 97%   BMI 28.91 kg/m²   General appearance:  NAD, appears stated age, resting comfortably arousable, pleasantly demented  Head: NCAT, PERRLA, EOMI, red conjunctiva  Neck: supple, no masses, trachea midline  Lungs: Equal chest rise bilateral, no retractions, no wheezing  Heart: Reg rate  Abdomen: soft, minimally tender nondistended, left lower quadrant ostomy in place with stool and air in the bag  Skin; warm and dry, no cyanosis  Gu: no cva tenderness  Extremities: atraumatic, no focal motor deficits, no open wounds  Psych: No tremor, visual hallucinations      Radiology: I reviewed relevant abdominal imaging from this admission and that available in the EMR including CT abd/pel My assessment is pancolitis end colostomy, no evidence of toxic megacolon or pneumatosis intestinalis      Time spent reviewing past medical, surgical, social and family history, vitals, nursing assessment and images.  Time spent face to face with patient and family counciling and discussing care exceeded 50% of the time of the consult. Additional time spent reviewing images and labs, discussing case with nursing, support staff and other physicians; as well as coordinating care.        NOTE: This report, in part or full, may have been transcribed using voice recognition software. Every effort was made to ensure accuracy; however, inadvertent computerized transcription errors may be present. Please excuse any transcriptional grammatical or spelling errors that may have escaped my editorial review.      Physician Signature: Electronically signed by Dr. Barahona  [phone] (p)

## 2025-06-05 NOTE — PROGRESS NOTES
Intensive Care Daily Quality Rounding Checklist      ICU Team Members:     Attending   Romeo Drake DO     Attending     Since 6/3/2025     [phone]     Additional Team Members   Estela Parks RN          Since 6/5/2025     [phone]       Una Corral MD     Consulting Physician - Pulmonary Disease     Since 6/3/2025     [phone]       Katie Kapadia MD     Consulting Physician - Cardiovascular Disease     Since 6/4/2025     [phone]       Levi Bunn MD     Consulting Physician     Since 6/4/2025     [phone]       Mago Cedillo, APRN - CNP     Consulting Physician - Hospice and Palliative Medicine     Since 6/4/2025     [phone]       Maura Wu MD     Consulting Physician - Infectious Diseases     Since 6/4/2025     [phone]       ICU Day #: NUMBER: 2    SOFA Score: 5    Intubation Date: N/A    Central Line Insertion Date: Imelda 4        Day #: NUMBER: 2        Indication: CVCIndication: Hemodynamically unstable requiring volume resuscitation     Arterial Line Insertion Date: N/A    Temporary Hemodialysis Catheter Insertion Date: N/A    DVT Prophylaxis: coumadin / currently held d/t INR    GI Prophylaxis: Protonix    Jimenes Catheter Insertion Date: Imelda 4       Day #: 2      Indications: Need for fluid management of the critically ill patient in a critical care setting      Continued need (if yes, reason documented and discussed with physician): yes, SHALINI    Skin Issues/ Wounds and ordered treatment discussed on rounds: Yes    Goals/ Plans for the Day: Improved labs and VS / ID to manage Atb therapy    Reviewed plan and goals for day with patient and/or representative: Yes    **SHARE this note so that the rounding nurse may edit**

## 2025-06-05 NOTE — PLAN OF CARE
Problem: Safety - Adult  Goal: Free from fall injury  Outcome: Progressing     Problem: Confusion  Goal: Confusion, delirium, dementia, or psychosis is improved or at baseline  Description: INTERVENTIONS:1. Assess for possible contributors to thought disturbance, including medications, impaired vision or hearing, underlying metabolic abnormalities, dehydration, psychiatric diagnoses, and notify attending LIP2. [place] high risk fall precautions, as indicated3. Provide frequent short contacts to provide reality reorientation, refocusing and direction4. Decrease environmental stimuli, including noise as appropriate5. Monitor and intervene to maintain adequate nutrition, hydration, elimination, sleep and activity6. If unable to ensure safety without constant attention obtain sitter and review sitter guidelines with assigned personnel7. Initiate Psychosocial CNS and Spiritual Care consult, as indicated  INTERVENTIONS:1. Assess for possible contributors to thought disturbance, including medications, impaired vision or hearing, underlying metabolic abnormalities, dehydration, psychiatric diagnoses, and notify attending LIP2. [place] high risk fall precautions, as indicated3. Provide frequent short contacts to provide reality reorientation, refocusing and direction4. Decrease environmental stimuli, including noise as appropriate5. Monitor and intervene to maintain adequate nutrition, hydration, elimination, sleep and activity6. If unable to ensure safety without constant attention obtain sitter and review sitter guidelines with assigned personnel7. Initiate Psychosocial CNS and Spiritual Care consult, as indicated  INTERVENTIONS:1. Assess for possible contributors to thought disturbance, including medications, impaired vision or hearing, underlying metabolic abnormalities, dehydration, psychiatric diagnoses, and notify attending LIP2. [place] high risk fall precautions, as indicated3. Provide frequent short  contacts to provide reality reorientation, refocusing and direction4. Decrease environmental stimuli, including noise as appropriate5. Monitor and intervene to maintain adequate nutrition, hydration, elimination, sleep and activity6. If unable to ensure safety without constant attention obtain sitter and review sitter guidelines with assigned personnel7. Initiate Psychosocial CNS and Spiritual Care consult, as indicated  INTERVENTIONS:1. Assess for possible contributors to thought disturbance, including medications, impaired vision or hearing, underlying metabolic abnormalities, dehydration, psychiatric diagnoses, and notify attending LIP2. [place] high risk fall precautions, as indicated3. Provide frequent short contacts to provide reality reorientation, refocusing and direction4. Decrease environmental stimuli, including noise as appropriate5. Monitor and intervene to maintain adequate nutrition, hydration, elimination, sleep and activity6. If unable to ensure safety without constant attention obtain sitter and review sitter guidelines with assigned personnel7. Initiate Psychosocial CNS and Spiritual Care consult, as indicated  Outcome: Progressing     Problem: Safety - Adult  Goal: Free from fall injury  Outcome: Progressing

## 2025-06-05 NOTE — PROGRESS NOTES
Internal Medicine Progress Note     [facility]     Romeo Drake D.O., ALICIAI.                         Shen Solo D.O., ALICIAILisa Jara D.O.     Jack Moscoso, MSN, APRN-CNP  Rishi Ruiz, MSN, APRN, NP-C  Fernanda Tay, MSN, APRN-CNP  Conchis Bradford, MSN, APRN, NP-C     Primary Care Physician: Lay Hunter MD   Admitting Physician:  Romeo Drake DO  Admission date and time: 6/3/2025  3:04 PM    Room:  [place] [address]  Admitting diagnosis: Kidney stone [N20.0]  Pericardial effusion [I31.39]  Pancolitis (HCC) [K51.00]  SHALINI (acute kidney injury) [N17.9]  Atrial fibrillation with RVR (HCC) [I48.91]  Septic shock (HCC) [A41.9, R65.21]    Patient Name: Susanna Arriola  MRN: [id number]    Date of Service: 6/5/2025     Subjective:  Susanna is a 70 y.o. female who was seen and examined today,6/5/2025, at the bedside.  Kelin remains hypotensive maintained on vasopressor support.  Ostomy output is noted and there is high clinical suspicion for C. difficile.  Multiple subspecialists are following in this rather complicated case with required hospitalization recently.  No family members were present during my examination.    Review of System:   Limited in the setting of advanced dementia  Constitutional:   Does not disclose fever or chills, weight loss or gain, positive for fatigue and malaise  HEENT:   Does not disclose ear pain, sore throat, sinus or eye problems.  Cardiovascular:   Does not disclose any chest pain, positive for palpitations in the setting of atrial fibrillation with rapid ventricular response  Respiratory:   Does not disclose shortness of breath, coughing, sputum production, hemoptysis, or wheezing.  Gastrointestinal:   Persistent pain.  Yellow-colored/liquid output from the ostomy  Genitourinary:    Does not disclose any urgency, frequency, hematuria.  Voiding with use of a Jimenes catheter..  Extremities:   Chronic edema.  Neurology:    Does not disclose

## 2025-06-05 NOTE — PROGRESS NOTES
Minimal assist of 1    Sit to supine: Minimal assist of 1    Scooting: Minimal assist of 1     Transfers Sit to stand: Maximal assist of 1   Sit to stand: Minimal assist of 1    Ambulation    not assessed   Unable    > 10 feet using  WW vs HHA with Moderate assist of 1    ROM Within functional limits    Increase range of motion 10% of affected joints    Strength BUE:  refer to OT eval  RLE:  unable to rate/5  LLE:  unable to rate/5  Increase strength in affected mm groups by 1/3 grade   Balance Sitting EOB:  fair-  Dynamic Standing:  poor+ with HHA  Sitting EOB:  fair   Dynamic Standing: fair- with WW vs HHA     Patient is Alert & Oriented x none and follows one step directions and is very confused  Sensation:  Patient  denies numbness/tingling   Edema:  no   Endurance: fair - with HHA    Vitals: room air   Blood Pressure at rest  Blood Pressure during session    Heart Rate at rest  Heart Rate during session    SPO2 at rest %  SPO2 during session %     Patient education  Patient educated on role of Physical Therapy, risks of immobility, safety and plan of care, energy conservation,  importance of mobility while in hospital , ankle pumps, quad set and glut set for edema control, blood clot prevention, importance and purpose of adaptive device and adjusted to proper height for the patient., safety , and positioning for skin integrity and comfort     Patient response to education:   Pt verbalized understanding Pt demonstrated skill Pt requires further education in this area   Yes Partial Yes      Treatment:  Patient practiced and was instructed/facilitated in the following treatment: Patient Sat edge of bed 5 minutes with Minimal assist of 1 to increase dynamic sitting balance and activity tolerance. Pt performed bed mobility, transfer, sat EOB, scooted toward HOB.      Therapeutic Exercises:  not performed      At end of session, patient in bed with daughter present call light and phone within reach,  all lines and  tubes intact, nursing notified.      Patient would benefit from continued skilled Physical Therapy to improve functional independence and quality of life.         Patient's/ family goals   none stated    Time in    1017  Time out  1047    Total Treatment Time  10 minutes    Evaluation time includes thorough review of current medical information, gathering information on past medical history/social history and prior level of function, completion of standardized testing/informal observation of tasks, assessment of data, and development of Plan of care and goals.     CPT codes:  Low Complexity PT evaluation (23278)  Therapeutic activities (01227)   10 minutes  1 unit(s)    Puneet Shirley, PT

## 2025-06-05 NOTE — PLAN OF CARE
Problem: Safety - Adult  Goal: Free from fall injury  6/5/2025 1043 by Meredith Roberts, RN  Outcome: Progressing  6/5/2025 0519 by Melanie Garcia, RN  Outcome: Progressing     Problem: Discharge Planning  Goal: Discharge to home or other facility with appropriate resources  Outcome: Progressing     Problem: Pain  Goal: Verbalizes/displays adequate comfort level or baseline comfort level  Outcome: Progressing     Problem: Skin/Tissue Integrity  Goal: Skin integrity remains intact  Description: 1.  Monitor for areas of redness and/or skin breakdown2.  Assess vascular access sites hourly3.  Every 4-6 hours minimum:  Change oxygen saturation probe site4.  Every 4-6 hours:  If on nasal continuous positive airway pressure, respiratory therapy assess nares and determine need for appliance change or resting period  Outcome: Progressing     Problem: ABCDS Injury Assessment  Goal: Absence of physical injury  Outcome: Progressing     Problem: Confusion  Goal: Confusion, delirium, dementia, or psychosis is improved or at baseline  Description: INTERVENTIONS:1. Assess for possible contributors to thought disturbance, including medications, impaired vision or hearing, underlying metabolic abnormalities, dehydration, psychiatric diagnoses, and notify attending LIP2. [place] high risk fall precautions, as indicated3. Provide frequent short contacts to provide reality reorientation, refocusing and direction4. Decrease environmental stimuli, including noise as appropriate5. Monitor and intervene to maintain adequate nutrition, hydration, elimination, sleep and activity6. If unable to ensure safety without constant attention obtain sitter and review sitter guidelines with assigned personnel7. Initiate Psychosocial CNS and Spiritual Care consult, as indicated  INTERVENTIONS:1. Assess for possible contributors to thought disturbance, including medications, impaired vision or hearing, underlying metabolic abnormalities,  Initiate Psychosocial CNS and Spiritual Care consult, as indicated  6/5/2025 1043 by Meredith Roberts, RN  Outcome: Progressing  6/5/2025 0519 by Melanie Garcia RN  Outcome: Progressing     Problem: Neurosensory - Adult  Goal: Achieves stable or improved neurological status  Outcome: Progressing  Goal: Achieves maximal functionality and self care  Outcome: Progressing     Problem: Respiratory - Adult  Goal: Achieves optimal ventilation and oxygenation  Outcome: Progressing     Problem: Cardiovascular - Adult  Goal: Maintains optimal cardiac output and hemodynamic stability  Outcome: Progressing  Goal: Absence of cardiac dysrhythmias or at baseline  Outcome: Progressing     Problem: Skin/Tissue Integrity - Adult  Goal: Skin integrity remains intact  Description: 1.  Monitor for areas of redness and/or skin breakdown2.  Assess vascular access sites hourly3.  Every 4-6 hours minimum:  Change oxygen saturation probe site4.  Every 4-6 hours:  If on nasal continuous positive airway pressure, respiratory therapy assess nares and determine need for appliance change or resting period  Outcome: Progressing  Goal: Incisions, wounds, or drain sites healing without S/S of infection  Outcome: Progressing  Goal: Oral mucous membranes remain intact  Outcome: Progressing     Problem: Musculoskeletal - Adult  Goal: Return mobility to safest level of function  Outcome: Progressing  Goal: Return ADL status to a safe level of function  Outcome: Progressing  Goal: Maintain proper alignment of affected body part  Outcome: Progressing     Problem: Gastrointestinal - Adult  Goal: Minimal or absence of nausea and vomiting  Outcome: Progressing  Goal: Maintains or returns to baseline bowel function  Outcome: Progressing  Goal: Maintains adequate nutritional intake  Outcome: Progressing  Goal: Establish and maintain optimal ostomy function  Outcome: Progressing     Problem: Genitourinary - Adult  Goal: Absence of urinary

## 2025-06-05 NOTE — CARE COORDINATION
Case Management Assessment  Initial Evaluation    Date/Time of Evaluation: 6/5/2025 12:00 PM  Assessment Completed by: Estela Parks RN    If patient is discharged prior to next notation, then this note serves as note for discharge by case management.    Patient Name: Susanna Arriola                   YOB: 1955  Diagnosis: Kidney stone [N20.0]  Pericardial effusion [I31.39]  Pancolitis (HCC) [K51.00]  SHALINI (acute kidney injury) [N17.9]  Atrial fibrillation with RVR (HCC) [I48.91]  Septic shock (HCC) [A41.9, R65.21]                   Date / Time: 6/3/2025  3:04 PM    Patient Admission Status: Inpatient   Readmission Risk (Low < 19, Mod (19-27), High > 27): Readmission Risk Score: 26.1    Current PCP: Lay Hunter MD  PCP verified by CM? Yes    Chart Reviewed: Yes      History Provided by: Child/Family (maxx Gutiérrez)  Patient Orientation: Person, Self    Patient Cognition: Dementia / Early Alzheimer's    Hospitalization in the last 30 days (Readmission):  Yes    Readmission Assessment  Number of Days since last admission?: 8-30 days  Previous Disposition: Home with Family  Who is being Interviewed: Caregiver (per chart review)  What was the patient's/caregiver's perception as to why they think they needed to return back to the hospital?: Other (Comment) (weakness)  Did you visit your Primary Care Physician after you left the hospital, before you returned this time?: No  Why weren't you able to visit your PCP?: Other (Comment) (appt is in the future)  Did you see a specialist, such as Cardiac, Pulmonary, Orthopedic Physician, etc. after you left the hospital?: No  Who advised the patient to return to the hospital?: Self-referral  Does the patient report anything that got in the way of taking their medications?: No  In our efforts to provide the best possible care to you and others like you, can you think of anything that we could have done to help you after you left the hospital the first time, so  her to go there fro rehab, referral made will await response. PRECERT required. Electronically signed by Estela Parks RN on 6/5/2025 at 12:08 PM      The Plan for Transition of Care is related to the following treatment goals of Kidney stone [N20.0]  Pericardial effusion [I31.39]  Pancolitis (HCC) [K51.00]  SHALINI (acute kidney injury) [N17.9]  Atrial fibrillation with RVR (HCC) [I48.91]  Septic shock (HCC) [A41.9, R65.21]        Estela Parks RN  Case Management Department  Ph: [phone] Fax: [phone]

## 2025-06-05 NOTE — PROGRESS NOTES
Comprehensive Nutrition Assessment    Type and Reason for Visit:  Initial, Positive nutrition screen    Nutrition Recommendations/Plan:   Continue diet as ordered  Add Wound healing ONS (Godwin) BID and HC/HP ONS (Ensure Plus HP) BID to maximize nutrition and assist w/ wound healing.  Continue inpatient monitoring     Malnutrition Assessment:  Malnutrition Status:  At risk for malnutrition (06/05/25 1416)    Context:  Chronic Illness     Findings of the 6 clinical characteristics of malnutrition:  Energy Intake:  Mild decrease in energy intake  Weight Loss:  Unable to assess (possible fluid related wt shifts - current ascites, PMH CHF)     Body Fat Loss:  Unable to assess     Muscle Mass Loss:  Unable to assess    Fluid Accumulation:  Unable to assess     Strength:  Not Performed    Nutrition Assessment:    Pt admitted w/ septic shock w/ severe pancolitis, mild ascites, Afib RVR and SHALINI. Pt w/ significant output via stoma suggesting Cdiff colitis. Pt w/ recent admissions for septic shock d/t UTI and S/P exploratory laparotomy with sigmoid colon resection, open intra-abdominal abscess drainage, and end descending colostomy creation May 2025. Further PMH of dementia, seizures, CHF, breast CA, valvular HD and brain aneurysm. Diet just advanced from NPO. Continue diet as oredered. Will add Wound healing ONS (Godwin) BID and HC/HP ONS (Ensure Plus HP) BID to maximize nutrition and assist w/ wound healing.    Nutrition Related Findings:    A/O x1 at baseline, anxious/impulsive/restless/flat affect, Abd soft, ostomy tube, distension, nausea, diarrhea Wound Type: Multiple, Surgical Incision (Per wound care note: Coccyx is pink and blanchable, Midline incision healing with small open area, Ostomy and surrounding skin red, painful and with small scattered areas that are bleeding)       Current Nutrition Intake & Therapies:    Average Meal Intake: Unable to assess (diet just advanced)  Average Supplements Intake: None

## 2025-06-05 NOTE — CARE COORDINATION
6-5-Cm note: pt has been accepted at [facility] (formerly [facility]3], they will start PRECERT tomorrow for possible dc next week. Pt in ICU, septic, r/o c-diff. HENS initiated, will need a signed JANA. Electronically signed by Estela Parks RN on 6/5/2025 at 1:20 PM

## 2025-06-05 NOTE — PROGRESS NOTES
Nursing approved therapy session. Upon arrival the patient was supine with HOB elevated.  At end of session, patient was supine with HOB elevated with call light and phone within reach, all lines and tubes intact.  Overall patient demonstrated decreased independence and safety during completion of ADL/functional transfer/mobility tasks.  Pt would benefit from continued skilled OT to increase safety and independence with completion of ADL/IADL tasks for functional independence and quality of life.    Treatment:      Functional transfers: Facilitated transfers to/from EOB with cues for body alignment, safety and hand placement.  ADL completion: Self-care retraining for the above-mentioned ADLs; training on proper hand placement, safety technique, sequencing, and energy conservation techniques.  Postural Balance: Sitting/standing balance retraining to improve righting reactions with postural changes during ADLs.      Rehab Potential: Good for established goals.      Patient / Family Goal: None provided      Patient and/or family were instructed on functional diagnosis, prognosis/goals and OT plan of care. Demonstrated good understanding.     Eval Complexity: Low  History: Brief review of medical records and additional review of physical, cognitive, or psychosocial history related to current functional performance  Exam: 3+ performance deficits  Assistance/Modification: Mod assistance or modifications required to perform tasks. May have comorbidities that affect occupational performance.    Time In: 9:41 AM   Time Out: 9:57   Total Treatment Time: 0      Min Units   OT Eval Low 97165  X  1    OT Eval Medium 78480      OT Eval High 49832      OT Re-Eval 51468            ADL/Self Care 07124     Therapeutic Activities 34055       Therapeutic Ex 45298       Orthotic Management 10219       Manual 30451     Neuro Re-Ed 70297       Non-Billable Time        Evaluation Time additionally includes thorough review of current  medical information, gathering information on past medical history/social history and prior level of function, interpretation of standardized testing/informal observation of tasks, assessment of data and development of plan of care and goals.        Evaluating OT: Moisés Wei OTR/L; #[id number]

## 2025-06-05 NOTE — PROGRESS NOTES
ET Nurse  Admit Date: 6/3/2025  3:04 PM    Reason for consult:  colostomy    Significant history:    Past Medical History:   Diagnosis Date    Atrial fibrillation (HCC)     Dementia (HCC)     History of cardioversion 10/2021       Stoma assessment:  stoma yellowish color  Stoma  has slough tissue  Plan:  applied one piece  system  Skin looks improved today    Anny Leary RN 6/5/2025 12:18 PM

## 2025-06-05 NOTE — PROGRESS NOTES
NAME: Susanna Arriola  MR:  [id number]  :   1955  Admit Date:  6/3/2025    Elements of this note, were copied and pasted from Previous. Updates have been made where noted and reflect current exam and medical decision making from the DOS of this encounter.  CHIEF COMPLAINT     No chief complaint on file.    HISTORY OF PRESENT ILLNESS     Susanna Arriola is a 70 y.o. female admitted on 6/3/2025    Patient Active Problem List   Diagnosis    Atrial fibrillation with RVR (HCC)    Perforated diverticulitis    Shock (HCC)    Sepsis secondary to UTI (HCC)    Sepsis due to urinary tract infection (HCC)    Persistent atrial fibrillation (HCC)    Septic shock (HCC)    Palliative care encounter       This is a face to face encounter   25 afebrile vs stable daughter present more awake still responds has nausea surgery to see   Wbc56.6 cr1.8     Admitted for   Kidney stone [N20.0]  Pericardial effusion [I31.39]  Pancolitis (HCC) [K51.00]  SHALINI (acute kidney injury) [N17.9]  Atrial fibrillation with RVR (HCC) [I48.91]  Septic shock (HCC) [A41.9, R65.21]    ASSESSMENT/PLAN    Sepsis  Leukocytosis  Colitis presumed C diff      Antimicrobials:     ceFEPIme (MAXIPIME)  will stop   metroNIDAZOLE (FLAGYL) 500 mg in 0.9% NaCl 100 mL IVPB premix, Q8H  vancomycin (VANCOCIN) 50 mg/mL oral solution 500 mg, 4 times per day       Patient is tolerating medications. No reported adverse drug reactions.  ID will continue to trend fever curve, WBCs, worsening signs of infection  and adverse effects to intravenous antibiotics.    ID will continue to follow and make recommendations accordingly  Available labs, imaging studies, microbiologic studies have been reviewed with pt and family if present.    DISCHARGE PLANS    Please note that 30 minutes were spent on this case in regards to the intensity and complexity inherent to hospital inpatient or observation care associated with a confirmed or suspected infectious disease.  This  32*   ALT 16 16  --   --  20     No results found for: \"MRSAC\"    Radiology:    CT ABDOMEN PELVIS W IV CONTRAST Additional Contrast? None  Result Date: 6/3/2025  1. Worsening pancolitis, now severe, extending from the cecum through the left lower quadrant colostomy. 2. New mild ascites, probably resulting from the severe colitis described above. 3. Stable changes of partial left sigmoid resection with a left lower quadrant colostomy. 4. Stable changes of polycystic liver disease. 5. Stable nonobstructive 3 mm calculus in the upper pole of the right kidney. 6. Stable small amount of cortical scarring of the lateral lower pole of the right kidney with mild marginal calcification. 7. New mild pericardial effusion.     CT HEAD WO CONTRAST  Result Date: 6/3/2025  No acute intracranial abnormality. Chronic postoperative and postprocedural findings as above.     XR CHEST PORTABLE  Result Date: 6/3/2025  No acute process.       Imaging and labs were reviewed per medical records.     Thank you for involving me in the care of Susanna Arriola I will continue to follow. Please do not hesitate to call [phone] for any questions or concerns.    Electronically signed by Maura Wu MD on 6/5/2025 at 9:33 AM

## 2025-06-05 NOTE — PROGRESS NOTES
Cardiology  Progress Note      SUBJECTIVE: Confusion.  No acute distress.     Current Inpatient Medications  Current Facility-Administered Medications: phytonadione (ADULT) (VITAMIN K) 1 mg in sodium chloride 0.9 % 100 mL IVPB, 1 mg, IntraVENous, Once  ceFEPIme (MAXIPIME) 2,000 mg in sodium chloride 0.9 % 100 mL IVPB (addEASE), 2,000 mg, IntraVENous, Q12H  metroNIDAZOLE (FLAGYL) 500 mg in 0.9% NaCl 100 mL IVPB premix, 500 mg, IntraVENous, Q8H  phenylephrine (COREY-SYNEPHRINE) 50 mg in sodium chloride 0.9 % 250 mL infusion,  mcg/min, IntraVENous, Continuous  pantoprazole (PROTONIX) injection 40 mg, 40 mg, IntraVENous, BID  vancomycin (VANCOCIN) 50 mg/mL oral solution 500 mg, 500 mg, Oral, 4 times per day  sodium chloride 0.9 % bolus 500 mL, 500 mL, IntraVENous, Once  dilTIAZem 125 mg in sodium chloride 0.9 % 125 mL infusion, 2.5-15 mg/hr, IntraVENous, Continuous  sodium chloride 0.9 % bolus 500 mL, 500 mL, IntraVENous, Once  glucose chewable tablet 16 g, 4 tablet, Oral, PRN  dextrose bolus 10% 125 mL, 125 mL, IntraVENous, PRN **OR** dextrose bolus 10% 250 mL, 250 mL, IntraVENous, PRN  glucagon injection 1 mg, 1 mg, SubCUTAneous, PRN  dextrose 10 % infusion, , IntraVENous, Continuous PRN  sodium chloride flush 0.9 % injection 5-40 mL, 5-40 mL, IntraVENous, 2 times per day  sodium chloride flush 0.9 % injection 5-40 mL, 5-40 mL, IntraVENous, PRN  0.9 % sodium chloride infusion, , IntraVENous, PRN  potassium chloride (KLOR-CON M) extended release tablet 40 mEq, 40 mEq, Oral, PRN **OR** potassium bicarb-citric acid (EFFER-K) effervescent tablet 40 mEq, 40 mEq, Oral, PRN **OR** potassium chloride 10 mEq/100 mL IVPB (Peripheral Line), 10 mEq, IntraVENous, PRN  magnesium sulfate 2000 mg in 50 mL IVPB premix, 2,000 mg, IntraVENous, PRN  polyethylene glycol (GLYCOLAX) packet 17 g, 17 g, Oral, Daily PRN  acetaminophen (TYLENOL) tablet 650 mg, 650 mg, Oral, Q6H PRN **OR** acetaminophen (TYLENOL) suppository 650 mg, 650 mg,

## 2025-06-05 NOTE — PROGRESS NOTES
Pharmacy Consultation Note  (Warfarin Dosing and Monitoring)    Initial consult date: 6/3/2025  Consulting Provider: Dr Laquita Arriola is a 70 y.o. female for whom pharmacy has been asked to manage warfarin therapy.     Weight:   Wt Readings from Last 1 Encounters:   06/05/25 75.8 kg (167 lb 3.2 oz)       TSH:    Lab Results   Component Value Date/Time    TSH 1.66 06/04/2025 05:31 AM       Hepatic Function Panel:                            Lab Results   Component Value Date/Time    ALKPHOS 136 06/05/2025 04:22 AM    ALT 20 06/05/2025 04:22 AM    AST 32 06/05/2025 04:22 AM    BILITOT 0.3 06/05/2025 04:22 AM    BILIDIR <0.2 06/05/2025 04:22 AM    IBILI Can not be calculated 06/05/2025 04:22 AM       Current significant warfarin drug-drug interactions include: metronidazole (incr INR)    Vitamin K or Blood product  Administration Date    Vitamin K 1 mg IV @ 0915  6/4         Recent Labs     06/03/25  1608 06/04/25  0531 06/04/25  1031 06/05/25  0422   * 391  --  392   INR 6.6* 9.4* >10.0* 8.1*   HGB 14.2 12.1  --  12.2       Date Warfarin Dose INR Heparin or LMWH Comment   6/3 HOLD 6.6 --    6/4 HOLD > 10.0 -- *Amiodarone PTA home med; but started on Metronidazole IV yesterday.   Vitamin K 1 mg IVPB given   6/5 HOLD 8.1 -- Vitamin K 1 mg IVPB given  Amiodarone discontinued                   Assessment:  Patient is a 70 y.o. female on warfarin for Atrial Fibrillation.  Patient's home warfarin dosing regimen is 2 mg daily.   Goal INR 2 - 3  INR 8.1 today  Vitamin K 1 mg IVPB given on 6/4 and 6/5  Amiodarone discontinued 6/5    Plan:  Hold warfarin tonight  Daily PT/INR until the INR is stable within the therapeutic range  Pharmacist will follow and monitor/adjust dosing as necessary    Thank you for this consult,    Dorinda Aviles, PharmD, BCPS 6/5/2025 11:23 AM   Ext: [phone]    [facility]: [phone]

## 2025-06-05 NOTE — PLAN OF CARE
Problem: Safety - Adult  Goal: Free from fall injury  6/5/2025 1958 by Fernanda Pillai RN  Outcome: Progressing  6/5/2025 1332 by Meredith Roberts RN  Outcome: Progressing  6/5/2025 1043 by Merediht Roberts RN  Outcome: Progressing     Problem: Discharge Planning  Goal: Discharge to home or other facility with appropriate resources  6/5/2025 1958 by Fernanda Pillai RN  Outcome: Progressing  6/5/2025 1332 by Meredith Roberts RN  Outcome: Progressing  6/5/2025 1043 by Meredith Roberts RN  Outcome: Progressing     Problem: Pain  Goal: Verbalizes/displays adequate comfort level or baseline comfort level  6/5/2025 1958 by Fernanda Pillai RN  Outcome: Progressing  6/5/2025 1332 by Meredith Roberts RN  Outcome: Progressing  6/5/2025 1043 by Meredith Roberts RN  Outcome: Progressing     Problem: Skin/Tissue Integrity  Goal: Skin integrity remains intact  Description: 1.  Monitor for areas of redness and/or skin breakdown2.  Assess vascular access sites hourly3.  Every 4-6 hours minimum:  Change oxygen saturation probe site4.  Every 4-6 hours:  If on nasal continuous positive airway pressure, respiratory therapy assess nares and determine need for appliance change or resting period  6/5/2025 1958 by Fernanda Pillai RN  Outcome: Progressing  6/5/2025 1332 by Meredith Roberts RN  Outcome: Progressing  6/5/2025 1043 by Meredith Roberts RN  Outcome: Progressing     Problem: ABCDS Injury Assessment  Goal: Absence of physical injury  6/5/2025 1958 by Fernanda Pillai RN  Outcome: Progressing  6/5/2025 1332 by Meredith Roberts RN  Outcome: Progressing  6/5/2025 1043 by Meredith Roberts RN  Outcome: Progressing     Problem: Confusion  Goal: Confusion, delirium, dementia, or psychosis is improved or at baseline  Description: INTERVENTIONS:1. Assess for possible contributors to thought disturbance, including medications, impaired vision or hearing, underlying metabolic abnormalities, dehydration,  function  6/5/2025 1958 by Fernanda Pillai RN  Outcome: Progressing  6/5/2025 1332 by Meredith Roberts RN  Outcome: Progressing  6/5/2025 1043 by Meredith Roberts RN  Outcome: Progressing     Problem: Musculoskeletal - Adult  Goal: Maintain proper alignment of affected body part  6/5/2025 1958 by Fernanda Pillai RN  Outcome: Progressing  6/5/2025 1332 by Meredith Roberts RN  Outcome: Progressing  6/5/2025 1043 by Meredith Roberts RN  Outcome: Progressing     Problem: Gastrointestinal - Adult  Goal: Minimal or absence of nausea and vomiting  6/5/2025 1958 by Fernanda Pillai RN  Outcome: Progressing  6/5/2025 1332 by Meredith Roberts RN  Outcome: Progressing  6/5/2025 1043 by Meredith Roberts RN  Outcome: Progressing     Problem: Gastrointestinal - Adult  Goal: Maintains or returns to baseline bowel function  6/5/2025 1958 by Fernanda Pillai RN  Outcome: Progressing  6/5/2025 1332 by Meredith Roberts RN  Outcome: Progressing  6/5/2025 1043 by Meredith Roberts RN  Outcome: Progressing     Problem: Gastrointestinal - Adult  Goal: Maintains adequate nutritional intake  6/5/2025 1958 by Fernanda Pillai RN  Outcome: Progressing  6/5/2025 1332 by Meredith Roberts RN  Outcome: Progressing  6/5/2025 1043 by Meredith Roberts RN  Outcome: Progressing     Problem: Gastrointestinal - Adult  Goal: Establish and maintain optimal ostomy function  6/5/2025 1958 by Fernanda Pillai RN  Outcome: Progressing  6/5/2025 1332 by Meredith Roberts RN  Outcome: Progressing  6/5/2025 1043 by Meredith Roberts RN  Outcome: Progressing     Problem: Genitourinary - Adult  Goal: Absence of urinary retention  6/5/2025 1958 by Fernanda Pillai RN  Outcome: Progressing  6/5/2025 1332 by Meredith Roberts RN  Outcome: Progressing  6/5/2025 1043 by Meredith Roberts RN  Outcome: Progressing     Problem: Genitourinary - Adult  Goal: Urinary catheter remains patent  6/5/2025 1958 by Fernanda Pillai,

## 2025-06-06 LAB
1,3 BETA GLUCAN SER-MCNC: <31 PG/ML
1,3 BETA-D-GLUCAN INTERP: NEGATIVE
ALBUMIN SERPL-MCNC: 2.2 G/DL (ref 3.5–5.2)
ALP SERPL-CCNC: 99 U/L (ref 35–104)
ALT SERPL-CCNC: 15 U/L (ref 0–32)
ANION GAP SERPL CALCULATED.3IONS-SCNC: 10 MMOL/L (ref 7–16)
ANION GAP SERPL CALCULATED.3IONS-SCNC: 11 MMOL/L (ref 7–16)
AST SERPL-CCNC: 18 U/L (ref 0–31)
BASOPHILS # BLD: 0 K/UL (ref 0–0.2)
BASOPHILS NFR BLD: 0 % (ref 0–2)
BILIRUB DIRECT SERPL-MCNC: <0.2 MG/DL (ref 0–0.3)
BILIRUB INDIRECT SERPL-MCNC: ABNORMAL MG/DL (ref 0–1)
BILIRUB SERPL-MCNC: 0.2 MG/DL (ref 0–1.2)
BUN SERPL-MCNC: 37 MG/DL (ref 6–23)
BUN SERPL-MCNC: 46 MG/DL (ref 6–23)
CALCIUM SERPL-MCNC: 7.6 MG/DL (ref 8.6–10.2)
CALCIUM SERPL-MCNC: 7.7 MG/DL (ref 8.6–10.2)
CHLORIDE SERPL-SCNC: 112 MMOL/L (ref 98–107)
CHLORIDE SERPL-SCNC: 117 MMOL/L (ref 98–107)
CO2 SERPL-SCNC: 16 MMOL/L (ref 22–29)
CO2 SERPL-SCNC: 17 MMOL/L (ref 22–29)
CREAT SERPL-MCNC: 1.2 MG/DL (ref 0.5–1)
CREAT SERPL-MCNC: 1.5 MG/DL (ref 0.5–1)
EOSINOPHIL # BLD: 0 K/UL (ref 0.05–0.5)
EOSINOPHILS RELATIVE PERCENT: 0 % (ref 0–6)
ERYTHROCYTE [DISTWIDTH] IN BLOOD BY AUTOMATED COUNT: 16.6 % (ref 11.5–15)
GFR, ESTIMATED: 37 ML/MIN/1.73M2
GFR, ESTIMATED: 49 ML/MIN/1.73M2
GLUCOSE SERPL-MCNC: 125 MG/DL (ref 74–99)
GLUCOSE SERPL-MCNC: 135 MG/DL (ref 74–99)
HCT VFR BLD AUTO: 30 % (ref 34–48)
HGB BLD-MCNC: 9.5 G/DL (ref 11.5–15.5)
INR PPP: 2.5
LACTATE BLDV-SCNC: 1.1 MMOL/L (ref 0.5–2.2)
LYMPHOCYTES NFR BLD: 1 K/UL (ref 1.5–4)
LYMPHOCYTES RELATIVE PERCENT: 3 % (ref 20–42)
MAGNESIUM SERPL-MCNC: 2.2 MG/DL (ref 1.6–2.6)
MCH RBC QN AUTO: 24.6 PG (ref 26–35)
MCHC RBC AUTO-ENTMCNC: 31.7 G/DL (ref 32–34.5)
MCV RBC AUTO: 77.7 FL (ref 80–99.9)
MICROORGANISM SPEC CULT: NORMAL
MONOCYTES NFR BLD: 0.33 K/UL (ref 0.1–0.95)
MONOCYTES NFR BLD: 1 % (ref 2–12)
NEUTROPHILS NFR BLD: 97 % (ref 43–80)
NEUTS SEG NFR BLD: 36.57 K/UL (ref 1.8–7.3)
NUCLEATED RED BLOOD CELLS: 2 PER 100 WBC
PHOSPHATE SERPL-MCNC: 2.6 MG/DL (ref 2.5–4.5)
PLATELET # BLD AUTO: 322 K/UL (ref 130–450)
PMV BLD AUTO: 10.3 FL (ref 7–12)
POTASSIUM SERPL-SCNC: 3.8 MMOL/L (ref 3.5–5)
POTASSIUM SERPL-SCNC: 3.8 MMOL/L (ref 3.5–5)
PROCALCITONIN SERPL-MCNC: 0.66 NG/ML (ref 0–0.08)
PROT SERPL-MCNC: 4.3 G/DL (ref 6.4–8.3)
PROTHROMBIN TIME: 27.3 SEC (ref 9.3–12.4)
RBC # BLD AUTO: 3.86 M/UL (ref 3.5–5.5)
RBC # BLD: ABNORMAL 10*6/UL
SODIUM SERPL-SCNC: 140 MMOL/L (ref 132–146)
SODIUM SERPL-SCNC: 143 MMOL/L (ref 132–146)
SPECIMEN DESCRIPTION: NORMAL
WBC OTHER # BLD: 37.9 K/UL (ref 4.5–11.5)

## 2025-06-06 PROCEDURE — 82248 BILIRUBIN DIRECT: CPT

## 2025-06-06 PROCEDURE — 2580000003 HC RX 258: Performed by: INTERNAL MEDICINE

## 2025-06-06 PROCEDURE — 6360000002 HC RX W HCPCS: Performed by: INTERNAL MEDICINE

## 2025-06-06 PROCEDURE — 2500000003 HC RX 250 WO HCPCS: Performed by: INTERNAL MEDICINE

## 2025-06-06 PROCEDURE — 85025 COMPLETE CBC W/AUTO DIFF WBC: CPT

## 2025-06-06 PROCEDURE — 80053 COMPREHEN METABOLIC PANEL: CPT

## 2025-06-06 PROCEDURE — 6370000000 HC RX 637 (ALT 250 FOR IP): Performed by: INTERNAL MEDICINE

## 2025-06-06 PROCEDURE — 6360000002 HC RX W HCPCS: Performed by: NURSE PRACTITIONER

## 2025-06-06 PROCEDURE — 6370000000 HC RX 637 (ALT 250 FOR IP): Performed by: SPECIALIST

## 2025-06-06 PROCEDURE — 84100 ASSAY OF PHOSPHORUS: CPT

## 2025-06-06 PROCEDURE — 85610 PROTHROMBIN TIME: CPT

## 2025-06-06 PROCEDURE — 2580000003 HC RX 258: Performed by: NURSE PRACTITIONER

## 2025-06-06 PROCEDURE — 2000000000 HC ICU R&B

## 2025-06-06 PROCEDURE — 36415 COLL VENOUS BLD VENIPUNCTURE: CPT

## 2025-06-06 PROCEDURE — 83605 ASSAY OF LACTIC ACID: CPT

## 2025-06-06 PROCEDURE — 83735 ASSAY OF MAGNESIUM: CPT

## 2025-06-06 PROCEDURE — 6360000002 HC RX W HCPCS

## 2025-06-06 PROCEDURE — 84145 PROCALCITONIN (PCT): CPT

## 2025-06-06 PROCEDURE — 80048 BASIC METABOLIC PNL TOTAL CA: CPT

## 2025-06-06 PROCEDURE — 99291 CRITICAL CARE FIRST HOUR: CPT | Performed by: INTERNAL MEDICINE

## 2025-06-06 RX ORDER — WARFARIN SODIUM 2 MG/1
2 TABLET ORAL
Status: COMPLETED | OUTPATIENT
Start: 2025-06-06 | End: 2025-06-06

## 2025-06-06 RX ORDER — POTASSIUM CHLORIDE 29.8 MG/ML
20 INJECTION INTRAVENOUS ONCE
Status: COMPLETED | OUTPATIENT
Start: 2025-06-06 | End: 2025-06-06

## 2025-06-06 RX ADMIN — Medication 1 CAPSULE: at 08:49

## 2025-06-06 RX ADMIN — MIDODRINE HYDROCHLORIDE 15 MG: 10 TABLET ORAL at 12:22

## 2025-06-06 RX ADMIN — VANCOMYCIN HYDROCHLORIDE 500 MG: 10 INJECTION, POWDER, LYOPHILIZED, FOR SOLUTION INTRAVENOUS at 05:19

## 2025-06-06 RX ADMIN — VANCOMYCIN HYDROCHLORIDE 500 MG: 10 INJECTION, POWDER, LYOPHILIZED, FOR SOLUTION INTRAVENOUS at 12:22

## 2025-06-06 RX ADMIN — SODIUM CHLORIDE: 0.9 INJECTION, SOLUTION INTRAVENOUS at 13:34

## 2025-06-06 RX ADMIN — SODIUM CHLORIDE: 0.9 INJECTION, SOLUTION INTRAVENOUS at 03:29

## 2025-06-06 RX ADMIN — MIDODRINE HYDROCHLORIDE 15 MG: 10 TABLET ORAL at 08:48

## 2025-06-06 RX ADMIN — SODIUM CHLORIDE: 0.9 INJECTION, SOLUTION INTRAVENOUS at 23:16

## 2025-06-06 RX ADMIN — PROCHLORPERAZINE EDISYLATE 10 MG: 5 INJECTION INTRAMUSCULAR; INTRAVENOUS at 08:49

## 2025-06-06 RX ADMIN — METOPROLOL TARTRATE 25 MG: 25 TABLET, FILM COATED ORAL at 08:49

## 2025-06-06 RX ADMIN — METRONIDAZOLE 500 MG: 5 INJECTION, SOLUTION INTRAVENOUS at 04:46

## 2025-06-06 RX ADMIN — OXCARBAZEPINE 600 MG: 300 TABLET, FILM COATED ORAL at 08:48

## 2025-06-06 RX ADMIN — Medication 10 ML: at 21:54

## 2025-06-06 RX ADMIN — SERTRALINE 100 MG: 50 TABLET, FILM COATED ORAL at 08:49

## 2025-06-06 RX ADMIN — ANASTROZOLE 1 MG: 1 TABLET ORAL at 08:49

## 2025-06-06 RX ADMIN — Medication 10 ML: at 08:51

## 2025-06-06 RX ADMIN — ZONISAMIDE 200 MG: 100 CAPSULE ORAL at 08:49

## 2025-06-06 RX ADMIN — POTASSIUM CHLORIDE 20 MEQ: 29.8 INJECTION, SOLUTION INTRAVENOUS at 06:50

## 2025-06-06 RX ADMIN — METRONIDAZOLE 500 MG: 5 INJECTION, SOLUTION INTRAVENOUS at 19:50

## 2025-06-06 RX ADMIN — PHENYLEPHRINE HYDROCHLORIDE 35 MCG/MIN: 50 INJECTION INTRAVENOUS at 20:53

## 2025-06-06 RX ADMIN — WARFARIN SODIUM 2 MG: 2 TABLET ORAL at 18:25

## 2025-06-06 RX ADMIN — METRONIDAZOLE 500 MG: 5 INJECTION, SOLUTION INTRAVENOUS at 12:25

## 2025-06-06 RX ADMIN — PANTOPRAZOLE SODIUM 40 MG: 40 INJECTION, POWDER, FOR SOLUTION INTRAVENOUS at 21:54

## 2025-06-06 RX ADMIN — PANTOPRAZOLE SODIUM 40 MG: 40 INJECTION, POWDER, FOR SOLUTION INTRAVENOUS at 08:49

## 2025-06-06 RX ADMIN — VANCOMYCIN HYDROCHLORIDE 500 MG: 10 INJECTION, POWDER, LYOPHILIZED, FOR SOLUTION INTRAVENOUS at 18:28

## 2025-06-06 ASSESSMENT — PAIN SCALES - PAIN ASSESSMENT IN ADVANCED DEMENTIA (PAINAD)
BREATHING: NORMAL
BREATHING: NORMAL
TOTALSCORE: 0
FACIALEXPRESSION: SMILING OR INEXPRESSIVE
FACIALEXPRESSION: SAD, FRIGHTENED, FROWN
BODYLANGUAGE: RELAXED
CONSOLABILITY: DISTRACTED OR REASSURED BY VOICE/TOUCH
CONSOLABILITY: NO NEED TO CONSOLE
BODYLANGUAGE: TENSE, DISTRESSED PACING, FIDGETING
FACIALEXPRESSION: SMILING OR INEXPRESSIVE
CONSOLABILITY: NO NEED TO CONSOLE
TOTALSCORE: 0
FACIALEXPRESSION: SMILING OR INEXPRESSIVE
TOTALSCORE: 3
CONSOLABILITY: NO NEED TO CONSOLE
BODYLANGUAGE: RELAXED
TOTALSCORE: 0
BREATHING: NORMAL
BREATHING: NORMAL
BODYLANGUAGE: RELAXED

## 2025-06-06 ASSESSMENT — PAIN SCALES - GENERAL
PAINLEVEL_OUTOF10: 0

## 2025-06-06 NOTE — PROGRESS NOTES
Cardiology  Progress Note      SUBJECTIVE: Lethargic this morning.  She was apparently confused and somewhat agitated last night according to the night nurse.  No acute distress    Current Inpatient Medications  Current Facility-Administered Medications: lactobacillus (CULTURELLE) capsule 1 capsule, 1 capsule, Oral, Daily with breakfast  metroNIDAZOLE (FLAGYL) 500 mg in 0.9% NaCl 100 mL IVPB premix, 500 mg, IntraVENous, Q8H  phenylephrine (COREY-SYNEPHRINE) 50 mg in sodium chloride 0.9 % 250 mL infusion,  mcg/min, IntraVENous, Continuous  pantoprazole (PROTONIX) injection 40 mg, 40 mg, IntraVENous, BID  vancomycin (VANCOCIN) 50 mg/mL oral solution 500 mg, 500 mg, Oral, 4 times per day  sodium chloride 0.9 % bolus 500 mL, 500 mL, IntraVENous, Once  sodium chloride 0.9 % bolus 500 mL, 500 mL, IntraVENous, Once  glucose chewable tablet 16 g, 4 tablet, Oral, PRN  dextrose bolus 10% 125 mL, 125 mL, IntraVENous, PRN **OR** dextrose bolus 10% 250 mL, 250 mL, IntraVENous, PRN  glucagon injection 1 mg, 1 mg, SubCUTAneous, PRN  dextrose 10 % infusion, , IntraVENous, Continuous PRN  sodium chloride flush 0.9 % injection 5-40 mL, 5-40 mL, IntraVENous, 2 times per day  sodium chloride flush 0.9 % injection 5-40 mL, 5-40 mL, IntraVENous, PRN  0.9 % sodium chloride infusion, , IntraVENous, PRN  potassium chloride (KLOR-CON M) extended release tablet 40 mEq, 40 mEq, Oral, PRN **OR** potassium bicarb-citric acid (EFFER-K) effervescent tablet 40 mEq, 40 mEq, Oral, PRN **OR** potassium chloride 10 mEq/100 mL IVPB (Peripheral Line), 10 mEq, IntraVENous, PRN  magnesium sulfate 2000 mg in 50 mL IVPB premix, 2,000 mg, IntraVENous, PRN  polyethylene glycol (GLYCOLAX) packet 17 g, 17 g, Oral, Daily PRN  acetaminophen (TYLENOL) tablet 650 mg, 650 mg, Oral, Q6H PRN **OR** acetaminophen (TYLENOL) suppository 650 mg, 650 mg, Rectal, Q6H PRN  anastrozole (ARIMIDEX) tablet 1 mg, 1 mg, Oral, Daily  donepezil (ARICEPT) tablet 5 mg, 5 mg, Oral,

## 2025-06-06 NOTE — PROGRESS NOTES
Spiritual Health History and Assessment/Progress Note  Y  Levi Jorge    (P) Spiritual/Emotional Needs,  ,  ,      Name: Susanna Arriola MRN: [id number]    Age: 70 y.o.     Sex: female   Language: English   Amish: Gnosticism   Septic shock (HCC)     Date: 6/6/2025                           Spiritual Assessment began in [facility] 2 ICU        Referral/Consult From: (P) Rounding   Encounter Overview/Reason: (P) Spiritual/Emotional Needs  Service Provided For: (P) Family    Tala, Belief, Meaning:   Patient unable to assess at this time  Family/Friends are connected with a tala tradition or spiritual practice and have beliefs or practices that help with coping during difficult times      Importance and Influence:  Patient unable to assess at this time  Family/Friends have spiritual/personal beliefs that influence decisions regarding the patient's health    Community:    Family/Friends feel well-supported. Support system includes: Spouse/Partner    Assessment and Plan of Care:       Family/Friends Interventions include: Facilitated expression of thoughts and feelings and Affirmed coping skills/support systems    Patient Plan of Care: Spiritual Care available upon further referral  Family/Friends Plan of Care: Spiritual Care available upon further referral    Electronically signed by Chaplain [place] on 6/6/2025 at 1:30 PM

## 2025-06-06 NOTE — PROGRESS NOTES
Internal Medicine Progress Note     [facility]     Romeo Drake D.O., ALICIAI.                         Shen Solo D.O., ALICIAILisa Jara D.O.     Jack Moscoso, MSN, APRN-CNP  Rishi Ruiz, MSN, APRN, NP-C  Fernanda Tay, MSN, APRN-CNP  Conchis Bradford, MSN, APRN, NP-C     Primary Care Physician: Lay Hunter MD   Admitting Physician:  Romeo Drake DO  Admission date and time: 6/3/2025  3:04 PM    Room:  [place] [address]  Admitting diagnosis: Kidney stone [N20.0]  Pericardial effusion [I31.39]  Pancolitis (HCC) [K51.00]  SHALINI (acute kidney injury) [N17.9]  Atrial fibrillation with RVR (HCC) [I48.91]  Septic shock (HCC) [A41.9, R65.21]    Patient Name: Susanna Arriola  MRN: [id number]    Date of Service: 6/6/2025     Subjective:  Susanna is a 70 y.o. female who was seen and examined today,6/6/2025, at the bedside.  Susanna has significantly less ostomy output at this point.  C. difficile has returned positive as expected.  White blood cell count is now trending downward.  She is less responsive today.  We appreciate the input from the palliative care team who spoke with the family yesterday.    Review of System:   Limited in the setting of advanced dementia  Constitutional:   Does not disclose fever or chills, weight loss or gain, positive for fatigue and malaise  HEENT:   Does not disclose ear pain, sore throat, sinus or eye problems.  Cardiovascular:   Does not disclose any chest pain, positive for palpitations in the setting of atrial fibrillation with rapid ventricular response  Respiratory:   Does not disclose shortness of breath, coughing, sputum production, hemoptysis, or wheezing.  Gastrointestinal:   Persistent pain.  Yellow-colored/liquid output from the ostomy  Genitourinary:    Does not disclose any urgency, frequency, hematuria.  Voiding with use of a Jimenes catheter..  Extremities:   Chronic edema.  Neurology:    Does not disclose any headache or focal

## 2025-06-06 NOTE — PLAN OF CARE
Problem: Safety - Adult  Goal: Free from fall injury  Outcome: Progressing     Problem: Discharge Planning  Goal: Discharge to home or other facility with appropriate resources  Outcome: Progressing     Problem: Pain  Goal: Verbalizes/displays adequate comfort level or baseline comfort level  Outcome: Progressing     Problem: Skin/Tissue Integrity  Goal: Skin integrity remains intact  Description: 1.  Monitor for areas of redness and/or skin breakdown2.  Assess vascular access sites hourly3.  Every 4-6 hours minimum:  Change oxygen saturation probe site4.  Every 4-6 hours:  If on nasal continuous positive airway pressure, respiratory therapy assess nares and determine need for appliance change or resting period  Outcome: Progressing     Problem: ABCDS Injury Assessment  Goal: Absence of physical injury  Outcome: Progressing     Problem: Confusion  Goal: Confusion, delirium, dementia, or psychosis is improved or at baseline  Description: INTERVENTIONS:1. Assess for possible contributors to thought disturbance, including medications, impaired vision or hearing, underlying metabolic abnormalities, dehydration, psychiatric diagnoses, and notify attending LIP2. Loving high risk fall precautions, as indicated3. Provide frequent short contacts to provide reality reorientation, refocusing and direction4. Decrease environmental stimuli, including noise as appropriate5. Monitor and intervene to maintain adequate nutrition, hydration, elimination, sleep and activity6. If unable to ensure safety without constant attention obtain sitter and review sitter guidelines with assigned personnel7. Initiate Psychosocial CNS and Spiritual Care consult, as indicated  INTERVENTIONS:1. Assess for possible contributors to thought disturbance, including medications, impaired vision or hearing, underlying metabolic abnormalities, dehydration, psychiatric diagnoses, and notify attending LIP2. Loving high risk fall precautions, as

## 2025-06-06 NOTE — PROGRESS NOTES
started on amiodarone drip.  Patient was given 1 dose of digoxin by cardiology.  Kidney function is improving.  White blood cells count has increased which may suggest C. difficile colitis.  Patient continues to be on Du-Synephrine.  Lactic acid has normalized.    June 5, 2025: Patient continues to be awake and interactive however she is weak and debilitated.  She was placed on digoxin 125 mcg 3 4 hours for 3 doses.  Lactic acid improved.  Patient has a stoma with significant output suggesting C. difficile colitis.  Patient is currently on oral vancomycin and IV Flagyl.  White blood cell count has significantly increased.  Patient's INR was elevated but improved from yesterday.  She was given an additional 1 mg of vitamin K today.    June 6, 2025: Patient remains on Du-Synephrine 15 mcg/min.  White blood cells count is trending down.  Urine output has improved and creatinine is improving.  Output from stoma has improved.  Patient continues to be on oral vancomycin and IV Flagyl. INR has improved to 2.5.  Pharmacy is managing Coumadin.  Patient received a total of 2 mg of vitamin K which has corrected her Coumadin level to a therapeutic range.    Past Medical History:  Past Medical History:   Diagnosis Date    Atrial fibrillation (HCC)     Dementia (HCC)     History of cardioversion 10/2021      Past Surgical History:   Procedure Laterality Date    COLECTOMY N/A 3/16/2025    OPEN SIGMOID COLON RESECTION; COLOSTOMY performed by Levi Bunn MD at [facility] OR       Family History:   No family history on file.     Allergies:         Betadine [povidone iodine]    Social history:  Social History     Socioeconomic History    Marital status:      Spouse name: Not on file    Number of children: Not on file    Years of education: Not on file    Highest education level: Not on file   Occupational History    Not on file   Tobacco Use    Smoking status: Never    Smokeless tobacco: Not on file   Substance and Sexual  Activity    Alcohol use: Never    Drug use: Never    Sexual activity: Not on file   Other Topics Concern    Not on file   Social History Narrative    Not on file     Social Drivers of Health     Financial Resource Strain: Not on file   Food Insecurity: No Food Insecurity (6/4/2025)    Hunger Vital Sign     Worried About Running Out of Food in the Last Year: Never true     Ran Out of Food in the Last Year: Never true   Transportation Needs: No Transportation Needs (6/4/2025)    PRAPARE - Transportation     Lack of Transportation (Medical): No     Lack of Transportation (Non-Medical): No   Physical Activity: Not on file   Stress: Not on file   Social Connections: Not on file   Intimate Partner Violence: Not on file   Housing Stability: Low Risk  (6/4/2025)    Housing Stability Vital Sign     Unable to Pay for Housing in the Last Year: No     Number of Times Moved in the Last Year: 0     Homeless in the Last Year: No       Current Medications:     Current Facility-Administered Medications:     lactobacillus (CULTURELLE) capsule 1 capsule, 1 capsule, Oral, Daily with breakfast, Una Corral MD, 1 capsule at 06/06/25 0849    metroNIDAZOLE (FLAGYL) 500 mg in 0.9% NaCl 100 mL IVPB premix, 500 mg, IntraVENous, Q8H, Laquita, Ismail U, DO, Stopped at 06/06/25 0542    phenylephrine (COREY-SYNEPHRINE) 50 mg in sodium chloride 0.9 % 250 mL infusion,  mcg/min, IntraVENous, Continuous, Laquita, Ismail U, DO, Last Rate: 4.5 mL/hr at 06/06/25 0912, 15 mcg/min at 06/06/25 0912    pantoprazole (PROTONIX) injection 40 mg, 40 mg, IntraVENous, BID, Jack Moscoso APRN - CNP, 40 mg at 06/06/25 0849    vancomycin (VANCOCIN) 50 mg/mL oral solution 500 mg, 500 mg, Oral, 4 times per day, Una Corral MD, 500 mg at 06/06/25 0519    sodium chloride 0.9 % bolus 500 mL, 500 mL, IntraVENous, Once, Soun Galvan,     sodium chloride 0.9 % bolus 500 mL, 500 mL, IntraVENous, Once, Ines Contreras DO    glucose

## 2025-06-06 NOTE — PROGRESS NOTES
Intensive Care Daily Quality Rounding Checklist      ICU Team Members:    Romeo Drake DO      Attending      Since 6/3/2025      [phone]      Additional Team Members    Estela Parks RN            Since 6/5/2025      [phone]       Una Corral MD      Consulting Physician - Pulmonary Disease      Since 6/3/2025      [phone]       Katie Kapadia MD      Consulting Physician - Cardiovascular Disease      Since 6/4/2025      [phone]        Levi Bunn MD      Consulting Physician      Since 6/4/2025      [phone]       Mago Cedillo, APRN - CNP      Consulting Physician - Hospice and Palliative Medicine      Since 6/4/2025      [phone]       Maura Wu MD      Consulting Physician - Infectious Diseases      Since 6/4/2025      [phone]        ICU Day #: NUMBER: 3    SOFA Score:  4    Intubation Date:  n/a     Ventilator Day #: n/a    Central Line Insertion Date: Imelda 4        Day #: NUMBER:3        Indication: CVCIndication: Administration of vasopressors or vesicant medications     Arterial Line Insertion Date: n/a       Day #: n/a    Temporary Hemodialysis Catheter Insertion Date: n/a       Day # n/a    DVT Prophylaxis: SCD     GI Prophylaxis: protonix 40mg IV BID    Jimenes Catheter Insertion Date: Imelda 4       Day #:3      Indications: Urinary retention      Continued need (if yes, reason documented and discussed with physician): yes     Skin Issues/ Wounds and ordered treatment discussed on rounds: Y, healing incision abdomen     Goals/ Plans for the Day: Improve mentation / safety    Reviewed plan and goals for day with patient and/or representative: Yes    **SHARE this note so that the rounding nurse may edit**

## 2025-06-06 NOTE — PROGRESS NOTES
NAME: Susanna Arriola  MR:  [id number]  :   1955  Admit Date:  6/3/2025    Elements of this note, were copied and pasted from Previous. Updates have been made where noted and reflect current exam and medical decision making from the DOS of this encounter.  CHIEF COMPLAINT     No chief complaint on file.    HISTORY OF PRESENT ILLNESS     Susanna Arriola is a 70 y.o. female admitted on 6/3/2025    Patient Active Problem List   Diagnosis    Atrial fibrillation with RVR (HCC)    Perforated diverticulitis    Shock (HCC)    Sepsis secondary to UTI (HCC)    Sepsis due to urinary tract infection (HCC)    Persistent atrial fibrillation (HCC)    Septic shock (HCC)    Palliative care encounter       This is a face to face encounter   25 has sitter nad     afebrile vs stable daughter present more awake still responds has nausea surgery to see   Wbc56.6 cr1.8     Admitted for   Kidney stone [N20.0]  Pericardial effusion [I31.39]  Pancolitis (HCC) [K51.00]  SHALINI (acute kidney injury) [N17.9]  Atrial fibrillation with RVR (HCC) [I48.91]  Septic shock (HCC) [A41.9, R65.21]    ASSESSMENT/PLAN    Sepsis  Leukocytosis trending down no abd pain   Colitis   C diff +  MRSA neg       Antimicrobials:        metroNIDAZOLE (FLAGYL) 500 mg in 0.9% NaCl 100 mL IVPB premix, Q8H  vancomycin (VANCOCIN) 50 mg/mL oral solution 500 mg, 4 times per day       Patient is tolerating medications. No reported adverse drug reactions.  ID will continue to trend fever curve, WBCs, worsening signs of infection  and adverse effects to intravenous antibiotics.    ID will continue to follow and make recommendations accordingly  Available labs, imaging studies, microbiologic studies have been reviewed with pt and family if present.    DISCHARGE PLANS       As stated above      PHYSICAL EXAMINATION    BP (!) 102/93   Pulse 72   Temp 97.9 °F (36.6 °C) (Bladder)   Resp 15   Ht 1.651 m (5' 5\")   Wt 81.4 kg (179 lb 6.4 oz)   SpO2 98%   BMI  POSITIVE ORGANISMS    C. difficile toxin Molecular [[id number]] Collected: 06/03/25 1930    Order Status: Canceled Specimen: Stool     Blood Culture 1 [[id number]] Collected: 06/03/25 1608    Order Status: Completed Specimen: Blood Updated: 06/05/25 2129     Specimen Description .BLOOD     Special Requests          Culture NO GROWTH 2 DAYS    Culture, Blood 2 [[id number]] Collected: 06/03/25 1608    Order Status: Completed Specimen: Blood Updated: 06/05/25 2119     Specimen Description .BLOOD     Special Requests          Culture NO GROWTH 2 DAYS            No results found for: \"BC\", \"BLOODCULT2\", \"ORG\"  No results for input(s): \"CDIFFTOXANT\" in the last 72 hours.  No results found for: \"WNDABS\"  Recent Labs     06/04/25  0531 06/05/25  0422 06/06/25 0437   WBC 43.1* 56.6* 37.9*    392 322     Recent Labs     06/05/25  0422 06/05/25  1802 06/06/25  0437    139 140   K 3.9 3.7 3.8    110* 112*   CO2 17* 16* 17*   BUN 46* 48* 46*   CREATININE 1.8* 1.7* 1.5*   GLUCOSE 140* 150* 135*   CALCIUM 8.4* 7.8* 7.7*   BILITOT 0.3  --  0.2   ALKPHOS 136*  --  99   AST 32*  --  18   ALT 20  --  15     Lab Results   Component Value Date    SEDRATE 8 06/04/2025    SEDRATE 45 (H) 04/15/2025     Lab Results   Component Value Date    .0 (H) 06/04/2025    CRP 39.3 (H) 04/15/2025     Lab Results   Component Value Date    PROCAL 0.66 (H) 06/06/2025    PROCAL 0.94 (H) 06/05/2025     Recent Labs     06/04/25  0531 06/04/25  1031 06/04/25  1032 06/05/25  0422 06/06/25  0437   PROCAL 0.32*  --  0.60* 0.94* 0.66*   SEDRATE  --   --  8  --   --    CRP  --   --  377.0*  --   --    INR 9.4* >10.0*  --  8.1* 2.5   PROTIME 105.2* >120.0*  --  89.9* 27.3*   AST 25  --   --  32* 18   ALT 16  --   --  20 15     No results found for: \"MRSAC\"    Radiology:    CT ABDOMEN PELVIS W IV CONTRAST Additional Contrast? None  Result Date: 6/3/2025  1. Worsening pancolitis, now severe, extending from the cecum through the left lower

## 2025-06-06 NOTE — PROGRESS NOTES
Pharmacy Consultation Note  (Warfarin Dosing and Monitoring)    Initial consult date: 6/3/2025  Consulting Provider: Dr Laquita Arriola is a 70 y.o. female for whom pharmacy has been asked to manage warfarin therapy.     Weight:   Wt Readings from Last 1 Encounters:   06/06/25 81.4 kg (179 lb 6.4 oz)       TSH:    Lab Results   Component Value Date/Time    TSH 1.66 06/04/2025 05:31 AM       Hepatic Function Panel:                            Lab Results   Component Value Date/Time    ALKPHOS 99 06/06/2025 04:37 AM    ALT 15 06/06/2025 04:37 AM    AST 18 06/06/2025 04:37 AM    BILITOT 0.2 06/06/2025 04:37 AM    BILIDIR <0.2 06/06/2025 04:37 AM    IBILI Can not be calculated 06/06/2025 04:37 AM       Current significant warfarin drug-drug interactions include: metronidazole (incr INR)    Vitamin K or Blood product  Administration Date    Vitamin K 1 mg IV @ 0915  6/4         Recent Labs     06/04/25  0531 06/04/25  1031 06/05/25  0422 06/06/25  0437     --  392 322   INR 9.4* >10.0* 8.1* 2.5   HGB 12.1  --  12.2 9.5*       Date Warfarin Dose INR Heparin or LMWH Comment   6/3 HOLD 6.6 --    6/4 HOLD > 10.0 -- *Amiodarone PTA home med; but started on Metronidazole IV yesterday.   Vitamin K 1 mg IVPB given   6/5 HOLD 8.1 -- Vitamin K 1 mg IVPB given  Amiodarone discontinued   6/6 <2 mg> 2.5 --             Assessment:  Patient is a 70 y.o. female on warfarin for Atrial Fibrillation.  Patient's home warfarin dosing regimen is 2 mg daily.   Goal INR 2 - 3  INR 2.5 today  Vitamin K 1 mg IVPB given on 6/4 and 6/5  Amiodarone discontinued 6/5  Patient is on metronidazole IV for C diff    Plan:  Warfarin 2 mg tonight  Daily PT/INR until the INR is stable within the therapeutic range  Pharmacist will follow and monitor/adjust dosing as necessary    Thank you for this consult,    Dorinda Aviles, AlmazD, BCPS 6/6/2025 10:49 AM   Ext: [phone]    [facility]: [phone]

## 2025-06-06 NOTE — CARE COORDINATION
Plan is to [facility] [place].  Spoke with Misty there, they have accepted and will start precert today for possible DC early next week.  Await auth.  Patient remains in ICU at this time.  ID, cardiology, Gen Surgery, Palliative on.  Currently on Flagyl and Vancomycin.  HENS was started, needs completed at DC and JANA will need signed.  SW/CM following.

## 2025-06-07 LAB
ALBUMIN SERPL-MCNC: 2.2 G/DL (ref 3.5–5.2)
ALP SERPL-CCNC: 102 U/L (ref 35–104)
ALT SERPL-CCNC: 14 U/L (ref 0–32)
ANION GAP SERPL CALCULATED.3IONS-SCNC: 11 MMOL/L (ref 7–16)
AST SERPL-CCNC: 17 U/L (ref 0–31)
BASOPHILS # BLD: 0 K/UL (ref 0–0.2)
BASOPHILS NFR BLD: 0 % (ref 0–2)
BILIRUB DIRECT SERPL-MCNC: <0.2 MG/DL (ref 0–0.3)
BILIRUB INDIRECT SERPL-MCNC: ABNORMAL MG/DL (ref 0–1)
BILIRUB SERPL-MCNC: 0.3 MG/DL (ref 0–1.2)
BUN SERPL-MCNC: 29 MG/DL (ref 6–23)
CALCIUM SERPL-MCNC: 7.6 MG/DL (ref 8.6–10.2)
CHLORIDE SERPL-SCNC: 118 MMOL/L (ref 98–107)
CO2 SERPL-SCNC: 16 MMOL/L (ref 22–29)
CREAT SERPL-MCNC: 1 MG/DL (ref 0.5–1)
EOSINOPHIL # BLD: 0 K/UL (ref 0.05–0.5)
EOSINOPHILS RELATIVE PERCENT: 0 % (ref 0–6)
ERYTHROCYTE [DISTWIDTH] IN BLOOD BY AUTOMATED COUNT: 16.8 % (ref 11.5–15)
GFR, ESTIMATED: 57 ML/MIN/1.73M2
GLUCOSE SERPL-MCNC: 94 MG/DL (ref 74–99)
HCT VFR BLD AUTO: 29 % (ref 34–48)
HGB BLD-MCNC: 9.1 G/DL (ref 11.5–15.5)
INR PPP: 2.9
LYMPHOCYTES NFR BLD: 0.79 K/UL (ref 1.5–4)
LYMPHOCYTES RELATIVE PERCENT: 4 % (ref 20–42)
MAGNESIUM SERPL-MCNC: 2.3 MG/DL (ref 1.6–2.6)
MCH RBC QN AUTO: 24.3 PG (ref 26–35)
MCHC RBC AUTO-ENTMCNC: 31.4 G/DL (ref 32–34.5)
MCV RBC AUTO: 77.5 FL (ref 80–99.9)
MONOCYTES NFR BLD: 0.79 K/UL (ref 0.1–0.95)
MONOCYTES NFR BLD: 3 % (ref 2–12)
NEUTROPHILS NFR BLD: 93 % (ref 43–80)
NEUTS SEG NFR BLD: 21.23 K/UL (ref 1.8–7.3)
PHOSPHATE SERPL-MCNC: 1.6 MG/DL (ref 2.5–4.5)
PLATELET # BLD AUTO: 306 K/UL (ref 130–450)
PMV BLD AUTO: 9.8 FL (ref 7–12)
POTASSIUM SERPL-SCNC: 3.6 MMOL/L (ref 3.5–5)
PROT SERPL-MCNC: 4.4 G/DL (ref 6.4–8.3)
PROTHROMBIN TIME: 31.6 SEC (ref 9.3–12.4)
RBC # BLD AUTO: 3.74 M/UL (ref 3.5–5.5)
RBC # BLD: ABNORMAL 10*6/UL
SODIUM SERPL-SCNC: 145 MMOL/L (ref 132–146)
WBC OTHER # BLD: 22.8 K/UL (ref 4.5–11.5)

## 2025-06-07 PROCEDURE — 6370000000 HC RX 637 (ALT 250 FOR IP): Performed by: INTERNAL MEDICINE

## 2025-06-07 PROCEDURE — 80053 COMPREHEN METABOLIC PANEL: CPT

## 2025-06-07 PROCEDURE — 2500000003 HC RX 250 WO HCPCS: Performed by: INTERNAL MEDICINE

## 2025-06-07 PROCEDURE — 84100 ASSAY OF PHOSPHORUS: CPT

## 2025-06-07 PROCEDURE — 2580000003 HC RX 258

## 2025-06-07 PROCEDURE — 2580000003 HC RX 258: Performed by: NURSE PRACTITIONER

## 2025-06-07 PROCEDURE — 6360000002 HC RX W HCPCS: Performed by: INTERNAL MEDICINE

## 2025-06-07 PROCEDURE — 2580000003 HC RX 258: Performed by: INTERNAL MEDICINE

## 2025-06-07 PROCEDURE — 6360000002 HC RX W HCPCS: Performed by: NURSE PRACTITIONER

## 2025-06-07 PROCEDURE — 6370000000 HC RX 637 (ALT 250 FOR IP): Performed by: SPECIALIST

## 2025-06-07 PROCEDURE — 2060000000 HC ICU INTERMEDIATE R&B

## 2025-06-07 PROCEDURE — 82248 BILIRUBIN DIRECT: CPT

## 2025-06-07 PROCEDURE — 2500000003 HC RX 250 WO HCPCS

## 2025-06-07 PROCEDURE — 83735 ASSAY OF MAGNESIUM: CPT

## 2025-06-07 PROCEDURE — 99291 CRITICAL CARE FIRST HOUR: CPT | Performed by: INTERNAL MEDICINE

## 2025-06-07 PROCEDURE — 85025 COMPLETE CBC W/AUTO DIFF WBC: CPT

## 2025-06-07 PROCEDURE — 6360000002 HC RX W HCPCS

## 2025-06-07 PROCEDURE — P9047 ALBUMIN (HUMAN), 25%, 50ML: HCPCS

## 2025-06-07 PROCEDURE — 85610 PROTHROMBIN TIME: CPT

## 2025-06-07 PROCEDURE — 36415 COLL VENOUS BLD VENIPUNCTURE: CPT

## 2025-06-07 RX ORDER — 0.9 % SODIUM CHLORIDE 0.9 %
1000 INTRAVENOUS SOLUTION INTRAVENOUS ONCE
Status: COMPLETED | OUTPATIENT
Start: 2025-06-07 | End: 2025-06-07

## 2025-06-07 RX ORDER — METOPROLOL TARTRATE 25 MG/1
12.5 TABLET, FILM COATED ORAL 2 TIMES DAILY
Status: DISCONTINUED | OUTPATIENT
Start: 2025-06-07 | End: 2025-06-09 | Stop reason: HOSPADM

## 2025-06-07 RX ORDER — FLUDROCORTISONE ACETATE 0.1 MG/1
0.1 TABLET ORAL 2 TIMES DAILY
Status: DISCONTINUED | OUTPATIENT
Start: 2025-06-07 | End: 2025-06-09 | Stop reason: HOSPADM

## 2025-06-07 RX ORDER — ALBUMIN (HUMAN) 12.5 G/50ML
50 SOLUTION INTRAVENOUS EVERY 12 HOURS
Status: COMPLETED | OUTPATIENT
Start: 2025-06-07 | End: 2025-06-08

## 2025-06-07 RX ORDER — POTASSIUM CHLORIDE 29.8 MG/ML
20 INJECTION INTRAVENOUS
Status: COMPLETED | OUTPATIENT
Start: 2025-06-07 | End: 2025-06-07

## 2025-06-07 RX ADMIN — SODIUM CHLORIDE: 0.9 INJECTION, SOLUTION INTRAVENOUS at 08:54

## 2025-06-07 RX ADMIN — ANASTROZOLE 1 MG: 1 TABLET ORAL at 08:04

## 2025-06-07 RX ADMIN — OXCARBAZEPINE 600 MG: 300 TABLET, FILM COATED ORAL at 19:51

## 2025-06-07 RX ADMIN — FLUDROCORTISONE ACETATE 0.1 MG: 0.1 TABLET ORAL at 19:51

## 2025-06-07 RX ADMIN — FLUDROCORTISONE ACETATE 0.1 MG: 0.1 TABLET ORAL at 11:14

## 2025-06-07 RX ADMIN — SODIUM CHLORIDE 1000 ML: 0.9 INJECTION, SOLUTION INTRAVENOUS at 11:13

## 2025-06-07 RX ADMIN — ZONISAMIDE 200 MG: 100 CAPSULE ORAL at 19:51

## 2025-06-07 RX ADMIN — PANTOPRAZOLE SODIUM 40 MG: 40 INJECTION, POWDER, FOR SOLUTION INTRAVENOUS at 19:54

## 2025-06-07 RX ADMIN — OXCARBAZEPINE 600 MG: 300 TABLET, FILM COATED ORAL at 08:03

## 2025-06-07 RX ADMIN — METRONIDAZOLE 500 MG: 5 INJECTION, SOLUTION INTRAVENOUS at 04:20

## 2025-06-07 RX ADMIN — MIDODRINE HYDROCHLORIDE 15 MG: 10 TABLET ORAL at 17:10

## 2025-06-07 RX ADMIN — Medication 10 ML: at 19:53

## 2025-06-07 RX ADMIN — MIDODRINE HYDROCHLORIDE 15 MG: 10 TABLET ORAL at 11:14

## 2025-06-07 RX ADMIN — DONEPEZIL HYDROCHLORIDE 5 MG: 5 TABLET ORAL at 19:51

## 2025-06-07 RX ADMIN — VANCOMYCIN HYDROCHLORIDE 500 MG: 10 INJECTION, POWDER, LYOPHILIZED, FOR SOLUTION INTRAVENOUS at 17:35

## 2025-06-07 RX ADMIN — PANTOPRAZOLE SODIUM 40 MG: 40 INJECTION, POWDER, FOR SOLUTION INTRAVENOUS at 08:03

## 2025-06-07 RX ADMIN — VANCOMYCIN HYDROCHLORIDE 500 MG: 10 INJECTION, POWDER, LYOPHILIZED, FOR SOLUTION INTRAVENOUS at 05:34

## 2025-06-07 RX ADMIN — VANCOMYCIN HYDROCHLORIDE 500 MG: 10 INJECTION, POWDER, LYOPHILIZED, FOR SOLUTION INTRAVENOUS at 11:14

## 2025-06-07 RX ADMIN — Medication 10 ML: at 19:54

## 2025-06-07 RX ADMIN — Medication 1 CAPSULE: at 08:04

## 2025-06-07 RX ADMIN — SERTRALINE 100 MG: 50 TABLET, FILM COATED ORAL at 08:04

## 2025-06-07 RX ADMIN — MIDODRINE HYDROCHLORIDE 15 MG: 10 TABLET ORAL at 08:03

## 2025-06-07 RX ADMIN — SODIUM PHOSPHATE, MONOBASIC, MONOHYDRATE AND SODIUM PHOSPHATE, DIBASIC, ANHYDROUS 30 MMOL: 142; 276 INJECTION, SOLUTION INTRAVENOUS at 08:52

## 2025-06-07 RX ADMIN — ALBUMIN (HUMAN) 50 G: 0.25 INJECTION, SOLUTION INTRAVENOUS at 17:07

## 2025-06-07 RX ADMIN — POTASSIUM CHLORIDE 20 MEQ: 29.8 INJECTION, SOLUTION INTRAVENOUS at 08:03

## 2025-06-07 RX ADMIN — METOPROLOL TARTRATE 12.5 MG: 25 TABLET, FILM COATED ORAL at 19:50

## 2025-06-07 RX ADMIN — VANCOMYCIN HYDROCHLORIDE 500 MG: 10 INJECTION, POWDER, LYOPHILIZED, FOR SOLUTION INTRAVENOUS at 23:55

## 2025-06-07 RX ADMIN — SODIUM CHLORIDE: 0.9 INJECTION, SOLUTION INTRAVENOUS at 21:24

## 2025-06-07 RX ADMIN — POTASSIUM CHLORIDE 20 MEQ: 29.8 INJECTION, SOLUTION INTRAVENOUS at 08:53

## 2025-06-07 RX ADMIN — ZONISAMIDE 200 MG: 100 CAPSULE ORAL at 08:03

## 2025-06-07 RX ADMIN — METRONIDAZOLE 500 MG: 5 INJECTION, SOLUTION INTRAVENOUS at 12:13

## 2025-06-07 RX ADMIN — METRONIDAZOLE 500 MG: 5 INJECTION, SOLUTION INTRAVENOUS at 19:54

## 2025-06-07 RX ADMIN — METOPROLOL TARTRATE 25 MG: 25 TABLET, FILM COATED ORAL at 08:04

## 2025-06-07 ASSESSMENT — PAIN SCALES - PAIN ASSESSMENT IN ADVANCED DEMENTIA (PAINAD)
BREATHING: NORMAL
BREATHING: NORMAL
BODYLANGUAGE: RELAXED
CONSOLABILITY: NO NEED TO CONSOLE
CONSOLABILITY: NO NEED TO CONSOLE
BREATHING: NORMAL
BREATHING: NORMAL
CONSOLABILITY: NO NEED TO CONSOLE
CONSOLABILITY: NO NEED TO CONSOLE
BODYLANGUAGE: RELAXED
CONSOLABILITY: NO NEED TO CONSOLE
BREATHING: NORMAL
BODYLANGUAGE: RELAXED
TOTALSCORE: 0
BODYLANGUAGE: RELAXED
FACIALEXPRESSION: SMILING OR INEXPRESSIVE
BODYLANGUAGE: RELAXED
BODYLANGUAGE: RELAXED
FACIALEXPRESSION: SMILING OR INEXPRESSIVE
TOTALSCORE: 0
FACIALEXPRESSION: SMILING OR INEXPRESSIVE
BREATHING: NORMAL
BREATHING: NORMAL
FACIALEXPRESSION: SMILING OR INEXPRESSIVE
CONSOLABILITY: NO NEED TO CONSOLE
FACIALEXPRESSION: SMILING OR INEXPRESSIVE
BREATHING: NORMAL
CONSOLABILITY: NO NEED TO CONSOLE
TOTALSCORE: 0
TOTALSCORE: 0
FACIALEXPRESSION: SMILING OR INEXPRESSIVE
CONSOLABILITY: NO NEED TO CONSOLE
TOTALSCORE: 0

## 2025-06-07 ASSESSMENT — PAIN SCALES - GENERAL
PAINLEVEL_OUTOF10: 0

## 2025-06-07 NOTE — PLAN OF CARE
Problem: Safety - Adult  Goal: Free from fall injury  6/6/2025 2028 by Fernanda Pillai RN  Outcome: Progressing  6/6/2025 2027 by Fernanda Pillai RN  Outcome: Progressing  6/6/2025 1143 by Meredith Roberts RN  Outcome: Progressing     Problem: Discharge Planning  Goal: Discharge to home or other facility with appropriate resources  6/6/2025 2028 by Fernanda Pillai RN  Outcome: Progressing  6/6/2025 2027 by Fernanda Pillai RN  Outcome: Progressing  6/6/2025 1143 by Meredith Roberts RN  Outcome: Progressing     Problem: Pain  Goal: Verbalizes/displays adequate comfort level or baseline comfort level  6/6/2025 2028 by Fernanda Pillai RN  Outcome: Progressing  6/6/2025 2027 by Fernanda Pillai RN  Outcome: Progressing  6/6/2025 1143 by Meredith Roberts RN  Outcome: Progressing     Problem: Skin/Tissue Integrity  Goal: Skin integrity remains intact  Description: 1.  Monitor for areas of redness and/or skin breakdown2.  Assess vascular access sites hourly3.  Every 4-6 hours minimum:  Change oxygen saturation probe site4.  Every 4-6 hours:  If on nasal continuous positive airway pressure, respiratory therapy assess nares and determine need for appliance change or resting period  6/6/2025 2028 by Fernanda Pillai RN  Outcome: Progressing  6/6/2025 2027 by Fernanda Pillai RN  Outcome: Progressing  6/6/2025 1143 by Meredith Roberts RN  Outcome: Progressing     Problem: ABCDS Injury Assessment  Goal: Absence of physical injury  6/6/2025 2028 by Fernanda Pillai RN  Outcome: Progressing  6/6/2025 2027 by Fernanda Pillai RN  Outcome: Progressing  6/6/2025 1143 by Meredith Roberts RN  Outcome: Progressing     Problem: Confusion  Goal: Confusion, delirium, dementia, or psychosis is improved or at baseline  Description: INTERVENTIONS:1. Assess for possible contributors to thought disturbance, including medications, impaired vision or hearing, underlying metabolic abnormalities, dehydration,

## 2025-06-07 NOTE — PROGRESS NOTES
Intensive Care Daily Quality Rounding Checklist      ICU Team Members:     ICU Day #: NUMBER: 3    SOFA Score:    Intubation Date:  N/A     Ventilator Day #: N/A    Central Line Insertion Date: Imelda 4        Day #: NUMBER: 3        Indication: CVCIndication: Administration of vasopressors or vesicant medications     Arterial Line Insertion Date:  N/A       Day #: N/A    Temporary Hemodialysis Catheter Insertion Date:  N/A       Day # N/A    DVT Prophylaxis: COUMADIN     GI Prophylaxis: PROTONIX 40 MG IV BID    Jimenes Catheter Insertion Date:  N/A        Day #:       Indications:       Continued need (if yes, reason documented and discussed with physician):     Skin Issues/ Wounds and ordered treatment discussed on rounds: Y     Goals/ Plans for the Day: {[facility] Quality Flow Goal(s) of the Day/Commitment(s):[id number]}    Reviewed plan and goals for day with patient and/or representative: {YES/NO:24278}    **SHARE this note so that the rounding nurse may edit**

## 2025-06-07 NOTE — PROGRESS NOTES
Assessment and Plan  Patient is a 70 y.o. female with the following medical Problems:   Severe sepsis with septic shock due to pancolitis with recent history of Klebsiella UTI.  Urinary tract infection  Paroxysmal atrial fibrillation with RVR.  Supratherapeutic INR.  Acute kidney injury  Recent hospitalization for perforated sigmoid diverticulitis (S/P exploratory laparotomy with sigmoid colon resection, open intra-abdominal abscess drainage, and end descending colostomy creation May 2025)  Recent history of rhinovirus upper respiratory tract infection  Metabolic encephalopathy  Chronic heart failure with midrange ejection fraction (EF 40 to 45% April 16, 2025)  Moderate secondary pulmonary hypertension  Valvular heart disease with tricuspid regurgitation, mitral regurgitation.  History of brain aneurysm.  Dementia  Hospital-acquired delirium  Normal pressure hydrocephalus with  shunt  Prior history of breast cancer  Hypertension  Dyslipidemia  Chronic calculus cholecystitis  Depression    Plan of care:  Patient is currently off cefepime but on oral vancomycin and IV Flagyl for C. difficile colitis.  Cardizem was discontinued  Decrease metoprolol to 12.5 milligram twice daily  Florinef and midodrine as basal sparing agents.  1 L crystalloid bolus to be able to wean off pressors  Digoxin 125 mcg every 8 hours for 3 doses  Du-Synephrine to keep up above 65.  Maintenance IV fluid.  Patient's INR is supratherapeutic which covers for DVT prophylaxis.  GI prophylaxis.  Discontinue lactate.  Goals of care discussion  Protonix for GI prophylaxis.  General surgery consult.  High fiber diet  Probiotics  DC Jimenes catheter      History of Present Illness:   Patient is 70-year-old woman with above-mentioned medical problem who presented to the emergency room with weakness and fatigue.  Patient presented with her family and was confused and unable to provide detailed history.  Patient denies increased output from ostomy.  He  has been nauseous lately.    Daily progress:  June 4, 2025: Patient is more awake and interactive however she continues to be on Du-Synephrine.  She remains tachycardic and was started on amiodarone drip.  Patient was given 1 dose of digoxin by cardiology.  Kidney function is improving.  White blood cells count has increased which may suggest C. difficile colitis.  Patient continues to be on Du-Synephrine.  Lactic acid has normalized.    June 5, 2025: Patient continues to be awake and interactive however she is weak and debilitated.  She was placed on digoxin 125 mcg 3 4 hours for 3 doses.  Lactic acid improved.  Patient has a stoma with significant output suggesting C. difficile colitis.  Patient is currently on oral vancomycin and IV Flagyl.  White blood cell count has significantly increased.  Patient's INR was elevated but improved from yesterday.  She was given an additional 1 mg of vitamin K today.    June 6, 2025: Patient remains on Du-Synephrine 15 mcg/min.  White blood cells count is trending down.  Urine output has improved and creatinine is improving.  Output from stoma has improved.  Patient continues to be on oral vancomycin and IV Flagyl. INR has improved to 2.5.  Pharmacy is managing Coumadin.  Patient received a total of 2 mg of vitamin K which has corrected her Coumadin level to a therapeutic range.    June 7, 2025: Patient continues to be on Du-Synephrine and currently on 15 mcg/min.  White blood cell count is trending down.  Kidney function has almost normalized.  INR is 2.9.  Patient has no fever, chills, rigors.  Patient is awake and interactive however she is intermittently confused.  She is alert and oriented to self only.    Past Medical History:  Past Medical History:   Diagnosis Date    Atrial fibrillation (HCC)     Dementia (HCC)     History of cardioversion 10/2021      Past Surgical History:   Procedure Laterality Date    COLECTOMY N/A 3/16/2025    OPEN SIGMOID COLON RESECTION;

## 2025-06-07 NOTE — PLAN OF CARE
Problem: Safety - Adult  Goal: Free from fall injury  6/7/2025 0958 by Candelario Wilson RN  Outcome: Progressing     Problem: Discharge Planning  Goal: Discharge to home or other facility with appropriate resources  6/7/2025 0958 by Candelario Wilson RN  Outcome: Progressing     Problem: Pain  Goal: Verbalizes/displays adequate comfort level or baseline comfort level  6/7/2025 0958 by Candelario Wilson RN  Outcome: Progressing     Problem: Skin/Tissue Integrity  Goal: Skin integrity remains intact  Description: 1.  Monitor for areas of redness and/or skin breakdown2.  Assess vascular access sites hourly3.  Every 4-6 hours minimum:  Change oxygen saturation probe site4.  Every 4-6 hours:  If on nasal continuous positive airway pressure, respiratory therapy assess nares and determine need for appliance change or resting period  6/7/2025 0958 by Candelario Wilson RN  Outcome: Progressing  Flowsheets (Taken 6/7/2025 0800)  Skin Integrity Remains Intact:   Monitor for areas of redness and/or skin breakdown   Assess vascular access sites hourly     Problem: ABCDS Injury Assessment  Goal: Absence of physical injury  6/7/2025 0958 by Candelario Wilson RN  Outcome: Progressing     Problem: Confusion  Goal: Confusion, delirium, dementia, or psychosis is improved or at baseline  Description: INTERVENTIONS:1. Assess for possible contributors to thought disturbance, including medications, impaired vision or hearing, underlying metabolic abnormalities, dehydration, psychiatric diagnoses, and notify attending LIP2. Cement high risk fall precautions, as indicated3. Provide frequent short contacts to provide reality reorientation, refocusing and direction4. Decrease environmental stimuli, including noise as appropriate5. Monitor and intervene to maintain adequate nutrition, hydration, elimination, sleep and activity6. If unable to ensure safety without constant attention obtain sitter and review sitter guidelines with  Candelario, RN  Outcome: Progressing     Problem: Hematologic - Adult  Goal: Maintains hematologic stability  6/7/2025 0958 by Candelario Wilson RN  Outcome: Progressing     Problem: Nutrition Deficit:  Goal: Optimize nutritional status  6/7/2025 0958 by Candelario Wilson, RN  Outcome: Progressing     Problem: Respiratory - Adult  Goal: Achieves optimal ventilation and oxygenation  Outcome: Progressing

## 2025-06-07 NOTE — PROGRESS NOTES
NAME: Susanna Arriola  MR:  [id number]  :   1955  Admit Date:  6/3/2025    Elements of this note, were copied and pasted from Previous. Updates have been made where noted and reflect current exam and medical decision making from the DOS of this encounter.  CHIEF COMPLAINT     No chief complaint on file.    HISTORY OF PRESENT ILLNESS     Susanna Arriola is a 70 y.o. female admitted on 6/3/2025    Patient Active Problem List   Diagnosis    Atrial fibrillation with RVR (HCC)    Perforated diverticulitis    Shock (HCC)    Sepsis secondary to UTI (HCC)    Sepsis due to urinary tract infection (HCC)    Persistent atrial fibrillation (HCC)    Septic shock (HCC)    Palliative care encounter       This is a face to face encounter   25 afebrile RA wbc22.8 cr1  familt present has sitter more awake    has sitter nad     afebrile vs stable daughter present more awake still responds has nausea surgery to see   Wbc56.6 cr1.8     Admitted for   Kidney stone [N20.0]  Pericardial effusion [I31.39]  Pancolitis (HCC) [K51.00]  SHALINI (acute kidney injury) [N17.9]  Atrial fibrillation with RVR (HCC) [I48.91]  Septic shock (HCC) [A41.9, R65.21]    ASSESSMENT/PLAN    Sepsis  Leukocytosis trending down   Pan Colitis   C diff   MRSA neg       Antimicrobials:        metroNIDAZOLE (FLAGYL) 500 mg in 0.9% NaCl 100 mL IVPB premix, Q8H  vancomycin (VANCOCIN) 50 mg/mL oral solution 500 mg, 4 times per day       Patient is tolerating medications. No reported adverse drug reactions.  ID will continue to trend fever curve, WBCs, worsening signs of infection  and adverse effects to intravenous antibiotics.    ID will continue to follow and make recommendations accordingly  Available labs, imaging studies, microbiologic studies have been reviewed with pt and family if present.    DISCHARGE PLANS      PHYSICAL EXAMINATION    BP (!) 117/44   Pulse 86   Temp 98.6 °F (37 °C) (Axillary)   Resp 24   Ht 1.651 m (5' 5\")   Wt 85.7 kg

## 2025-06-07 NOTE — PROGRESS NOTES
Pharmacy Consultation Note  (Warfarin Dosing and Monitoring)    Initial consult date: 6/3/2025  Consulting Provider: Dr Coelho      Hepatic Function Panel:                          Lab Results   Component Value Date/Time    ALKPHOS 102 06/07/2025 04:12 AM    ALT 14 06/07/2025 04:12 AM    AST 17 06/07/2025 04:12 AM    BILITOT 0.3 06/07/2025 04:12 AM    BILIDIR <0.2 06/07/2025 04:12 AM    IBILI Can not be calculated 06/07/2025 04:12 AM       Current significant warfarin drug-drug interactions include: metronidazole (incr INR)      Vitamin K or Blood product  Administration Date    Vitamin K 1 mg IV @ 0915  6/4   Vitamin k 1 mg IV @ 0918 6/5     Recent Labs     06/05/25  0422 06/06/25  0437 06/07/25  0412    322 306   INR 8.1* 2.5 2.9   HGB 12.2 9.5* 9.1*       Date Warfarin Dose INR Heparin or LMWH Comment   6/3 HOLD 6.6 --    6/4 HOLD > 10.0 -- *Amiodarone PTA home med; but started on Metronidazole IV yesterday.   Vitamin K 1 mg IVPB given   6/5 HOLD 8.1 -- Vitamin K 1 mg IVPB given  Amiodarone discontinued   6/6 <2 mg> 2.5 --    6/7 HOLD 2.9 --      Assessment:  Patient is a 70 y.o. female on warfarin for Atrial Fibrillation.  Patient's home warfarin dosing regimen is 2 mg daily.   Goal INR 2 - 3  INR 2.5 today  Vitamin K 1 mg IVPB given on 6/4 and 6/5  Amiodarone discontinued 6/5  Patient is on metronidazole IV for C diff    Plan:  Hold warfarin dose tonight  Daily PT/INR until the INR is stable within the therapeutic range  Pharmacist will follow and monitor/adjust dosing as necessary    Thank you for this consult,    Escobar Ambrose, PharmD, BCEMP 6/7/2025 12:21 PM   [phone]    [facility]: [phone]

## 2025-06-07 NOTE — PROGRESS NOTES
Internal Medicine Progress Note     [facility]     Romeo Drake D.O., ALICIAI.                         Shen Solo D.O., EDITH Jara D.O.     Jack Moscoso, MSN, APRN-CNP  Rishi Ruiz, MSN, APRN, NP-C  Fernanda Tay, MSN, APRN-CNP  Conchis Bradford, MSN, APRN, NP-C     Primary Care Physician: Lay Hunter MD   Admitting Physician:  Romeo Drake DO  Admission date and time: 6/3/2025  3:04 PM    Room:  [place] [address]  Admitting diagnosis: Kidney stone [N20.0]  Pericardial effusion [I31.39]  Pancolitis (HCC) [K51.00]  SHALINI (acute kidney injury) [N17.9]  Atrial fibrillation with RVR (HCC) [I48.91]  Septic shock (HCC) [A41.9, R65.21]    Patient Name: Susanna Arriola  MRN: [id number]    Date of Service: 6/7/2025     Subjective:  Susanna is a 70 y.o. female who was seen and examined today,6/7/2025, at the bedside.  Patient still remain pleasantly confused.  Patient started on Du-Synephrine last evening due to low blood pressure.  Patient has good output from the ostomy with a midline incision which is slowly hearing.  Patient currently being treated for C. difficile.    No family member present      Review of System:   Limited in the setting of advanced dementia  Constitutional:   Does not disclose fever or chills, weight loss or gain, positive for fatigue and malaise  HEENT:   Does not disclose ear pain, sore throat, sinus or eye problems.  Cardiovascular:   Does not disclose any chest pain, positive for palpitations in the setting of atrial fibrillation with rapid ventricular response  Respiratory:   Does not disclose shortness of breath, coughing, sputum production, hemoptysis, or wheezing.  Gastrointestinal:   Persistent pain.  Yellow-colored/liquid output from the ostomy  Genitourinary:    Does not disclose any urgency, frequency, hematuria.  Voiding with use of a Jimenes catheter..  Extremities:   Chronic edema.  Neurology:    Does not disclose any headache or

## 2025-06-08 ENCOUNTER — APPOINTMENT (OUTPATIENT)
Dept: CT IMAGING | Age: 70
DRG: 871 | End: 2025-06-08
Payer: MEDICARE

## 2025-06-08 LAB
ALBUMIN SERPL-MCNC: 3.4 G/DL (ref 3.5–5.2)
ALP SERPL-CCNC: 57 U/L (ref 35–104)
ALT SERPL-CCNC: 8 U/L (ref 0–32)
ANION GAP SERPL CALCULATED.3IONS-SCNC: 9 MMOL/L (ref 7–16)
AST SERPL-CCNC: 13 U/L (ref 0–31)
BASOPHILS # BLD: 0.02 K/UL (ref 0–0.2)
BASOPHILS NFR BLD: 0 % (ref 0–2)
BILIRUB DIRECT SERPL-MCNC: <0.2 MG/DL (ref 0–0.3)
BILIRUB INDIRECT SERPL-MCNC: ABNORMAL MG/DL (ref 0–1)
BILIRUB SERPL-MCNC: 0.4 MG/DL (ref 0–1.2)
BUN SERPL-MCNC: 20 MG/DL (ref 6–23)
CALCIUM SERPL-MCNC: 7.7 MG/DL (ref 8.6–10.2)
CHLORIDE SERPL-SCNC: 117 MMOL/L (ref 98–107)
CO2 SERPL-SCNC: 16 MMOL/L (ref 22–29)
CREAT SERPL-MCNC: 0.8 MG/DL (ref 0.5–1)
DATE, STOOL #1: ABNORMAL
EOSINOPHIL # BLD: 0.11 K/UL (ref 0.05–0.5)
EOSINOPHILS RELATIVE PERCENT: 1 % (ref 0–6)
ERYTHROCYTE [DISTWIDTH] IN BLOOD BY AUTOMATED COUNT: 16.8 % (ref 11.5–15)
GFR, ESTIMATED: 77 ML/MIN/1.73M2
GLUCOSE SERPL-MCNC: 77 MG/DL (ref 74–99)
HCT VFR BLD AUTO: 22.7 % (ref 34–48)
HEMOCCULT SP1 STL QL: POSITIVE
HGB BLD-MCNC: 7.1 G/DL (ref 11.5–15.5)
IMM GRANULOCYTES # BLD AUTO: 0.44 K/UL (ref 0–0.58)
IMM GRANULOCYTES NFR BLD: 5 % (ref 0–5)
INR PPP: 4.4
LYMPHOCYTES NFR BLD: 1.55 K/UL (ref 1.5–4)
LYMPHOCYTES RELATIVE PERCENT: 16 % (ref 20–42)
MAGNESIUM SERPL-MCNC: 2.1 MG/DL (ref 1.6–2.6)
MCH RBC QN AUTO: 24.2 PG (ref 26–35)
MCHC RBC AUTO-ENTMCNC: 31.3 G/DL (ref 32–34.5)
MCV RBC AUTO: 77.5 FL (ref 80–99.9)
MICROORGANISM SPEC CULT: NORMAL
MICROORGANISM SPEC CULT: NORMAL
MONOCYTES NFR BLD: 0.84 K/UL (ref 0.1–0.95)
MONOCYTES NFR BLD: 9 % (ref 2–12)
NEUTROPHILS NFR BLD: 70 % (ref 43–80)
NEUTS SEG NFR BLD: 6.74 K/UL (ref 1.8–7.3)
PHOSPHATE SERPL-MCNC: 1.9 MG/DL (ref 2.5–4.5)
PLATELET # BLD AUTO: 237 K/UL (ref 130–450)
PMV BLD AUTO: 9.5 FL (ref 7–12)
POTASSIUM SERPL-SCNC: 3.4 MMOL/L (ref 3.5–5)
PROT SERPL-MCNC: 4.6 G/DL (ref 6.4–8.3)
PROTHROMBIN TIME: 48.1 SEC (ref 9.3–12.4)
RBC # BLD AUTO: 2.93 M/UL (ref 3.5–5.5)
SERVICE CMNT-IMP: NORMAL
SERVICE CMNT-IMP: NORMAL
SODIUM SERPL-SCNC: 142 MMOL/L (ref 132–146)
SPECIMEN DESCRIPTION: NORMAL
SPECIMEN DESCRIPTION: NORMAL
TIME, STOOL #1: 1157
WBC OTHER # BLD: 9.7 K/UL (ref 4.5–11.5)

## 2025-06-08 PROCEDURE — 2500000003 HC RX 250 WO HCPCS

## 2025-06-08 PROCEDURE — 74177 CT ABD & PELVIS W/CONTRAST: CPT

## 2025-06-08 PROCEDURE — 85610 PROTHROMBIN TIME: CPT

## 2025-06-08 PROCEDURE — 6360000002 HC RX W HCPCS

## 2025-06-08 PROCEDURE — 86922 COMPATIBILITY TEST ANTIGLOB: CPT

## 2025-06-08 PROCEDURE — 86870 RBC ANTIBODY IDENTIFICATION: CPT

## 2025-06-08 PROCEDURE — 6370000000 HC RX 637 (ALT 250 FOR IP): Performed by: INTERNAL MEDICINE

## 2025-06-08 PROCEDURE — 2580000003 HC RX 258

## 2025-06-08 PROCEDURE — 6360000002 HC RX W HCPCS: Performed by: INTERNAL MEDICINE

## 2025-06-08 PROCEDURE — P9047 ALBUMIN (HUMAN), 25%, 50ML: HCPCS

## 2025-06-08 PROCEDURE — 99233 SBSQ HOSP IP/OBS HIGH 50: CPT | Performed by: INTERNAL MEDICINE

## 2025-06-08 PROCEDURE — 83735 ASSAY OF MAGNESIUM: CPT

## 2025-06-08 PROCEDURE — P9016 RBC LEUKOCYTES REDUCED: HCPCS

## 2025-06-08 PROCEDURE — 2060000000 HC ICU INTERMEDIATE R&B

## 2025-06-08 PROCEDURE — 82248 BILIRUBIN DIRECT: CPT

## 2025-06-08 PROCEDURE — 6360000002 HC RX W HCPCS: Performed by: NURSE PRACTITIONER

## 2025-06-08 PROCEDURE — 6360000004 HC RX CONTRAST MEDICATION: Performed by: RADIOLOGY

## 2025-06-08 PROCEDURE — 86900 BLOOD TYPING SEROLOGIC ABO: CPT

## 2025-06-08 PROCEDURE — 85025 COMPLETE CBC W/AUTO DIFF WBC: CPT

## 2025-06-08 PROCEDURE — 82272 OCCULT BLD FECES 1-3 TESTS: CPT

## 2025-06-08 PROCEDURE — 86860 RBC ANTIBODY ELUTION: CPT

## 2025-06-08 PROCEDURE — 30233N1 TRANSFUSION OF NONAUTOLOGOUS RED BLOOD CELLS INTO PERIPHERAL VEIN, PERCUTANEOUS APPROACH: ICD-10-PCS | Performed by: INTERNAL MEDICINE

## 2025-06-08 PROCEDURE — 86880 COOMBS TEST DIRECT: CPT

## 2025-06-08 PROCEDURE — 2500000003 HC RX 250 WO HCPCS: Performed by: INTERNAL MEDICINE

## 2025-06-08 PROCEDURE — 84100 ASSAY OF PHOSPHORUS: CPT

## 2025-06-08 PROCEDURE — 36430 TRANSFUSION BLD/BLD COMPNT: CPT

## 2025-06-08 PROCEDURE — 80053 COMPREHEN METABOLIC PANEL: CPT

## 2025-06-08 PROCEDURE — 86850 RBC ANTIBODY SCREEN: CPT

## 2025-06-08 PROCEDURE — 86901 BLOOD TYPING SEROLOGIC RH(D): CPT

## 2025-06-08 PROCEDURE — 2580000003 HC RX 258: Performed by: NURSE PRACTITIONER

## 2025-06-08 PROCEDURE — 86920 COMPATIBILITY TEST SPIN: CPT

## 2025-06-08 RX ORDER — SODIUM CHLORIDE 9 MG/ML
INJECTION, SOLUTION INTRAVENOUS PRN
Status: DISCONTINUED | OUTPATIENT
Start: 2025-06-08 | End: 2025-06-09 | Stop reason: HOSPADM

## 2025-06-08 RX ORDER — IOPAMIDOL 755 MG/ML
75 INJECTION, SOLUTION INTRAVASCULAR
Status: COMPLETED | OUTPATIENT
Start: 2025-06-08 | End: 2025-06-08

## 2025-06-08 RX ORDER — VANCOMYCIN HYDROCHLORIDE 125 MG/1
125 CAPSULE ORAL 4 TIMES DAILY
Qty: 56 CAPSULE | Refills: 0 | Status: SHIPPED | OUTPATIENT
Start: 2025-06-08 | End: 2025-06-22

## 2025-06-08 RX ADMIN — Medication 10 ML: at 21:19

## 2025-06-08 RX ADMIN — VANCOMYCIN HYDROCHLORIDE 500 MG: 10 INJECTION, POWDER, LYOPHILIZED, FOR SOLUTION INTRAVENOUS at 13:11

## 2025-06-08 RX ADMIN — SODIUM CHLORIDE: 0.9 INJECTION, SOLUTION INTRAVENOUS at 07:34

## 2025-06-08 RX ADMIN — MIDODRINE HYDROCHLORIDE 15 MG: 10 TABLET ORAL at 11:01

## 2025-06-08 RX ADMIN — OXCARBAZEPINE 600 MG: 300 TABLET, FILM COATED ORAL at 21:19

## 2025-06-08 RX ADMIN — SODIUM CHLORIDE: 0.9 INJECTION, SOLUTION INTRAVENOUS at 18:20

## 2025-06-08 RX ADMIN — VANCOMYCIN HYDROCHLORIDE 500 MG: 10 INJECTION, POWDER, LYOPHILIZED, FOR SOLUTION INTRAVENOUS at 18:28

## 2025-06-08 RX ADMIN — IOPAMIDOL 75 ML: 755 INJECTION, SOLUTION INTRAVENOUS at 14:44

## 2025-06-08 RX ADMIN — OXCARBAZEPINE 600 MG: 300 TABLET, FILM COATED ORAL at 11:02

## 2025-06-08 RX ADMIN — POTASSIUM BICARBONATE 40 MEQ: 782 TABLET, EFFERVESCENT ORAL at 11:01

## 2025-06-08 RX ADMIN — METOPROLOL TARTRATE 12.5 MG: 25 TABLET, FILM COATED ORAL at 11:07

## 2025-06-08 RX ADMIN — SODIUM PHOSPHATE, MONOBASIC, MONOHYDRATE AND SODIUM PHOSPHATE, DIBASIC, ANHYDROUS 13.71 MMOL: 142; 276 INJECTION, SOLUTION INTRAVENOUS at 20:04

## 2025-06-08 RX ADMIN — METOPROLOL TARTRATE 12.5 MG: 25 TABLET, FILM COATED ORAL at 21:19

## 2025-06-08 RX ADMIN — ANASTROZOLE 1 MG: 1 TABLET ORAL at 11:02

## 2025-06-08 RX ADMIN — DONEPEZIL HYDROCHLORIDE 5 MG: 5 TABLET ORAL at 21:19

## 2025-06-08 RX ADMIN — VANCOMYCIN HYDROCHLORIDE 500 MG: 10 INJECTION, POWDER, LYOPHILIZED, FOR SOLUTION INTRAVENOUS at 06:36

## 2025-06-08 RX ADMIN — PANTOPRAZOLE SODIUM 40 MG: 40 INJECTION, POWDER, FOR SOLUTION INTRAVENOUS at 21:19

## 2025-06-08 RX ADMIN — MIDODRINE HYDROCHLORIDE 15 MG: 10 TABLET ORAL at 18:27

## 2025-06-08 RX ADMIN — FLUDROCORTISONE ACETATE 0.1 MG: 0.1 TABLET ORAL at 11:03

## 2025-06-08 RX ADMIN — ZONISAMIDE 200 MG: 100 CAPSULE ORAL at 21:31

## 2025-06-08 RX ADMIN — METRONIDAZOLE 500 MG: 5 INJECTION, SOLUTION INTRAVENOUS at 13:08

## 2025-06-08 RX ADMIN — FLUDROCORTISONE ACETATE 0.1 MG: 0.1 TABLET ORAL at 21:19

## 2025-06-08 RX ADMIN — Medication 10 ML: at 11:06

## 2025-06-08 RX ADMIN — METRONIDAZOLE 500 MG: 5 INJECTION, SOLUTION INTRAVENOUS at 21:25

## 2025-06-08 RX ADMIN — Medication 1 CAPSULE: at 11:04

## 2025-06-08 RX ADMIN — ALBUMIN (HUMAN) 50 G: 0.25 INJECTION, SOLUTION INTRAVENOUS at 04:38

## 2025-06-08 RX ADMIN — PANTOPRAZOLE SODIUM 40 MG: 40 INJECTION, POWDER, FOR SOLUTION INTRAVENOUS at 11:00

## 2025-06-08 RX ADMIN — ZONISAMIDE 200 MG: 100 CAPSULE ORAL at 11:03

## 2025-06-08 RX ADMIN — PHYTONADIONE 1 MG: 10 INJECTION, EMULSION INTRAMUSCULAR; INTRAVENOUS; SUBCUTANEOUS at 18:26

## 2025-06-08 RX ADMIN — SERTRALINE 100 MG: 50 TABLET, FILM COATED ORAL at 11:03

## 2025-06-08 RX ADMIN — METRONIDAZOLE 500 MG: 5 INJECTION, SOLUTION INTRAVENOUS at 04:37

## 2025-06-08 ASSESSMENT — PAIN SCALES - PAIN ASSESSMENT IN ADVANCED DEMENTIA (PAINAD)
FACIALEXPRESSION: SMILING OR INEXPRESSIVE
BODYLANGUAGE: RELAXED
TOTALSCORE: 0
CONSOLABILITY: NO NEED TO CONSOLE
BREATHING: NORMAL

## 2025-06-08 NOTE — PROGRESS NOTES
NAME: Susanna Arriola  MR:  [id number]  :   1955  Admit Date:  6/3/2025    Elements of this note, were copied and pasted from Previous. Updates have been made where noted and reflect current exam and medical decision making from the DOS of this encounter.  CHIEF COMPLAINT     No chief complaint on file.    HISTORY OF PRESENT ILLNESS     Susanna Arriola is a 70 y.o. female admitted on 6/3/2025    Patient Active Problem List   Diagnosis    Atrial fibrillation with RVR (HCC)    Perforated diverticulitis    Shock (HCC)    Sepsis secondary to UTI (HCC)    Sepsis due to urinary tract infection (HCC)    Persistent atrial fibrillation (HCC)    Septic shock (HCC)    Palliative care encounter       This is a face to face encounter   23 aake and alter has no c/o pain afebrile RA    afebrile RA wbc22.8 cr1  familt present has sitter more awake    has sitter nad     afebrile vs stable daughter present more awake still responds has nausea surgery to see   Wbc56.6 cr1.8     Admitted for   Kidney stone [N20.0]  Pericardial effusion [I31.39]  Pancolitis (HCC) [K51.00]  ANDREAS (acute kidney injury) [N17.9]  Atrial fibrillation with RVR (HCC) [I48.91]  Septic shock (HCC) [A41.9, R65.21]    ASSESSMENT/PLAN    Sepsis  Leukocytosis  wbc9.7  Pan Colitis   C diff   MRSA neg   Andreas better       Antimicrobials:   metroNIDAZOLE (FLAGYL) 500 mg in 0.9% NaCl 100 mL IVPB premix, Q8H  vancomycin (VANCOCIN) 50 mg/mL oral solution 500 mg, 4 times per day med        Patient is tolerating medications. No reported adverse drug reactions.  ID will continue to trend fever curve, WBCs, worsening signs of infection  and adverse effects to intravenous antibiotics.    ID will continue to follow and make recommendations accordingly  Available labs, imaging studies, microbiologic studies have been reviewed with pt and family if present.    DISCHARGE PLANS      PHYSICAL EXAMINATION    BP (!) 108/43   Pulse 82   Temp 98.2 °F (36.8 °C)  7.7*   BILITOT 0.3 0.4   ALKPHOS 102 57   AST 17 13   ALT 14 8     Lab Results   Component Value Date    SEDRATE 8 06/04/2025    SEDRATE 45 (H) 04/15/2025     Lab Results   Component Value Date    .0 (H) 06/04/2025    CRP 39.3 (H) 04/15/2025     Lab Results   Component Value Date    PROCAL 0.66 (H) 06/06/2025    PROCAL 0.94 (H) 06/05/2025     Recent Labs     06/06/25  0437 06/07/25  0412 06/08/25  0610   PROCAL 0.66*  --   --    INR 2.5 2.9 4.4   PROTIME 27.3* 31.6* 48.1*   AST 18 17 13   ALT 15 14 8     No results found for: \"MRSAC\"    Radiology:    CT ABDOMEN PELVIS W IV CONTRAST Additional Contrast? None  Result Date: 6/3/2025  1. Worsening pancolitis, now severe, extending from the cecum through the left lower quadrant colostomy. 2. New mild ascites, probably resulting from the severe colitis described above. 3. Stable changes of partial left sigmoid resection with a left lower quadrant colostomy. 4. Stable changes of polycystic liver disease. 5. Stable nonobstructive 3 mm calculus in the upper pole of the right kidney. 6. Stable small amount of cortical scarring of the lateral lower pole of the right kidney with mild marginal calcification. 7. New mild pericardial effusion.     CT HEAD WO CONTRAST  Result Date: 6/3/2025  No acute intracranial abnormality. Chronic postoperative and postprocedural findings as above.     XR CHEST PORTABLE  Result Date: 6/3/2025  No acute process.       Imaging and labs were reviewed per medical records.     Thank you for involving me in the care of Susanna Arriola I will continue to follow. Please do not hesitate to call [phone] for any questions or concerns.    Electronically signed by Maura Wu MD on 6/8/2025 at 5:44 PM

## 2025-06-08 NOTE — PROGRESS NOTES
Internal Medicine Progress Note     [facility]     Romeo Drake D.O., ALICIAI.                         Shen Solo D.O., EDITH Jara D.O.     Jack Moscoso, MSN, APRN-CNP  Rishi Ruiz, MSN, APRN, NP-C  Fernanda Tay, MSN, APRN-CNP  Conchis Bradford, MSN, APRN, NP-C     Primary Care Physician: Lay Hunter MD   Admitting Physician:  Romeo Drake DO  Admission date and time: 6/3/2025  3:04 PM    Room:  [address] [place] [address]  Admitting diagnosis: Kidney stone [N20.0]  Pericardial effusion [I31.39]  Pancolitis (HCC) [K51.00]  SHALINI (acute kidney injury) [N17.9]  Atrial fibrillation with RVR (HCC) [I48.91]  Septic shock (HCC) [A41.9, R65.21]    Patient Name: Susanna Arriola  MRN: [id number]    Date of Service: 6/8/2025     Subjective:  Susanna is a 70 y.o. female who was seen and examined today,6/8/2025, at the bedside.  Patient is resting company bed.  She does remain intermittently confused.  She does have a sitter at the bedside.  She did have a drop in hemoglobin from 9.1 down to 7.1 and will receive 1 unit packed red blood cells.  Her INR this morning was 4.4 we will give vitamin K and check CT of the abdomen.  Will discontinue warfarin with the possibility of starting Eliquis once INR is within normal ranges.  Will check stools for occult blood.  No family member present      Review of System:   Limited in the setting of advanced dementia  Constitutional:   Does not disclose fever or chills, weight loss or gain, positive for fatigue and malaise  HEENT:   Does not disclose ear pain, sore throat, sinus or eye problems.  Cardiovascular:   Does not disclose any chest pain, positive for palpitations in the setting of atrial fibrillation with rapid ventricular response  Respiratory:   Does not disclose shortness of breath, coughing, sputum production, hemoptysis, or wheezing.  Gastrointestinal:   Persistent pain.  Yellow-colored/liquid output from the ostomy  Genitourinary:  Pt [facility] pharmacy sent refill request for   Quetiapine 50 mg tablet with quantity of 90 tablets.    Instructions:  Take 1 tablet by mouth every day at night.

## 2025-06-08 NOTE — PROGRESS NOTES
4 Eyes Skin Assessment     NAME:  Susanna Arriola  YOB: 1955  MEDICAL RECORD NUMBER:  [id number]    The patient is being assessed for  Transfer to New Unit    I agree that at least one RN has performed a thorough Head to Toe Skin Assessment on the patient. ALL assessment sites listed below have been assessed.      Areas assessed by both nurses:    Head, Face, Ears, Shoulders, Back, Chest, Arms, Elbows, Hands, Sacrum. Buttock, Coccyx, Ischium, and Legs. Feet and Heels        Does the Patient have a Wound? No noted wound(s)       Deshaun Prevention initiated by RN: Yes  Wound Care Orders initiated by RN: No    Pressure Injury (Stage 3,4, Unstageable, DTI, NWPT, and Complex wounds) if present, place Wound referral order by RN under : No    New Ostomies, if present place, Ostomy referral order under : No     Nurse 1 eSignature: Electronically signed by Dirk Chapman RN on 6/7/25 at 11:11 PM EDT    **SHARE this note so that the co-signing nurse can place an eSignature**    Nurse 2 eSignature: {Esignature:[id number]}

## 2025-06-08 NOTE — PROGRESS NOTES
Date:  6/8/2025  Patient: Susanna Arriola  Admission:  6/3/2025  3:04 PM  Admit DX: Kidney stone [N20.0]  Pericardial effusion [I31.39]  Pancolitis (HCC) [K51.00]  SHALINI (acute kidney injury) [N17.9]  Atrial fibrillation with RVR (HCC) [I48.91]  Septic shock (HCC) [A41.9, R65.21]  Age:  70 y.o., 1955     LOS: 5 days       SUBJECTIVE:      Confused  No distress  Blood pressure remains low  She was weaned off the inotropes  Hemoglobin dropped couple points    OBJECTIVE:      BP 90/60   Pulse 82   Temp 98.6 °F (37 °C) (Axillary)   Resp 18   Ht 1.651 m (5' 5\")   Wt 85.7 kg (189 lb)   SpO2 99%   BMI 31.45 kg/m²     Intake/Output Summary (Last 24 hours) at 6/8/2025 1541  Last data filed at 6/7/2025 2200  Gross per 24 hour   Intake 240 ml   Output 550 ml   Net -310 ml       General: Confused, no acute distress.  Neck: Supple. No jugular venous distention.  Respiratory: Lungs are clear to auscultation, Respirations are non-labored, Breath sounds are equal, Symmetrical chest wall expansion.  Cardiovascular: Normal rate, No murmur, No gallop, Good pulses equal in all extremities, No edema.  Gastrointestinal: Soft, Non-tender, Non-distended, Normal bowel sounds.  Musculoskeletal: Normal strength, No tenderness, No deformity.  Neurologic: Confused, no focal deficits.          Current Inpatient Medications:     phytonadione (ADULT) (VITAMIN K) 1 mg in sodium chloride 0.9 % 100 mL IVPB  1 mg IntraVENous Once    metoprolol tartrate  12.5 mg Oral BID    fludrocortisone  0.1 mg Oral BID    lactobacillus  1 capsule Oral Daily with breakfast    metroNIDAZOLE  500 mg IntraVENous Q8H    pantoprazole  40 mg IntraVENous BID    vancomycin  500 mg Oral 4 times per day    sodium chloride flush  5-40 mL IntraVENous 2 times per day    anastrozole  1 mg Oral Daily    donepezil  5 mg Oral Nightly    OXcarbazepine  600 mg Oral BID    sertraline  100 mg Oral Daily    zonisamide  200 mg Oral BID    midodrine  15 mg Oral TID        IV  CONTRAST/BUBBLE/STRAIN/3D) 04/17/2025  7:02 AM (Final)    Interpretation Summary    Left Ventricle: Mildly reduced left ventricular systolic function with a visually estimated EF of 40 - 45%. Left ventricle size is normal. Mildly increased wall thickness.    Right Ventricle: Right ventricle size is normal.    Aortic Valve: Mild stenosis of the aortic valve. AV mean gradient is 8 mmHg.    Mitral Valve: Moderate regurgitation.    Tricuspid Valve: Moderate regurgitation.    Left Atrium: Left atrium is mildly dilated.    Pulmonary Arteries: Moderate pulmonary hypertension present.    Image quality is suboptimal.    Signed by: Katie Kapadia MD on 4/17/2025  7:02 AM      No results found for this or any previous visit.        ASSESSMENT:      Septic shock  C. difficile colitis  Atrial fibrillation with rapid response  Cardiomyopathy  Moderate mitral regurgitation  Mild aortic stenosis  Warfarin overdose  Probable dementia      PLAN:    Hold anticoagulation  Guaiac stools  CT of the abdomen  Packed red blood cells as needed  Dr. Kapadia will resume cardiac care tomorrow      Kin Millard MD, MD, FACC

## 2025-06-08 NOTE — PROGRESS NOTES
Report called to [facility]- all questions answered at this time.     Call made out to patient's  Erasmo regarding transfer; given update at this time.     Electronically signed by Shelly Baez RN on 6/7/2025 at 10:04 PM

## 2025-06-08 NOTE — CONSENT
Informed Consent for Blood Component Transfusion Note    I have discussed with the patient the rationale for blood component transfusion; its benefits in treating or preventing fatigue, organ damage, or death; and its risk which includes mild transfusion reactions, rare risk of blood borne infection, or more serious but rare reactions. I have discussed the alternatives to transfusion, including the risk and consequences of not receiving transfusion. The patient had an opportunity to ask questions and had agreed to proceed with transfusion of blood components.    Electronically signed by MARLYN Haro CNP on 6/8/25 at 8:29 AM EDT

## 2025-06-08 NOTE — PROGRESS NOTES
Assessment and Plan  Patient is a 70 y.o. female with the following medical Problems:   Severe sepsis with septic shock due to pancolitis with recent history of Klebsiella UTI.  Urinary tract infection  Paroxysmal atrial fibrillation with RVR.  Supratherapeutic INR.  Acute kidney injury  Recent hospitalization for perforated sigmoid diverticulitis (S/P exploratory laparotomy with sigmoid colon resection, open intra-abdominal abscess drainage, and end descending colostomy creation May 2025)  Recent history of rhinovirus upper respiratory tract infection  Metabolic encephalopathy  Chronic heart failure with midrange ejection fraction (EF 40 to 45% April 16, 2025)  Moderate secondary pulmonary hypertension  Valvular heart disease with tricuspid regurgitation, mitral regurgitation.  History of brain aneurysm.  Dementia  Hospital-acquired delirium  Normal pressure hydrocephalus with  shunt  Prior history of breast cancer  Hypertension  Dyslipidemia  Chronic calculus cholecystitis  Depression    Plan of care:  Patient is currently off cefepime but on oral vancomycin and IV Flagyl for C. difficile colitis.  Cardizem was discontinued  Decrease metoprolol to 12.5 milligram twice daily  Florinef and midodrine as basal sparing agents.  1 L crystalloid bolus to be able to wean off pressors  Digoxin 125 mcg every 8 hours for 3 doses  Du-Synephrine to keep up above 65.  Maintenance IV fluid.  Patient's INR is supratherapeutic which covers for DVT prophylaxis.  GI prophylaxis.  Discontinue lactate.  Goals of care discussion  Protonix for GI prophylaxis.  General surgery consult.  High fiber diet  Probiotics  We will sign off please call with questions.  Follow-up CT scan of the abdomen and pelvis results.      History of Present Illness:   Patient is 70-year-old woman with above-mentioned medical problem who presented to the emergency room with weakness and fatigue.  Patient presented with her family and was confused and unable

## 2025-06-08 NOTE — PROGRESS NOTES
Pharmacy Consultation Note  (Warfarin Dosing and Monitoring)    Initial consult date: 6/3/2025  Consulting Provider: Dr Coelho      Due to sensitivity to warfarin and repeatedly elevated INR, plan to switch to eliquis. Warfarin orders discontinued. Pharmacy will sign off on the consult. Please re-consult if needed.      Escobar Ambrose, PharmD, BCEMP 6/8/2025 1:12 PM   [phone]    [facility]: [phone]

## 2025-06-08 NOTE — PROGRESS NOTES
Date:  6/7/2025  Patient: Susanna Arriola  Admission:  6/3/2025  3:04 PM  Admit DX: Kidney stone [N20.0]  Pericardial effusion [I31.39]  Pancolitis (HCC) [K51.00]  SHALINI (acute kidney injury) [N17.9]  Atrial fibrillation with RVR (HCC) [I48.91]  Septic shock (HCC) [A41.9, R65.21]  Age:  70 y.o., 1955     LOS: 4 days       SUBJECTIVE:      Seen and examined  I am covering Dr. Kapadia today  She remains weak  Slightly lethargic      OBJECTIVE:      BP (!) 135/53   Pulse 75   Temp 98.4 °F (36.9 °C) (Oral)   Resp 20   Ht 1.651 m (5' 5\")   Wt 85.7 kg (189 lb)   SpO2 100%   BMI 31.45 kg/m²     Intake/Output Summary (Last 24 hours) at 6/7/2025 2245  Last data filed at 6/7/2025 2200  Gross per 24 hour   Intake 4926.33 ml   Output 1550 ml   Net 3376.33 ml       General: Slightly lethargic, no acute distress.  Neck: Supple. No jugular venous distention.  Respiratory: Lungs are clear to auscultation, Respirations are non-labored, Breath sounds are equal, Symmetrical chest wall expansion.  Cardiovascular: Normal rate, No murmur, No gallop, Good pulses equal in all extremities, No edema.  Gastrointestinal: Soft, Non-tender, Non-distended, Normal bowel sounds.  Musculoskeletal: Normal strength, No tenderness, No deformity.  Neurologic: Lethargic, No focal deficits.          Current Inpatient Medications:     metoprolol tartrate  12.5 mg Oral BID    fludrocortisone  0.1 mg Oral BID    albumin human 25%  50 g IntraVENous Q12H    lactobacillus  1 capsule Oral Daily with breakfast    metroNIDAZOLE  500 mg IntraVENous Q8H    pantoprazole  40 mg IntraVENous BID    vancomycin  500 mg Oral 4 times per day    sodium chloride flush  5-40 mL IntraVENous 2 times per day    anastrozole  1 mg Oral Daily    donepezil  5 mg Oral Nightly    OXcarbazepine  600 mg Oral BID    sertraline  100 mg Oral Daily    zonisamide  200 mg Oral BID    warfarin placeholder: dosing by pharmacy   Oral RX Placeholder    midodrine  15 mg Oral TID WC  CONTRAST/BUBBLE/STRAIN/3D) 04/17/2025  7:02 AM (Final)    Interpretation Summary    Left Ventricle: Mildly reduced left ventricular systolic function with a visually estimated EF of 40 - 45%. Left ventricle size is normal. Mildly increased wall thickness.    Right Ventricle: Right ventricle size is normal.    Aortic Valve: Mild stenosis of the aortic valve. AV mean gradient is 8 mmHg.    Mitral Valve: Moderate regurgitation.    Tricuspid Valve: Moderate regurgitation.    Left Atrium: Left atrium is mildly dilated.    Pulmonary Arteries: Moderate pulmonary hypertension present.    Image quality is suboptimal.    Signed by: Katie Kapadia MD on 4/17/2025  7:02 AM      No results found for this or any previous visit.        ASSESSMENT:      Septic shock  C. difficile colitis  Atrial fibrillation with rapid response  Cardiomyopathy  Moderate mitral regurgitation  Mild aortic stenosis  Warfarin overdose  Probable dementia      PLAN:    Heart rate is controlled  Blood pressure stable  Hemoglobin is stable  Conservative medical therapy          Kin Millard MD, MD, FACC

## 2025-06-09 VITALS
BODY MASS INDEX: 31.49 KG/M2 | DIASTOLIC BLOOD PRESSURE: 62 MMHG | RESPIRATION RATE: 15 BRPM | HEIGHT: 65 IN | SYSTOLIC BLOOD PRESSURE: 123 MMHG | OXYGEN SATURATION: 95 % | TEMPERATURE: 97.6 F | WEIGHT: 189 LBS | HEART RATE: 75 BPM

## 2025-06-09 LAB
ABO/RH: NORMAL
ALBUMIN SERPL-MCNC: 2.7 G/DL (ref 3.5–5.2)
ALP SERPL-CCNC: 57 U/L (ref 35–104)
ALT SERPL-CCNC: 10 U/L (ref 0–32)
ANION GAP SERPL CALCULATED.3IONS-SCNC: 10 MMOL/L (ref 7–16)
ANTIBODY IDENTIFICATION: NORMAL
ANTIBODY IDENTIFICATION: NORMAL
ANTIBODY SCREEN: POSITIVE
ARM BAND NUMBER: NORMAL
AST SERPL-CCNC: 18 U/L (ref 0–31)
BASOPHILS # BLD: 0 K/UL (ref 0–0.2)
BASOPHILS NFR BLD: 0 % (ref 0–2)
BILIRUB DIRECT SERPL-MCNC: <0.2 MG/DL (ref 0–0.3)
BILIRUB INDIRECT SERPL-MCNC: ABNORMAL MG/DL (ref 0–1)
BILIRUB SERPL-MCNC: 0.6 MG/DL (ref 0–1.2)
BLOOD BANK BLOOD PRODUCT EXPIRATION DATE: NORMAL
BLOOD BANK COMMENT: NORMAL
BLOOD BANK COMMENT: NORMAL
BLOOD BANK DISPENSE STATUS: NORMAL
BLOOD BANK ISBT PRODUCT BLOOD TYPE: 9500
BLOOD BANK PRODUCT CODE: NORMAL
BLOOD BANK SAMPLE EXPIRATION: NORMAL
BLOOD BANK UNIT TYPE AND RH: NORMAL
BPU ID: NORMAL
BUN SERPL-MCNC: 13 MG/DL (ref 6–23)
CALCIUM SERPL-MCNC: 7.7 MG/DL (ref 8.6–10.2)
CHLORIDE SERPL-SCNC: 114 MMOL/L (ref 98–107)
CO2 SERPL-SCNC: 16 MMOL/L (ref 22–29)
COMPONENT: NORMAL
CREAT SERPL-MCNC: 0.8 MG/DL (ref 0.5–1)
CROSSMATCH RESULT: NORMAL
DAT IGG: POSITIVE
ELUATE ANTIBODY IDENTIFICATION: NORMAL
EOSINOPHIL # BLD: 0.19 K/UL (ref 0.05–0.5)
EOSINOPHILS RELATIVE PERCENT: 2 % (ref 0–6)
ERYTHROCYTE [DISTWIDTH] IN BLOOD BY AUTOMATED COUNT: 18 % (ref 11.5–15)
GFR, ESTIMATED: 77 ML/MIN/1.73M2
GLUCOSE SERPL-MCNC: 79 MG/DL (ref 74–99)
HCT VFR BLD AUTO: 31.1 % (ref 34–48)
HGB BLD-MCNC: 9.8 G/DL (ref 11.5–15.5)
INR PPP: 1.8
LYMPHOCYTES NFR BLD: 0.68 K/UL (ref 1.5–4)
LYMPHOCYTES RELATIVE PERCENT: 6 % (ref 20–42)
MAGNESIUM SERPL-MCNC: 1.9 MG/DL (ref 1.6–2.6)
MCH RBC QN AUTO: 25.3 PG (ref 26–35)
MCHC RBC AUTO-ENTMCNC: 31.5 G/DL (ref 32–34.5)
MCV RBC AUTO: 80.2 FL (ref 80–99.9)
MONOCYTES NFR BLD: 0.78 K/UL (ref 0.1–0.95)
MONOCYTES NFR BLD: 7 % (ref 2–12)
NEUTROPHILS NFR BLD: 85 % (ref 43–80)
NEUTS SEG NFR BLD: 9.35 K/UL (ref 1.8–7.3)
PHOSPHATE SERPL-MCNC: 2.4 MG/DL (ref 2.5–4.5)
PLATELET # BLD AUTO: 218 K/UL (ref 130–450)
PMV BLD AUTO: 9.9 FL (ref 7–12)
POTASSIUM SERPL-SCNC: 3.3 MMOL/L (ref 3.5–5)
PROT SERPL-MCNC: 4.3 G/DL (ref 6.4–8.3)
PROTHROMBIN TIME: 19 SEC (ref 9.3–12.4)
RBC # BLD AUTO: 3.88 M/UL (ref 3.5–5.5)
RBC # BLD: ABNORMAL 10*6/UL
SODIUM SERPL-SCNC: 140 MMOL/L (ref 132–146)
TRANSFUSION STATUS: NORMAL
UNIT DIVISION: 0
UNIT ISSUE DATE/TIME: NORMAL
WBC OTHER # BLD: 11 K/UL (ref 4.5–11.5)

## 2025-06-09 PROCEDURE — 97530 THERAPEUTIC ACTIVITIES: CPT

## 2025-06-09 PROCEDURE — 6360000002 HC RX W HCPCS: Performed by: NURSE PRACTITIONER

## 2025-06-09 PROCEDURE — 2500000003 HC RX 250 WO HCPCS: Performed by: INTERNAL MEDICINE

## 2025-06-09 PROCEDURE — 82248 BILIRUBIN DIRECT: CPT

## 2025-06-09 PROCEDURE — 84100 ASSAY OF PHOSPHORUS: CPT

## 2025-06-09 PROCEDURE — 97116 GAIT TRAINING THERAPY: CPT

## 2025-06-09 PROCEDURE — 6370000000 HC RX 637 (ALT 250 FOR IP): Performed by: SPECIALIST

## 2025-06-09 PROCEDURE — 6370000000 HC RX 637 (ALT 250 FOR IP): Performed by: INTERNAL MEDICINE

## 2025-06-09 PROCEDURE — 2580000003 HC RX 258: Performed by: NURSE PRACTITIONER

## 2025-06-09 PROCEDURE — 85025 COMPLETE CBC W/AUTO DIFF WBC: CPT

## 2025-06-09 PROCEDURE — 85610 PROTHROMBIN TIME: CPT

## 2025-06-09 PROCEDURE — 80053 COMPREHEN METABOLIC PANEL: CPT

## 2025-06-09 PROCEDURE — 6360000002 HC RX W HCPCS: Performed by: SPECIALIST

## 2025-06-09 PROCEDURE — 83735 ASSAY OF MAGNESIUM: CPT

## 2025-06-09 PROCEDURE — 6360000002 HC RX W HCPCS: Performed by: INTERNAL MEDICINE

## 2025-06-09 PROCEDURE — 97535 SELF CARE MNGMENT TRAINING: CPT

## 2025-06-09 RX ORDER — LACTOBACILLUS RHAMNOSUS GG 10B CELL
1 CAPSULE ORAL
DISCHARGE
Start: 2025-06-10

## 2025-06-09 RX ORDER — METOPROLOL TARTRATE 25 MG/1
12.5 TABLET, FILM COATED ORAL 2 TIMES DAILY
DISCHARGE
Start: 2025-06-09

## 2025-06-09 RX ORDER — FLUDROCORTISONE ACETATE 0.1 MG/1
0.1 TABLET ORAL 2 TIMES DAILY
DISCHARGE
Start: 2025-06-09 | End: 2025-07-09

## 2025-06-09 RX ORDER — FUROSEMIDE 10 MG/ML
40 INJECTION INTRAMUSCULAR; INTRAVENOUS ONCE
Status: COMPLETED | OUTPATIENT
Start: 2025-06-09 | End: 2025-06-09

## 2025-06-09 RX ORDER — FUROSEMIDE 40 MG/1
20 TABLET ORAL DAILY
DISCHARGE
Start: 2025-06-09

## 2025-06-09 RX ADMIN — SERTRALINE 100 MG: 50 TABLET, FILM COATED ORAL at 10:52

## 2025-06-09 RX ADMIN — ANASTROZOLE 1 MG: 1 TABLET ORAL at 10:52

## 2025-06-09 RX ADMIN — SODIUM CHLORIDE: 0.9 INJECTION, SOLUTION INTRAVENOUS at 05:16

## 2025-06-09 RX ADMIN — Medication 10 ML: at 10:53

## 2025-06-09 RX ADMIN — VANCOMYCIN HYDROCHLORIDE 500 MG: 10 INJECTION, POWDER, LYOPHILIZED, FOR SOLUTION INTRAVENOUS at 05:15

## 2025-06-09 RX ADMIN — METRONIDAZOLE 500 MG: 5 INJECTION, SOLUTION INTRAVENOUS at 04:53

## 2025-06-09 RX ADMIN — VANCOMYCIN HYDROCHLORIDE 500 MG: 10 INJECTION, POWDER, LYOPHILIZED, FOR SOLUTION INTRAVENOUS at 00:10

## 2025-06-09 RX ADMIN — METRONIDAZOLE 500 MG: 5 INJECTION, SOLUTION INTRAVENOUS at 12:59

## 2025-06-09 RX ADMIN — Medication 1 CAPSULE: at 10:52

## 2025-06-09 RX ADMIN — FLUDROCORTISONE ACETATE 0.1 MG: 0.1 TABLET ORAL at 10:51

## 2025-06-09 RX ADMIN — APIXABAN 5 MG: 5 TABLET, FILM COATED ORAL at 10:52

## 2025-06-09 RX ADMIN — PROCHLORPERAZINE EDISYLATE 10 MG: 5 INJECTION INTRAMUSCULAR; INTRAVENOUS at 02:28

## 2025-06-09 RX ADMIN — FUROSEMIDE 40 MG: 10 INJECTION, SOLUTION INTRAMUSCULAR; INTRAVENOUS at 10:53

## 2025-06-09 RX ADMIN — ZONISAMIDE 200 MG: 100 CAPSULE ORAL at 10:52

## 2025-06-09 RX ADMIN — PANTOPRAZOLE SODIUM 40 MG: 40 INJECTION, POWDER, FOR SOLUTION INTRAVENOUS at 10:53

## 2025-06-09 RX ADMIN — METOPROLOL TARTRATE 12.5 MG: 25 TABLET, FILM COATED ORAL at 10:51

## 2025-06-09 RX ADMIN — MIDODRINE HYDROCHLORIDE 15 MG: 10 TABLET ORAL at 10:51

## 2025-06-09 RX ADMIN — MIDODRINE HYDROCHLORIDE 15 MG: 10 TABLET ORAL at 12:59

## 2025-06-09 RX ADMIN — OXCARBAZEPINE 600 MG: 300 TABLET, FILM COATED ORAL at 10:51

## 2025-06-09 NOTE — PROGRESS NOTES
ET Nurse  Admit Date: 6/3/2025  3:04 PM    Reason for consult:  ostomy    Significant history:    Past Medical History:   Diagnosis Date    Atrial fibrillation (HCC)     Dementia (HCC)     History of cardioversion 10/2021       Stoma assessment:  stoma gray color functioning for liquid stool  Peristomal skin is sl red    Plan:    Applied one piece system convex 16075 applied gasket  Applied skin barrier strips    Anny Leary RN 6/9/2025 10:46 AM

## 2025-06-09 NOTE — PROGRESS NOTES
bed, patient required constant vc's for sequencing and participation Modified [place]    Balance Sitting:     Static: fair     Dynamic: fair -  Standing: fair - with wheeled walker Sitting:     Static: fair     Dynamic: fair -  Standing: fair - with wheeled walker Sitting:     Static: good     Dynamic: good   Standing: good  with wheeled walker   Activity Tolerance fair  Fair   patients HR increased to 140s during all activity good    Visual/  Perceptual Glasses: Yes                       Hand Dominance: R       AROM (PROM) Strength Additional Info:  Goal:   RUE  WFL 4-/5 good  and wfl FMC/dexterity noted during ADL tasks    Improve overall RUE strength  for participation in functional tasks   LUE WFL 4-/5 good  and wfl FMC/dexterity noted during ADL tasks    Improve overall LUE strength  for participation in functional tasks     Hearing: WFL   Sensation:  No c/o numbness or tingling  Tone: WFL   Edema: None    Comments:  Cleared by RN to see pt. Upon arrival patient resting in bed and agreeable to OT session. At end of session, patient positioned in bed with RN present with call light and phone within reach, all lines and tubes intact.  Overall patient demonstrated  decreased independence and safety during completion of ADL/functional transfer/mobility tasks. Patient is very limited by cognition. Therapist facilitated ADL tasks, functional transfers, functional mobility, bed mobility to address safety awareness, implementation of fall prevention strategies, & functional engagement throughout ADLs. Pt would benefit from continued skilled OT to increase safety and independence with completion of ADL/IADL tasks for functional independence and quality of life.    Treatment: OT treatment provided this date includes:   ADL-  Instruction/training on safety and adapted techniques for completion of ADLs: Therapist facilitated & pt educated on activity modifications/adaptations to promote implementation of fall  prevention strategies, EC/WS strategies, & safety awareness throughout ADLs.   Mobility-  Instruction/training on safety and improved independence with bed mobility/functional transfers  and functional mobility.   Sitting/Standing Balance/Tolerance:  to increase balance and activity tolerance during ADLs and facilitate proper posture and positioning.   Activity tolerance- Instruction/training on energy conservation/work simplification for completion of ADLs:.     Neuromuscular Reeducation to facilitate balance/righting reactions for increased function with ADLs tasks:    Neuromuscular Facilitation of  UE functional movement/ROM./fine motor dexterity    Cognitive retraining -  Cues for safety, sequencing, and problem solving    Skilled positioning/alignment-  Therapist facilitated proper positioning/alignment throughout session to maintain skin/joint integrity & proper body mechanics.      Pt has made fair progress towards set goals.   Continue with current plan of care      Treatment Time In:0933            Treatment Time Out: 1000                Treatment Charges: Mins Units   Ther Ex  30752     Manual Therapy 88008     Thera Activities 33933 12 1   ADL/Home Mgt 61143 15 1   Neuro Re-ed 47701     Group Therapy      Orthotic manage/training  61814     Non-Billable Time     Total Timed Treatment 27 2         Erika HENSON/JEREMY [id number]

## 2025-06-09 NOTE — PROGRESS NOTES
Physical Therapy  Physical Therapy Treatment Note/Plan of Care    Room #:  0636/0636-01  Patient Name: Susanna Arriola  YOB: 1955  MRN: [id number]    Date of Service: 6/9/2025     Tentative placement recommendation: Subacute Rehab  Equipment recommendation: To be determined      Evaluating Physical Therapist: Puneet Shirley PT, DPT #[id number]      Specific Provider Orders/Date/Referring Provider :     06/04/25 0630    PT eval and treat  Start:  06/04/25 0630,   End:  06/04/25 0630,   ONE TIME,   Standing Count:  1 Occurrences,   R       Jack Moscoso, APRN - CNP Acknowledge New    Admitting Diagnosis:   Kidney stone [N20.0]  Pericardial effusion [I31.39]  Pancolitis (HCC) [K51.00]  SHALINI (acute kidney injury) [N17.9]  Atrial fibrillation with RVR (HCC) [I48.91]  Septic shock (HCC) [A41.9, R65.21]      Surgery: none  Visit Diagnoses         Codes      Pancolitis (HCC)     K51.00      SHALINI (acute kidney injury)     N17.9      Kidney stone     N20.0      Pericardial effusion     I31.39            Patient Active Problem List   Diagnosis    Atrial fibrillation with RVR (HCC)    Perforated diverticulitis    Shock (HCC)    Sepsis secondary to UTI (HCC)    Sepsis due to urinary tract infection (HCC)    Persistent atrial fibrillation (HCC)    Septic shock (HCC)    Palliative care encounter        ASSESSMENT of Current Deficits Patient exhibits decreased strength, balance, and endurance impairing functional mobility, transfers, gait , gait distance, and tolerance to activity. Patient had confusion throughout session, however was able to understand one step directions. Patient able to perform side steps at edge of bed this session with assistance of 2 with max tactile cues for walker control for advancement. Patient does fatigue easily and during treatment patient frequently expressed desire to lay back in bed; educated importance of mobility while in the hospital  Reported nausea after ambulating. Colostomy bag  purpose of adaptive device and adjusted to proper height for the patient., and safety      Patient response to education:   Pt verbalized understanding Pt demonstrated skill Pt requires further education in this area   Yes Partial Yes      Treatment:  Patient practiced and was instructed/facilitated in the following treatment: Patient Sat edge of bed 10 minutes with Minimal assist of 1 to increase dynamic sitting balance and activity tolerance. Pt performed bed mobility, sat EOB, took side steps towards head of bed, assisted back into bed scooted towards HOB, rolled for chux pad. Once rolled patient with colostomy leakage, nursing notified.       Therapeutic Exercises:  not performed      At end of session, patient in bed with alarm call light and phone within reach,  all lines and tubes intact, nursing notified.      Patient would benefit from continued skilled Physical Therapy to improve functional independence and quality of life.         Patient's/ family goals   none stated    Time in    935a  Time out  1000a    Total Treatment Time 25 minutes    CPT codes:    Therapeutic activities (03665)   17 minutes  1 unit(s)  Gait Training (36257) 8 minutes 1 unit(s)    CHARMAINE Herrera, [facility] lic#[id number]

## 2025-06-09 NOTE — FLOWSHEET NOTE
Inpatient Wound Care    Admit Date: 6/3/2025  3:04 PM    Reason for consult:  abd    Significant history:    Past Medical History:   Diagnosis Date    Atrial fibrillation (HCC)     Dementia (HCC)     History of cardioversion 10/2021       Findings:     06/09/25 1049   Wound Abdomen Mid   No Date First Assessed or Time First Assessed found.   Present on Original Admission: Yes  Primary Wound Type: Surgical  Location: Abdomen  Wound Location Orientation: Mid   Wound Etiology Surgical   Wound Length (cm) 2 cm   Wound Width (cm) 2 cm   Wound Surface Area (cm^2) 4 cm^2   Change in Wound Size % (l*w) 93.31   Wound Assessment Pink/red   Drainage Amount Scant (moist but unmeasurable)         Impression:  surgical wound    Interventions in place:  mepielx    Plan:  Cont with mepilex       Anny Leary RN 6/9/2025 10:50 AM

## 2025-06-09 NOTE — CARE COORDINATION
[facility]  DC order noted.  Plan to [facility] at 2[facility]30p via [facility].  Have notified  Erasmo via phone.  HENS done, JANA signed.

## 2025-06-09 NOTE — PROGRESS NOTES
NAME: Susanna Arriola  MR:  [id number]  :   1955  Admit Date:  6/3/2025    Elements of this note, were copied and pasted from Previous. Updates have been made where noted and reflect current exam and medical decision making from the DOS of this encounter.  CHIEF COMPLAINT     No chief complaint on file.    HISTORY OF PRESENT ILLNESS     Susanna Arriola is a 70 y.o. female admitted on 6/3/2025    Patient Active Problem List   Diagnosis    Atrial fibrillation with RVR (HCC)    Perforated diverticulitis    Shock (HCC)    Sepsis secondary to UTI (HCC)    Sepsis due to urinary tract infection (HCC)    Persistent atrial fibrillation (HCC)    Septic shock (HCC)    Palliative care encounter       This is a face to face encounter   25 awake and alert has no c/o for d/c today    awake and alter has no c/o pain afebrile RA    afebrile RA wbc22.8 cr1  familt present has sitter more awake    has sitter nad     afebrile vs stable daughter present more awake still responds has nausea surgery to see   Wbc56.6 cr1.8     Admitted for   Kidney stone [N20.0]  Pericardial effusion [I31.39]  Pancolitis (HCC) [K51.00]  ANDREAS (acute kidney injury) [N17.9]  Atrial fibrillation with RVR (HCC) [I48.91]  Septic shock (HCC) [A41.9, R65.21]    ASSESSMENT/PLAN    Sepsis  Leukocytosis  wbc9.7  Pan Colitis   C diff   MRSA neg   Andreas better       Antimicrobials:   Stop metroNIDAZOLE (FLAGYL) 500 mg in 0.9% NaCl 100 mL IVPB premix, Q8H  vancomycin (VANCOCIN) 50 mg/mL oral solution 500 mg, 4 times per day med re for 2 weeks   F/u as needed       Patient is tolerating medications. No reported adverse drug reactions.  ID will continue to trend fever curve, WBCs, worsening signs of infection  and adverse effects to intravenous antibiotics.    ID will continue to follow and make recommendations accordingly  Available labs, imaging studies, microbiologic studies have been reviewed with pt and family if present.    DISCHARGE PLANS       PHYSICAL EXAMINATION    /62   Pulse 75   Temp 97.6 °F (36.4 °C)   Resp 15   Ht 1.651 m (5' 5\")   Wt 85.7 kg (189 lb)   SpO2 95%   BMI 31.45 kg/m²   Temp  Av.2 °F (36.8 °C)  Min: 97.6 °F (36.4 °C)  Max: 98.6 °F (37 °C)  CONSTITUTIONAL:   comfortable in bed   ENT:  Normocephalic, atraumatic,     LUNGS:  clear to auscultation bilaterally, no crackles or wheezing  CARDIOVASCULAR:  Normal apical impulse, regular rate and rhythm   ABDOMEN:  Normal bowel sounds, soft,  distended, non-tender  MUSCULOSKELETAL:    has  range of motion  no edema    NEUROLOGIC:  Awake, alert nad  SKIN:    no rashes     Central Venous Catheter:  CVC  25 Right Femoral (Active)   $Central Line Insertion $ Yes 25   Central Line Being Utilized Yes 25   Criteria for Appropriate Use Hemodynamically unstable, requiring monitoring lines, vasopressors, or volume resuscitation 25   Site Assessment Clean;Dry 25   Phlebitis Assessment No symptoms 25   Infiltration Assessment 0 25   Color/Movement/Sensation Capillary refill less than 3 sec 25   Proximal Lumen Color/Status White;Flushed;Brisk blood return;Normal saline locked;Alcohol cap applied 25   Medial Lumen Status Blue;Flushed;Brisk blood return;Normal saline locked;Alcohol cap applied 25   Distal Lumen Color/Status Brown;Flushed;Brisk blood return;Infusing 25   Line Care Connections checked and tightened 25   Alcohol Cap Used Yes 25   Date of Last Dressing Change 25   Dressing Type Transparent w/CHG gel 25   Dressing Status Clean, dry & intact 25   Dressing Intervention Other (Comment) 25           Drain(s):  Colostomy LLQ (Active)   Stomal Appliance 1 piece 25   Flange Size (inches) 1.5 Inches 05/06/25 0824   Stoma  Assessment Pavo 25 0800   Peristomal Assessment

## 2025-06-09 NOTE — PROGRESS NOTES
Cardiology  Progress Note      SUBJECTIVE: She was semiupright in bed when I came to see her.  She was confused.  No acute distress    Current Inpatient Medications  Current Facility-Administered Medications: sodium phosphate 13.71 mmol in sodium chloride 0.9 % 250 mL IVPB, 0.16 mmol/kg, IntraVENous, PRN **OR** sodium phosphate 27.42 mmol in sodium chloride 0.9 % 250 mL IVPB, 0.32 mmol/kg, IntraVENous, PRN  0.9 % sodium chloride infusion, , IntraVENous, PRN  metoprolol tartrate (LOPRESSOR) tablet 12.5 mg, 12.5 mg, Oral, BID  fludrocortisone (FLORINEF) tablet 0.1 mg, 0.1 mg, Oral, BID  phenylephrine (COREY-SYNEPHRINE) 50 mg in sodium chloride 0.9 % 250 mL infusion,  mcg/min, IntraVENous, Continuous  lactobacillus (CULTURELLE) capsule 1 capsule, 1 capsule, Oral, Daily with breakfast  metroNIDAZOLE (FLAGYL) 500 mg in 0.9% NaCl 100 mL IVPB premix, 500 mg, IntraVENous, Q8H  pantoprazole (PROTONIX) injection 40 mg, 40 mg, IntraVENous, BID  vancomycin (VANCOCIN) 50 mg/mL oral solution 500 mg, 500 mg, Oral, 4 times per day  glucose chewable tablet 16 g, 4 tablet, Oral, PRN  dextrose bolus 10% 125 mL, 125 mL, IntraVENous, PRN **OR** dextrose bolus 10% 250 mL, 250 mL, IntraVENous, PRN  glucagon injection 1 mg, 1 mg, SubCUTAneous, PRN  dextrose 10 % infusion, , IntraVENous, Continuous PRN  sodium chloride flush 0.9 % injection 5-40 mL, 5-40 mL, IntraVENous, 2 times per day  sodium chloride flush 0.9 % injection 5-40 mL, 5-40 mL, IntraVENous, PRN  0.9 % sodium chloride infusion, , IntraVENous, PRN  potassium chloride (KLOR-CON M) extended release tablet 40 mEq, 40 mEq, Oral, PRN **OR** potassium bicarb-citric acid (EFFER-K) effervescent tablet 40 mEq, 40 mEq, Oral, PRN **OR** potassium chloride 10 mEq/100 mL IVPB (Peripheral Line), 10 mEq, IntraVENous, PRN  magnesium sulfate 2000 mg in 50 mL IVPB premix, 2,000 mg, IntraVENous, PRN  polyethylene glycol (GLYCOLAX) packet 17 g, 17 g, Oral, Daily PRN  acetaminophen (TYLENOL)  04/15/2025    TROPHS 16 (H) 06/03/2025       ASSESSMENT & PLAN:        Septic shock  C. difficile colitis  Permanent atrial fibrillation with rapid ventricular response  Cardiomyopathy  Lactic acidosis  Coagulopathy with supratherapeutic INR  Status post colostomy  Mild aortic stenosis  Moderate mitral and tricuspid regurgitation    Patient presented with extreme fatigue.    She was hypotensive.  White count was markedly elevated around 40,000    She was felt to be in septic shock.  She was started on antibiotics and pressors IV.    From the cardiac standpoint she was in atrial fibrillation with rapid ventricular response    She failed multiple attempts for cardioversion in the past.  The last cardioversion was April of this year about 2 months ago.    She got dose of digoxin IV and metoprolol p.o. down in ER.    Heart rate is under better control now.  She is off pressors.  She was kept on midodrine for blood pressure support    Her INR was around 6 on admission and it went up above 10 after admission.  She had some oozing from the femoral vein line when I came to see her in the ER    Patient was given dose of vitamin K.    INR is down to 1.8    I tried to switch patient few months ago to Eliquis however family wanted to go back on warfarin due to the high cost of Eliquis    We will start her on Eliquis and we will try to help patients with samples from the office    Patient had fairly extensive bilateral edema in her lower extremities today.  That can be related to all the IV fluid that she got on admission for her sepsis and septic shock..  She had echocardiogram 2 months ago showed cardiomyopathy with mildly to moderately reduced left ventricular systolic function with ejection fraction around 40 to 45%.    Recommend giving patient dose of Lasix IV this morning.            Electronically signed by Katie Kapadia MD on 6/9/2025 at 7:05 AM

## 2025-06-09 NOTE — PLAN OF CARE
Problem: Pain  Goal: Verbalizes/displays adequate comfort level or baseline comfort level  Outcome: Progressing  Flowsheets (Taken 6/8/2025 2351)  Verbalizes/displays adequate comfort level or baseline comfort level: Encourage patient to monitor pain and request assistance     Problem: Cardiovascular - Adult  Goal: Maintains optimal cardiac output and hemodynamic stability  Outcome: Progressing  Flowsheets (Taken 6/8/2025 2351)  Maintains optimal cardiac output and hemodynamic stability: Monitor blood pressure and heart rate

## 2025-06-10 ENCOUNTER — OUTSIDE SERVICES (OUTPATIENT)
Dept: PRIMARY CARE CLINIC | Age: 70
End: 2025-06-10
Payer: MEDICARE

## 2025-06-10 DIAGNOSIS — I10 PRIMARY HYPERTENSION: ICD-10-CM

## 2025-06-10 DIAGNOSIS — N39.0 SEPSIS DUE TO URINARY TRACT INFECTION (HCC): ICD-10-CM

## 2025-06-10 DIAGNOSIS — A41.9 SEPSIS DUE TO URINARY TRACT INFECTION (HCC): ICD-10-CM

## 2025-06-10 DIAGNOSIS — I48.91 ATRIAL FIBRILLATION WITH RVR (HCC): ICD-10-CM

## 2025-06-10 DIAGNOSIS — N17.9 AKI (ACUTE KIDNEY INJURY): Primary | ICD-10-CM

## 2025-06-10 DIAGNOSIS — F33.9 EPISODE OF RECURRENT MAJOR DEPRESSIVE DISORDER, UNSPECIFIED DEPRESSION EPISODE SEVERITY: ICD-10-CM

## 2025-06-10 DIAGNOSIS — I50.22 CHRONIC SYSTOLIC (CONGESTIVE) HEART FAILURE (HCC): ICD-10-CM

## 2025-06-10 DIAGNOSIS — E78.2 MIXED HYPERLIPIDEMIA: ICD-10-CM

## 2025-06-10 DIAGNOSIS — R53.1 WEAKNESS: ICD-10-CM

## 2025-06-10 DIAGNOSIS — K57.92 ACUTE DIVERTICULITIS: ICD-10-CM

## 2025-06-10 DIAGNOSIS — F03.C3 SEVERE DEMENTIA WITH MOOD DISTURBANCE, UNSPECIFIED DEMENTIA TYPE (HCC): ICD-10-CM

## 2025-06-10 PROCEDURE — 99306 1ST NF CARE HIGH MDM 50: CPT | Performed by: INTERNAL MEDICINE

## 2025-06-10 NOTE — DISCHARGE SUMMARY
[place] [id number]                            DISCHARGE SUMMARY      PATIENT NAME: FEDERICO HARDEN              : 1955  MED REC NO: [id number]                        ROOM: 0636  ACCOUNT NO: [id number]                       ADMIT DATE: 2025  PROVIDER: Romeo Drake DO      ADMITTING DIAGNOSES:  Septic shock, present on admission evidenced by tachycardia; profound leukocytosis; hypertension, non-responsive to volume resuscitation requiring vasopressor therapy with lactic acidosis due to severe Clostridium difficile pancolitis with associated mild generalized ascites, recent hospitalization for severe sepsis secondary to Klebsiella urinary tract infection.    SECONDARY DISCHARGE DIANGOSES:    1. Paroxysmal atrial fibrillation with rapid ventricular response on chronic warfarin therapy with supratherapeutic INR.  2. Recent hospitalization for perforated sigmoid diverticulitis, status post exploratory laparotomy with sigmoid colon resection.  3. Open intraabdominal abscess drainage, and descending colostomy creation.  4. Valvular heart disease with mild aortic stenosis, moderate tricuspid regurgitation, moderate mitral regurgitation, moderate pulmonary hypertension, chronic persistent compensated systolic congestive heart failure.  5. Dementia with current hospital-acquired delirium.  6. History of normal-pressure hydrocephalus with ventriculoperitoneal shunt.  7. History of breast cancer.  8. History of hypertension, hyperlipidemia, chronic acalculous cholecystitis, and depression.    COMPLICATION:  Coagulopathy and transfusion.    OPERATION:  No operation.    CONSULTATION OBTAINED:  With Dr. Barahona, Dr. Kapadia, Dr. Wu, Palliative Care, and Dr. Corral.  No OMT was given.    ADDITIONAL ADMITTING PHYSICIAN:  Dr. Solo.    CHIEF COMPLAINT AND HISTORY OF CHIEF COMPLAINT:  This is a 70-year-old female who was

## 2025-06-12 ASSESSMENT — ENCOUNTER SYMPTOMS: ABDOMINAL PAIN: 1

## 2025-06-12 ASSESSMENT — VISUAL ACUITY: OU: 1

## 2025-06-12 NOTE — PROGRESS NOTES
cbc, cmp, lipid ordered  Severe Dementia on donepezil 5 mg qhs  Continue with current tx plan      [place] MCCOLLUM LPN am scribing for ELE Hunt Deborah Summers, LPN, personally performed the services described in this documentation as scribed by [place] Yan and it is both accurate and complete  William MCCOLLUM MD, personally performed the services described in this documentation as scribed by [place] Yan and it is both accurate and complete

## 2025-06-25 ENCOUNTER — OUTSIDE SERVICES (OUTPATIENT)
Dept: PRIMARY CARE CLINIC | Age: 70
End: 2025-06-25
Payer: MEDICARE

## 2025-06-25 DIAGNOSIS — I48.91 ATRIAL FIBRILLATION WITH RVR (HCC): ICD-10-CM

## 2025-06-25 DIAGNOSIS — N39.0 SEPSIS DUE TO URINARY TRACT INFECTION (HCC): ICD-10-CM

## 2025-06-25 DIAGNOSIS — E78.2 MIXED HYPERLIPIDEMIA: ICD-10-CM

## 2025-06-25 DIAGNOSIS — F03.C3 SEVERE DEMENTIA WITH MOOD DISTURBANCE, UNSPECIFIED DEMENTIA TYPE (HCC): ICD-10-CM

## 2025-06-25 DIAGNOSIS — N17.9 AKI (ACUTE KIDNEY INJURY): Primary | ICD-10-CM

## 2025-06-25 DIAGNOSIS — K57.92 ACUTE DIVERTICULITIS: ICD-10-CM

## 2025-06-25 DIAGNOSIS — I50.22 CHRONIC SYSTOLIC (CONGESTIVE) HEART FAILURE (HCC): ICD-10-CM

## 2025-06-25 DIAGNOSIS — I10 PRIMARY HYPERTENSION: ICD-10-CM

## 2025-06-25 DIAGNOSIS — F33.9 EPISODE OF RECURRENT MAJOR DEPRESSIVE DISORDER, UNSPECIFIED DEPRESSION EPISODE SEVERITY: ICD-10-CM

## 2025-06-25 DIAGNOSIS — R53.1 WEAKNESS: ICD-10-CM

## 2025-06-25 DIAGNOSIS — A41.9 SEPSIS DUE TO URINARY TRACT INFECTION (HCC): ICD-10-CM

## 2025-06-25 PROCEDURE — 99309 SBSQ NF CARE MODERATE MDM 30: CPT | Performed by: INTERNAL MEDICINE

## 2025-06-25 ASSESSMENT — ENCOUNTER SYMPTOMS: ABDOMINAL PAIN: 1

## 2025-06-25 ASSESSMENT — VISUAL ACUITY: OU: 1

## 2025-06-25 NOTE — PROGRESS NOTES
General: Vision grossly intact.      Extraocular Movements: Extraocular movements intact.      Conjunctiva/sclera: Conjunctivae normal.      Pupils: Pupils are equal, round, and reactive to light.      Comments: Fundoscopic exam is benign  Retinal vessels: Normal   Neck:      Vascular: No carotid bruit.      Comments: Thyroid is normal in size.  Carotids 2+ and equal bilaterally  Cardiovascular:      Rate and Rhythm: Normal rate and regular rhythm.      Pulses: Normal pulses.      Heart sounds: Normal heart sounds, S1 normal and S2 normal. No murmur heard.     No friction rub. No gallop.      Comments: Pedal pulses 2+ and equal bilaterally  Pulmonary:      Effort: Pulmonary effort is normal.      Breath sounds: Normal breath sounds.   Abdominal:      General: Bowel sounds are normal. There is no distension.      Palpations: Abdomen is soft. There is no mass.      Tenderness: There is no abdominal tenderness. There is no guarding or rebound.      Hernia: No hernia is present.      Comments: No rigidity   Musculoskeletal:         General: Normal range of motion.      Right shoulder: Normal.      Left shoulder: Normal.      Right upper arm: Normal.      Left upper arm: Normal.      Cervical back: Normal range of motion.      Right hip: Normal.      Left hip: Normal.      Right upper leg: Normal.      Left upper leg: Normal.      Right lower leg: Normal.      Left lower leg: Normal.      Right foot: Normal.      Left foot: Normal.   Lymphadenopathy:      Comments: No palpable or visible regional lymphadenopathy   Skin:     General: Skin is warm and dry.      Findings: No bruising, ecchymosis, lesion or rash.   Neurological:      General: No focal deficit present.      Mental Status: Mental status is at baseline.      Cranial Nerves: No cranial nerve deficit.      Deep Tendon Reflexes: Reflexes normal.   Psychiatric:         Mood and Affect: Mood and affect normal. Mood is not depressed.         Behavior: Behavior

## 2025-07-10 ENCOUNTER — APPOINTMENT (OUTPATIENT)
Dept: CT IMAGING | Age: 70
DRG: 392 | End: 2025-07-10
Payer: MEDICARE

## 2025-07-10 ENCOUNTER — HOSPITAL ENCOUNTER (INPATIENT)
Age: 70
LOS: 6 days | Discharge: NURSING, REHAB OR LONG-TERM CARE | DRG: 392 | End: 2025-07-16
Admitting: INTERNAL MEDICINE
Payer: MEDICARE

## 2025-07-10 ENCOUNTER — APPOINTMENT (OUTPATIENT)
Dept: GENERAL RADIOLOGY | Age: 70
DRG: 392 | End: 2025-07-10
Payer: MEDICARE

## 2025-07-10 DIAGNOSIS — I95.9 HYPOTENSION, UNSPECIFIED HYPOTENSION TYPE: Primary | ICD-10-CM

## 2025-07-10 DIAGNOSIS — E87.6 HYPOKALEMIA: ICD-10-CM

## 2025-07-10 DIAGNOSIS — R79.1 SUPRATHERAPEUTIC INR: ICD-10-CM

## 2025-07-10 DIAGNOSIS — K52.9 COLITIS: ICD-10-CM

## 2025-07-10 LAB
ALBUMIN SERPL-MCNC: 2.8 G/DL (ref 3.5–5.2)
ALP SERPL-CCNC: 86 U/L (ref 35–104)
ALT SERPL-CCNC: 14 U/L (ref 0–35)
ANION GAP SERPL CALCULATED.3IONS-SCNC: 13 MMOL/L (ref 7–16)
AST SERPL-CCNC: 40 U/L (ref 0–35)
BACTERIA URNS QL MICRO: ABNORMAL
BASOPHILS # BLD: 0 K/UL (ref 0–0.2)
BASOPHILS # BLD: 0.04 K/UL (ref 0–0.2)
BASOPHILS NFR BLD: 0 % (ref 0–2)
BASOPHILS NFR BLD: 1 % (ref 0–2)
BILIRUB SERPL-MCNC: 0.4 MG/DL (ref 0–1.2)
BILIRUB UR QL STRIP: NEGATIVE
BNP SERPL-MCNC: 2725 PG/ML (ref 0–125)
BUN SERPL-MCNC: 10 MG/DL (ref 8–23)
CALCIUM SERPL-MCNC: 7.8 MG/DL (ref 8.8–10.2)
CHLORIDE SERPL-SCNC: 103 MMOL/L (ref 98–107)
CLARITY UR: CLEAR
CO2 SERPL-SCNC: 27 MMOL/L (ref 22–29)
COLOR UR: YELLOW
CREAT SERPL-MCNC: 1 MG/DL (ref 0.5–1)
EOSINOPHIL # BLD: 0 K/UL (ref 0.05–0.5)
EOSINOPHIL # BLD: 0.04 K/UL (ref 0.05–0.5)
EOSINOPHILS RELATIVE PERCENT: 0 % (ref 0–6)
EOSINOPHILS RELATIVE PERCENT: 1 % (ref 0–6)
ERYTHROCYTE [DISTWIDTH] IN BLOOD BY AUTOMATED COUNT: 21.1 % (ref 11.5–15)
ERYTHROCYTE [DISTWIDTH] IN BLOOD BY AUTOMATED COUNT: 21.2 % (ref 11.5–15)
GFR, ESTIMATED: 62 ML/MIN/1.73M2
GLUCOSE SERPL-MCNC: 111 MG/DL (ref 74–99)
GLUCOSE UR STRIP-MCNC: NEGATIVE MG/DL
HCT VFR BLD AUTO: 31.7 % (ref 34–48)
HCT VFR BLD AUTO: 34.9 % (ref 34–48)
HGB BLD-MCNC: 10.7 G/DL (ref 11.5–15.5)
HGB BLD-MCNC: 9.8 G/DL (ref 11.5–15.5)
HGB UR QL STRIP.AUTO: NEGATIVE
INR PPP: 5.6
KETONES UR STRIP-MCNC: NEGATIVE MG/DL
LACTATE BLDV-SCNC: 0.9 MMOL/L (ref 0.5–1.9)
LACTATE BLDV-SCNC: 1.6 MMOL/L (ref 0.5–1.9)
LEUKOCYTE ESTERASE UR QL STRIP: ABNORMAL
LYMPHOCYTES NFR BLD: 0.84 K/UL (ref 1.5–4)
LYMPHOCYTES NFR BLD: 0.93 K/UL (ref 1.5–4)
LYMPHOCYTES RELATIVE PERCENT: 18 % (ref 20–42)
LYMPHOCYTES RELATIVE PERCENT: 20 % (ref 20–42)
MAGNESIUM SERPL-MCNC: 1.8 MG/DL (ref 1.6–2.4)
MCH RBC QN AUTO: 25 PG (ref 26–35)
MCH RBC QN AUTO: 25.1 PG (ref 26–35)
MCHC RBC AUTO-ENTMCNC: 30.7 G/DL (ref 32–34.5)
MCHC RBC AUTO-ENTMCNC: 30.9 G/DL (ref 32–34.5)
MCV RBC AUTO: 80.9 FL (ref 80–99.9)
MCV RBC AUTO: 81.9 FL (ref 80–99.9)
MONOCYTES NFR BLD: 0.08 K/UL (ref 0.1–0.95)
MONOCYTES NFR BLD: 0.17 K/UL (ref 0.1–0.95)
MONOCYTES NFR BLD: 2 % (ref 2–12)
MONOCYTES NFR BLD: 4 % (ref 2–12)
NEUTROPHILS NFR BLD: 77 % (ref 43–80)
NEUTROPHILS NFR BLD: 79 % (ref 43–80)
NEUTS SEG NFR BLD: 3.69 K/UL (ref 1.8–7.3)
NEUTS SEG NFR BLD: 3.7 K/UL (ref 1.8–7.3)
NITRITE UR QL STRIP: NEGATIVE
PH UR STRIP: 7.5 [PH] (ref 5–8)
PLATELET # BLD AUTO: 208 K/UL (ref 130–450)
PLATELET # BLD AUTO: 261 K/UL (ref 130–450)
PMV BLD AUTO: 8.9 FL (ref 7–12)
PMV BLD AUTO: 9.6 FL (ref 7–12)
POTASSIUM SERPL-SCNC: 2.2 MMOL/L (ref 3.5–5.1)
POTASSIUM SERPL-SCNC: 2.7 MMOL/L (ref 3.5–5.1)
PROT SERPL-MCNC: 6.1 G/DL (ref 6.4–8.3)
PROT UR STRIP-MCNC: NEGATIVE MG/DL
PROTHROMBIN TIME: 61.6 SEC (ref 9.3–12.4)
RBC # BLD AUTO: 3.92 M/UL (ref 3.5–5.5)
RBC # BLD AUTO: 4.26 M/UL (ref 3.5–5.5)
RBC # BLD: ABNORMAL 10*6/UL
RBC #/AREA URNS HPF: ABNORMAL /HPF
SODIUM SERPL-SCNC: 143 MMOL/L (ref 136–145)
SP GR UR STRIP: 1.01 (ref 1–1.03)
TROPONIN I SERPL HS-MCNC: 24 NG/L (ref 0–14)
TROPONIN I SERPL HS-MCNC: 27 NG/L (ref 0–14)
UROBILINOGEN UR STRIP-ACNC: 0.2 EU/DL (ref 0–1)
WBC #/AREA URNS HPF: ABNORMAL /HPF
WBC OTHER # BLD: 4.7 K/UL (ref 4.5–11.5)
WBC OTHER # BLD: 4.8 K/UL (ref 4.5–11.5)

## 2025-07-10 PROCEDURE — 83880 ASSAY OF NATRIURETIC PEPTIDE: CPT

## 2025-07-10 PROCEDURE — 83605 ASSAY OF LACTIC ACID: CPT

## 2025-07-10 PROCEDURE — 87040 BLOOD CULTURE FOR BACTERIA: CPT

## 2025-07-10 PROCEDURE — 80053 COMPREHEN METABOLIC PANEL: CPT

## 2025-07-10 PROCEDURE — 96366 THER/PROPH/DIAG IV INF ADDON: CPT

## 2025-07-10 PROCEDURE — 2500000003 HC RX 250 WO HCPCS

## 2025-07-10 PROCEDURE — 6360000002 HC RX W HCPCS

## 2025-07-10 PROCEDURE — 74177 CT ABD & PELVIS W/CONTRAST: CPT

## 2025-07-10 PROCEDURE — 93005 ELECTROCARDIOGRAM TRACING: CPT

## 2025-07-10 PROCEDURE — 96375 TX/PRO/DX INJ NEW DRUG ADDON: CPT

## 2025-07-10 PROCEDURE — 71045 X-RAY EXAM CHEST 1 VIEW: CPT

## 2025-07-10 PROCEDURE — 99285 EMERGENCY DEPT VISIT HI MDM: CPT

## 2025-07-10 PROCEDURE — 6360000004 HC RX CONTRAST MEDICATION: Performed by: RADIOLOGY

## 2025-07-10 PROCEDURE — 85610 PROTHROMBIN TIME: CPT

## 2025-07-10 PROCEDURE — 85025 COMPLETE CBC W/AUTO DIFF WBC: CPT

## 2025-07-10 PROCEDURE — 2580000003 HC RX 258

## 2025-07-10 PROCEDURE — 6370000000 HC RX 637 (ALT 250 FOR IP)

## 2025-07-10 PROCEDURE — 84132 ASSAY OF SERUM POTASSIUM: CPT

## 2025-07-10 PROCEDURE — 87077 CULTURE AEROBIC IDENTIFY: CPT

## 2025-07-10 PROCEDURE — 2060000000 HC ICU INTERMEDIATE R&B

## 2025-07-10 PROCEDURE — 81001 URINALYSIS AUTO W/SCOPE: CPT

## 2025-07-10 PROCEDURE — 83735 ASSAY OF MAGNESIUM: CPT

## 2025-07-10 PROCEDURE — 96365 THER/PROPH/DIAG IV INF INIT: CPT

## 2025-07-10 PROCEDURE — 87086 URINE CULTURE/COLONY COUNT: CPT

## 2025-07-10 PROCEDURE — 84484 ASSAY OF TROPONIN QUANT: CPT

## 2025-07-10 PROCEDURE — 36556 INSERT NON-TUNNEL CV CATH: CPT

## 2025-07-10 RX ORDER — IOPAMIDOL 755 MG/ML
75 INJECTION, SOLUTION INTRAVASCULAR
Status: COMPLETED | OUTPATIENT
Start: 2025-07-10 | End: 2025-07-10

## 2025-07-10 RX ORDER — ACETAMINOPHEN 325 MG/1
650 TABLET ORAL EVERY 6 HOURS PRN
Status: DISCONTINUED | OUTPATIENT
Start: 2025-07-10 | End: 2025-07-16 | Stop reason: HOSPADM

## 2025-07-10 RX ORDER — POTASSIUM CHLORIDE 7.45 MG/ML
10 INJECTION INTRAVENOUS ONCE
Status: COMPLETED | OUTPATIENT
Start: 2025-07-10 | End: 2025-07-10

## 2025-07-10 RX ORDER — MAGNESIUM SULFATE IN WATER 40 MG/ML
2000 INJECTION, SOLUTION INTRAVENOUS PRN
Status: DISCONTINUED | OUTPATIENT
Start: 2025-07-10 | End: 2025-07-16 | Stop reason: HOSPADM

## 2025-07-10 RX ORDER — MECOBALAMIN 5000 MCG
5 TABLET,DISINTEGRATING ORAL NIGHTLY PRN
Status: DISCONTINUED | OUTPATIENT
Start: 2025-07-10 | End: 2025-07-16 | Stop reason: HOSPADM

## 2025-07-10 RX ORDER — PROCHLORPERAZINE EDISYLATE 5 MG/ML
10 INJECTION INTRAMUSCULAR; INTRAVENOUS EVERY 6 HOURS PRN
Status: DISCONTINUED | OUTPATIENT
Start: 2025-07-10 | End: 2025-07-16 | Stop reason: HOSPADM

## 2025-07-10 RX ORDER — 0.9 % SODIUM CHLORIDE 0.9 %
1000 INTRAVENOUS SOLUTION INTRAVENOUS ONCE
Status: COMPLETED | OUTPATIENT
Start: 2025-07-10 | End: 2025-07-10

## 2025-07-10 RX ORDER — POTASSIUM CHLORIDE 7.45 MG/ML
10 INJECTION INTRAVENOUS PRN
Status: DISCONTINUED | OUTPATIENT
Start: 2025-07-10 | End: 2025-07-16 | Stop reason: HOSPADM

## 2025-07-10 RX ORDER — POTASSIUM CHLORIDE 7.45 MG/ML
10 INJECTION INTRAVENOUS
Status: DISPENSED | OUTPATIENT
Start: 2025-07-10 | End: 2025-07-10

## 2025-07-10 RX ORDER — METRONIDAZOLE 500 MG/100ML
500 INJECTION, SOLUTION INTRAVENOUS ONCE
Status: COMPLETED | OUTPATIENT
Start: 2025-07-10 | End: 2025-07-10

## 2025-07-10 RX ORDER — POTASSIUM CHLORIDE 1500 MG/1
40 TABLET, EXTENDED RELEASE ORAL PRN
Status: DISCONTINUED | OUTPATIENT
Start: 2025-07-10 | End: 2025-07-16 | Stop reason: HOSPADM

## 2025-07-10 RX ORDER — SODIUM CHLORIDE AND POTASSIUM CHLORIDE 150; 900 MG/100ML; MG/100ML
INJECTION, SOLUTION INTRAVENOUS CONTINUOUS
Status: DISCONTINUED | OUTPATIENT
Start: 2025-07-10 | End: 2025-07-16 | Stop reason: HOSPADM

## 2025-07-10 RX ADMIN — WATER 2000 MG: 1 INJECTION INTRAMUSCULAR; INTRAVENOUS; SUBCUTANEOUS at 22:29

## 2025-07-10 RX ADMIN — POTASSIUM CHLORIDE 10 MEQ: 7.46 INJECTION, SOLUTION INTRAVENOUS at 20:51

## 2025-07-10 RX ADMIN — SODIUM CHLORIDE 1000 ML: 0.9 INJECTION, SOLUTION INTRAVENOUS at 17:42

## 2025-07-10 RX ADMIN — POTASSIUM BICARBONATE 50 MEQ: 978 TABLET, EFFERVESCENT ORAL at 19:31

## 2025-07-10 RX ADMIN — POTASSIUM CHLORIDE 10 MEQ: 7.46 INJECTION, SOLUTION INTRAVENOUS at 22:40

## 2025-07-10 RX ADMIN — SODIUM CHLORIDE AND POTASSIUM CHLORIDE: .9; .15 SOLUTION INTRAVENOUS at 23:50

## 2025-07-10 RX ADMIN — IOPAMIDOL 75 ML: 755 INJECTION, SOLUTION INTRAVENOUS at 18:58

## 2025-07-10 RX ADMIN — POTASSIUM CHLORIDE 10 MEQ: 7.46 INJECTION, SOLUTION INTRAVENOUS at 19:35

## 2025-07-10 RX ADMIN — METRONIDAZOLE 500 MG: 500 INJECTION, SOLUTION INTRAVENOUS at 22:43

## 2025-07-10 NOTE — ED PROVIDER NOTES
[facility] EMERGENCY DEPARTMENT  EMERGENCY DEPARTMENT ENCOUNTER        Pt Name: Susanna Arriola  MRN: [id number]  Birthdate 1955  Date of evaluation: 7/10/2025  Provider: Ashley Villareal MD  PCP: Lay Hunter MD  Note Started: 4:54 PM EDT 7/10/25    CHIEF COMPLAINT       Chief Complaint   Patient presents with    Hypotension     RRT called for BP 73/51 as pt was visiting her  upstairs. Granddaughter states pt has been increasingly weak over past few days.        HISTORY OF PRESENT ILLNESS: 1 or more Elements   History From: Patient, granddaughter, physician    Limitations to history : Poor historian, history of dementia    Susanna Arriola is a 70 y.o. female who presents after RRT for near syncopal episode occurring prior to arrival to the ED.  Patient was visiting her  in the hospital when she began to feel very lightheaded and near syncope.  No LOC.  RRT was called.  BP was checked and patient was found to be hypotensive in the 60s systolic.  Repeat pressure resulted in the 70s.  Granddaughter at bedside states patient was trying to get up when she experienced the near syncopal episode.  Patient has been noted to be generally weak recently.  Patient was hospitalized approximately 1 month ago for sepsis.  Patient denies any chest pain, shortness of breath, nausea, vomiting, urinary symptoms, or any further complaints or symptoms at this time.    Nursing Notes were all reviewed and agree with or any disagreements were addressed in the HPI.    REVIEW OF EXTERNAL NOTES :         6/3/2025: Patient was admitted to the ICU for septic shock.  Patient was in A-fib RVR on warfarin with history of supratherapeutic INR noted.  Patient was noted to have recent hospitalization prior for perforated sigmoid diverticulitis s/p ex lap with colon resection.  Patient also had intra-abdominal abscess drainage and colostomy creation.    REVIEW OF SYSTEMS :        Positives and

## 2025-07-11 LAB
25(OH)D3 SERPL-MCNC: 39.5 NG/ML (ref 30–100)
ALBUMIN SERPL-MCNC: 2.5 G/DL (ref 3.5–5.2)
ALP SERPL-CCNC: 71 U/L (ref 35–104)
ALT SERPL-CCNC: 13 U/L (ref 0–35)
ANION GAP SERPL CALCULATED.3IONS-SCNC: 12 MMOL/L (ref 7–16)
AST SERPL-CCNC: 24 U/L (ref 0–35)
BASOPHILS # BLD: 0.13 K/UL (ref 0–0.2)
BASOPHILS NFR BLD: 3 % (ref 0–2)
BILIRUB SERPL-MCNC: 0.2 MG/DL (ref 0–1.2)
BUN SERPL-MCNC: 7 MG/DL (ref 8–23)
CALCIUM SERPL-MCNC: 6.9 MG/DL (ref 8.8–10.2)
CHLORIDE SERPL-SCNC: 109 MMOL/L (ref 98–107)
CHOLEST SERPL-MCNC: 162 MG/DL
CO2 SERPL-SCNC: 24 MMOL/L (ref 22–29)
CREAT SERPL-MCNC: 0.8 MG/DL (ref 0.5–1)
EKG ATRIAL RATE: 250 BPM
EKG ATRIAL RATE: 79 BPM
EKG Q-T INTERVAL: 416 MS
EKG Q-T INTERVAL: 442 MS
EKG QRS DURATION: 104 MS
EKG QRS DURATION: 96 MS
EKG QTC CALCULATION (BAZETT): 486 MS
EKG QTC CALCULATION (BAZETT): 511 MS
EKG R AXIS: 69 DEGREES
EKG R AXIS: 72 DEGREES
EKG T AXIS: 62 DEGREES
EKG T AXIS: 64 DEGREES
EKG VENTRICULAR RATE: 73 BPM
EKG VENTRICULAR RATE: 91 BPM
EOSINOPHIL # BLD: 0.04 K/UL (ref 0.05–0.5)
EOSINOPHILS RELATIVE PERCENT: 1 % (ref 0–6)
ERYTHROCYTE [DISTWIDTH] IN BLOOD BY AUTOMATED COUNT: 21.4 % (ref 11.5–15)
FOLATE SERPL-MCNC: 12.2 NG/ML (ref 4.6–34.8)
GFR, ESTIMATED: 79 ML/MIN/1.73M2
GLUCOSE SERPL-MCNC: 82 MG/DL (ref 74–99)
HCT VFR BLD AUTO: 28.9 % (ref 34–48)
HDLC SERPL-MCNC: 43 MG/DL
HGB BLD-MCNC: 9.1 G/DL (ref 11.5–15.5)
IRON SATN MFR SERPL: 37 % (ref 15–50)
IRON SERPL-MCNC: 54 UG/DL (ref 37–145)
LDLC SERPL CALC-MCNC: 100 MG/DL
LYMPHOCYTES NFR BLD: 0.88 K/UL (ref 1.5–4)
LYMPHOCYTES RELATIVE PERCENT: 18 % (ref 20–42)
MAGNESIUM SERPL-MCNC: 1.5 MG/DL (ref 1.6–2.4)
MCH RBC QN AUTO: 25.8 PG (ref 26–35)
MCHC RBC AUTO-ENTMCNC: 31.5 G/DL (ref 32–34.5)
MCV RBC AUTO: 81.9 FL (ref 80–99.9)
MONOCYTES NFR BLD: 0.22 K/UL (ref 0.1–0.95)
MONOCYTES NFR BLD: 4 % (ref 2–12)
NEUTROPHILS NFR BLD: 74 % (ref 43–80)
NEUTS SEG NFR BLD: 3.72 K/UL (ref 1.8–7.3)
PHOSPHATE SERPL-MCNC: 2.3 MG/DL (ref 2.5–4.5)
PLATELET # BLD AUTO: 206 K/UL (ref 130–450)
PMV BLD AUTO: 9.1 FL (ref 7–12)
POTASSIUM SERPL-SCNC: 2.8 MMOL/L (ref 3.5–5.1)
PROT SERPL-MCNC: 5 G/DL (ref 6.4–8.3)
RBC # BLD AUTO: 3.53 M/UL (ref 3.5–5.5)
RBC # BLD: ABNORMAL 10*6/UL
SODIUM SERPL-SCNC: 145 MMOL/L (ref 136–145)
T4 FREE SERPL-MCNC: 0.8 NG/DL (ref 0.9–1.7)
TIBC SERPL-MCNC: 146 UG/DL (ref 250–450)
TRIGL SERPL-MCNC: 98 MG/DL
TROPONIN I SERPL HS-MCNC: 23 NG/L (ref 0–14)
TSH SERPL DL<=0.05 MIU/L-ACNC: 3.52 UIU/ML (ref 0.27–4.2)
VIT B12 SERPL-MCNC: 994 PG/ML (ref 232–1245)
VLDLC SERPL CALC-MCNC: 20 MG/DL
WBC OTHER # BLD: 5 K/UL (ref 4.5–11.5)

## 2025-07-11 PROCEDURE — 82607 VITAMIN B-12: CPT

## 2025-07-11 PROCEDURE — 84443 ASSAY THYROID STIM HORMONE: CPT

## 2025-07-11 PROCEDURE — 93010 ELECTROCARDIOGRAM REPORT: CPT | Performed by: INTERNAL MEDICINE

## 2025-07-11 PROCEDURE — 84439 ASSAY OF FREE THYROXINE: CPT

## 2025-07-11 PROCEDURE — 80061 LIPID PANEL: CPT

## 2025-07-11 PROCEDURE — 82306 VITAMIN D 25 HYDROXY: CPT

## 2025-07-11 PROCEDURE — 83540 ASSAY OF IRON: CPT

## 2025-07-11 PROCEDURE — 83735 ASSAY OF MAGNESIUM: CPT

## 2025-07-11 PROCEDURE — 84484 ASSAY OF TROPONIN QUANT: CPT

## 2025-07-11 PROCEDURE — 6360000002 HC RX W HCPCS

## 2025-07-11 PROCEDURE — 80053 COMPREHEN METABOLIC PANEL: CPT

## 2025-07-11 PROCEDURE — 2060000000 HC ICU INTERMEDIATE R&B

## 2025-07-11 PROCEDURE — 93005 ELECTROCARDIOGRAM TRACING: CPT

## 2025-07-11 PROCEDURE — 84100 ASSAY OF PHOSPHORUS: CPT

## 2025-07-11 PROCEDURE — 2500000003 HC RX 250 WO HCPCS

## 2025-07-11 PROCEDURE — 6370000000 HC RX 637 (ALT 250 FOR IP)

## 2025-07-11 PROCEDURE — 85025 COMPLETE CBC W/AUTO DIFF WBC: CPT

## 2025-07-11 PROCEDURE — 82746 ASSAY OF FOLIC ACID SERUM: CPT

## 2025-07-11 PROCEDURE — 83550 IRON BINDING TEST: CPT

## 2025-07-11 RX ORDER — ZONISAMIDE 100 MG/1
200 CAPSULE ORAL 2 TIMES DAILY
Status: DISCONTINUED | OUTPATIENT
Start: 2025-07-11 | End: 2025-07-16 | Stop reason: HOSPADM

## 2025-07-11 RX ORDER — DOCUSATE SODIUM 100 MG/1
100 CAPSULE, LIQUID FILLED ORAL 2 TIMES DAILY
Status: DISCONTINUED | OUTPATIENT
Start: 2025-07-11 | End: 2025-07-16 | Stop reason: HOSPADM

## 2025-07-11 RX ORDER — PANTOPRAZOLE SODIUM 40 MG/1
40 TABLET, DELAYED RELEASE ORAL
Status: DISCONTINUED | OUTPATIENT
Start: 2025-07-11 | End: 2025-07-11

## 2025-07-11 RX ORDER — METRONIDAZOLE 500 MG/100ML
500 INJECTION, SOLUTION INTRAVENOUS EVERY 8 HOURS
Status: DISCONTINUED | OUTPATIENT
Start: 2025-07-11 | End: 2025-07-16 | Stop reason: HOSPADM

## 2025-07-11 RX ORDER — METOPROLOL TARTRATE 25 MG/1
12.5 TABLET, FILM COATED ORAL 2 TIMES DAILY
Status: DISCONTINUED | OUTPATIENT
Start: 2025-07-11 | End: 2025-07-11 | Stop reason: DRUGHIGH

## 2025-07-11 RX ORDER — DONEPEZIL HYDROCHLORIDE 5 MG/1
5 TABLET, FILM COATED ORAL NIGHTLY
Status: DISCONTINUED | OUTPATIENT
Start: 2025-07-11 | End: 2025-07-16 | Stop reason: HOSPADM

## 2025-07-11 RX ORDER — OXCARBAZEPINE 150 MG/1
600 TABLET, FILM COATED ORAL 2 TIMES DAILY
Status: DISCONTINUED | OUTPATIENT
Start: 2025-07-11 | End: 2025-07-16 | Stop reason: HOSPADM

## 2025-07-11 RX ORDER — FUROSEMIDE 20 MG/1
20 TABLET ORAL DAILY
Status: DISCONTINUED | OUTPATIENT
Start: 2025-07-11 | End: 2025-07-16 | Stop reason: HOSPADM

## 2025-07-11 RX ORDER — ANASTROZOLE 1 MG/1
1 TABLET ORAL DAILY
Status: DISCONTINUED | OUTPATIENT
Start: 2025-07-11 | End: 2025-07-16 | Stop reason: HOSPADM

## 2025-07-11 RX ORDER — LACTOBACILLUS RHAMNOSUS GG 10B CELL
1 CAPSULE ORAL
Status: DISCONTINUED | OUTPATIENT
Start: 2025-07-11 | End: 2025-07-16 | Stop reason: HOSPADM

## 2025-07-11 RX ORDER — MIDODRINE HYDROCHLORIDE 5 MG/1
5 TABLET ORAL
Status: DISCONTINUED | OUTPATIENT
Start: 2025-07-11 | End: 2025-07-16 | Stop reason: HOSPADM

## 2025-07-11 RX ORDER — SORBITOL SOLUTION 70 %
60 SOLUTION, ORAL MISCELLANEOUS ONCE
Status: COMPLETED | OUTPATIENT
Start: 2025-07-11 | End: 2025-07-11

## 2025-07-11 RX ORDER — ATORVASTATIN CALCIUM 10 MG/1
10 TABLET, FILM COATED ORAL DAILY
Status: DISCONTINUED | OUTPATIENT
Start: 2025-07-11 | End: 2025-07-16 | Stop reason: HOSPADM

## 2025-07-11 RX ADMIN — SORBITOL SOLUTION (BULK) 60 ML: 70 SOLUTION at 12:17

## 2025-07-11 RX ADMIN — APIXABAN 5 MG: 5 TABLET, FILM COATED ORAL at 21:34

## 2025-07-11 RX ADMIN — DOCUSATE SODIUM 100 MG: 100 CAPSULE, LIQUID FILLED ORAL at 21:33

## 2025-07-11 RX ADMIN — OXCARBAZEPINE 600 MG: 300 TABLET, FILM COATED ORAL at 09:47

## 2025-07-11 RX ADMIN — PROCHLORPERAZINE EDISYLATE 10 MG: 5 INJECTION INTRAMUSCULAR; INTRAVENOUS at 10:29

## 2025-07-11 RX ADMIN — DOCUSATE SODIUM 100 MG: 100 CAPSULE, LIQUID FILLED ORAL at 09:47

## 2025-07-11 RX ADMIN — METOPROLOL TARTRATE 75 MG: 50 TABLET, FILM COATED ORAL at 09:47

## 2025-07-11 RX ADMIN — SODIUM CHLORIDE, PRESERVATIVE FREE 40 MG: 5 INJECTION INTRAVENOUS at 09:44

## 2025-07-11 RX ADMIN — METRONIDAZOLE 500 MG: 500 INJECTION, SOLUTION INTRAVENOUS at 15:23

## 2025-07-11 RX ADMIN — ATORVASTATIN CALCIUM 10 MG: 20 TABLET, FILM COATED ORAL at 09:46

## 2025-07-11 RX ADMIN — ZONISAMIDE 200 MG: 100 CAPSULE ORAL at 21:48

## 2025-07-11 RX ADMIN — OXCARBAZEPINE 600 MG: 300 TABLET, FILM COATED ORAL at 21:32

## 2025-07-11 RX ADMIN — METRONIDAZOLE 500 MG: 500 INJECTION, SOLUTION INTRAVENOUS at 23:26

## 2025-07-11 RX ADMIN — APIXABAN 5 MG: 5 TABLET, FILM COATED ORAL at 09:46

## 2025-07-11 RX ADMIN — MIDODRINE HYDROCHLORIDE 5 MG: 5 TABLET ORAL at 10:30

## 2025-07-11 RX ADMIN — MIDODRINE HYDROCHLORIDE 5 MG: 5 TABLET ORAL at 17:42

## 2025-07-11 RX ADMIN — OXCARBAZEPINE 600 MG: 300 TABLET, FILM COATED ORAL at 02:28

## 2025-07-11 RX ADMIN — SODIUM CHLORIDE AND POTASSIUM CHLORIDE: .9; .15 SOLUTION INTRAVENOUS at 09:54

## 2025-07-11 RX ADMIN — WATER 1000 MG: 1 INJECTION INTRAMUSCULAR; INTRAVENOUS; SUBCUTANEOUS at 21:51

## 2025-07-11 RX ADMIN — ZONISAMIDE 200 MG: 100 CAPSULE ORAL at 09:47

## 2025-07-11 RX ADMIN — DONEPEZIL HYDROCHLORIDE 5 MG: 5 TABLET, FILM COATED ORAL at 00:57

## 2025-07-11 RX ADMIN — ANASTROZOLE 1 MG: 1 TABLET ORAL at 09:46

## 2025-07-11 RX ADMIN — SERTRALINE 100 MG: 50 TABLET, FILM COATED ORAL at 09:46

## 2025-07-11 RX ADMIN — METRONIDAZOLE 500 MG: 500 INJECTION, SOLUTION INTRAVENOUS at 07:36

## 2025-07-11 RX ADMIN — SODIUM CHLORIDE AND POTASSIUM CHLORIDE: .9; .15 SOLUTION INTRAVENOUS at 23:24

## 2025-07-11 RX ADMIN — DONEPEZIL HYDROCHLORIDE 5 MG: 5 TABLET, FILM COATED ORAL at 21:34

## 2025-07-11 RX ADMIN — Medication 1 CAPSULE: at 09:46

## 2025-07-11 RX ADMIN — METOPROLOL TARTRATE 75 MG: 50 TABLET, FILM COATED ORAL at 00:52

## 2025-07-11 RX ADMIN — APIXABAN 5 MG: 5 TABLET, FILM COATED ORAL at 00:52

## 2025-07-11 RX ADMIN — POTASSIUM BICARBONATE 40 MEQ: 782 TABLET, EFFERVESCENT ORAL at 09:45

## 2025-07-11 ASSESSMENT — PAIN SCALES - GENERAL: PAINLEVEL_OUTOF10: 0

## 2025-07-11 NOTE — H&P
Internal Medicine Consult Note    [facility]    Romeo Drake D.O., AJOLisaI.                    Shen Solo D.O., AJOLisaILisa Jara D.O.     Jack Moscoso, MSN, APRN-CNP  Rishi Ruiz, MSN, APRN-CNP  Fernanda Tay, MSN, APRN-CNP  Conchis Bradford, MSN, APRN-CNP     Department of Internal Medicine  History and Physical    PCP: Dr. Hunter   Admitting Physician: Dr. Drake  Consultants: Dr. Millard      CHIEF COMPLAINT:  syncope    HISTORY OF PRESENT ILLNESS:    Patient presents to the emergency room after being a rapid response team while she was visiting a family member upstairs.  She cannot tell me what happened because she is screaming that her left arm hurts.  The nurses are programming fluids/potassium to infuse through an IV in the left arm.  She is too preoccupied with the infusion going to provide HPI.  Her nurse was able to inform me that she lives at home with her , son, and granddaughter, she ambulates with a rollator at baseline, has [facility] home [facility]. Patient does not use nicotine, alcohol, marijuana, or illicit drugs. She is agreeable to admission for evaluation of syncope and treatment of colitis as well as replenishment of potassium.    PAST MEDICAL Hx:  Past Medical History:   Diagnosis Date    Atrial fibrillation (HCC)     Dementia (HCC)     History of cardioversion 10/2021       PAST SURGICAL Hx:   Past Surgical History:   Procedure Laterality Date    COLECTOMY N/A 3/16/2025    OPEN SIGMOID COLON RESECTION; COLOSTOMY performed by Levi Bunn MD at [facility] OR       FAMILY Hx:  History reviewed. No pertinent family history.    HOME MEDICATIONS:  Prior to Admission medications    Medication Sig Start Date End Date Taking? Authorizing Provider   apixaban (ELIQUIS) 5 MG TABS tablet Take 1 tablet by mouth 2 times daily 6/9/25   Rishi Ruiz, APRN - CNP   lactobacillus (CULTURELLE) capsule Take 1 capsule by mouth

## 2025-07-11 NOTE — CONSULTS
Consult Note            Date:7/11/2025        Patient Name:Susanna Arriola     YOB: 1955     Age:70 y.o.    Consults    Chief Complaint     Chief Complaint   Patient presents with    Hypotension     RRT called for BP 73/51 as pt was visiting her  upstairs. Granddaughter states pt has been increasingly weak over past few days.           History Obtained From     Chart    History of Present Illness       Patient is a 70-year-old lady known to me from before     She has problem with atrial fibrillation.  She underwent cardioversion in September of last year at [facility].  She was kept on warfarin and amiodarone.     She was admitted in April of this year to [facility] with low output from her colostomy.  She was found at that time to be in atrial fibrillation.  She underwent echocardiogram showing mildly to moderately reduced left ventricular systolic function with ejection fraction around 40 to 45% with mild aortic stenosis and moderate mitral and tricuspid regurgitation with moderate pulmonary hypertension.  She was cardioverted during her hospitalization.     I saw her for follow-up after her discharge and she was back in atrial fibrillation.  It was decided at that time to treat patient conservatively in view of her component of dementia with just heart rate control and long-term anticoagulation.  We stopped the amiodarone.  She was kept on warfarin.    Patient was brought to the emergency room this time with what appears to be possible collapse/syncope.  This history was taken mainly from her chart.  Patient cannot answer any question because of her mental status with some dementia.  She was apparently getting ready to get IV infusion with potassium through her left arm when patient started screaming.  I did not have any further details about the events after that.    Patient was found to have hypokalemia in ER.    She was admitted.  Cardiac consult was requested    I came  (ELIQUIS) tablet 5 mg, BID  donepezil (ARICEPT) tablet 5 mg, Nightly  [Held by provider] furosemide (LASIX) tablet 20 mg, Daily  lactobacillus (CULTURELLE) capsule 1 capsule, Daily with breakfast  midodrine (PROAMATINE) tablet 5 mg, TID WC  OXcarbazepine (TRILEPTAL) tablet 600 mg, BID  sertraline (ZOLOFT) tablet 100 mg, Daily  atorvastatin (LIPITOR) tablet 10 mg, Daily  zonisamide (ZONEGRAN) capsule 200 mg, BID  pantoprazole (PROTONIX) 40 mg in sodium chloride (PF) 0.9 % 10 mL injection, Daily  metroNIDAZOLE (FLAGYL) 500 mg in 0.9% NaCl 100 mL IVPB premix, Q8H  cefTRIAXone (ROCEPHIN) 1,000 mg in sterile water 10 mL IV syringe, Q24H  metoprolol tartrate (LOPRESSOR) tablet 75 mg, BID  sorbitol 70 % solution 60 mL, Once  docusate sodium (COLACE) capsule 100 mg, BID  acetaminophen (TYLENOL) tablet 650 mg, Q6H PRN  prochlorperazine (COMPAZINE) injection 10 mg, Q6H PRN  melatonin disintegrating tablet 5 mg, Nightly PRN  sodium phosphate 15 mmol in sodium chloride 0.9 % 250 mL IVPB, PRN  magnesium sulfate 2000 mg in 50 mL IVPB premix, PRN  potassium chloride (KLOR-CON M) extended release tablet 40 mEq, PRN   Or  potassium bicarb-citric acid (EFFER-K) effervescent tablet 40 mEq, PRN   Or  potassium chloride 10 mEq/100 mL IVPB (Peripheral Line), PRN  0.9% NaCl with KCl 20 mEq infusion, Continuous        Allergies   Betadine [povidone iodine]    Social History     Social History       Tobacco History       Smoking Status  Never      Smokeless Tobacco Use  Unknown              Alcohol History       Alcohol Use Status  Never              Drug Use       Drug Use Status  Never              Sexual Activity       Sexually Active  Not Asked                    Family History   History reviewed. No pertinent family history.    Review of Systems     I could not get review of system from the patient due to her mental status    Physical Exam   BP (!) 124/92   Pulse 100   Temp 97.7 °F (36.5 °C) (Oral)   Resp 19   SpO2 94%

## 2025-07-11 NOTE — CARE COORDINATION
7/11/25  NOTE: SW received notification from [facility]. Pt is active w/them for PT/OT/RN (for ostomy care).  Electronically signed by PANCHO Reza on 7/11/2025 at 11:55 AM

## 2025-07-11 NOTE — PROGRESS NOTES
10 mg Oral Daily    zonisamide  200 mg Oral BID    pantoprazole (PROTONIX) 40 mg in sodium chloride (PF) 0.9 % 10 mL injection  40 mg IntraVENous Daily    metroNIDAZOLE  500 mg IntraVENous Q8H    cefTRIAXone (ROCEPHIN) IV  1,000 mg IntraVENous Q24H    metoprolol tartrate  75 mg Oral BID    docusate sodium  100 mg Oral BID     Continuous Infusions:   0.9% NaCl with KCl 20 mEq 75 mL/hr at 07/11/25 1705       Objective Data:  Recent Labs     07/10/25  1745 07/10/25  2009 07/11/25  0735   WBC 4.8 4.7 5.0   RBC 4.26 3.92 3.53   HGB 10.7* 9.8* 9.1*   HCT 34.9 31.7* 28.9*   MCV 81.9 80.9 81.9   MCH 25.1* 25.0* 25.8*   MCHC 30.7* 30.9* 31.5*   RDW 21.1* 21.2* 21.4*    208 206   MPV 9.6 8.9 9.1     Recent Labs     07/10/25  1707 07/10/25  1834 07/11/25  0735     --  145   K 2.7* 2.2* 2.8*     --  109*   CO2 27  --  24   BUN 10  --  7*   CREATININE 1.0  --  0.8   GLUCOSE 111*  --  82   CALCIUM 7.8*  --  6.9*   BILITOT 0.4  --  0.2   ALKPHOS 86  --  71   AST 40*  --  24   ALT 14  --  13   ALBUMIN 2.8*  --  2.5*     No results found for: \"TROPONINI\"       Wound Documentation:   Wound Abdomen Mid (Active)   Number of days:        Wound 05/05/25 Sacrum Posterior (Active)   Number of days: 67       Assessment:  Hypokalemia  Syncope and collapse  Colitis  Constipation  Supratherapeutic INR  Hypoproteinemia  Hypocalcemia  Acute on chronic anemia  Essential hypertension  A-fib  Right breast cancer status postmastectomy/implant placement  Depression  Hyperlipidemia  GERD  Class I obesity BMI 31.45 kg/m²    Plan:   Continue IV fluids  Replace electrolytes as needed  Consult cardiology  Echocardiogram on 4/17/2025 showed EF of 40 to 45% with moderate regurgitation of the mitral and tricuspid valve and moderate pulmonary hypertension  Anticoagulation with Eliquis  Antibiotic therapy with Rocephin  Sorbitol and Colace for constipation  Appreciate subspecialist input    Continue current therapy.  See orders for further  plan of care.    More than 50% of my  time was spent at the bedside counseling/coordinating care with the patient and/or family with face to face contact.  This time was spent reviewing notes and laboratory data as well as instructing and counseling the patient. Time I spent with the family or surrogate(s) is included only if the patient was incapable of providing the necessary information or participating in medical decisions. I also discussed the differential diagnosis and all of the proposed management plans with the patient and individuals accompanying the patient.    The patient was seen, examined and then discussed with Dr. Drake.      MARLYN Haro CNP  7/11/2025  7:24 PM        I saw and evaluated the patient. I agree with the findings and the plan of care as documented in Rishi CLINE-CNP note.    Romeo Drake DO, FACOI  7:34 PM  7/11/2025

## 2025-07-12 LAB
ALBUMIN SERPL-MCNC: 2.4 G/DL (ref 3.5–5.2)
ALP SERPL-CCNC: 73 U/L (ref 35–104)
ALT SERPL-CCNC: 11 U/L (ref 0–35)
ANION GAP SERPL CALCULATED.3IONS-SCNC: 11 MMOL/L (ref 7–16)
AST SERPL-CCNC: 22 U/L (ref 0–35)
BASOPHILS # BLD: 0.1 K/UL (ref 0–0.2)
BASOPHILS NFR BLD: 2 % (ref 0–2)
BILIRUB SERPL-MCNC: 0.2 MG/DL (ref 0–1.2)
BUN SERPL-MCNC: 7 MG/DL (ref 8–23)
CALCIUM SERPL-MCNC: 7.4 MG/DL (ref 8.8–10.2)
CHLORIDE SERPL-SCNC: 113 MMOL/L (ref 98–107)
CO2 SERPL-SCNC: 22 MMOL/L (ref 22–29)
CREAT SERPL-MCNC: 0.8 MG/DL (ref 0.5–1)
EOSINOPHIL # BLD: 0.1 K/UL (ref 0.05–0.5)
EOSINOPHILS RELATIVE PERCENT: 2 % (ref 0–6)
ERYTHROCYTE [DISTWIDTH] IN BLOOD BY AUTOMATED COUNT: 21.6 % (ref 11.5–15)
GFR, ESTIMATED: 78 ML/MIN/1.73M2
GLUCOSE SERPL-MCNC: 90 MG/DL (ref 74–99)
HCT VFR BLD AUTO: 30.6 % (ref 34–48)
HGB BLD-MCNC: 9.4 G/DL (ref 11.5–15.5)
LYMPHOCYTES NFR BLD: 1.03 K/UL (ref 1.5–4)
LYMPHOCYTES RELATIVE PERCENT: 17 % (ref 20–42)
MAGNESIUM SERPL-MCNC: 1.6 MG/DL (ref 1.6–2.4)
MAGNESIUM SERPL-MCNC: 2 MG/DL (ref 1.6–2.4)
MCH RBC QN AUTO: 25.6 PG (ref 26–35)
MCHC RBC AUTO-ENTMCNC: 30.7 G/DL (ref 32–34.5)
MCV RBC AUTO: 83.4 FL (ref 80–99.9)
MONOCYTES NFR BLD: 0.1 K/UL (ref 0.1–0.95)
MONOCYTES NFR BLD: 2 % (ref 2–12)
NEUTROPHILS NFR BLD: 77 % (ref 43–80)
NEUTS SEG NFR BLD: 4.57 K/UL (ref 1.8–7.3)
PHOSPHATE SERPL-MCNC: 2 MG/DL (ref 2.5–4.5)
PHOSPHATE SERPL-MCNC: 2.3 MG/DL (ref 2.5–4.5)
PLATELET # BLD AUTO: 208 K/UL (ref 130–450)
PMV BLD AUTO: 9.7 FL (ref 7–12)
POTASSIUM SERPL-SCNC: 2.7 MMOL/L (ref 3.5–5.1)
POTASSIUM SERPL-SCNC: 3.7 MMOL/L (ref 3.5–5.1)
PROT SERPL-MCNC: 5.1 G/DL (ref 6.4–8.3)
RBC # BLD AUTO: 3.67 M/UL (ref 3.5–5.5)
RBC # BLD: ABNORMAL 10*6/UL
SODIUM SERPL-SCNC: 146 MMOL/L (ref 136–145)
WBC OTHER # BLD: 5.9 K/UL (ref 4.5–11.5)

## 2025-07-12 PROCEDURE — 6360000002 HC RX W HCPCS

## 2025-07-12 PROCEDURE — 80053 COMPREHEN METABOLIC PANEL: CPT

## 2025-07-12 PROCEDURE — 83735 ASSAY OF MAGNESIUM: CPT

## 2025-07-12 PROCEDURE — 36415 COLL VENOUS BLD VENIPUNCTURE: CPT

## 2025-07-12 PROCEDURE — 85025 COMPLETE CBC W/AUTO DIFF WBC: CPT

## 2025-07-12 PROCEDURE — 84100 ASSAY OF PHOSPHORUS: CPT

## 2025-07-12 PROCEDURE — 84132 ASSAY OF SERUM POTASSIUM: CPT

## 2025-07-12 PROCEDURE — 2500000003 HC RX 250 WO HCPCS

## 2025-07-12 PROCEDURE — 6370000000 HC RX 637 (ALT 250 FOR IP): Performed by: SURGERY

## 2025-07-12 PROCEDURE — 6370000000 HC RX 637 (ALT 250 FOR IP)

## 2025-07-12 PROCEDURE — 2580000003 HC RX 258

## 2025-07-12 PROCEDURE — 2060000000 HC ICU INTERMEDIATE R&B

## 2025-07-12 RX ORDER — SENNOSIDES 8.6 MG/1
1 TABLET ORAL NIGHTLY
Status: DISCONTINUED | OUTPATIENT
Start: 2025-07-12 | End: 2025-07-16 | Stop reason: HOSPADM

## 2025-07-12 RX ORDER — POLYETHYLENE GLYCOL 3350 17 G/17G
17 POWDER, FOR SOLUTION ORAL DAILY
Status: DISCONTINUED | OUTPATIENT
Start: 2025-07-12 | End: 2025-07-16 | Stop reason: HOSPADM

## 2025-07-12 RX ADMIN — POLYETHYLENE GLYCOL 3350 17 G: 17 POWDER, FOR SOLUTION ORAL at 15:39

## 2025-07-12 RX ADMIN — SODIUM CHLORIDE AND POTASSIUM CHLORIDE: .9; .15 SOLUTION INTRAVENOUS at 14:06

## 2025-07-12 RX ADMIN — MIDODRINE HYDROCHLORIDE 5 MG: 5 TABLET ORAL at 08:40

## 2025-07-12 RX ADMIN — OXCARBAZEPINE 600 MG: 300 TABLET, FILM COATED ORAL at 20:27

## 2025-07-12 RX ADMIN — ATORVASTATIN CALCIUM 10 MG: 20 TABLET, FILM COATED ORAL at 08:43

## 2025-07-12 RX ADMIN — POTASSIUM CHLORIDE 10 MEQ: 7.46 INJECTION, SOLUTION INTRAVENOUS at 08:56

## 2025-07-12 RX ADMIN — POTASSIUM CHLORIDE 10 MEQ: 7.46 INJECTION, SOLUTION INTRAVENOUS at 10:16

## 2025-07-12 RX ADMIN — ANASTROZOLE 1 MG: 1 TABLET ORAL at 08:51

## 2025-07-12 RX ADMIN — MIDODRINE HYDROCHLORIDE 5 MG: 5 TABLET ORAL at 15:39

## 2025-07-12 RX ADMIN — ZONISAMIDE 200 MG: 100 CAPSULE ORAL at 08:51

## 2025-07-12 RX ADMIN — DOCUSATE SODIUM 100 MG: 100 CAPSULE, LIQUID FILLED ORAL at 20:27

## 2025-07-12 RX ADMIN — MIDODRINE HYDROCHLORIDE 5 MG: 5 TABLET ORAL at 11:31

## 2025-07-12 RX ADMIN — APIXABAN 5 MG: 5 TABLET, FILM COATED ORAL at 20:27

## 2025-07-12 RX ADMIN — Medication 5 MG: at 23:24

## 2025-07-12 RX ADMIN — SODIUM CHLORIDE, PRESERVATIVE FREE 40 MG: 5 INJECTION INTRAVENOUS at 08:40

## 2025-07-12 RX ADMIN — POTASSIUM CHLORIDE 10 MEQ: 7.46 INJECTION, SOLUTION INTRAVENOUS at 12:10

## 2025-07-12 RX ADMIN — APIXABAN 5 MG: 5 TABLET, FILM COATED ORAL at 08:43

## 2025-07-12 RX ADMIN — POTASSIUM CHLORIDE 10 MEQ: 7.46 INJECTION, SOLUTION INTRAVENOUS at 14:05

## 2025-07-12 RX ADMIN — MAGNESIUM SULFATE HEPTAHYDRATE 2000 MG: 40 INJECTION, SOLUTION INTRAVENOUS at 13:00

## 2025-07-12 RX ADMIN — SODIUM PHOSPHATE, MONOBASIC, MONOHYDRATE AND SODIUM PHOSPHATE, DIBASIC, ANHYDROUS 15 MMOL: 142; 276 INJECTION, SOLUTION INTRAVENOUS at 15:53

## 2025-07-12 RX ADMIN — ZONISAMIDE 200 MG: 100 CAPSULE ORAL at 20:29

## 2025-07-12 RX ADMIN — METRONIDAZOLE 500 MG: 500 INJECTION, SOLUTION INTRAVENOUS at 05:42

## 2025-07-12 RX ADMIN — METOPROLOL TARTRATE 75 MG: 50 TABLET, FILM COATED ORAL at 08:41

## 2025-07-12 RX ADMIN — OXCARBAZEPINE 600 MG: 300 TABLET, FILM COATED ORAL at 08:40

## 2025-07-12 RX ADMIN — METOPROLOL TARTRATE 75 MG: 50 TABLET, FILM COATED ORAL at 20:26

## 2025-07-12 RX ADMIN — Medication 1 CAPSULE: at 08:40

## 2025-07-12 RX ADMIN — SERTRALINE 100 MG: 50 TABLET, FILM COATED ORAL at 08:40

## 2025-07-12 RX ADMIN — SENNOSIDES 8.6 MG: 8.6 TABLET, FILM COATED ORAL at 20:27

## 2025-07-12 RX ADMIN — DOCUSATE SODIUM 100 MG: 100 CAPSULE, LIQUID FILLED ORAL at 08:43

## 2025-07-12 RX ADMIN — WATER 1000 MG: 1 INJECTION INTRAMUSCULAR; INTRAVENOUS; SUBCUTANEOUS at 21:08

## 2025-07-12 RX ADMIN — POTASSIUM CHLORIDE 10 MEQ: 7.46 INJECTION, SOLUTION INTRAVENOUS at 13:21

## 2025-07-12 RX ADMIN — METRONIDAZOLE 500 MG: 500 INJECTION, SOLUTION INTRAVENOUS at 21:13

## 2025-07-12 RX ADMIN — DONEPEZIL HYDROCHLORIDE 5 MG: 5 TABLET, FILM COATED ORAL at 20:27

## 2025-07-12 RX ADMIN — METRONIDAZOLE 500 MG: 500 INJECTION, SOLUTION INTRAVENOUS at 14:08

## 2025-07-12 RX ADMIN — POTASSIUM CHLORIDE 10 MEQ: 7.46 INJECTION, SOLUTION INTRAVENOUS at 11:26

## 2025-07-12 NOTE — PLAN OF CARE
Problem: Safety - Adult  Goal: Free from fall injury  7/12/2025 1448 by More Bland RN  Outcome: Progressing     Problem: Discharge Planning  Goal: Discharge to home or other facility with appropriate resources  7/12/2025 1448 by More Bland RN  Outcome: Progressing     Problem: Pain  Goal: Verbalizes/displays adequate comfort level or baseline comfort level  7/12/2025 1448 by More Bland RN  Outcome: Progressing     Problem: Skin/Tissue Integrity  Goal: Skin integrity remains intact  Description: 1.  Monitor for areas of redness and/or skin breakdown  2.  Assess vascular access sites hourly  3.  Every 4-6 hours minimum:  Change oxygen saturation probe site  4.  Every 4-6 hours:  If on nasal continuous positive airway pressure, respiratory therapy assess nares and determine need for appliance change or resting period  Outcome: Progressing

## 2025-07-12 NOTE — PROGRESS NOTES
Cardiology  Progress Note      SUBJECTIVE: Completely confused.  No acute distress    Current Inpatient Medications  Current Facility-Administered Medications: anastrozole (ARIMIDEX) tablet 1 mg, 1 mg, Oral, Daily  apixaban (ELIQUIS) tablet 5 mg, 5 mg, Oral, BID  donepezil (ARICEPT) tablet 5 mg, 5 mg, Oral, Nightly  [Held by provider] furosemide (LASIX) tablet 20 mg, 20 mg, Oral, Daily  lactobacillus (CULTURELLE) capsule 1 capsule, 1 capsule, Oral, Daily with breakfast  midodrine (PROAMATINE) tablet 5 mg, 5 mg, Oral, TID WC  OXcarbazepine (TRILEPTAL) tablet 600 mg, 600 mg, Oral, BID  sertraline (ZOLOFT) tablet 100 mg, 100 mg, Oral, Daily  atorvastatin (LIPITOR) tablet 10 mg, 10 mg, Oral, Daily  zonisamide (ZONEGRAN) capsule 200 mg, 200 mg, Oral, BID  pantoprazole (PROTONIX) 40 mg in sodium chloride (PF) 0.9 % 10 mL injection, 40 mg, IntraVENous, Daily  metroNIDAZOLE (FLAGYL) 500 mg in 0.9% NaCl 100 mL IVPB premix, 500 mg, IntraVENous, Q8H  cefTRIAXone (ROCEPHIN) 1,000 mg in sterile water 10 mL IV syringe, 1,000 mg, IntraVENous, Q24H  metoprolol tartrate (LOPRESSOR) tablet 75 mg, 75 mg, Oral, BID  docusate sodium (COLACE) capsule 100 mg, 100 mg, Oral, BID  acetaminophen (TYLENOL) tablet 650 mg, 650 mg, Oral, Q6H PRN  prochlorperazine (COMPAZINE) injection 10 mg, 10 mg, IntraVENous, Q6H PRN  melatonin disintegrating tablet 5 mg, 5 mg, Oral, Nightly PRN  sodium phosphate 15 mmol in sodium chloride 0.9 % 250 mL IVPB, 15 mmol, IntraVENous, PRN  magnesium sulfate 2000 mg in 50 mL IVPB premix, 2,000 mg, IntraVENous, PRN  potassium chloride (KLOR-CON M) extended release tablet 40 mEq, 40 mEq, Oral, PRN **OR** potassium bicarb-citric acid (EFFER-K) effervescent tablet 40 mEq, 40 mEq, Oral, PRN **OR** potassium chloride 10 mEq/100 mL IVPB (Peripheral Line), 10 mEq, IntraVENous, PRN  0.9% NaCl with KCl 20 mEq infusion, , IntraVENous, Continuous      Physical  VITALS:  /74   Pulse (!) 101   Temp 98.1 °F (36.7 °C)

## 2025-07-12 NOTE — PROGRESS NOTES
4 Eyes Skin Assessment     The patient is being assess for  Admission    I agree that 2 RN's have performed a thorough Head to Toe Skin Assessment on the patient. ALL assessment sites listed below have been assessed.       Areas assessed by both nurses:   [x]   Head, Face, and Ears   [x]   Shoulders, Back, and Chest  [x]   Arms, Elbows, and Hands   [x]   Coccyx, Sacrum, and IschIum  [x]   Legs, Feet, and Heels        Does the Patient have Skin Breakdown?  No         Deshaun Prevention initiated:  Yes   Wound Care Orders initiated:  No      [facility] nurse consulted for Pressure Injury (Stage 3,4, Unstageable, DTI, NWPT, and Complex wounds), New and Established Ostomies:  Yes    Established ostomy and abdominal incision scar    Nurse 1 eSignature: Electronically signed by Emiliano Barrera RN on 7/12/25 at 6:27 AM EDT    **SHARE this note so that the co-signing nurse is able to place an eSignature**    Nurse 2 eSignature: Electronically signed by Edwin Rivers RN on 7/12/25 at 6:56 AM EDT

## 2025-07-12 NOTE — PROGRESS NOTES
Internal Medicine Consult Note    JOSE=[facility]    Romeo Drake D.O., AJOLisaI.                    Shen Solo D.O., ALICIAILisa Jara D.O.     Jack Moscoso, MSN, APRN-CNP  Rishi Ruiz, MSN, APRN-CNP  Conchis Bradford, MSN. APRN-NP-C  Belen Curran MSN, APRN, ACNP-AG     Primary Care Physician: Lay Hunter MD   Admitting Physician:  Romeo Drake DO  Admission date and time: 7/10/2025  4:47 PM    Room:  [address] [place] [address]  Admitting diagnosis: Hypokalemia [E87.6]  Colitis [K52.9]  Supratherapeutic INR [R79.1]  Hypotension, unspecified hypotension type [I95.9]    Patient Name: Susanna Arriola  MRN: [id number]    Date of Service: 7/12/2025     Subjective:  Susanna is a 70 y.o. female who was seen and examined today,7/12/2025, at the bedside.  Patient is seen examined emergency department this morning she is alert and oriented.  Patient states that she does not feel very well today.  She has not had had any output from her ostomy since admission.  Will consult general surgery.  She also has multiple electrolyte abnormalities will provide replacement therapy.    No family present during my examination.    Review of System:   Constitutional:   Denies fever or chills, weight loss or gain, reports malaise, denies any syncope or vertigo  HEENT:   Denies ear pain, sore throat, sinus or eye problems.  Cardiovascular:   Denies any chest pain, irregular heartbeats, or palpitations.   Respiratory:   Denies shortness of breath, coughing, sputum production, hemoptysis, or wheezing.  Gastrointestinal:   Denies nausea, vomiting, diarrhea, or constipation.  Denies any abdominal pain.  Reports ostomy  Genitourinary:    Denies any urgency, frequency, hematuria. Voiding  without difficulty.  Extremities:   Denies lower extremity swelling, edema or cyanosis.   Neurology:    Denies any headache or focal neurological deficits, Denies generalized weakness or memory

## 2025-07-13 ENCOUNTER — APPOINTMENT (OUTPATIENT)
Dept: GENERAL RADIOLOGY | Age: 70
DRG: 392 | End: 2025-07-13
Payer: MEDICARE

## 2025-07-13 PROBLEM — I95.9 HYPOTENSION: Status: ACTIVE | Noted: 2025-07-13

## 2025-07-13 LAB
ALBUMIN SERPL-MCNC: 2.6 G/DL (ref 3.5–5.2)
ALP SERPL-CCNC: 80 U/L (ref 35–104)
ALT SERPL-CCNC: 12 U/L (ref 0–35)
ANION GAP SERPL CALCULATED.3IONS-SCNC: 11 MMOL/L (ref 7–16)
AST SERPL-CCNC: 20 U/L (ref 0–35)
BASOPHILS # BLD: 0.09 K/UL (ref 0–0.2)
BASOPHILS NFR BLD: 1 % (ref 0–2)
BILIRUB SERPL-MCNC: 0.3 MG/DL (ref 0–1.2)
BUN SERPL-MCNC: 8 MG/DL (ref 8–23)
CALCIUM SERPL-MCNC: 7.9 MG/DL (ref 8.8–10.2)
CHLORIDE SERPL-SCNC: 113 MMOL/L (ref 98–107)
CO2 SERPL-SCNC: 19 MMOL/L (ref 22–29)
CREAT SERPL-MCNC: 0.9 MG/DL (ref 0.5–1)
EOSINOPHIL # BLD: 0.03 K/UL (ref 0.05–0.5)
EOSINOPHILS RELATIVE PERCENT: 0 % (ref 0–6)
ERYTHROCYTE [DISTWIDTH] IN BLOOD BY AUTOMATED COUNT: 21.9 % (ref 11.5–15)
GFR, ESTIMATED: 71 ML/MIN/1.73M2
GLUCOSE SERPL-MCNC: 168 MG/DL (ref 74–99)
HCT VFR BLD AUTO: 32.7 % (ref 34–48)
HGB BLD-MCNC: 9.8 G/DL (ref 11.5–15.5)
INR PPP: 7
INR PPP: 9.3
LYMPHOCYTES NFR BLD: 1.57 K/UL (ref 1.5–4)
LYMPHOCYTES RELATIVE PERCENT: 23 % (ref 20–42)
MAGNESIUM SERPL-MCNC: 2 MG/DL (ref 1.6–2.4)
MCH RBC QN AUTO: 25.4 PG (ref 26–35)
MCHC RBC AUTO-ENTMCNC: 30 G/DL (ref 32–34.5)
MCV RBC AUTO: 84.7 FL (ref 80–99.9)
MONOCYTES NFR BLD: 0.65 K/UL (ref 0.1–0.95)
MONOCYTES NFR BLD: 9 % (ref 2–12)
NEUTROPHILS NFR BLD: 66 % (ref 43–80)
NEUTS SEG NFR BLD: 4.54 K/UL (ref 1.8–7.3)
PHOSPHATE SERPL-MCNC: 2 MG/DL (ref 2.5–4.5)
PLATELET # BLD AUTO: 223 K/UL (ref 130–450)
PMV BLD AUTO: 9.5 FL (ref 7–12)
POTASSIUM SERPL-SCNC: 3.5 MMOL/L (ref 3.5–5.1)
PROT SERPL-MCNC: 5.5 G/DL (ref 6.4–8.3)
PROTHROMBIN TIME: 103.7 SEC (ref 9.3–12.4)
PROTHROMBIN TIME: 78.2 SEC (ref 9.3–12.4)
RBC # BLD AUTO: 3.86 M/UL (ref 3.5–5.5)
RBC # BLD: ABNORMAL 10*6/UL
SODIUM SERPL-SCNC: 143 MMOL/L (ref 136–145)
WBC OTHER # BLD: 6.9 K/UL (ref 4.5–11.5)

## 2025-07-13 PROCEDURE — 2060000000 HC ICU INTERMEDIATE R&B

## 2025-07-13 PROCEDURE — 6370000000 HC RX 637 (ALT 250 FOR IP)

## 2025-07-13 PROCEDURE — 71045 X-RAY EXAM CHEST 1 VIEW: CPT

## 2025-07-13 PROCEDURE — 36415 COLL VENOUS BLD VENIPUNCTURE: CPT

## 2025-07-13 PROCEDURE — 6360000002 HC RX W HCPCS

## 2025-07-13 PROCEDURE — 85610 PROTHROMBIN TIME: CPT

## 2025-07-13 PROCEDURE — 94640 AIRWAY INHALATION TREATMENT: CPT

## 2025-07-13 PROCEDURE — 85025 COMPLETE CBC W/AUTO DIFF WBC: CPT

## 2025-07-13 PROCEDURE — 2500000003 HC RX 250 WO HCPCS

## 2025-07-13 PROCEDURE — 99222 1ST HOSP IP/OBS MODERATE 55: CPT | Performed by: SURGERY

## 2025-07-13 PROCEDURE — 83735 ASSAY OF MAGNESIUM: CPT

## 2025-07-13 PROCEDURE — 84100 ASSAY OF PHOSPHORUS: CPT

## 2025-07-13 PROCEDURE — 6370000000 HC RX 637 (ALT 250 FOR IP): Performed by: INTERNAL MEDICINE

## 2025-07-13 PROCEDURE — 6370000000 HC RX 637 (ALT 250 FOR IP): Performed by: SURGERY

## 2025-07-13 PROCEDURE — 80053 COMPREHEN METABOLIC PANEL: CPT

## 2025-07-13 PROCEDURE — 2580000003 HC RX 258

## 2025-07-13 RX ORDER — SORBITOL SOLUTION 70 %
15 SOLUTION, ORAL MISCELLANEOUS ONCE
Status: COMPLETED | OUTPATIENT
Start: 2025-07-13 | End: 2025-07-13

## 2025-07-13 RX ORDER — LACTULOSE 10 G/15ML
20 SOLUTION ORAL ONCE
Status: COMPLETED | OUTPATIENT
Start: 2025-07-13 | End: 2025-07-13

## 2025-07-13 RX ORDER — IPRATROPIUM BROMIDE AND ALBUTEROL SULFATE 2.5; .5 MG/3ML; MG/3ML
1 SOLUTION RESPIRATORY (INHALATION) EVERY 4 HOURS PRN
Status: DISCONTINUED | OUTPATIENT
Start: 2025-07-13 | End: 2025-07-16 | Stop reason: HOSPADM

## 2025-07-13 RX ADMIN — DOCUSATE SODIUM 100 MG: 100 CAPSULE, LIQUID FILLED ORAL at 09:00

## 2025-07-13 RX ADMIN — SERTRALINE 100 MG: 50 TABLET, FILM COATED ORAL at 09:01

## 2025-07-13 RX ADMIN — OXCARBAZEPINE 600 MG: 300 TABLET, FILM COATED ORAL at 20:57

## 2025-07-13 RX ADMIN — WATER 1000 MG: 1 INJECTION INTRAMUSCULAR; INTRAVENOUS; SUBCUTANEOUS at 21:32

## 2025-07-13 RX ADMIN — LACTULOSE 20 G: 20 SOLUTION ORAL at 13:47

## 2025-07-13 RX ADMIN — DOCUSATE SODIUM 100 MG: 100 CAPSULE, LIQUID FILLED ORAL at 20:57

## 2025-07-13 RX ADMIN — METOPROLOL TARTRATE 75 MG: 50 TABLET, FILM COATED ORAL at 20:57

## 2025-07-13 RX ADMIN — ANASTROZOLE 1 MG: 1 TABLET ORAL at 08:59

## 2025-07-13 RX ADMIN — DONEPEZIL HYDROCHLORIDE 5 MG: 5 TABLET, FILM COATED ORAL at 20:57

## 2025-07-13 RX ADMIN — METRONIDAZOLE 500 MG: 500 INJECTION, SOLUTION INTRAVENOUS at 21:34

## 2025-07-13 RX ADMIN — MIDODRINE HYDROCHLORIDE 5 MG: 5 TABLET ORAL at 09:02

## 2025-07-13 RX ADMIN — SODIUM CHLORIDE AND POTASSIUM CHLORIDE: .9; .15 SOLUTION INTRAVENOUS at 17:56

## 2025-07-13 RX ADMIN — SODIUM CHLORIDE, PRESERVATIVE FREE 40 MG: 5 INJECTION INTRAVENOUS at 09:02

## 2025-07-13 RX ADMIN — SORBITOL SOLUTION (BULK) 15 ML: 70 SOLUTION at 13:47

## 2025-07-13 RX ADMIN — ZONISAMIDE 200 MG: 100 CAPSULE ORAL at 11:00

## 2025-07-13 RX ADMIN — IPRATROPIUM BROMIDE AND ALBUTEROL SULFATE 1 DOSE: .5; 2.5 SOLUTION RESPIRATORY (INHALATION) at 06:26

## 2025-07-13 RX ADMIN — ZONISAMIDE 200 MG: 100 CAPSULE ORAL at 20:57

## 2025-07-13 RX ADMIN — Medication 1 CAPSULE: at 09:01

## 2025-07-13 RX ADMIN — METOPROLOL TARTRATE 75 MG: 50 TABLET, FILM COATED ORAL at 09:00

## 2025-07-13 RX ADMIN — METRONIDAZOLE 500 MG: 500 INJECTION, SOLUTION INTRAVENOUS at 06:09

## 2025-07-13 RX ADMIN — MIDODRINE HYDROCHLORIDE 5 MG: 5 TABLET ORAL at 13:47

## 2025-07-13 RX ADMIN — PHYTONADIONE 1 MG: 10 INJECTION, EMULSION INTRAMUSCULAR; INTRAVENOUS; SUBCUTANEOUS at 08:48

## 2025-07-13 RX ADMIN — MIDODRINE HYDROCHLORIDE 5 MG: 5 TABLET ORAL at 17:05

## 2025-07-13 RX ADMIN — SENNOSIDES 8.6 MG: 8.6 TABLET, FILM COATED ORAL at 20:56

## 2025-07-13 RX ADMIN — METRONIDAZOLE 500 MG: 500 INJECTION, SOLUTION INTRAVENOUS at 14:09

## 2025-07-13 RX ADMIN — APIXABAN 5 MG: 5 TABLET, FILM COATED ORAL at 09:00

## 2025-07-13 RX ADMIN — ATORVASTATIN CALCIUM 10 MG: 20 TABLET, FILM COATED ORAL at 09:01

## 2025-07-13 RX ADMIN — IPRATROPIUM BROMIDE AND ALBUTEROL SULFATE 1 DOSE: .5; 2.5 SOLUTION RESPIRATORY (INHALATION) at 22:00

## 2025-07-13 RX ADMIN — PHYTONADIONE 1 MG: 1 INJECTION, EMULSION INTRAMUSCULAR; INTRAVENOUS; SUBCUTANEOUS at 19:03

## 2025-07-13 RX ADMIN — OXCARBAZEPINE 600 MG: 300 TABLET, FILM COATED ORAL at 09:01

## 2025-07-13 RX ADMIN — POLYETHYLENE GLYCOL 3350 17 G: 17 POWDER, FOR SOLUTION ORAL at 09:02

## 2025-07-13 NOTE — CONSULTS
[facility]  Stewart Brunson MD  7/13/2025    Physician Consulted:  Stewart Brunson MD for Dr Bunn  Reason for Consult: Constipation  Referring Physician: Dr. Vidal GRANDA  Susanna Arriola is a 70 y.o. female who presents for evaluation of syncope.  She was admitted to the hospital on 7/10 by the medical service.  She was noted to have electrolyte abnormalities and on 7/11 was not have any ostomy output for which surgery was consulted.  He has a past surgical history of perforated sigmoid diverticulitis with exploratory laparotomy and end colostomy on 3/16/2025 by Dr. Bunn.  This morning she feels better she currently denies any abdominal pain she does have stool within her ostomy appliance.      Past Medical History:   Diagnosis Date    Atrial fibrillation (HCC)     Dementia (HCC)     History of cardioversion 10/2021       Past Surgical History:   Procedure Laterality Date    COLECTOMY N/A 3/16/2025    OPEN SIGMOID COLON RESECTION; COLOSTOMY performed by Levi Bunn MD at [facility] OR       Medications Prior to Admission    Prior to Admission medications    Medication Sig Start Date End Date Taking? Authorizing Provider   apixaban (ELIQUIS) 5 MG TABS tablet Take 1 tablet by mouth 2 times daily 6/9/25   Rishi Ruiz APRN - CNP   lactobacillus (CULTURELLE) capsule Take 1 capsule by mouth daily (with breakfast) 6/10/25   Rishi Ruiz APRN - CNP   metoprolol tartrate (LOPRESSOR) 25 MG tablet Take 0.5 tablets by mouth 2 times daily 6/9/25   Rishi Ruiz APRN - CNP   furosemide (LASIX) 40 MG tablet Take 0.5 tablets by mouth daily 6/9/25   Rishi Ruiz APRN - CNP   zonisamide (ZONEGRAN) 100 MG capsule Take 2 capsules by mouth in the morning and at bedtime 5/8/25   Rishi Ruiz APRN - CNP   midodrine (PROAMATINE) 5 MG tablet Take 1 tablet by mouth 3 times daily (with meals) 5/8/25   Rishi Ruiz APRN - CNP   anastrozole (ARIMIDEX) 1 MG tablet Take 1 tablet by mouth daily 4/21/25   Shen Solo DO

## 2025-07-13 NOTE — PROGRESS NOTES
Internal Medicine Consult Note    [facility]    Romeo Drake D.O., ALICIAI.                    Shen Solo D.O., ALICIAILisa Jara D.O.     Jack Moscoso, MSN, APRN-CNP  Rishi Ruiz, MSN, APRN-CNP  Conchis Bradford, MSN. APRN-NP-C  Belen Curran MSN, APRN, ACNP-AG     Primary Care Physician: Lay Hunter MD   Admitting Physician:  Romeo Drake DO  Admission date and time: 7/10/2025  4:47 PM    Room:  [address] [place] [address]  Admitting diagnosis: Hypokalemia [E87.6]  Colitis [K52.9]  Supratherapeutic INR [R79.1]  Hypotension, unspecified hypotension type [I95.9]    Patient Name: Susanna Arriola  MRN: [id number]    Date of Service: 7/13/2025     Subjective:  Susanna is a 70 y.o. female who was seen and examined today,7/13/2025, at the bedside.  Patient resting comfortably in bed.  There is no family present at this time.  She has received multiple cathartic agents from general surgery.  She is starting to have some output from her ostomy.  She denies any pain or discomfort.  INR was repeated this morning at 9.3.  Will give milligram vitamin K and repeat INR later.  The patient was discharged from the [facility] to a skilled nursing facility in May on warfarin 2 mg for 30 days… She did not received any anticoagulation in June but in July she was prescribed Eliquis.  No family present during my examination.    Review of System:   Constitutional:   Denies fever or chills, weight loss or gain, reports malaise, denies any syncope or vertigo  HEENT:   Denies ear pain, sore throat, sinus or eye problems.  Cardiovascular:   Denies any chest pain, irregular heartbeats, or palpitations.   Respiratory:   Denies shortness of breath, coughing, sputum production, hemoptysis, or wheezing.  Gastrointestinal:   Denies nausea, vomiting, diarrhea, or constipation.  Denies any abdominal pain.  Reports ostomy  Genitourinary:    Denies any urgency,

## 2025-07-13 NOTE — PROGRESS NOTES
Cardiology  Progress Note      SUBJECTIVE:.  Confused.  No acute distress.  She looked anxious this morning.  She was asking me where she is now.    Current Inpatient Medications  Current Facility-Administered Medications: ipratropium 0.5 mg-albuterol 2.5 mg (DUONEB) nebulizer solution 1 Dose, 1 Dose, Inhalation, Q4H PRN  phytonadione (ADULT) (VITAMIN K) 1 mg in sodium chloride 0.9 % 100 mL IVPB, 1 mg, IntraVENous, Once  polyethylene glycol (GLYCOLAX) packet 17 g, 17 g, Oral, Daily  senna (SENOKOT) tablet 8.6 mg, 1 tablet, Oral, Nightly  anastrozole (ARIMIDEX) tablet 1 mg, 1 mg, Oral, Daily  apixaban (ELIQUIS) tablet 5 mg, 5 mg, Oral, BID  donepezil (ARICEPT) tablet 5 mg, 5 mg, Oral, Nightly  [Held by provider] furosemide (LASIX) tablet 20 mg, 20 mg, Oral, Daily  lactobacillus (CULTURELLE) capsule 1 capsule, 1 capsule, Oral, Daily with breakfast  midodrine (PROAMATINE) tablet 5 mg, 5 mg, Oral, TID WC  OXcarbazepine (TRILEPTAL) tablet 600 mg, 600 mg, Oral, BID  sertraline (ZOLOFT) tablet 100 mg, 100 mg, Oral, Daily  atorvastatin (LIPITOR) tablet 10 mg, 10 mg, Oral, Daily  zonisamide (ZONEGRAN) capsule 200 mg, 200 mg, Oral, BID  pantoprazole (PROTONIX) 40 mg in sodium chloride (PF) 0.9 % 10 mL injection, 40 mg, IntraVENous, Daily  metroNIDAZOLE (FLAGYL) 500 mg in 0.9% NaCl 100 mL IVPB premix, 500 mg, IntraVENous, Q8H  cefTRIAXone (ROCEPHIN) 1,000 mg in sterile water 10 mL IV syringe, 1,000 mg, IntraVENous, Q24H  metoprolol tartrate (LOPRESSOR) tablet 75 mg, 75 mg, Oral, BID  docusate sodium (COLACE) capsule 100 mg, 100 mg, Oral, BID  acetaminophen (TYLENOL) tablet 650 mg, 650 mg, Oral, Q6H PRN  prochlorperazine (COMPAZINE) injection 10 mg, 10 mg, IntraVENous, Q6H PRN  melatonin disintegrating tablet 5 mg, 5 mg, Oral, Nightly PRN  sodium phosphate 15 mmol in sodium chloride 0.9 % 250 mL IVPB, 15 mmol, IntraVENous, PRN  magnesium sulfate 2000 mg in 50 mL IVPB premix, 2,000 mg, IntraVENous, PRN  potassium chloride

## 2025-07-14 ENCOUNTER — APPOINTMENT (OUTPATIENT)
Dept: GENERAL RADIOLOGY | Age: 70
DRG: 392 | End: 2025-07-14
Payer: MEDICARE

## 2025-07-14 LAB
ANION GAP SERPL CALCULATED.3IONS-SCNC: 10 MMOL/L (ref 7–16)
BASOPHILS # BLD: 0 K/UL (ref 0–0.2)
BASOPHILS NFR BLD: 0 % (ref 0–2)
BUN SERPL-MCNC: 9 MG/DL (ref 8–23)
CALCIUM SERPL-MCNC: 7.7 MG/DL (ref 8.8–10.2)
CHLORIDE SERPL-SCNC: 114 MMOL/L (ref 98–107)
CO2 SERPL-SCNC: 19 MMOL/L (ref 22–29)
CREAT SERPL-MCNC: 0.9 MG/DL (ref 0.5–1)
EOSINOPHIL # BLD: 0.16 K/UL (ref 0.05–0.5)
EOSINOPHILS RELATIVE PERCENT: 3 % (ref 0–6)
ERYTHROCYTE [DISTWIDTH] IN BLOOD BY AUTOMATED COUNT: 22.1 % (ref 11.5–15)
GFR, ESTIMATED: 66 ML/MIN/1.73M2
GLUCOSE SERPL-MCNC: 124 MG/DL (ref 74–99)
HCT VFR BLD AUTO: 29.7 % (ref 34–48)
HGB BLD-MCNC: 9.1 G/DL (ref 11.5–15.5)
INR PPP: 1.9
LYMPHOCYTES NFR BLD: 1.29 K/UL (ref 1.5–4)
LYMPHOCYTES RELATIVE PERCENT: 21 % (ref 20–42)
MAGNESIUM SERPL-MCNC: 2 MG/DL (ref 1.6–2.4)
MCH RBC QN AUTO: 25.6 PG (ref 26–35)
MCHC RBC AUTO-ENTMCNC: 30.6 G/DL (ref 32–34.5)
MCV RBC AUTO: 83.7 FL (ref 80–99.9)
MICROORGANISM SPEC CULT: ABNORMAL
MONOCYTES NFR BLD: 0.43 K/UL (ref 0.1–0.95)
MONOCYTES NFR BLD: 7 % (ref 2–12)
NEUTROPHILS NFR BLD: 70 % (ref 43–80)
NEUTS SEG NFR BLD: 4.31 K/UL (ref 1.8–7.3)
PHOSPHATE SERPL-MCNC: 2.2 MG/DL (ref 2.5–4.5)
PLATELET # BLD AUTO: 208 K/UL (ref 130–450)
PMV BLD AUTO: 9.7 FL (ref 7–12)
POTASSIUM SERPL-SCNC: 4 MMOL/L (ref 3.5–5.1)
PROTHROMBIN TIME: 21.3 SEC (ref 9.3–12.4)
RBC # BLD AUTO: 3.55 M/UL (ref 3.5–5.5)
RBC # BLD: ABNORMAL 10*6/UL
SERVICE CMNT-IMP: ABNORMAL
SODIUM SERPL-SCNC: 143 MMOL/L (ref 136–145)
SPECIMEN DESCRIPTION: ABNORMAL
WBC OTHER # BLD: 6.2 K/UL (ref 4.5–11.5)

## 2025-07-14 PROCEDURE — 6370000000 HC RX 637 (ALT 250 FOR IP): Performed by: INTERNAL MEDICINE

## 2025-07-14 PROCEDURE — 85025 COMPLETE CBC W/AUTO DIFF WBC: CPT

## 2025-07-14 PROCEDURE — 6370000000 HC RX 637 (ALT 250 FOR IP)

## 2025-07-14 PROCEDURE — 6360000002 HC RX W HCPCS

## 2025-07-14 PROCEDURE — 99231 SBSQ HOSP IP/OBS SF/LOW 25: CPT | Performed by: SURGERY

## 2025-07-14 PROCEDURE — 6370000000 HC RX 637 (ALT 250 FOR IP): Performed by: SURGERY

## 2025-07-14 PROCEDURE — 36415 COLL VENOUS BLD VENIPUNCTURE: CPT

## 2025-07-14 PROCEDURE — 83735 ASSAY OF MAGNESIUM: CPT

## 2025-07-14 PROCEDURE — 84100 ASSAY OF PHOSPHORUS: CPT

## 2025-07-14 PROCEDURE — 2580000003 HC RX 258

## 2025-07-14 PROCEDURE — 71045 X-RAY EXAM CHEST 1 VIEW: CPT

## 2025-07-14 PROCEDURE — 80048 BASIC METABOLIC PNL TOTAL CA: CPT

## 2025-07-14 PROCEDURE — 2060000000 HC ICU INTERMEDIATE R&B

## 2025-07-14 PROCEDURE — 85610 PROTHROMBIN TIME: CPT

## 2025-07-14 PROCEDURE — 2500000003 HC RX 250 WO HCPCS

## 2025-07-14 PROCEDURE — 94640 AIRWAY INHALATION TREATMENT: CPT

## 2025-07-14 PROCEDURE — 92610 EVALUATE SWALLOWING FUNCTION: CPT | Performed by: SPEECH-LANGUAGE PATHOLOGIST

## 2025-07-14 RX ORDER — PANTOPRAZOLE SODIUM 40 MG/1
40 TABLET, DELAYED RELEASE ORAL
Status: DISCONTINUED | OUTPATIENT
Start: 2025-07-15 | End: 2025-07-16 | Stop reason: HOSPADM

## 2025-07-14 RX ADMIN — DOCUSATE SODIUM 100 MG: 100 CAPSULE, LIQUID FILLED ORAL at 10:01

## 2025-07-14 RX ADMIN — SERTRALINE 100 MG: 50 TABLET, FILM COATED ORAL at 09:45

## 2025-07-14 RX ADMIN — ANASTROZOLE 1 MG: 1 TABLET ORAL at 09:45

## 2025-07-14 RX ADMIN — OXCARBAZEPINE 600 MG: 300 TABLET, FILM COATED ORAL at 09:45

## 2025-07-14 RX ADMIN — APIXABAN 5 MG: 5 TABLET, FILM COATED ORAL at 20:24

## 2025-07-14 RX ADMIN — ATORVASTATIN CALCIUM 10 MG: 20 TABLET, FILM COATED ORAL at 10:02

## 2025-07-14 RX ADMIN — MIDODRINE HYDROCHLORIDE 5 MG: 5 TABLET ORAL at 09:45

## 2025-07-14 RX ADMIN — WATER 1000 MG: 1 INJECTION INTRAMUSCULAR; INTRAVENOUS; SUBCUTANEOUS at 22:51

## 2025-07-14 RX ADMIN — ZONISAMIDE 200 MG: 100 CAPSULE ORAL at 09:45

## 2025-07-14 RX ADMIN — SODIUM CHLORIDE, PRESERVATIVE FREE 40 MG: 5 INJECTION INTRAVENOUS at 10:01

## 2025-07-14 RX ADMIN — METOPROLOL TARTRATE 75 MG: 50 TABLET, FILM COATED ORAL at 20:24

## 2025-07-14 RX ADMIN — MIDODRINE HYDROCHLORIDE 5 MG: 5 TABLET ORAL at 17:50

## 2025-07-14 RX ADMIN — METRONIDAZOLE 500 MG: 500 INJECTION, SOLUTION INTRAVENOUS at 15:20

## 2025-07-14 RX ADMIN — DONEPEZIL HYDROCHLORIDE 5 MG: 5 TABLET, FILM COATED ORAL at 20:24

## 2025-07-14 RX ADMIN — IPRATROPIUM BROMIDE AND ALBUTEROL SULFATE 1 DOSE: .5; 2.5 SOLUTION RESPIRATORY (INHALATION) at 01:39

## 2025-07-14 RX ADMIN — Medication 5 MG: at 23:57

## 2025-07-14 RX ADMIN — ZONISAMIDE 200 MG: 100 CAPSULE ORAL at 21:46

## 2025-07-14 RX ADMIN — Medication 1 CAPSULE: at 10:01

## 2025-07-14 RX ADMIN — METOPROLOL TARTRATE 75 MG: 50 TABLET, FILM COATED ORAL at 10:01

## 2025-07-14 RX ADMIN — OXCARBAZEPINE 600 MG: 300 TABLET, FILM COATED ORAL at 20:24

## 2025-07-14 RX ADMIN — METRONIDAZOLE 500 MG: 500 INJECTION, SOLUTION INTRAVENOUS at 22:55

## 2025-07-14 RX ADMIN — SODIUM PHOSPHATE, MONOBASIC, MONOHYDRATE AND SODIUM PHOSPHATE, DIBASIC, ANHYDROUS 15 MMOL: 142; 276 INJECTION, SOLUTION INTRAVENOUS at 15:21

## 2025-07-14 RX ADMIN — DOCUSATE SODIUM 100 MG: 100 CAPSULE, LIQUID FILLED ORAL at 20:24

## 2025-07-14 RX ADMIN — SENNOSIDES 8.6 MG: 8.6 TABLET, FILM COATED ORAL at 20:24

## 2025-07-14 RX ADMIN — METRONIDAZOLE 500 MG: 500 INJECTION, SOLUTION INTRAVENOUS at 06:12

## 2025-07-14 NOTE — PROGRESS NOTES
SPEECH/LANGUAGE PATHOLOGY  CLINICAL ASSESSMENT OF SWALLOWING FUNCTION   and PLAN OF CARE      PATIENT NAME:  Susanna Arriola  (female)     MRN:  [id number]    :  1955  (70 y.o.)  STATUS:  Inpatient: Room 0636/0636-01    TODAY'S DATE:  2025  ORDER DATE, DESCRIPTION AND REFERRING PROVIDER:    SLP eval and treat  Start:  25 1400,   End:  25 1400,   ONE TIME,   Standing Count:  1 Occurrences,   R       Shen Solo DO  REASON FOR REFERRAL: assess for aspiration    EVALUATING THERAPIST: Radha Brown, VICK                 RESULTS:    DYSPHAGIA DIAGNOSIS:   Clinical indicators of mild  oropharyngeal phase dysphagia  and limited intake on exam   IF SILENT ASPIRATION SUSPECTED NEED MBSS ORDER   DIET RECOMMENDATIONS:  Regular consistency solids (IDDSI level 7) with  thin liquids (IDDSI level 0)     FEEDING RECOMMENDATIONS:     Assistance level:  Encourage self-feeding as function allows      Compensatory strategies recommended: Slow rate of intake       Discussed recommendations with:  Patient     SPEECH THERAPY  PLAN OF CARE   The dysphagia POC is established based on physician order, dysphagia diagnosis and results of clinical assessment     Skilled SLP intervention for dysphagia management on acute care up to 5 x per week until goals met, pt plateaus in function and/or discharged from hospital    Conditions Requiring Skilled Therapeutic Intervention for dysphagia:    Patient is performing below functional baseline d/t  current acute condition, respiratory compromise, multiple medications, and/or increased dependency upon caregivers.  Underlying moist cough decreases ability to determine presence or absence of clinical indicators of dysphagia    Specific dysphagia interventions to include:     compensatory swallowing strategies to improve airway protection and swallow function.  Training in positioning for improved integrity of swallow  ongoing mealtime assessment to provide diet modification and  compensatory strategy implementation to minimize risk of aspiration associated with PO intake    Specific instructions for next treatment:  development and training of compensatory swallow strategies to improve airway protection and swallow function  Patient Treatment Goals:    Short Term Goals:  Pt will implement identified compensatory swallowing strategies on 90% of opportunities or greater to improve airway protection and swallow function.    Long Term Goals:   Pt will maintain adequate nutrition/hydration via PO intake of the least restrictive oral diet with implementation of safe swallow/ compensatory strategies and decrease signs/symptoms of aspiration to less than 1 x/day.   OTHER RECOMMENDATIONS:  A Video Swallow Study (MBSS) is recommended and requires a physician order IF silent aspiration is suspected based on medical presentation     Patient/family Goal:    To be able to eat/drink     Plan of care discussed with Patient   The Patient did not demonstrate complete understanding of the diagnosis, prognosis and plan of care     Rehabilitation Potential/Prognosis: Good                    ADMITTING DIAGNOSIS: Hypokalemia [E87.6]  Colitis [K52.9]  Supratherapeutic INR [R79.1]  Hypotension, unspecified hypotension type [I95.9]    VISIT DIAGNOSIS:   Visit Diagnoses         Codes      Colitis     K52.9      Supratherapeutic INR     R79.1             PATIENT REPORT/COMPLAINT: denies difficulty swallowing  nursing not available at time of evaluation chart reviewed and patient is not NPO for any procedures per current documentation.     PRIOR LEVEL OF SWALLOW FUNCTION:    PAST HISTORY OF OROPHARYNGEAL DYSPHAGIA?: none reported    Home diet: Regular consistency solids (IDDSI level 7) with  thin liquids (IDDSI level 0)  Current Diet Order:  ADULT DIET; Regular    PROCEDURE:  Consistencies Administered During the Evaluation   Liquids: thin liquid   Solids:  Hard solid      Method of Intake:   straw  Self fed

## 2025-07-14 NOTE — CARE COORDINATION
7/14/2025 1725 CM Note:  Pt resides with her , son and grand daughter in a 2 story house but pt stays on the first floor. Pt has a rollator, w/c, cane, BSC, and shower chair.  Pt's  is at [facility] for rehab and pt would like a referral to go there for rehab.  It was sent via [employer].  WILL NEED TERRIE GOTTI and a Signed JANA.  PCP is Dr Hunter and uses [facility].  Pt does not drive her son transports her.  CM will follow. Electronically signed by Radha Mcgraw RN on 7/14/2025 at 5:39 PM

## 2025-07-14 NOTE — PROGRESS NOTES
Spiritual Health History and Assessment/Progress Note  [facility]    (P) Spiritual/Emotional Needs,  ,  ,      Name: Susanna Arriola MRN: [id number]    Age: 70 y.o.     Sex: female   Language: English   Pentecostal: Hinduism   Hypokalemia     Date: 7/14/2025                           Spiritual Assessment began in [facility] I[facility]        Referral/Consult From: (P) Rounding   Encounter Overview/Reason: (P) Spiritual/Emotional Needs  Service Provided For: (P) Family    Tala, Belief, Meaning:     Family/Friends are connected with a tala tradition or spiritual practice      Importance and Influence:    Family/Friends have spiritual/personal beliefs that influence decisions regarding the patient's health    Community:    Family/Friends feel well-supported. Support system includes: Parent/s    Assessment and Plan of Care:       Family/Friends Interventions include: Facilitated expression of thoughts and feelings and Affirmed coping skills/support systems    Patient Plan of Care: Spiritual Care available upon further referral  Family/Friends Plan of Care: Spiritual Care available upon further referral    Electronically signed by Chaplain Odessa on 7/14/2025 at 3:36 PM  \

## 2025-07-14 NOTE — ACP (ADVANCE CARE PLANNING)
Advance Care Planning   Healthcare Decision Maker:    Primary Decision Maker: Erasmo Arriola - Spouse - [phone]    Secondary Decision Maker: ANNA ARRIOLA - Child - [phone]    Secondary Decision Maker: Marla Richter - Child - [phone]

## 2025-07-14 NOTE — PROGRESS NOTES
GENERAL SURGERY  DAILY PROGRESS NOTE  7/14/2025    Subjective:  Ostomy emptied twice overnight denies any abdominal pain is pleasantly confused    Objective:  /70   Pulse (!) 110   Temp 97.7 °F (36.5 °C) (Oral)   Resp 20   Ht 1.651 m (5' 5\")   Wt 84.1 kg (185 lb 6.4 oz)   SpO2 93%   BMI 30.85 kg/m²     Gen: No apparent distress confused  HEENT: NCAT, anicteric  CV: RR  Pulm: nonlabored breathing on room air  Abdomen: soft, nontender, nondistended, ostomy with stool    Assessment/Plan:  70 y.o. female resolving constipation versus ileus secondary to electrolyte abnormalities    Patient has been having bowel function without issue  No signs of obstruction  Continue GlycoLax, senna, Colace  Okay for diet from surgery standpoint  No further intervention from surgery standpoint feel free to call with any questions or concerns will be available as needed      Electronically signed by Stewart Brunson MD on 7/14/2025 at 12:44 PM

## 2025-07-14 NOTE — PROGRESS NOTES
Cardiology  Progress Note      SUBJECTIVE: Patient is confused.  No acute distress    Current Inpatient Medications  Current Facility-Administered Medications: ipratropium 0.5 mg-albuterol 2.5 mg (DUONEB) nebulizer solution 1 Dose, 1 Dose, Inhalation, Q4H PRN  polyethylene glycol (GLYCOLAX) packet 17 g, 17 g, Oral, Daily  senna (SENOKOT) tablet 8.6 mg, 1 tablet, Oral, Nightly  anastrozole (ARIMIDEX) tablet 1 mg, 1 mg, Oral, Daily  [Held by provider] apixaban (ELIQUIS) tablet 5 mg, 5 mg, Oral, BID  donepezil (ARICEPT) tablet 5 mg, 5 mg, Oral, Nightly  [Held by provider] furosemide (LASIX) tablet 20 mg, 20 mg, Oral, Daily  lactobacillus (CULTURELLE) capsule 1 capsule, 1 capsule, Oral, Daily with breakfast  midodrine (PROAMATINE) tablet 5 mg, 5 mg, Oral, TID WC  OXcarbazepine (TRILEPTAL) tablet 600 mg, 600 mg, Oral, BID  sertraline (ZOLOFT) tablet 100 mg, 100 mg, Oral, Daily  atorvastatin (LIPITOR) tablet 10 mg, 10 mg, Oral, Daily  zonisamide (ZONEGRAN) capsule 200 mg, 200 mg, Oral, BID  pantoprazole (PROTONIX) 40 mg in sodium chloride (PF) 0.9 % 10 mL injection, 40 mg, IntraVENous, Daily  metroNIDAZOLE (FLAGYL) 500 mg in 0.9% NaCl 100 mL IVPB premix, 500 mg, IntraVENous, Q8H  cefTRIAXone (ROCEPHIN) 1,000 mg in sterile water 10 mL IV syringe, 1,000 mg, IntraVENous, Q24H  metoprolol tartrate (LOPRESSOR) tablet 75 mg, 75 mg, Oral, BID  docusate sodium (COLACE) capsule 100 mg, 100 mg, Oral, BID  acetaminophen (TYLENOL) tablet 650 mg, 650 mg, Oral, Q6H PRN  prochlorperazine (COMPAZINE) injection 10 mg, 10 mg, IntraVENous, Q6H PRN  melatonin disintegrating tablet 5 mg, 5 mg, Oral, Nightly PRN  sodium phosphate 15 mmol in sodium chloride 0.9 % 250 mL IVPB, 15 mmol, IntraVENous, PRN  magnesium sulfate 2000 mg in 50 mL IVPB premix, 2,000 mg, IntraVENous, PRN  potassium chloride (KLOR-CON M) extended release tablet 40 mEq, 40 mEq, Oral, PRN **OR** potassium bicarb-citric acid (EFFER-K) effervescent tablet 40 mEq, 40 mEq, Oral,

## 2025-07-14 NOTE — PROGRESS NOTES
Internal Medicine Consult Note    [facility]    Romeo Drake D.O., AJOLisaI.                    Shen Solo D.O., AJOLisaILisa Jara D.O.     Jack Moscoso, MSN, APRN-CNP  Rishi Ruiz, MSN, APRN-CNP  Conchis Bradford, MSN. APRN-NP-C  Belen Curran MSN, APRN, ACNP-AG     Primary Care Physician: Lay Hunter MD   Admitting Physician:  Romeo Drake DO  Admission date and time: 7/10/2025  4:47 PM    Room:  [address] [place] [address]  Admitting diagnosis: Hypokalemia [E87.6]  Colitis [K52.9]  Supratherapeutic INR [R79.1]  Hypotension, unspecified hypotension type [I95.9]    Patient Name: Susanna Arriola  MRN: [id number]    Date of Service: 7/14/2025     Subjective:  Susanna is a 70 y.o. female who was seen and examined today,7/14/2025, at the bedside.  Kelin appears relatively comfortable during my examination.  She suffers from confusion at baseline.  Nursing reports decreased urinary output and I have suggested placement of Ijmenes catheter.  The patient initially refused lab work this morning but is now agreeable.  There are no family members present during my examination.    Review of System:   Constitutional:   Debilitating malaise and fatigue.  HEENT:   Denies ear pain, sore throat, sinus or eye problems.  Cardiovascular:   Denies any chest pain, irregular heartbeats, or palpitations.   Respiratory:   Mild shortness of breath.  No significant coughing or sputum production.  Gastrointestinal:   Increased ostomy output following bowel regimen.  Genitourinary:    Decreased urination with recommendations for Jimenes catheterization.  Extremities:   Denies lower extremity swelling, edema or cyanosis.   Neurology:    Denies any headache or focal neurological deficits, profound weakness and deconditioning.  Psch:   Anxious overall in the setting of dementia.  Musculoskeletal:    Denies  myalgias, joint complaints or back pain.   Integumentary:   Thin skin with

## 2025-07-15 LAB
ANION GAP SERPL CALCULATED.3IONS-SCNC: 10 MMOL/L (ref 7–16)
BASOPHILS # BLD: 0.06 K/UL (ref 0–0.2)
BASOPHILS NFR BLD: 1 % (ref 0–2)
BUN SERPL-MCNC: 10 MG/DL (ref 8–23)
CALCIUM SERPL-MCNC: 8.3 MG/DL (ref 8.8–10.2)
CHLORIDE SERPL-SCNC: 114 MMOL/L (ref 98–107)
CO2 SERPL-SCNC: 17 MMOL/L (ref 22–29)
CREAT SERPL-MCNC: 1 MG/DL (ref 0.5–1)
EOSINOPHIL # BLD: 0.11 K/UL (ref 0.05–0.5)
EOSINOPHILS RELATIVE PERCENT: 2 % (ref 0–6)
ERYTHROCYTE [DISTWIDTH] IN BLOOD BY AUTOMATED COUNT: 22.1 % (ref 11.5–15)
GFR, ESTIMATED: 60 ML/MIN/1.73M2
GLUCOSE SERPL-MCNC: 126 MG/DL (ref 74–99)
HCT VFR BLD AUTO: 33 % (ref 34–48)
HGB BLD-MCNC: 10.1 G/DL (ref 11.5–15.5)
INR PPP: 1.7
LYMPHOCYTES NFR BLD: 1.49 K/UL (ref 1.5–4)
LYMPHOCYTES RELATIVE PERCENT: 24 % (ref 20–42)
MAGNESIUM SERPL-MCNC: 1.9 MG/DL (ref 1.6–2.4)
MCH RBC QN AUTO: 26 PG (ref 26–35)
MCHC RBC AUTO-ENTMCNC: 30.6 G/DL (ref 32–34.5)
MCV RBC AUTO: 84.8 FL (ref 80–99.9)
MICROORGANISM SPEC CULT: NORMAL
MICROORGANISM SPEC CULT: NORMAL
MONOCYTES NFR BLD: 0.11 K/UL (ref 0.1–0.95)
MONOCYTES NFR BLD: 2 % (ref 2–12)
NEUTROPHILS NFR BLD: 72 % (ref 43–80)
NEUTS SEG NFR BLD: 4.53 K/UL (ref 1.8–7.3)
PHOSPHATE SERPL-MCNC: 2.9 MG/DL (ref 2.5–4.5)
PLATELET # BLD AUTO: 256 K/UL (ref 130–450)
PMV BLD AUTO: 10 FL (ref 7–12)
POTASSIUM SERPL-SCNC: 4.9 MMOL/L (ref 3.5–5.1)
PROTHROMBIN TIME: 18.8 SEC (ref 9.3–12.4)
RBC # BLD AUTO: 3.89 M/UL (ref 3.5–5.5)
RBC # BLD: ABNORMAL 10*6/UL
SERVICE CMNT-IMP: NORMAL
SERVICE CMNT-IMP: NORMAL
SODIUM SERPL-SCNC: 142 MMOL/L (ref 136–145)
SPECIMEN DESCRIPTION: NORMAL
SPECIMEN DESCRIPTION: NORMAL
WBC OTHER # BLD: 6.3 K/UL (ref 4.5–11.5)

## 2025-07-15 PROCEDURE — 97530 THERAPEUTIC ACTIVITIES: CPT | Performed by: PHYSICAL THERAPIST

## 2025-07-15 PROCEDURE — 2500000003 HC RX 250 WO HCPCS

## 2025-07-15 PROCEDURE — 97535 SELF CARE MNGMENT TRAINING: CPT

## 2025-07-15 PROCEDURE — 97161 PT EVAL LOW COMPLEX 20 MIN: CPT | Performed by: PHYSICAL THERAPIST

## 2025-07-15 PROCEDURE — 6370000000 HC RX 637 (ALT 250 FOR IP): Performed by: INTERNAL MEDICINE

## 2025-07-15 PROCEDURE — 6370000000 HC RX 637 (ALT 250 FOR IP): Performed by: SURGERY

## 2025-07-15 PROCEDURE — 6360000002 HC RX W HCPCS

## 2025-07-15 PROCEDURE — 97530 THERAPEUTIC ACTIVITIES: CPT

## 2025-07-15 PROCEDURE — 84100 ASSAY OF PHOSPHORUS: CPT

## 2025-07-15 PROCEDURE — 83735 ASSAY OF MAGNESIUM: CPT

## 2025-07-15 PROCEDURE — 97165 OT EVAL LOW COMPLEX 30 MIN: CPT

## 2025-07-15 PROCEDURE — 2700000000 HC OXYGEN THERAPY PER DAY

## 2025-07-15 PROCEDURE — 85610 PROTHROMBIN TIME: CPT

## 2025-07-15 PROCEDURE — 85025 COMPLETE CBC W/AUTO DIFF WBC: CPT

## 2025-07-15 PROCEDURE — 2060000000 HC ICU INTERMEDIATE R&B

## 2025-07-15 PROCEDURE — 36415 COLL VENOUS BLD VENIPUNCTURE: CPT

## 2025-07-15 PROCEDURE — 6370000000 HC RX 637 (ALT 250 FOR IP)

## 2025-07-15 PROCEDURE — 80048 BASIC METABOLIC PNL TOTAL CA: CPT

## 2025-07-15 PROCEDURE — 6360000002 HC RX W HCPCS: Performed by: INTERNAL MEDICINE

## 2025-07-15 PROCEDURE — 76937 US GUIDE VASCULAR ACCESS: CPT

## 2025-07-15 PROCEDURE — 94640 AIRWAY INHALATION TREATMENT: CPT

## 2025-07-15 RX ADMIN — DOCUSATE SODIUM 100 MG: 100 CAPSULE, LIQUID FILLED ORAL at 09:58

## 2025-07-15 RX ADMIN — IPRATROPIUM BROMIDE AND ALBUTEROL SULFATE 1 DOSE: .5; 2.5 SOLUTION RESPIRATORY (INHALATION) at 04:49

## 2025-07-15 RX ADMIN — MIDODRINE HYDROCHLORIDE 5 MG: 5 TABLET ORAL at 16:57

## 2025-07-15 RX ADMIN — ZONISAMIDE 200 MG: 100 CAPSULE ORAL at 21:10

## 2025-07-15 RX ADMIN — METRONIDAZOLE 500 MG: 500 INJECTION, SOLUTION INTRAVENOUS at 13:51

## 2025-07-15 RX ADMIN — ATORVASTATIN CALCIUM 10 MG: 20 TABLET, FILM COATED ORAL at 10:00

## 2025-07-15 RX ADMIN — DONEPEZIL HYDROCHLORIDE 5 MG: 5 TABLET, FILM COATED ORAL at 21:10

## 2025-07-15 RX ADMIN — SENNOSIDES 8.6 MG: 8.6 TABLET, FILM COATED ORAL at 21:10

## 2025-07-15 RX ADMIN — Medication 1 CAPSULE: at 09:59

## 2025-07-15 RX ADMIN — APIXABAN 5 MG: 5 TABLET, FILM COATED ORAL at 09:59

## 2025-07-15 RX ADMIN — METRONIDAZOLE 500 MG: 500 INJECTION, SOLUTION INTRAVENOUS at 05:36

## 2025-07-15 RX ADMIN — MIDODRINE HYDROCHLORIDE 5 MG: 5 TABLET ORAL at 09:59

## 2025-07-15 RX ADMIN — MIDODRINE HYDROCHLORIDE 5 MG: 5 TABLET ORAL at 12:12

## 2025-07-15 RX ADMIN — SODIUM CHLORIDE AND POTASSIUM CHLORIDE: .9; .15 SOLUTION INTRAVENOUS at 11:09

## 2025-07-15 RX ADMIN — METOPROLOL TARTRATE 75 MG: 50 TABLET, FILM COATED ORAL at 21:08

## 2025-07-15 RX ADMIN — POLYETHYLENE GLYCOL 3350 17 G: 17 POWDER, FOR SOLUTION ORAL at 10:00

## 2025-07-15 RX ADMIN — ZONISAMIDE 200 MG: 100 CAPSULE ORAL at 09:58

## 2025-07-15 RX ADMIN — OXCARBAZEPINE 600 MG: 300 TABLET, FILM COATED ORAL at 21:10

## 2025-07-15 RX ADMIN — METOPROLOL TARTRATE 75 MG: 50 TABLET, FILM COATED ORAL at 09:59

## 2025-07-15 RX ADMIN — PANTOPRAZOLE SODIUM 40 MG: 40 TABLET, DELAYED RELEASE ORAL at 06:38

## 2025-07-15 RX ADMIN — ANASTROZOLE 1 MG: 1 TABLET ORAL at 09:58

## 2025-07-15 RX ADMIN — WATER 1000 MG: 1 INJECTION INTRAMUSCULAR; INTRAVENOUS; SUBCUTANEOUS at 22:22

## 2025-07-15 RX ADMIN — METRONIDAZOLE 500 MG: 500 INJECTION, SOLUTION INTRAVENOUS at 22:27

## 2025-07-15 RX ADMIN — SERTRALINE 100 MG: 50 TABLET, FILM COATED ORAL at 09:59

## 2025-07-15 RX ADMIN — ACETAMINOPHEN 650 MG: 325 TABLET ORAL at 00:14

## 2025-07-15 RX ADMIN — APIXABAN 5 MG: 5 TABLET, FILM COATED ORAL at 21:10

## 2025-07-15 RX ADMIN — OXCARBAZEPINE 600 MG: 300 TABLET, FILM COATED ORAL at 09:58

## 2025-07-15 RX ADMIN — Medication 5 MG: at 21:10

## 2025-07-15 RX ADMIN — DOCUSATE SODIUM 100 MG: 100 CAPSULE, LIQUID FILLED ORAL at 21:10

## 2025-07-15 ASSESSMENT — PAIN DESCRIPTION - DESCRIPTORS: DESCRIPTORS: ACHING

## 2025-07-15 ASSESSMENT — PAIN DESCRIPTION - LOCATION: LOCATION: GENERALIZED

## 2025-07-15 NOTE — PROGRESS NOTES
Physical Therapy  Physical Therapy Initial Evaluation/Plan of Care    Room #:  0636/0636-01  Patient Name: Susanna Arriola  YOB: 1955  MRN: [id number]    Date of Service: 7/15/2025     Tentative placement recommendation: Subacute Rehab  Equipment recommendation: To be determined      Evaluating Physical Therapist: Puneet Shirley PT, DPT #[id number]      Specific Provider Orders/Date/Referring Provider :     07/14/25 1700    PT eval and treat  Start:  07/14/25 1700,   End:  07/14/25 1700,   ONE TIME,   Standing Count:  1 Occurrences,   R       Shen Solo, DO Acknowledge New    Admitting Diagnosis:   Hypokalemia [E87.6]  Colitis [K52.9]  Supratherapeutic INR [R79.1]  Hypotension, unspecified hypotension type [I95.9]      Surgery: none  Visit Diagnoses         Codes      Colitis     K52.9      Supratherapeutic INR     R79.1            Patient Active Problem List   Diagnosis    Atrial fibrillation with RVR (HCC)    Perforated diverticulitis    Shock (HCC)    Sepsis secondary to UTI (HCC)    Sepsis due to urinary tract infection (HCC)    Persistent atrial fibrillation (HCC)    Septic shock (HCC)    Palliative care encounter    Hypokalemia    Hypotension        ASSESSMENT of Current Deficits Patient exhibits decreased strength, balance, and endurance impairing functional mobility, transfers, gait , gait distance, and tolerance to activity. Pt pleasantly confused during session with increased time needed for all function. Pt required MaxA for bed mobility and was unable to stand upon 2 attempts with therapist.       PHYSICAL THERAPY  PLAN OF CARE       Physical therapy plan of care is established based on physician order,  patient diagnosis and clinical assessment    Current Treatment Recommendations:    -Bed Mobility: Lower and upper extremity exercises, and trunk control activities  -Sitting Balance: Incorporate reaching activities to activate trunk muscles , Hands on support to maintain midline , Facilitate

## 2025-07-15 NOTE — PROGRESS NOTES
OCCUPATIONAL THERAPY INITIAL EVALUATION    [facility]  [address] [place]        Date:7/15/2025                                                  Patient Name: Susanna Arriola    MRN: [id number]    : 1955    Room: [address] [place] [address]      Evaluating OT: Moisés Wei OTR/L; #[id number]     Referring Provider and Specific Provider Orders/Date:      25  OT eval and treat  Start:  25,   End:  25,   ONE TIME,   Standing Count:  1 Occurrences,   R         Shen Solo, DO        Placement Recommendation: Subacute Rehab       Diagnosis:   1. Hypotension, unspecified hypotension type    2. Hypokalemia    3. Colitis    4. Supratherapeutic INR         Surgery: None      Pertinent Medical History:       Past Medical History:   Diagnosis Date    Atrial fibrillation (HCC)     Dementia (HCC)     History of cardioversion 10/2021         Past Surgical History:   Procedure Laterality Date    COLECTOMY N/A 3/16/2025    OPEN SIGMOID COLON RESECTION; COLOSTOMY performed by Levi Bunn MD at [facility] [place] OR        Precautions:  Fall Risk, ESBL Contact, Limb restriction, NC O2, Jimenes      Assessment of current deficits:     [x] Functional mobility  [x]ADLs  [x] Strength               [x]Cognition    [x] Functional transfers   [x] IADLs         [x] Safety Awareness   [x]Endurance    [] Fine Coordination              [x] Balance      [] Vision/perception   []Sensation     []Gross Motor Coordination  [] ROM  [] Delirium                   [] Motor Control     OT PLAN OF CARE   OT POC based on physician orders, patient diagnosis and results of clinical assessment    Frequency/Duration 1-3 days/wk for 2 weeks PRN     Specific OT Treatment Interventions to include:   * Instruction/training on adapted ADL techniques and AE recommendations to increase functional independence within precautions       * Training on energy conservation strategies, correct

## 2025-07-15 NOTE — PLAN OF CARE
Problem: Safety - Adult  Goal: Free from fall injury  Outcome: Progressing     Problem: Pain  Goal: Verbalizes/displays adequate comfort level or baseline comfort level  Outcome: Progressing     Problem: Skin/Tissue Integrity  Goal: Skin integrity remains intact  Description: 1.  Monitor for areas of redness and/or skin breakdown  2.  Assess vascular access sites hourly  3.  Every 4-6 hours minimum:  Change oxygen saturation probe site  4.  Every 4-6 hours:  If on nasal continuous positive airway pressure, respiratory therapy assess nares and determine need for appliance change or resting period  Outcome: Progressing  Flowsheets  Taken 7/15/2025 0046  Skin Integrity Remains Intact: Monitor for areas of redness and/or skin breakdown  Taken 7/14/2025 2000  Skin Integrity Remains Intact:   Monitor for areas of redness and/or skin breakdown   Turn and reposition as indicated

## 2025-07-15 NOTE — PROGRESS NOTES
Cardiology  Progress Note      SUBJECTIVE:  Confused.  No acute distress.    Current Inpatient Medications  Current Facility-Administered Medications: pantoprazole (PROTONIX) tablet 40 mg, 40 mg, Oral, QAM AC  ipratropium 0.5 mg-albuterol 2.5 mg (DUONEB) nebulizer solution 1 Dose, 1 Dose, Inhalation, Q4H PRN  polyethylene glycol (GLYCOLAX) packet 17 g, 17 g, Oral, Daily  senna (SENOKOT) tablet 8.6 mg, 1 tablet, Oral, Nightly  anastrozole (ARIMIDEX) tablet 1 mg, 1 mg, Oral, Daily  apixaban (ELIQUIS) tablet 5 mg, 5 mg, Oral, BID  donepezil (ARICEPT) tablet 5 mg, 5 mg, Oral, Nightly  [Held by provider] furosemide (LASIX) tablet 20 mg, 20 mg, Oral, Daily  lactobacillus (CULTURELLE) capsule 1 capsule, 1 capsule, Oral, Daily with breakfast  midodrine (PROAMATINE) tablet 5 mg, 5 mg, Oral, TID WC  OXcarbazepine (TRILEPTAL) tablet 600 mg, 600 mg, Oral, BID  sertraline (ZOLOFT) tablet 100 mg, 100 mg, Oral, Daily  atorvastatin (LIPITOR) tablet 10 mg, 10 mg, Oral, Daily  zonisamide (ZONEGRAN) capsule 200 mg, 200 mg, Oral, BID  metroNIDAZOLE (FLAGYL) 500 mg in 0.9% NaCl 100 mL IVPB premix, 500 mg, IntraVENous, Q8H  cefTRIAXone (ROCEPHIN) 1,000 mg in sterile water 10 mL IV syringe, 1,000 mg, IntraVENous, Q24H  metoprolol tartrate (LOPRESSOR) tablet 75 mg, 75 mg, Oral, BID  docusate sodium (COLACE) capsule 100 mg, 100 mg, Oral, BID  acetaminophen (TYLENOL) tablet 650 mg, 650 mg, Oral, Q6H PRN  prochlorperazine (COMPAZINE) injection 10 mg, 10 mg, IntraVENous, Q6H PRN  melatonin disintegrating tablet 5 mg, 5 mg, Oral, Nightly PRN  sodium phosphate 15 mmol in sodium chloride 0.9 % 250 mL IVPB, 15 mmol, IntraVENous, PRN  magnesium sulfate 2000 mg in 50 mL IVPB premix, 2,000 mg, IntraVENous, PRN  potassium chloride (KLOR-CON M) extended release tablet 40 mEq, 40 mEq, Oral, PRN **OR** potassium bicarb-citric acid (EFFER-K) effervescent tablet 40 mEq, 40 mEq, Oral, PRN **OR** potassium chloride 10 mEq/100 mL IVPB (Peripheral Line), 10

## 2025-07-15 NOTE — PLAN OF CARE
Problem: Safety - Adult  Goal: Free from fall injury  7/15/2025 1131 by More Bland RN  Outcome: Progressing     Problem: Discharge Planning  Goal: Discharge to home or other facility with appropriate resources  Outcome: Progressing     Problem: Pain  Goal: Verbalizes/displays adequate comfort level or baseline comfort level  7/15/2025 1131 by More Bland RN  Outcome: Progressing     Problem: Skin/Tissue Integrity  Goal: Skin integrity remains intact  Description: 1.  Monitor for areas of redness and/or skin breakdown  2.  Assess vascular access sites hourly  3.  Every 4-6 hours minimum:  Change oxygen saturation probe site  4.  Every 4-6 hours:  If on nasal continuous positive airway pressure, respiratory therapy assess nares and determine need for appliance change or resting period  7/15/2025 1131 by More Bland RN  Outcome: Progressing  Flowsheets (Taken 7/15/2025 1129)  Skin Integrity Remains Intact: Monitor for areas of redness and/or skin breakdown     Problem: ABCDS Injury Assessment  Goal: Absence of physical injury  7/15/2025 1131 by More Bland RN  Outcome: Progressing

## 2025-07-15 NOTE — CARE COORDINATION
7/15/1015 1230 CM Note:  Pt plan is to go to rehab at d/c.  She has been accepted at [facility] where her  is for rehab as well.  WILL NEED MENDYERT, TERRIE(Initiated) and a Signed JANA.  [facility] liaison made aware to start PRECERT once PT eval is in, OT eval is done. CM will follow. Electronically signed by Radha Mcgraw RN on 7/15/2025 at 12:48 PM    19-Apr-2018

## 2025-07-15 NOTE — PROGRESS NOTES
Internal Medicine Consult Note    [facility]    Romeo Drake D.O., AJOLisaI.                    Shen Solo D.O., ALICIAILisa Jara D.O.     Jack Moscoso, MSN, APRN-CNP  Rishi Ruiz, MSN, APRN-CNP  Conchis Bradford, MSN. APRN-NP-C  Belen Curran MSN, APRN, ACNP-AG     Primary Care Physician: Lay Hunter MD   Admitting Physician:  Romeo Drake DO  Admission date and time: 7/10/2025  4:47 PM    Room:  [address] [place] [address]  Admitting diagnosis: Hypokalemia [E87.6]  Colitis [K52.9]  Supratherapeutic INR [R79.1]  Hypotension, unspecified hypotension type [I95.9]    Patient Name: Susanna Arriola  MRN: [id number]    Date of Service: 7/15/2025     Subjective:  Susanna is a 70 y.o. female who was seen and examined today,7/15/2025, at the bedside.  Susanna appears relatively comfortable during my examination.  She is chronically confused but is awake, alert, pleasant and cooperative.  We have reviewed the results of workup, plan of care moving forward and discharge planning with the patient.  She is eager for discharge when she will can be arranged.  She is agreeable with nursing facility placement.  No other new symptoms or concerns. There are no family members present during my examination.    Review of System:   Constitutional:   Debilitating malaise and fatigue.  No fevers or chills.  HEENT:   Denies ear pain, sore throat, sinus or eye problems.  Cardiovascular:   Denies any chest pain, irregular heartbeats, or palpitations.   Respiratory:   No significant residual shortness of breath.  No significant coughing or sputum production.  Gastrointestinal:   Increased ostomy output following bowel regimen.  No reported pain, vomiting.  Genitourinary:    Decreased urinary output reported yesterday which has improved.  Extremities:   Denies lower extremity swelling, edema or cyanosis.   Neurology:    Denies any headache or focal neurological deficits,

## 2025-07-16 VITALS
HEART RATE: 128 BPM | OXYGEN SATURATION: 100 % | HEIGHT: 65 IN | BODY MASS INDEX: 32.37 KG/M2 | TEMPERATURE: 97.8 F | WEIGHT: 194.3 LBS | SYSTOLIC BLOOD PRESSURE: 100 MMHG | RESPIRATION RATE: 24 BRPM | DIASTOLIC BLOOD PRESSURE: 84 MMHG

## 2025-07-16 LAB
ANION GAP SERPL CALCULATED.3IONS-SCNC: 9 MMOL/L (ref 7–16)
BASOPHILS # BLD: 0 K/UL (ref 0–0.2)
BASOPHILS NFR BLD: 0 % (ref 0–2)
BUN SERPL-MCNC: 10 MG/DL (ref 8–23)
CALCIUM SERPL-MCNC: 7.8 MG/DL (ref 8.8–10.2)
CHLORIDE SERPL-SCNC: 115 MMOL/L (ref 98–107)
CO2 SERPL-SCNC: 18 MMOL/L (ref 22–29)
CREAT SERPL-MCNC: 1 MG/DL (ref 0.5–1)
EOSINOPHIL # BLD: 0 K/UL (ref 0.05–0.5)
EOSINOPHILS RELATIVE PERCENT: 0 % (ref 0–6)
ERYTHROCYTE [DISTWIDTH] IN BLOOD BY AUTOMATED COUNT: 22.4 % (ref 11.5–15)
GFR, ESTIMATED: 57 ML/MIN/1.73M2
GLUCOSE SERPL-MCNC: 88 MG/DL (ref 74–99)
HCT VFR BLD AUTO: 30.6 % (ref 34–48)
HGB BLD-MCNC: 9.4 G/DL (ref 11.5–15.5)
INR PPP: 1.7
LYMPHOCYTES NFR BLD: 0.68 K/UL (ref 1.5–4)
LYMPHOCYTES RELATIVE PERCENT: 13 % (ref 20–42)
MAGNESIUM SERPL-MCNC: 1.8 MG/DL (ref 1.6–2.4)
MCH RBC QN AUTO: 26.1 PG (ref 26–35)
MCHC RBC AUTO-ENTMCNC: 30.7 G/DL (ref 32–34.5)
MCV RBC AUTO: 85 FL (ref 80–99.9)
MONOCYTES NFR BLD: 0.41 K/UL (ref 0.1–0.95)
MONOCYTES NFR BLD: 8 % (ref 2–12)
NEUTROPHILS NFR BLD: 79 % (ref 43–80)
NEUTS SEG NFR BLD: 4.11 K/UL (ref 1.8–7.3)
PHOSPHATE SERPL-MCNC: 3.1 MG/DL (ref 2.5–4.5)
PLATELET # BLD AUTO: 228 K/UL (ref 130–450)
PMV BLD AUTO: 9.9 FL (ref 7–12)
POTASSIUM SERPL-SCNC: 4.4 MMOL/L (ref 3.5–5.1)
PROTHROMBIN TIME: 18.3 SEC (ref 9.3–12.4)
RBC # BLD AUTO: 3.6 M/UL (ref 3.5–5.5)
RBC # BLD: ABNORMAL 10*6/UL
SODIUM SERPL-SCNC: 143 MMOL/L (ref 136–145)
WBC OTHER # BLD: 5.2 K/UL (ref 4.5–11.5)

## 2025-07-16 PROCEDURE — 92526 ORAL FUNCTION THERAPY: CPT

## 2025-07-16 PROCEDURE — 6370000000 HC RX 637 (ALT 250 FOR IP)

## 2025-07-16 PROCEDURE — 83735 ASSAY OF MAGNESIUM: CPT

## 2025-07-16 PROCEDURE — 85610 PROTHROMBIN TIME: CPT

## 2025-07-16 PROCEDURE — 6360000002 HC RX W HCPCS: Performed by: INTERNAL MEDICINE

## 2025-07-16 PROCEDURE — 6370000000 HC RX 637 (ALT 250 FOR IP): Performed by: INTERNAL MEDICINE

## 2025-07-16 PROCEDURE — 85025 COMPLETE CBC W/AUTO DIFF WBC: CPT

## 2025-07-16 PROCEDURE — 36415 COLL VENOUS BLD VENIPUNCTURE: CPT

## 2025-07-16 PROCEDURE — 84100 ASSAY OF PHOSPHORUS: CPT

## 2025-07-16 PROCEDURE — 6370000000 HC RX 637 (ALT 250 FOR IP): Performed by: SURGERY

## 2025-07-16 PROCEDURE — 80048 BASIC METABOLIC PNL TOTAL CA: CPT

## 2025-07-16 PROCEDURE — 6360000002 HC RX W HCPCS

## 2025-07-16 RX ORDER — CEFDINIR 300 MG/1
300 CAPSULE ORAL 2 TIMES DAILY
DISCHARGE
Start: 2025-07-16 | End: 2025-07-23

## 2025-07-16 RX ORDER — METOPROLOL TARTRATE 75 MG/1
75 TABLET ORAL 2 TIMES DAILY
DISCHARGE
Start: 2025-07-16

## 2025-07-16 RX ORDER — METRONIDAZOLE 500 MG/1
500 TABLET ORAL 3 TIMES DAILY
DISCHARGE
Start: 2025-07-16 | End: 2025-07-23

## 2025-07-16 RX ORDER — ATORVASTATIN CALCIUM 10 MG/1
10 TABLET, FILM COATED ORAL DAILY
DISCHARGE
Start: 2025-07-17

## 2025-07-16 RX ORDER — MECOBALAMIN 5000 MCG
5 TABLET,DISINTEGRATING ORAL NIGHTLY PRN
DISCHARGE
Start: 2025-07-16

## 2025-07-16 RX ORDER — SENNOSIDES 8.6 MG/1
1 TABLET ORAL NIGHTLY
DISCHARGE
Start: 2025-07-16 | End: 2025-08-15

## 2025-07-16 RX ORDER — IPRATROPIUM BROMIDE AND ALBUTEROL SULFATE 2.5; .5 MG/3ML; MG/3ML
3 SOLUTION RESPIRATORY (INHALATION) EVERY 4 HOURS PRN
DISCHARGE
Start: 2025-07-16

## 2025-07-16 RX ORDER — MIDODRINE HYDROCHLORIDE 5 MG/1
5 TABLET ORAL
DISCHARGE
Start: 2025-07-16

## 2025-07-16 RX ORDER — PSEUDOEPHEDRINE HCL 30 MG
100 TABLET ORAL 2 TIMES DAILY
DISCHARGE
Start: 2025-07-16

## 2025-07-16 RX ORDER — POLYETHYLENE GLYCOL 3350 17 G/17G
17 POWDER, FOR SOLUTION ORAL DAILY
DISCHARGE
Start: 2025-07-17 | End: 2025-08-16

## 2025-07-16 RX ADMIN — OXCARBAZEPINE 600 MG: 300 TABLET, FILM COATED ORAL at 09:44

## 2025-07-16 RX ADMIN — METRONIDAZOLE 500 MG: 500 INJECTION, SOLUTION INTRAVENOUS at 05:23

## 2025-07-16 RX ADMIN — ANASTROZOLE 1 MG: 1 TABLET ORAL at 09:45

## 2025-07-16 RX ADMIN — SODIUM CHLORIDE AND POTASSIUM CHLORIDE: .9; .15 SOLUTION INTRAVENOUS at 03:54

## 2025-07-16 RX ADMIN — POLYETHYLENE GLYCOL 3350 17 G: 17 POWDER, FOR SOLUTION ORAL at 09:45

## 2025-07-16 RX ADMIN — MIDODRINE HYDROCHLORIDE 5 MG: 5 TABLET ORAL at 09:44

## 2025-07-16 RX ADMIN — DOCUSATE SODIUM 100 MG: 100 CAPSULE, LIQUID FILLED ORAL at 09:44

## 2025-07-16 RX ADMIN — PANTOPRAZOLE SODIUM 40 MG: 40 TABLET, DELAYED RELEASE ORAL at 06:14

## 2025-07-16 RX ADMIN — Medication 1 CAPSULE: at 09:44

## 2025-07-16 RX ADMIN — ATORVASTATIN CALCIUM 10 MG: 20 TABLET, FILM COATED ORAL at 09:44

## 2025-07-16 RX ADMIN — MIDODRINE HYDROCHLORIDE 5 MG: 5 TABLET ORAL at 12:05

## 2025-07-16 RX ADMIN — MIDODRINE HYDROCHLORIDE 5 MG: 5 TABLET ORAL at 16:06

## 2025-07-16 RX ADMIN — SERTRALINE 100 MG: 50 TABLET, FILM COATED ORAL at 09:45

## 2025-07-16 RX ADMIN — APIXABAN 5 MG: 5 TABLET, FILM COATED ORAL at 09:44

## 2025-07-16 RX ADMIN — METRONIDAZOLE 500 MG: 500 INJECTION, SOLUTION INTRAVENOUS at 16:10

## 2025-07-16 RX ADMIN — ZONISAMIDE 200 MG: 100 CAPSULE ORAL at 09:44

## 2025-07-16 NOTE — PROGRESS NOTES
Cardiology  Progress Note      SUBJECTIVE: Confusion.  No acute distress    Current Inpatient Medications  Current Facility-Administered Medications: pantoprazole (PROTONIX) tablet 40 mg, 40 mg, Oral, QAM AC  ipratropium 0.5 mg-albuterol 2.5 mg (DUONEB) nebulizer solution 1 Dose, 1 Dose, Inhalation, Q4H PRN  polyethylene glycol (GLYCOLAX) packet 17 g, 17 g, Oral, Daily  senna (SENOKOT) tablet 8.6 mg, 1 tablet, Oral, Nightly  anastrozole (ARIMIDEX) tablet 1 mg, 1 mg, Oral, Daily  apixaban (ELIQUIS) tablet 5 mg, 5 mg, Oral, BID  donepezil (ARICEPT) tablet 5 mg, 5 mg, Oral, Nightly  [Held by provider] furosemide (LASIX) tablet 20 mg, 20 mg, Oral, Daily  lactobacillus (CULTURELLE) capsule 1 capsule, 1 capsule, Oral, Daily with breakfast  midodrine (PROAMATINE) tablet 5 mg, 5 mg, Oral, TID WC  OXcarbazepine (TRILEPTAL) tablet 600 mg, 600 mg, Oral, BID  sertraline (ZOLOFT) tablet 100 mg, 100 mg, Oral, Daily  atorvastatin (LIPITOR) tablet 10 mg, 10 mg, Oral, Daily  zonisamide (ZONEGRAN) capsule 200 mg, 200 mg, Oral, BID  metroNIDAZOLE (FLAGYL) 500 mg in 0.9% NaCl 100 mL IVPB premix, 500 mg, IntraVENous, Q8H  cefTRIAXone (ROCEPHIN) 1,000 mg in sterile water 10 mL IV syringe, 1,000 mg, IntraVENous, Q24H  metoprolol tartrate (LOPRESSOR) tablet 75 mg, 75 mg, Oral, BID  docusate sodium (COLACE) capsule 100 mg, 100 mg, Oral, BID  acetaminophen (TYLENOL) tablet 650 mg, 650 mg, Oral, Q6H PRN  prochlorperazine (COMPAZINE) injection 10 mg, 10 mg, IntraVENous, Q6H PRN  melatonin disintegrating tablet 5 mg, 5 mg, Oral, Nightly PRN  sodium phosphate 15 mmol in sodium chloride 0.9 % 250 mL IVPB, 15 mmol, IntraVENous, PRN  magnesium sulfate 2000 mg in 50 mL IVPB premix, 2,000 mg, IntraVENous, PRN  potassium chloride (KLOR-CON M) extended release tablet 40 mEq, 40 mEq, Oral, PRN **OR** potassium bicarb-citric acid (EFFER-K) effervescent tablet 40 mEq, 40 mEq, Oral, PRN **OR** potassium chloride 10 mEq/100 mL IVPB (Peripheral Line), 10

## 2025-07-16 NOTE — PLAN OF CARE
Problem: Safety - Adult  Goal: Free from fall injury  7/15/2025 2351 by Oneyda Iqbal RN  Outcome: Progressing  Flowsheets (Taken 7/15/2025 2348)  Free From Fall Injury: Instruct family/caregiver on patient safety  7/15/2025 1131 by More Bland RN  Outcome: Progressing     Problem: Discharge Planning  Goal: Discharge to home or other facility with appropriate resources  7/15/2025 2351 by Oneyda Iqbal RN  Outcome: Progressing  7/15/2025 1131 by More Bland RN  Outcome: Progressing     Problem: Pain  Goal: Verbalizes/displays adequate comfort level or baseline comfort level  7/15/2025 2351 by Oneyda Iqbal RN  Outcome: Progressing  7/15/2025 1131 by More Bland RN  Outcome: Progressing     Problem: Skin/Tissue Integrity  Goal: Skin integrity remains intact  Description: 1.  Monitor for areas of redness and/or skin breakdown  2.  Assess vascular access sites hourly  3.  Every 4-6 hours minimum:  Change oxygen saturation probe site  4.  Every 4-6 hours:  If on nasal continuous positive airway pressure, respiratory therapy assess nares and determine need for appliance change or resting period  7/15/2025 2351 by Oneyda Iqbal RN  Outcome: Progressing  Flowsheets (Taken 7/15/2025 2348)  Skin Integrity Remains Intact:   Monitor for areas of redness and/or skin breakdown   Turn and reposition as indicated  7/15/2025 1131 by More Bland RN  Outcome: Progressing  Flowsheets (Taken 7/15/2025 1129)  Skin Integrity Remains Intact: Monitor for areas of redness and/or skin breakdown     Problem: ABCDS Injury Assessment  Goal: Absence of physical injury  7/15/2025 2351 by Oneyda Iqbal RN  Outcome: Progressing  Flowsheets (Taken 7/15/2025 2348)  Absence of Physical Injury: Implement safety measures based on patient assessment  7/15/2025 1131 by More Bland RN  Outcome: Progressing

## 2025-07-16 NOTE — PROGRESS NOTES
Spiritual Health History and Assessment/Progress Note  [facility]     Encounter, Rituals, Rites and Sacraments,  ,  ,      Name: Susanna Arriola MRN: [id number]    Age: 70 y.o.     Sex: female   Language: English   Yazdanism: Mormon   Hypokalemia     Date: 7/16/2025                           Spiritual Assessment began in [facility] IMCU        Referral/Consult From: Rounding   Encounter Overview/Reason:  Encounter, Rituals, Rites and Sacraments  Service Provided For: Patient    Tala, Belief, Meaning:   Patient is connected with a tala tradition or spiritual practice  Family/Friends No family/friends present      Importance and Influence:  Patient has no beliefs influential to healthcare decision-making identified during this visit  Family/Friends No family/friends present    Community:  Patient feels well-supported. Support system includes: Spouse/Partner and Children  Family/Friends No family/friends present    Assessment and Plan of Care:     Patient Interventions include: Facilitated expression of thoughts and feelings, Affirmed coping skills/support systems, and Provided sacramental/Jainism ritual  Family/Friends Interventions include: No family/friends present    Patient Plan of Care: Spiritual Care available upon further referral  Family/Friends Plan of Care: Spiritual Care available upon further referral    Electronically signed by Chaplain Nadeem on 7/16/2025 at 4:17 PM

## 2025-07-16 NOTE — PROGRESS NOTES
This nurse attempted to call nurse to nurse report to [facility] x4 times. No answer.

## 2025-07-16 NOTE — DISCHARGE INSTR - COC
Continuity of Care Form    Patient Name: Susanna Arriola   :  1955  MRN:  [id number]    Admit date:  7/10/2025  Discharge date:  2025    Code Status Order: Full Code   Advance Directives:     Admitting Physician:  Romeo Drake DO  PCP: Lay Hunter MD    Discharging [facility]36-01  Discharging Unit Phone Number: [phone]    Emergency Contact:   Extended Emergency Contact Information  Primary Emergency Contact: Erasmo Arriola  Address: [address] [place] [id number] [place]  Home Phone: [phone]  Mobile Phone: [phone]  Relation: Spouse   needed? No  Secondary Emergency Contact: FINAANNA  Home Phone: [phone]  Relation: Child  Preferred language: English   needed? No    Past Surgical History:  Past Surgical History:   Procedure Laterality Date    COLECTOMY N/A 3/16/2025    OPEN SIGMOID COLON RESECTION; COLOSTOMY performed by Levi Bunn MD at [facility] OR       Immunization History:   Immunization History   Administered Date(s) Administered    COVID-19, MODERNA, , (age 12y+), IM, 50mcg/0.5mL 10/09/2024    COVID-19, PFIZER PURPLE top, DILUTE for use, (age 12 y+), 30mcg/0.3mL 2021, 2021, 10/12/2021       Active Problems:  Patient Active Problem List   Diagnosis Code    Atrial fibrillation with RVR ([facility]) I48.91    Perforated diverticulitis K57.92    Shock (HCC) R57.9    Sepsis secondary to UTI (HCC) A41.9    Sepsis due to urinary tract infection (HCC) A41.9, N39.0    Persistent atrial fibrillation (HCC) I48.19    Septic shock (HCC) A41.9, R65.21    Palliative care encounter Z51.5    Hypokalemia E87.6    Hypotension I95.9       Isolation/Infection:   Isolation            Contact          Patient Infection Status        Infection Onset Added Last Indicated Last Indicated By Review Planned Expiration    ESBL (Extended Spectrum Beta

## 2025-07-16 NOTE — PROGRESS NOTES
SPEECH LANGUAGE PATHOLOGY  DAILY PROGRESS NOTE        PATIENT NAME:  Susanna Arriola      :  1955          TODAY'S DATE:  2025 ROOM:  [address] [place] [address]    Patient was seen for dysphagia mgmt. Patient was alert but confused upon entry and repositioned with head raised greater than 75 degrees. Patient consumed thin liquids via straw, puree, and solids. Patient oral phase displayed efficient mastication, good oral containment and good AP propulsion. Patient pharyngeal phase displayed no overt s/s of aspiration. Recommend patient continue on current diet (regular/thin), recommend to encourage self feeding as function allows.       CPT code(s) 01870  dysphagia tx  Total minutes :  15 minutes    Radha Chilel  Speech Pathology  Student Clinician

## 2025-07-16 NOTE — DISCHARGE SUMMARY
Internal Medicine Progress Note     [facility]     Romeo Drake D.O., EDITH Solo D.O., EDITH Jara D.O.   __________________________________________    Jack Moscoso, MSN, APRN-CNP  Rishi Ruiz, MSN, APRN, NP-C  Conchis Bradford, MSN, APRN, NP-C  Belen Curran, MSN, APRN, NP-C       Internal Medicine  Discharge Summary    NAME: Susanna Arriola  :  1955  MRN:  [id number]  PCP:Lay Hunter MD  ADMITTED: 7/10/2025      DISCHARGED: 25    ADMITTING PHYSICIAN: Romeo Drake DO    CONSULTANT(S):   IP CONSULT TO CARDIOLOGY  IP CONSULT TO GENERAL SURGERY  IP CONSULT TO VASCULAR ACCESS TEAM  IP CONSULT TO SOCIAL WORK     ADMITTING DIAGNOSIS:   Hypokalemia [E87.6]  Colitis [K52.9]  Supratherapeutic INR [R79.1]  Hypotension, unspecified hypotension type [I95.9]     DISCHARGE DIAGNOSES:   Acute colitis in the setting of constipation with extensive abdominal history that includes C. difficile infection and perforated sigmoid diverticulitis necessitating exploratory laparotomy with sigmoid colon resection and descending colostomy creation  Multifactorial syncope  Supratherapeutic INR in the setting of concomitant use of both Eliquis and Coumadin the latter which was discontinued on last hospitalization  Acute on chronic anemia  Essential hypertension  Paroxysmal atrial fibrillation  Right breast cancer status postmastectomy/implant placement  Depression  Hyperlipidemia  GERD  Class I obesity BMI 31.45 kg/m²    BRIEF HISTORY OF PRESENT ILLNESS:   Patient presents to the emergency room after being a rapid response team while she was visiting a family member upstairs.  She cannot tell me what happened because she is screaming that her left arm hurts.  The nurses are programming fluids/potassium to infuse through an IV in the left arm.  She is too preoccupied with the infusion going to provide HPI.  Her nurse was able to  stable for discharge with outpatient follow-up.    I have spoken with the patient and discussed the results of the current hospitalization, in addition to providing specific details for the plan of care and counseling regarding the diagnosis and prognosis.  The plan has been discussed in detail and they are aware of the specific conditions for emergent return, as well as the importance of follow-up.  Their questions are answered at this time and they are agreeable with the plan for discharge to nursing home    DISCHARGE MEDICATIONS:   Current Discharge Medication List             Details   atorvastatin (LIPITOR) 10 MG tablet Take 1 tablet by mouth daily      docusate sodium (COLACE, DULCOLAX) 100 MG CAPS Take 100 mg by mouth 2 times daily      polyethylene glycol (GLYCOLAX) 17 g packet Take 1 packet by mouth daily      senna (SENOKOT) 8.6 MG tablet Take 1 tablet by mouth nightly      melatonin 5 MG TBDP disintegrating tablet Take 1 tablet by mouth nightly as needed (sleep)      ipratropium 0.5 mg-albuterol 2.5 mg (DUONEB) 0.5-2.5 (3) MG/3ML SOLN nebulizer solution Inhale 3 mLs into the lungs every 4 hours as needed for Shortness of Breath      cefdinir (OMNICEF) 300 MG capsule Take 1 capsule by mouth 2 times daily for 7 days      metroNIDAZOLE (FLAGYL) 500 MG tablet Take 1 tablet by mouth 3 times daily for 7 days . Do not drink alcohol while taking this medication and for 48 hours after completion as it will make you very ill.                Details   metoprolol tartrate 75 MG TABS Take 75 mg by mouth 2 times daily      midodrine (PROAMATINE) 5 MG tablet Take 1 tablet by mouth 3 times daily (with meals)                Details   apixaban (ELIQUIS) 5 MG TABS tablet Take 1 tablet by mouth 2 times daily      lactobacillus (CULTURELLE) capsule Take 1 capsule by mouth daily (with breakfast)      furosemide (LASIX) 40 MG tablet Take 0.5 tablets by mouth daily      zonisamide (ZONEGRAN) 100 MG capsule Take 2 capsules by

## 2025-07-16 NOTE — CARE COORDINATION
7/16/2025 1330 CM Note:  Pt is being discharged to [facility] today and PRECERT was obtained this am per Sayra owens and is good through 7/21.  HENS completed and JANA is Signed.  [facility] is scheduled to transport her via stretcher @ 1830, ambulance form completed and placed with the soft chart. Sayra liaison aware and pt's  and son Jose G.  CM will follow. Electronically signed by Radha Mcgraw RN on 7/16/2025 at 2:08 PM

## 2025-08-03 PROBLEM — I49.5 SICK SINUS SYNDROME (HCC): Status: ACTIVE | Noted: 2024-01-01

## 2025-08-03 PROBLEM — E78.5 HYPERLIPIDEMIA: Status: ACTIVE | Noted: 2024-01-01

## 2025-08-03 PROBLEM — K21.9 GASTRO-ESOPHAGEAL REFLUX DISEASE WITHOUT ESOPHAGITIS: Status: ACTIVE | Noted: 2025-01-01

## 2025-08-03 PROBLEM — E87.6 HYPOKALEMIA: Chronic | Status: ACTIVE | Noted: 2025-01-01

## 2025-08-03 PROBLEM — I50.23 ACUTE ON CHRONIC HEART FAILURE WITH MILDLY REDUCED EJECTION FRACTION (HFMREF, 41-49%) (HCC): Status: ACTIVE | Noted: 2025-01-01

## 2025-08-03 PROBLEM — Z85.3 PERSONAL HISTORY OF MALIGNANT NEOPLASM OF BREAST: Status: ACTIVE | Noted: 2025-01-01

## 2025-08-03 PROBLEM — F41.9 ANXIETY DISORDER: Status: ACTIVE | Noted: 2024-01-01

## 2025-08-03 PROBLEM — Z93.3 COLOSTOMY STATUS (HCC): Status: ACTIVE | Noted: 2025-01-01

## 2025-08-03 PROBLEM — I50.9 ACUTE EXACERBATION OF CHRONIC HEART FAILURE (HCC): Status: ACTIVE | Noted: 2025-01-01

## 2025-08-03 PROBLEM — F33.9 MAJOR DEPRESSIVE DISORDER, RECURRENT, UNSPECIFIED: Status: ACTIVE | Noted: 2025-01-01

## 2025-08-03 PROBLEM — J90 PLEURAL EFFUSION: Status: ACTIVE | Noted: 2024-01-01

## 2025-08-03 PROBLEM — I05.2 NONRHEUMATIC MITRAL VALVE STENOSIS WITH REGURGITATION: Status: ACTIVE | Noted: 2024-01-01

## 2025-08-03 PROBLEM — G91.2 (IDIOPATHIC) NORMAL PRESSURE HYDROCEPHALUS (HCC): Status: ACTIVE | Noted: 2025-01-01

## 2025-08-03 PROBLEM — R62.7 ADULT FAILURE TO THRIVE SYNDROME: Status: ACTIVE | Noted: 2024-01-01

## 2025-08-03 PROBLEM — E44.0 MODERATE PROTEIN-CALORIE MALNUTRITION: Status: ACTIVE | Noted: 2025-01-01

## 2025-08-03 PROBLEM — I48.91 ATRIAL FIBRILLATION WITH RVR (HCC): Chronic | Status: ACTIVE | Noted: 2024-07-04

## 2025-08-03 PROBLEM — C50.919 PRIMARY MALIGNANT NEOPLASM OF FEMALE BREAST (HCC): Status: ACTIVE | Noted: 2024-01-01

## 2025-08-03 PROBLEM — J18.9 PNEUMONIA: Status: ACTIVE | Noted: 2024-01-01

## 2025-08-03 PROBLEM — G91.2 (IDIOPATHIC) NORMAL PRESSURE HYDROCEPHALUS (HCC): Chronic | Status: ACTIVE | Noted: 2025-01-01

## 2025-08-03 PROBLEM — Z79.01 LONG TERM CURRENT USE OF ANTICOAGULANT THERAPY: Status: ACTIVE | Noted: 2024-01-01

## 2025-08-03 PROBLEM — I50.23 ACUTE ON CHRONIC HEART FAILURE WITH MILDLY REDUCED EJECTION FRACTION (HFMREF, 41-49%) (HCC): Chronic | Status: ACTIVE | Noted: 2025-01-01

## 2025-08-04 PROBLEM — R41.82 ALTERED MENTAL STATUS: Status: ACTIVE | Noted: 2025-01-01

## 2025-08-04 PROBLEM — Z71.89 GOALS OF CARE, COUNSELING/DISCUSSION: Status: ACTIVE | Noted: 2025-01-01

## 2025-08-05 PROBLEM — I50.43 CHF (CONGESTIVE HEART FAILURE), NYHA CLASS I, ACUTE ON CHRONIC, COMBINED (HCC): Status: ACTIVE | Noted: 2025-01-01

## 2025-08-12 LAB
ABO/RH: NORMAL
ANTIBODY IDENTIFICATION: NORMAL
ANTIBODY SCREEN: POSITIVE
ARM BAND NUMBER: NORMAL
BLOOD BANK BLOOD PRODUCT EXPIRATION DATE: NORMAL
BLOOD BANK BLOOD PRODUCT EXPIRATION DATE: NORMAL
BLOOD BANK COMMENT: NORMAL
BLOOD BANK COMMENT: NORMAL
BLOOD BANK DISPENSE STATUS: NORMAL
BLOOD BANK DISPENSE STATUS: NORMAL
BLOOD BANK ISBT PRODUCT BLOOD TYPE: 1700
BLOOD BANK ISBT PRODUCT BLOOD TYPE: 1700
BLOOD BANK PRODUCT CODE: NORMAL
BLOOD BANK PRODUCT CODE: NORMAL
BLOOD BANK SAMPLE EXPIRATION: NORMAL
BLOOD BANK UNIT TYPE AND RH: NORMAL
BLOOD BANK UNIT TYPE AND RH: NORMAL
BPU ID: NORMAL
BPU ID: NORMAL
COMPONENT: NORMAL
COMPONENT: NORMAL
CROSSMATCH RESULT: NORMAL
CROSSMATCH RESULT: NORMAL
DAT IGG: POSITIVE
ELUATE ANTIBODY IDENTIFICATION: NEGATIVE
TRANSFUSION STATUS: NORMAL
TRANSFUSION STATUS: NORMAL
UNIT DIVISION: 0
UNIT DIVISION: 0
UNIT ISSUE DATE/TIME: NORMAL
UNIT ISSUE DATE/TIME: NORMAL

## 2025-08-13 LAB
MICROORGANISM SPEC CULT: NORMAL
MICROORGANISM SPEC CULT: NORMAL
MICROORGANISM/AGENT SPEC: NORMAL
MICROORGANISM/AGENT SPEC: NORMAL
SERVICE CMNT-IMP: NORMAL
SERVICE CMNT-IMP: NORMAL
SPECIMEN DESCRIPTION: NORMAL
SPECIMEN DESCRIPTION: NORMAL

## 2025-09-06 NOTE — PROGRESS NOTES
Physician Progress Note      PATIENT:               FEDERICO HARDEN  CSN #:                  [id number]  :                       1955  ADMIT DATE:       7/10/2025 4:47 PM  DISCH DATE:        2025 7:21 PM  RESPONDING  PROVIDER #:        Shen Solo DO          QUERY TEXT:    Colitis is documented in the medical record.  Please specify the type of   colitis such as:    The clinical indicators include:  -\"She is agreeable to admission for evaluation of syncope and treatment of   colitis as well as replenishment of potassium\". (7/10 Dr Drake)  -\"mild colitis, with resolving ileus after electrolyte improvement.\", \"Correct   electrolytes as needed. Senna and GlycoLax added. Lactulose once. Continue   Rocephin Flagyl due to thickening of colon concerning for colitis\". ( Dr Brunson)  -\"Acute colitis in the setting of constipation with extensive abdominal   history that includes C. difficile infection and perforated sigmoid   diverticulitis necessitating exploratory laparotomy with sigmoid colon   resection and descending colostomy creation\". ( Dr. Solo,  JUANJOSE Solo)  -Rocephin, Flagyl (MAR)  Options provided:  -- Bacterial Colitis  -- Colitis related to other etiology, Please document other etiology.  -- Other - I will add my own diagnosis  -- Disagree - Not applicable / Not valid  -- Disagree - Clinically unable to determine / Unknown  -- Refer to Clinical Documentation Reviewer    PROVIDER RESPONSE TEXT:    This patient has bacterial colitis.    Query created by: Magali Graham on 2025 11:30 AM      Electronically signed by:  Shen Solo DO 2025 10:50 AM

## 2025-09-07 LAB
MICROORGANISM SPEC CULT: NORMAL
MICROORGANISM/AGENT SPEC: NORMAL
SERVICE CMNT-IMP: NORMAL
SPECIMEN DESCRIPTION: NORMAL

## (undated) DEVICE — GARMENT,MEDLINE,DVT,INT,CALF,MED, GEN2: Brand: MEDLINE

## (undated) DEVICE — GOWN,SIRUS,NONRNF,SETINSLV,XL,20/CS: Brand: MEDLINE

## (undated) DEVICE — ELECTRODE PT RET AD L9FT HI MOIST COND ADH HYDRGEL CORDED

## (undated) DEVICE — APPLIER CLP L SHFT DIA12MM 20 ROT MULT LIGACLP

## (undated) DEVICE — BLADE ES ELASTOMERIC COAT INSUL DURABLE BEND UPTO 90DEG

## (undated) DEVICE — Device

## (undated) DEVICE — ACCESS PLATFORM FOR MINIMALLY INVASIVE SURGERY: Brand: GELPOINT® ADVANCED ACCESS PLATFORM

## (undated) DEVICE — SEALER/DIVIDER LAP SHFT L44CM DIA5MM STR BLNT TIP JAW HND

## (undated) DEVICE — TROCAR: Brand: KII SLEEVE

## (undated) DEVICE — TROCAR: Brand: KII FIOS FIRST ENTRY

## (undated) DEVICE — SOLUTION IV 1000 ML 0.9 NACL INJ USP EXCEL PLAS CONTAINER

## (undated) DEVICE — APPLIER CLP M/L SHFT DIA5MM 15 LIG LIGAMAX 5

## (undated) DEVICE — COLOSTOMY/ILEOSTOMY KIT,FLEXWEAR: Brand: NEW IMAGE

## (undated) DEVICE — 40586 ADVANCED TRENDELENBURG POSITIONING KIT: Brand: 40586 ADVANCED TRENDELENBURG POSITIONING KIT

## (undated) DEVICE — PUMP SUC IRR TBNG L10FT W/ HNDPC ASSEMB STRYKEFLOW 2

## (undated) DEVICE — STAPLER INT L75MM CUT LN L73MM STPL LN L77MM BLU B FRM 8

## (undated) DEVICE — NEEDLE HYPO 25GA L1.5IN BLU POLYPR HUB S STL REG BVL STR

## (undated) DEVICE — MARKER,SKIN,WI/RULER AND LABELS: Brand: MEDLINE

## (undated) DEVICE — BASIC DOUBLE BASIN 2-LF: Brand: MEDLINE INDUSTRIES, INC.

## (undated) DEVICE — GLOVE ORANGE PI 7 1/2   MSG9075

## (undated) DEVICE — STAPLER SKIN L440MM 32MM LNG 12 FIRING B FRM PWR + GRIPPING

## (undated) DEVICE — PLUMEPORT ACTIV LAPAROSCOPIC SMOKE FILTRATION DEVICE: Brand: PLUMEPORT ACTIVE

## (undated) DEVICE — LAPAROSCOPIC SCISSORS: Brand: EPIX LAPAROSCOPIC SCISSORS

## (undated) DEVICE — LIQUIBAND RAPID ADHESIVE 36/CS 0.8ML: Brand: MEDLINE

## (undated) DEVICE — STAPLER EXT 65MM S STL AUTO DISP PURSTRING

## (undated) DEVICE — INSUFFLATION NEEDLE TO ESTABLISH PNEUMOPERITONEUM.: Brand: INSUFFLATION NEEDLE

## (undated) DEVICE — KIT BEDSIDE REVITAL OX 500ML

## (undated) DEVICE — LAPAROSCOPIC WIRE L HK 45 CM

## (undated) DEVICE — CLEANER LENS C-CLEAR

## (undated) DEVICE — YANKAUER,BULB TIP,W/O VENT,RIGID,STERILE: Brand: MEDLINE

## (undated) DEVICE — GLOVE ORANGE PI 8   MSG9080

## (undated) DEVICE — RELOAD STPL L75MM OPN H3.8MM CLS 1.5MM WIRE DIA0.2MM REG

## (undated) DEVICE — SPONGE LAP W18XL18IN WHT COT 4 PLY FLD STRUNG RADPQ DISP ST 2 PER PACK

## (undated) DEVICE — 1LYRTR 16FR10ML100%SIL UMS SNP: Brand: MEDLINE INDUSTRIES, INC.

## (undated) DEVICE — SYRINGE MED 10ML TRNSLUC BRL PLUNG BLK MRK POLYPR CTRL

## (undated) DEVICE — LEGGINGS, PAIR, 31X48, STERILE: Brand: MEDLINE

## (undated) DEVICE — STAPLER INT CUT LN 51MM STPL 51MM BLU CRV HD B FRM

## (undated) DEVICE — 4-PORT MANIFOLD: Brand: NEPTUNE 2

## (undated) DEVICE — WIPES SKIN CLOTH READYPREP 9 X 10.5 IN 2% CHLORHEX GLUCONATE CHG PREOP

## (undated) DEVICE — COVER,LIGHT HANDLE,FLX,1/PK: Brand: MEDLINE INDUSTRIES, INC.

## (undated) DEVICE — RELOAD STPL L60MM H1.5-3.6MM REG TISS BLU GRIPPING SURF B

## (undated) DEVICE — TOWEL,OR,DSP,ST,BLUE,STD,6/PK,12PK/CS: Brand: MEDLINE

## (undated) DEVICE — SYRINGE IRRIG 60ML SFT PLIABLE BLB EZ TO GRP 1 HND USE W/

## (undated) DEVICE — NDL CNTR 40CT FM MAG: Brand: MEDLINE INDUSTRIES, INC.

## (undated) DEVICE — TUBING, SUCTION, 1/4" X 10', STRAIGHT: Brand: MEDLINE

## (undated) DEVICE — COVER,TABLE,44X90,STERILE: Brand: MEDLINE

## (undated) DEVICE — APPLICATOR MEDICATED 26 CC SOLUTION HI LT ORNG CHLORAPREP

## (undated) DEVICE — STAPLER INT L28CM DIA29MM CLS STPL H10-2.5MM OPN LEG L5.5MM